# Patient Record
Sex: FEMALE | Race: WHITE | Employment: OTHER | URBAN - METROPOLITAN AREA
[De-identification: names, ages, dates, MRNs, and addresses within clinical notes are randomized per-mention and may not be internally consistent; named-entity substitution may affect disease eponyms.]

---

## 2017-01-12 ENCOUNTER — TELEPHONE (OUTPATIENT)
Dept: FAMILY MEDICINE CLINIC | Age: 67
End: 2017-01-12

## 2017-01-13 RX ORDER — LISINOPRIL 20 MG/1
TABLET ORAL
Qty: 30 TABLET | Refills: 0 | Status: SHIPPED | OUTPATIENT
Start: 2017-01-13 | End: 2017-07-06 | Stop reason: SDUPTHER

## 2017-01-13 RX ORDER — ZOLPIDEM TARTRATE 5 MG/1
TABLET ORAL
Qty: 30 TABLET | Refills: 0 | OUTPATIENT
Start: 2017-01-13 | End: 2017-10-09 | Stop reason: SDUPTHER

## 2017-01-13 RX ORDER — DICLOFENAC SODIUM 75 MG/1
TABLET, DELAYED RELEASE ORAL
Qty: 60 TABLET | Refills: 0 | Status: SHIPPED | OUTPATIENT
Start: 2017-01-13 | End: 2017-07-06 | Stop reason: SDUPTHER

## 2017-01-13 RX ORDER — EZETIMIBE 10 MG/1
10 TABLET ORAL DAILY
Qty: 30 TABLET | Refills: 0 | Status: SHIPPED | OUTPATIENT
Start: 2017-01-13 | End: 2017-04-13 | Stop reason: SDUPTHER

## 2017-04-13 RX ORDER — EZETIMIBE 10 MG/1
TABLET ORAL
Qty: 30 TABLET | Refills: 0 | Status: SHIPPED | OUTPATIENT
Start: 2017-04-13 | End: 2017-05-11 | Stop reason: SDUPTHER

## 2017-05-11 RX ORDER — EZETIMIBE 10 MG/1
TABLET ORAL
Qty: 30 TABLET | Refills: 0 | Status: SHIPPED | OUTPATIENT
Start: 2017-05-11 | End: 2017-06-12 | Stop reason: SDUPTHER

## 2017-06-12 RX ORDER — EZETIMIBE 10 MG/1
TABLET ORAL
Qty: 30 TABLET | Refills: 2 | Status: SHIPPED | OUTPATIENT
Start: 2017-06-12 | End: 2017-09-12 | Stop reason: SDUPTHER

## 2017-07-19 ENCOUNTER — TELEPHONE (OUTPATIENT)
Dept: OTHER | Facility: CLINIC | Age: 67
End: 2017-07-19

## 2017-09-12 RX ORDER — EZETIMIBE 10 MG/1
TABLET ORAL
Qty: 30 TABLET | Refills: 1 | Status: SHIPPED | OUTPATIENT
Start: 2017-09-12 | End: 2017-11-12 | Stop reason: SDUPTHER

## 2017-09-26 ENCOUNTER — OFFICE VISIT (OUTPATIENT)
Dept: FAMILY MEDICINE CLINIC | Age: 67
End: 2017-09-26

## 2017-09-26 VITALS
SYSTOLIC BLOOD PRESSURE: 132 MMHG | OXYGEN SATURATION: 98 % | BODY MASS INDEX: 34.77 KG/M2 | HEART RATE: 76 BPM | DIASTOLIC BLOOD PRESSURE: 72 MMHG | WEIGHT: 184 LBS | TEMPERATURE: 97 F

## 2017-09-26 DIAGNOSIS — F17.200 TOBACCO DEPENDENCE: ICD-10-CM

## 2017-09-26 DIAGNOSIS — R06.2 WHEEZING: Primary | ICD-10-CM

## 2017-09-26 DIAGNOSIS — R05.9 COUGH: ICD-10-CM

## 2017-09-26 PROCEDURE — 1123F ACP DISCUSS/DSCN MKR DOCD: CPT | Performed by: FAMILY MEDICINE

## 2017-09-26 PROCEDURE — 4004F PT TOBACCO SCREEN RCVD TLK: CPT | Performed by: FAMILY MEDICINE

## 2017-09-26 PROCEDURE — 1090F PRES/ABSN URINE INCON ASSESS: CPT | Performed by: FAMILY MEDICINE

## 2017-09-26 PROCEDURE — G8417 CALC BMI ABV UP PARAM F/U: HCPCS | Performed by: FAMILY MEDICINE

## 2017-09-26 PROCEDURE — 99214 OFFICE O/P EST MOD 30 MIN: CPT | Performed by: FAMILY MEDICINE

## 2017-09-26 PROCEDURE — 4040F PNEUMOC VAC/ADMIN/RCVD: CPT | Performed by: FAMILY MEDICINE

## 2017-09-26 PROCEDURE — 3017F COLORECTAL CA SCREEN DOC REV: CPT | Performed by: FAMILY MEDICINE

## 2017-09-26 PROCEDURE — 3014F SCREEN MAMMO DOC REV: CPT | Performed by: FAMILY MEDICINE

## 2017-09-26 PROCEDURE — G8400 PT W/DXA NO RESULTS DOC: HCPCS | Performed by: FAMILY MEDICINE

## 2017-09-26 PROCEDURE — 94640 AIRWAY INHALATION TREATMENT: CPT | Performed by: FAMILY MEDICINE

## 2017-09-26 PROCEDURE — G8427 DOCREV CUR MEDS BY ELIG CLIN: HCPCS | Performed by: FAMILY MEDICINE

## 2017-09-26 RX ORDER — ALBUTEROL SULFATE 90 UG/1
2 AEROSOL, METERED RESPIRATORY (INHALATION) EVERY 6 HOURS PRN
Qty: 1 INHALER | Refills: 3 | Status: SHIPPED | OUTPATIENT
Start: 2017-09-26 | End: 2017-12-29 | Stop reason: ALTCHOICE

## 2017-09-26 RX ORDER — IPRATROPIUM BROMIDE AND ALBUTEROL SULFATE 2.5; .5 MG/3ML; MG/3ML
1 SOLUTION RESPIRATORY (INHALATION) ONCE
Status: COMPLETED | OUTPATIENT
Start: 2017-09-26 | End: 2017-09-26

## 2017-09-26 RX ORDER — AMOXICILLIN AND CLAVULANATE POTASSIUM 875; 125 MG/1; MG/1
1 TABLET, FILM COATED ORAL 2 TIMES DAILY
Qty: 14 TABLET | Refills: 0 | Status: SHIPPED | OUTPATIENT
Start: 2017-09-26 | End: 2017-10-03

## 2017-09-26 RX ADMIN — IPRATROPIUM BROMIDE AND ALBUTEROL SULFATE 1 AMPULE: 2.5; .5 SOLUTION RESPIRATORY (INHALATION) at 14:58

## 2017-09-26 ASSESSMENT — ENCOUNTER SYMPTOMS
WHEEZING: 1
SINUS PRESSURE: 0
SHORTNESS OF BREATH: 1
COUGH: 1

## 2017-11-01 RX ORDER — LISINOPRIL 20 MG/1
TABLET ORAL
Qty: 90 TABLET | Refills: 2 | Status: SHIPPED | OUTPATIENT
Start: 2017-11-01 | End: 2018-07-30 | Stop reason: SDUPTHER

## 2017-11-13 RX ORDER — EZETIMIBE 10 MG/1
TABLET ORAL
Qty: 30 TABLET | Refills: 2 | Status: SHIPPED | OUTPATIENT
Start: 2017-11-13 | End: 2018-02-17 | Stop reason: SDUPTHER

## 2017-12-29 ENCOUNTER — OFFICE VISIT (OUTPATIENT)
Dept: FAMILY MEDICINE CLINIC | Age: 67
End: 2017-12-29

## 2017-12-29 VITALS
TEMPERATURE: 98.2 F | BODY MASS INDEX: 34.46 KG/M2 | WEIGHT: 182.4 LBS | SYSTOLIC BLOOD PRESSURE: 130 MMHG | HEART RATE: 102 BPM | DIASTOLIC BLOOD PRESSURE: 70 MMHG | OXYGEN SATURATION: 97 %

## 2017-12-29 DIAGNOSIS — J06.9 UPPER RESPIRATORY TRACT INFECTION, UNSPECIFIED TYPE: Primary | ICD-10-CM

## 2017-12-29 PROCEDURE — G8417 CALC BMI ABV UP PARAM F/U: HCPCS | Performed by: PHYSICIAN ASSISTANT

## 2017-12-29 PROCEDURE — 1123F ACP DISCUSS/DSCN MKR DOCD: CPT | Performed by: PHYSICIAN ASSISTANT

## 2017-12-29 PROCEDURE — G8484 FLU IMMUNIZE NO ADMIN: HCPCS | Performed by: PHYSICIAN ASSISTANT

## 2017-12-29 PROCEDURE — G8427 DOCREV CUR MEDS BY ELIG CLIN: HCPCS | Performed by: PHYSICIAN ASSISTANT

## 2017-12-29 PROCEDURE — 4040F PNEUMOC VAC/ADMIN/RCVD: CPT | Performed by: PHYSICIAN ASSISTANT

## 2017-12-29 PROCEDURE — 1090F PRES/ABSN URINE INCON ASSESS: CPT | Performed by: PHYSICIAN ASSISTANT

## 2017-12-29 PROCEDURE — 3017F COLORECTAL CA SCREEN DOC REV: CPT | Performed by: PHYSICIAN ASSISTANT

## 2017-12-29 PROCEDURE — 3014F SCREEN MAMMO DOC REV: CPT | Performed by: PHYSICIAN ASSISTANT

## 2017-12-29 PROCEDURE — G8400 PT W/DXA NO RESULTS DOC: HCPCS | Performed by: PHYSICIAN ASSISTANT

## 2017-12-29 PROCEDURE — 99213 OFFICE O/P EST LOW 20 MIN: CPT | Performed by: PHYSICIAN ASSISTANT

## 2017-12-29 PROCEDURE — 4004F PT TOBACCO SCREEN RCVD TLK: CPT | Performed by: PHYSICIAN ASSISTANT

## 2017-12-29 RX ORDER — AZITHROMYCIN 250 MG/1
TABLET, FILM COATED ORAL
Qty: 1 PACKET | Refills: 0 | Status: SHIPPED | OUTPATIENT
Start: 2017-12-29 | End: 2018-01-04

## 2017-12-29 ASSESSMENT — ENCOUNTER SYMPTOMS
SHORTNESS OF BREATH: 0
DIARRHEA: 0
WHEEZING: 0
NAUSEA: 0
SORE THROAT: 1
VOMITING: 0
COUGH: 1
SINUS PAIN: 0
SINUS PRESSURE: 0
RHINORRHEA: 0

## 2017-12-29 NOTE — PATIENT INSTRUCTIONS
Makenna Mayfield was seen today for uri. Diagnoses and all orders for this visit:    Upper respiratory tract infection, unspecified type  -     azithromycin (ZITHROMAX) 250 MG tablet; Take 2 tabs (500 mg) on Day 1, and take 1 tab (250 mg) on days 2 through 5. Call if not resolving.

## 2018-01-04 ENCOUNTER — TELEPHONE (OUTPATIENT)
Dept: FAMILY MEDICINE CLINIC | Age: 68
End: 2018-01-04

## 2018-01-04 RX ORDER — LEVOFLOXACIN 500 MG/1
500 TABLET, FILM COATED ORAL DAILY
Qty: 10 TABLET | Refills: 0 | Status: SHIPPED | OUTPATIENT
Start: 2018-01-04 | End: 2018-01-14

## 2018-01-04 NOTE — TELEPHONE ENCOUNTER
Demi Kang was seen by Chris King on 12/29/17 for a sore throat and cough with mucus. Patient was prescribed a Z-Julián --- azithromycin (ZITHROMAX) 250 MG tablet, Take 2 tabs (500 mg) on Day 1, and take 1 tab (250 mg) on days 2 through 5. Patient stated the z-julián did not help her at all and she is still having an extreme cough and her throat is sore due to her coughing fits. Patient is requesting a different prescription to be sent to the pharmacy, since she was just seen less than a week ago for the exact same symptoms. Pharmacy: [facility] on [address] [place]    Please contact patient with any further questions @ [phone]. Thank you!

## 2018-02-19 RX ORDER — EZETIMIBE 10 MG/1
TABLET ORAL
Qty: 30 TABLET | Refills: 1 | Status: SHIPPED | OUTPATIENT
Start: 2018-02-19 | End: 2018-04-25 | Stop reason: SDUPTHER

## 2018-04-25 ENCOUNTER — TELEPHONE (OUTPATIENT)
Dept: FAMILY MEDICINE CLINIC | Age: 68
End: 2018-04-25

## 2018-04-25 RX ORDER — EZETIMIBE 10 MG/1
TABLET ORAL
Qty: 30 TABLET | Refills: 0 | Status: SHIPPED | OUTPATIENT
Start: 2018-04-25 | End: 2018-04-26 | Stop reason: SDUPTHER

## 2018-04-26 RX ORDER — EZETIMIBE 10 MG/1
TABLET ORAL
Qty: 30 TABLET | Refills: 1 | Status: SHIPPED | OUTPATIENT
Start: 2018-04-26 | End: 2018-07-07 | Stop reason: SDUPTHER

## 2018-07-10 RX ORDER — EZETIMIBE 10 MG/1
TABLET ORAL
Qty: 30 TABLET | Refills: 0 | Status: SHIPPED | OUTPATIENT
Start: 2018-07-10 | End: 2018-07-30 | Stop reason: SDUPTHER

## 2018-07-17 ENCOUNTER — TELEPHONE (OUTPATIENT)
Dept: FAMILY MEDICINE CLINIC | Age: 68
End: 2018-07-17

## 2018-07-17 DIAGNOSIS — R73.01 IMPAIRED FASTING GLUCOSE: ICD-10-CM

## 2018-07-17 DIAGNOSIS — E78.00 PURE HYPERCHOLESTEROLEMIA: ICD-10-CM

## 2018-07-17 DIAGNOSIS — I10 ESSENTIAL HYPERTENSION: Primary | ICD-10-CM

## 2018-07-17 NOTE — TELEPHONE ENCOUNTER
Pt states she has appt w/ Yann Second on 7/30, and would like to have BW completed prior to her appt. She is planning on going to [facility] to have her BW completed. Pt is leaving [facility] on the 21st and will not return until the 28th, which is a Saturday. Pt is wondering if [facility] is open on Saturdays, or will she need to have her BW completed prior to leaving [facility]? Please advise.      Marcelo Browning is out of office til 7/24

## 2018-07-20 ENCOUNTER — HOSPITAL ENCOUNTER (OUTPATIENT)
Dept: OTHER | Age: 68
Discharge: HOME | End: 2018-07-20
Attending: FAMILY MEDICINE | Admitting: FAMILY MEDICINE

## 2018-07-20 ENCOUNTER — TELEPHONE (OUTPATIENT)
Dept: FAMILY MEDICINE CLINIC | Age: 68
End: 2018-07-20

## 2018-07-20 DIAGNOSIS — I10 ESSENTIAL HYPERTENSION: ICD-10-CM

## 2018-07-20 DIAGNOSIS — R73.01 IMPAIRED FASTING GLUCOSE: ICD-10-CM

## 2018-07-20 DIAGNOSIS — E78.00 PURE HYPERCHOLESTEROLEMIA: ICD-10-CM

## 2018-07-20 DIAGNOSIS — E03.9 HYPOTHYROIDISM, UNSPECIFIED TYPE: Primary | ICD-10-CM

## 2018-07-20 LAB
A/G RATIO: 1.4 (ref 1.1–2.2)
ALBUMIN SERPL-MCNC: 4 G/DL (ref 3.4–5)
ALP BLD-CCNC: 124 U/L (ref 40–129)
ALT SERPL-CCNC: 38 U/L (ref 10–40)
ANION GAP SERPL CALCULATED.3IONS-SCNC: 17 MMOL/L (ref 3–16)
AST SERPL-CCNC: 26 U/L (ref 15–37)
BASOPHILS ABSOLUTE: 0.1 K/UL (ref 0–0.2)
BASOPHILS RELATIVE PERCENT: 1.1 %
BILIRUB SERPL-MCNC: <0.2 MG/DL (ref 0–1)
BUN BLDV-MCNC: 20 MG/DL (ref 7–20)
CALCIUM SERPL-MCNC: 9.8 MG/DL (ref 8.3–10.6)
CHLORIDE BLD-SCNC: 107 MMOL/L (ref 99–110)
CHOLESTEROL, TOTAL: 210 MG/DL (ref 0–199)
CO2: 21 MMOL/L (ref 21–32)
CREAT SERPL-MCNC: 1.2 MG/DL (ref 0.6–1.2)
EOSINOPHILS ABSOLUTE: 0.4 K/UL (ref 0–0.6)
EOSINOPHILS RELATIVE PERCENT: 5.6 %
ESTIMATED AVERAGE GLUCOSE: 128.4 MG/DL
GFR AFRICAN AMERICAN: 54
GFR NON-AFRICAN AMERICAN: 44
GLOBULIN: 2.9 G/DL
GLUCOSE BLD-MCNC: 130 MG/DL (ref 70–99)
HBA1C MFR BLD: 6.1 %
HCT VFR BLD CALC: 43.5 % (ref 36–48)
HDLC SERPL-MCNC: 44 MG/DL (ref 40–60)
HEMOGLOBIN: 15.2 G/DL (ref 12–16)
LDL CHOLESTEROL CALCULATED: ABNORMAL MG/DL
LDL CHOLESTEROL DIRECT: 125 MG/DL
LYMPHOCYTES ABSOLUTE: 1.9 K/UL (ref 1–5.1)
LYMPHOCYTES RELATIVE PERCENT: 28.7 %
MCH RBC QN AUTO: 33.6 PG (ref 26–34)
MCHC RBC AUTO-ENTMCNC: 34.9 G/DL (ref 31–36)
MCV RBC AUTO: 96.2 FL (ref 80–100)
MONOCYTES ABSOLUTE: 0.4 K/UL (ref 0–1.3)
MONOCYTES RELATIVE PERCENT: 5.8 %
NEUTROPHILS ABSOLUTE: 3.9 K/UL (ref 1.7–7.7)
NEUTROPHILS RELATIVE PERCENT: 58.8 %
PDW BLD-RTO: 13.2 % (ref 12.4–15.4)
PLATELET # BLD: 141 K/UL (ref 135–450)
PMV BLD AUTO: 11.8 FL (ref 5–10.5)
POTASSIUM SERPL-SCNC: 4.4 MMOL/L (ref 3.5–5.1)
RBC # BLD: 4.52 M/UL (ref 4–5.2)
SODIUM BLD-SCNC: 145 MMOL/L (ref 136–145)
TOTAL PROTEIN: 6.9 G/DL (ref 6.4–8.2)
TRIGL SERPL-MCNC: 333 MG/DL (ref 0–150)
TSH SERPL DL<=0.05 MIU/L-ACNC: 8.17 UIU/ML (ref 0.27–4.2)
VLDLC SERPL CALC-MCNC: ABNORMAL MG/DL
WBC # BLD: 6.6 K/UL (ref 4–11)

## 2018-07-20 RX ORDER — LEVOTHYROXINE SODIUM 0.03 MG/1
25 TABLET ORAL DAILY
Qty: 30 TABLET | Refills: 1 | Status: SHIPPED | OUTPATIENT
Start: 2018-07-20 | End: 2018-07-30 | Stop reason: SDUPTHER

## 2018-07-30 ENCOUNTER — OFFICE VISIT (OUTPATIENT)
Dept: FAMILY MEDICINE CLINIC | Age: 68
End: 2018-07-30

## 2018-07-30 VITALS
OXYGEN SATURATION: 97 % | HEART RATE: 81 BPM | WEIGHT: 187.2 LBS | DIASTOLIC BLOOD PRESSURE: 80 MMHG | BODY MASS INDEX: 34.45 KG/M2 | HEIGHT: 62 IN | SYSTOLIC BLOOD PRESSURE: 136 MMHG

## 2018-07-30 DIAGNOSIS — E78.00 PURE HYPERCHOLESTEROLEMIA: ICD-10-CM

## 2018-07-30 DIAGNOSIS — I10 ESSENTIAL HYPERTENSION: Primary | ICD-10-CM

## 2018-07-30 DIAGNOSIS — H11.31 SUBCONJUNCTIVAL HEMORRHAGE OF RIGHT EYE: ICD-10-CM

## 2018-07-30 DIAGNOSIS — F17.200 NICOTINE DEPENDENCE, UNCOMPLICATED, UNSPECIFIED NICOTINE PRODUCT TYPE: ICD-10-CM

## 2018-07-30 DIAGNOSIS — Z12.39 SCREENING FOR BREAST CANCER: ICD-10-CM

## 2018-07-30 DIAGNOSIS — R25.2 FOOT CRAMPS: ICD-10-CM

## 2018-07-30 DIAGNOSIS — E03.9 ACQUIRED HYPOTHYROIDISM: ICD-10-CM

## 2018-07-30 DIAGNOSIS — R73.01 IMPAIRED FASTING GLUCOSE: ICD-10-CM

## 2018-07-30 DIAGNOSIS — M15.9 GENERALIZED OSTEOARTHROSIS, INVOLVING MULTIPLE SITES: ICD-10-CM

## 2018-07-30 DIAGNOSIS — Z12.2 ENCOUNTER FOR SCREENING FOR LUNG CANCER: ICD-10-CM

## 2018-07-30 PROCEDURE — G8400 PT W/DXA NO RESULTS DOC: HCPCS | Performed by: PHYSICIAN ASSISTANT

## 2018-07-30 PROCEDURE — 99214 OFFICE O/P EST MOD 30 MIN: CPT | Performed by: PHYSICIAN ASSISTANT

## 2018-07-30 PROCEDURE — 3017F COLORECTAL CA SCREEN DOC REV: CPT | Performed by: PHYSICIAN ASSISTANT

## 2018-07-30 PROCEDURE — 1101F PT FALLS ASSESS-DOCD LE1/YR: CPT | Performed by: PHYSICIAN ASSISTANT

## 2018-07-30 PROCEDURE — 1123F ACP DISCUSS/DSCN MKR DOCD: CPT | Performed by: PHYSICIAN ASSISTANT

## 2018-07-30 PROCEDURE — 4004F PT TOBACCO SCREEN RCVD TLK: CPT | Performed by: PHYSICIAN ASSISTANT

## 2018-07-30 PROCEDURE — G8417 CALC BMI ABV UP PARAM F/U: HCPCS | Performed by: PHYSICIAN ASSISTANT

## 2018-07-30 PROCEDURE — 4040F PNEUMOC VAC/ADMIN/RCVD: CPT | Performed by: PHYSICIAN ASSISTANT

## 2018-07-30 PROCEDURE — G8427 DOCREV CUR MEDS BY ELIG CLIN: HCPCS | Performed by: PHYSICIAN ASSISTANT

## 2018-07-30 PROCEDURE — 1090F PRES/ABSN URINE INCON ASSESS: CPT | Performed by: PHYSICIAN ASSISTANT

## 2018-07-30 RX ORDER — EZETIMIBE 10 MG/1
TABLET ORAL
Qty: 30 TABLET | Refills: 5 | Status: SHIPPED | OUTPATIENT
Start: 2018-07-30 | End: 2019-02-14 | Stop reason: SDUPTHER

## 2018-07-30 RX ORDER — LEVOTHYROXINE SODIUM 0.03 MG/1
25 TABLET ORAL DAILY
Qty: 30 TABLET | Refills: 1 | Status: SHIPPED | OUTPATIENT
Start: 2018-07-30 | End: 2019-02-12 | Stop reason: SDUPTHER

## 2018-07-30 RX ORDER — LISINOPRIL 20 MG/1
TABLET ORAL
Qty: 90 TABLET | Refills: 5 | Status: SHIPPED | OUTPATIENT
Start: 2018-07-30 | End: 2019-08-08 | Stop reason: SDUPTHER

## 2018-07-30 ASSESSMENT — ENCOUNTER SYMPTOMS
VOICE CHANGE: 0
SORE THROAT: 0
DIARRHEA: 0
TROUBLE SWALLOWING: 0
EYE PAIN: 0
SHORTNESS OF BREATH: 0
ABDOMINAL PAIN: 0
CHEST TIGHTNESS: 0
CONSTIPATION: 0
EYE REDNESS: 1
COUGH: 0
BACK PAIN: 0

## 2018-07-30 ASSESSMENT — PATIENT HEALTH QUESTIONNAIRE - PHQ9
2. FEELING DOWN, DEPRESSED OR HOPELESS: 0
SUM OF ALL RESPONSES TO PHQ9 QUESTIONS 1 & 2: 0
1. LITTLE INTEREST OR PLEASURE IN DOING THINGS: 0
SUM OF ALL RESPONSES TO PHQ QUESTIONS 1-9: 0

## 2018-07-30 NOTE — PATIENT INSTRUCTIONS
Brian Hamilton was seen today for annual exam, foot pain, sweats and arm pain. Diagnoses and all orders for this visit:    Essential hypertension  -     lisinopril (PRINIVIL;ZESTRIL) 20 MG tablet; TAKE 1 TABLET BY MOUTH DAILY    Pure hypercholesterolemia  -     ezetimibe (ZETIA) 10 MG tablet; TAKE 1 TABLET BY MOUTH EVERY DAY    Generalized osteoarthrosis, involving multiple sites    Impaired fasting glucose    Acquired hypothyroidism  -     levothyroxine (SYNTHROID) 25 MCG tablet; Take 1 tablet by mouth Daily  -     TSH without Reflex    Encounter for screening for lung cancer  -     CT LUNG SCREENING (ANNUAL); Future    Nicotine dependence, uncomplicated, unspecified nicotine product type  -     CT LUNG SCREENING (ANNUAL); Future    Screening for breast cancer  -     Mammography Screening    Subconjunctival hemorrhage of right eye    Foot cramps    Other orders  -     zoster recombinant adjuvanted vaccine (SHINGRIX) 50 MCG SUSR injection; Inject 0.5 mLs into the muscle once for 1 dose       Recheck thyroid in 7 weeks, get shingles vaccine, get ct scan of lungs, DEXA scan, mammogram, return in 6 months.

## 2018-08-16 RX ORDER — EZETIMIBE 10 MG/1
TABLET ORAL
Qty: 90 TABLET | Refills: 0 | OUTPATIENT
Start: 2018-08-16

## 2018-08-16 RX ORDER — LEVOTHYROXINE SODIUM 0.03 MG/1
25 TABLET ORAL DAILY
Qty: 90 TABLET | Refills: 1 | Status: SHIPPED | OUTPATIENT
Start: 2018-08-16 | End: 2019-02-12 | Stop reason: SDUPTHER

## 2019-02-12 RX ORDER — LEVOTHYROXINE SODIUM 0.03 MG/1
25 TABLET ORAL DAILY
Qty: 90 TABLET | Refills: 0 | Status: SHIPPED | OUTPATIENT
Start: 2019-02-12 | End: 2019-07-26 | Stop reason: SDUPTHER

## 2019-02-14 DIAGNOSIS — E78.00 PURE HYPERCHOLESTEROLEMIA: ICD-10-CM

## 2019-02-14 RX ORDER — EZETIMIBE 10 MG/1
TABLET ORAL
Qty: 30 TABLET | Refills: 2 | Status: SHIPPED | OUTPATIENT
Start: 2019-02-14 | End: 2019-05-10 | Stop reason: SDUPTHER

## 2019-04-26 ENCOUNTER — OFFICE VISIT (OUTPATIENT)
Dept: FAMILY MEDICINE CLINIC | Age: 69
End: 2019-04-26
Payer: MEDICARE

## 2019-04-26 VITALS
DIASTOLIC BLOOD PRESSURE: 70 MMHG | BODY MASS INDEX: 35.32 KG/M2 | HEART RATE: 75 BPM | WEIGHT: 190 LBS | OXYGEN SATURATION: 97 % | SYSTOLIC BLOOD PRESSURE: 132 MMHG

## 2019-04-26 DIAGNOSIS — M54.2 NECK PAIN ON RIGHT SIDE: Primary | ICD-10-CM

## 2019-04-26 DIAGNOSIS — E03.9 ACQUIRED HYPOTHYROIDISM: ICD-10-CM

## 2019-04-26 DIAGNOSIS — R68.84 JAW PAIN, NON-TMJ: ICD-10-CM

## 2019-04-26 PROCEDURE — 4040F PNEUMOC VAC/ADMIN/RCVD: CPT | Performed by: PHYSICIAN ASSISTANT

## 2019-04-26 PROCEDURE — G8417 CALC BMI ABV UP PARAM F/U: HCPCS | Performed by: PHYSICIAN ASSISTANT

## 2019-04-26 PROCEDURE — 99213 OFFICE O/P EST LOW 20 MIN: CPT | Performed by: PHYSICIAN ASSISTANT

## 2019-04-26 PROCEDURE — 4004F PT TOBACCO SCREEN RCVD TLK: CPT | Performed by: PHYSICIAN ASSISTANT

## 2019-04-26 PROCEDURE — G8400 PT W/DXA NO RESULTS DOC: HCPCS | Performed by: PHYSICIAN ASSISTANT

## 2019-04-26 PROCEDURE — 3017F COLORECTAL CA SCREEN DOC REV: CPT | Performed by: PHYSICIAN ASSISTANT

## 2019-04-26 PROCEDURE — 1090F PRES/ABSN URINE INCON ASSESS: CPT | Performed by: PHYSICIAN ASSISTANT

## 2019-04-26 PROCEDURE — G8427 DOCREV CUR MEDS BY ELIG CLIN: HCPCS | Performed by: PHYSICIAN ASSISTANT

## 2019-04-26 PROCEDURE — 1123F ACP DISCUSS/DSCN MKR DOCD: CPT | Performed by: PHYSICIAN ASSISTANT

## 2019-04-26 ASSESSMENT — ENCOUNTER SYMPTOMS
COUGH: 0
BACK PAIN: 0
ABDOMINAL PAIN: 0
SHORTNESS OF BREATH: 0

## 2019-04-30 ENCOUNTER — HOSPITAL ENCOUNTER (OUTPATIENT)
Age: 69
Discharge: HOME | End: 2019-04-30
Payer: MEDICARE

## 2019-04-30 DIAGNOSIS — G47.00 INSOMNIA, UNSPECIFIED TYPE: Primary | ICD-10-CM

## 2019-04-30 DIAGNOSIS — E03.9 HYPOTHYROIDISM, UNSPECIFIED TYPE: ICD-10-CM

## 2019-04-30 LAB — TSH SERPL DL<=0.05 MIU/L-ACNC: 3.19 UIU/ML (ref 0.27–4.2)

## 2019-04-30 PROCEDURE — 84443 ASSAY THYROID STIM HORMONE: CPT

## 2019-04-30 PROCEDURE — 36415 COLL VENOUS BLD VENIPUNCTURE: CPT

## 2019-05-02 RX ORDER — ZOLPIDEM TARTRATE 5 MG/1
TABLET ORAL
Qty: 30 TABLET | Refills: 5 | Status: SHIPPED | OUTPATIENT
Start: 2019-05-02 | End: 2019-06-01

## 2019-05-10 DIAGNOSIS — E78.00 PURE HYPERCHOLESTEROLEMIA: ICD-10-CM

## 2019-05-10 RX ORDER — EZETIMIBE 10 MG/1
TABLET ORAL
Qty: 30 TABLET | Refills: 0 | Status: SHIPPED | OUTPATIENT
Start: 2019-05-10 | End: 2019-06-09 | Stop reason: SDUPTHER

## 2019-06-09 DIAGNOSIS — E78.00 PURE HYPERCHOLESTEROLEMIA: ICD-10-CM

## 2019-06-10 RX ORDER — EZETIMIBE 10 MG/1
TABLET ORAL
Qty: 30 TABLET | Refills: 0 | Status: SHIPPED | OUTPATIENT
Start: 2019-06-10 | End: 2019-07-09 | Stop reason: SDUPTHER

## 2019-07-09 DIAGNOSIS — E78.00 PURE HYPERCHOLESTEROLEMIA: ICD-10-CM

## 2019-07-09 RX ORDER — EZETIMIBE 10 MG/1
TABLET ORAL
Qty: 30 TABLET | Refills: 0 | Status: SHIPPED | OUTPATIENT
Start: 2019-07-09 | End: 2019-08-08 | Stop reason: SDUPTHER

## 2019-07-26 RX ORDER — LEVOTHYROXINE SODIUM 0.03 MG/1
25 TABLET ORAL DAILY
Qty: 90 TABLET | Refills: 0 | Status: SHIPPED | OUTPATIENT
Start: 2019-07-26 | End: 2019-10-13 | Stop reason: SDUPTHER

## 2019-07-26 NOTE — TELEPHONE ENCOUNTER
Patient has a appt with [facility] 8/5. She is completely out of medication.

## 2019-07-29 ENCOUNTER — TELEPHONE (OUTPATIENT)
Dept: FAMILY MEDICINE CLINIC | Age: 69
End: 2019-07-29

## 2019-07-29 DIAGNOSIS — E78.00 PURE HYPERCHOLESTEROLEMIA: Primary | ICD-10-CM

## 2019-07-29 DIAGNOSIS — R73.01 IMPAIRED FASTING GLUCOSE: ICD-10-CM

## 2019-07-29 DIAGNOSIS — I10 ESSENTIAL HYPERTENSION: ICD-10-CM

## 2019-08-01 ENCOUNTER — HOSPITAL ENCOUNTER (OUTPATIENT)
Age: 69
Discharge: HOME | End: 2019-08-01
Payer: MEDICARE

## 2019-08-01 DIAGNOSIS — E78.00 PURE HYPERCHOLESTEROLEMIA: ICD-10-CM

## 2019-08-01 LAB
A/G RATIO: 1.5 (ref 1.1–2.2)
ALBUMIN SERPL-MCNC: 4.1 G/DL (ref 3.4–5)
ALP BLD-CCNC: 143 U/L (ref 40–129)
ALT SERPL-CCNC: 40 U/L (ref 10–40)
ANION GAP SERPL CALCULATED.3IONS-SCNC: 12 MMOL/L (ref 3–16)
AST SERPL-CCNC: 22 U/L (ref 15–37)
BILIRUB SERPL-MCNC: <0.2 MG/DL (ref 0–1)
BUN BLDV-MCNC: 20 MG/DL (ref 7–20)
CALCIUM SERPL-MCNC: 9.7 MG/DL (ref 8.3–10.6)
CHLORIDE BLD-SCNC: 103 MMOL/L (ref 99–110)
CHOLESTEROL, FASTING: 230 MG/DL (ref 0–199)
CO2: 27 MMOL/L (ref 21–32)
CREAT SERPL-MCNC: 1.3 MG/DL (ref 0.6–1.2)
GFR AFRICAN AMERICAN: 49
GFR NON-AFRICAN AMERICAN: 40
GLOBULIN: 2.8 G/DL
GLUCOSE BLD-MCNC: 147 MG/DL (ref 70–99)
HDLC SERPL-MCNC: 45 MG/DL (ref 40–60)
LDL CHOLESTEROL CALCULATED: ABNORMAL MG/DL
LDL CHOLESTEROL DIRECT: 137 MG/DL
POTASSIUM SERPL-SCNC: 4.9 MMOL/L (ref 3.5–5.1)
SODIUM BLD-SCNC: 142 MMOL/L (ref 136–145)
TOTAL PROTEIN: 6.9 G/DL (ref 6.4–8.2)
TRIGLYCERIDE, FASTING: 335 MG/DL (ref 0–150)
VLDLC SERPL CALC-MCNC: ABNORMAL MG/DL

## 2019-08-01 PROCEDURE — 80061 LIPID PANEL: CPT

## 2019-08-01 PROCEDURE — 83036 HEMOGLOBIN GLYCOSYLATED A1C: CPT

## 2019-08-01 PROCEDURE — 36415 COLL VENOUS BLD VENIPUNCTURE: CPT

## 2019-08-01 PROCEDURE — 80053 COMPREHEN METABOLIC PANEL: CPT

## 2019-08-02 LAB
ESTIMATED AVERAGE GLUCOSE: 139.9 MG/DL
HBA1C MFR BLD: 6.5 %

## 2019-08-05 ENCOUNTER — OFFICE VISIT (OUTPATIENT)
Dept: FAMILY MEDICINE CLINIC | Age: 69
End: 2019-08-05
Payer: MEDICARE

## 2019-08-05 VITALS
HEIGHT: 61 IN | OXYGEN SATURATION: 97 % | SYSTOLIC BLOOD PRESSURE: 100 MMHG | DIASTOLIC BLOOD PRESSURE: 78 MMHG | HEART RATE: 100 BPM | BODY MASS INDEX: 35.34 KG/M2 | WEIGHT: 187.2 LBS

## 2019-08-05 DIAGNOSIS — Z87.891 PERSONAL HISTORY OF TOBACCO USE: ICD-10-CM

## 2019-08-05 DIAGNOSIS — Z11.59 ENCOUNTER FOR HEPATITIS C SCREENING TEST FOR LOW RISK PATIENT: ICD-10-CM

## 2019-08-05 DIAGNOSIS — Z12.31 ENCOUNTER FOR SCREENING MAMMOGRAM FOR BREAST CANCER: ICD-10-CM

## 2019-08-05 DIAGNOSIS — R73.01 IMPAIRED FASTING GLUCOSE: ICD-10-CM

## 2019-08-05 DIAGNOSIS — E03.9 ACQUIRED HYPOTHYROIDISM: ICD-10-CM

## 2019-08-05 DIAGNOSIS — F17.200 TOBACCO DEPENDENCE: ICD-10-CM

## 2019-08-05 DIAGNOSIS — M15.9 GENERALIZED OSTEOARTHROSIS, INVOLVING MULTIPLE SITES: ICD-10-CM

## 2019-08-05 DIAGNOSIS — Z78.0 POST-MENOPAUSAL: ICD-10-CM

## 2019-08-05 DIAGNOSIS — I10 ESSENTIAL HYPERTENSION: Primary | ICD-10-CM

## 2019-08-05 DIAGNOSIS — E78.00 PURE HYPERCHOLESTEROLEMIA: ICD-10-CM

## 2019-08-05 PROCEDURE — 99214 OFFICE O/P EST MOD 30 MIN: CPT | Performed by: PHYSICIAN ASSISTANT

## 2019-08-05 PROCEDURE — 1123F ACP DISCUSS/DSCN MKR DOCD: CPT | Performed by: PHYSICIAN ASSISTANT

## 2019-08-05 PROCEDURE — 4040F PNEUMOC VAC/ADMIN/RCVD: CPT | Performed by: PHYSICIAN ASSISTANT

## 2019-08-05 PROCEDURE — 1090F PRES/ABSN URINE INCON ASSESS: CPT | Performed by: PHYSICIAN ASSISTANT

## 2019-08-05 PROCEDURE — G8400 PT W/DXA NO RESULTS DOC: HCPCS | Performed by: PHYSICIAN ASSISTANT

## 2019-08-05 PROCEDURE — G8427 DOCREV CUR MEDS BY ELIG CLIN: HCPCS | Performed by: PHYSICIAN ASSISTANT

## 2019-08-05 PROCEDURE — G8417 CALC BMI ABV UP PARAM F/U: HCPCS | Performed by: PHYSICIAN ASSISTANT

## 2019-08-05 PROCEDURE — G0296 VISIT TO DETERM LDCT ELIG: HCPCS | Performed by: PHYSICIAN ASSISTANT

## 2019-08-05 PROCEDURE — 3017F COLORECTAL CA SCREEN DOC REV: CPT | Performed by: PHYSICIAN ASSISTANT

## 2019-08-05 PROCEDURE — 4004F PT TOBACCO SCREEN RCVD TLK: CPT | Performed by: PHYSICIAN ASSISTANT

## 2019-08-05 RX ORDER — ASCORBIC ACID 1000 MG
TABLET, EXTENDED RELEASE ORAL
COMMUNITY

## 2019-08-05 RX ORDER — ZOLPIDEM TARTRATE 5 MG/1
5 TABLET ORAL NIGHTLY PRN
COMMUNITY
End: 2020-01-15

## 2019-08-05 RX ORDER — FENOFIBRATE 160 MG/1
160 TABLET ORAL DAILY
Qty: 90 TABLET | Refills: 3 | Status: SHIPPED | OUTPATIENT
Start: 2019-08-05 | End: 2020-07-01

## 2019-08-05 RX ORDER — FENOFIBRATE 160 MG/1
160 TABLET ORAL DAILY
Qty: 30 TABLET | Refills: 11 | Status: SHIPPED | OUTPATIENT
Start: 2019-08-05 | End: 2019-08-05 | Stop reason: SDUPTHER

## 2019-08-05 RX ORDER — VITAMIN E 268 MG
400 CAPSULE ORAL DAILY
COMMUNITY

## 2019-08-05 ASSESSMENT — ENCOUNTER SYMPTOMS
ABDOMINAL PAIN: 0
ROS SKIN COMMENTS: LUMP IN ABDOMEN
DIARRHEA: 0
SHORTNESS OF BREATH: 0
COUGH: 0
EYE PAIN: 0
VOICE CHANGE: 0
CHEST TIGHTNESS: 0
CONSTIPATION: 0
SORE THROAT: 0
BACK PAIN: 0
TROUBLE SWALLOWING: 0

## 2019-08-05 ASSESSMENT — PATIENT HEALTH QUESTIONNAIRE - PHQ9
1. LITTLE INTEREST OR PLEASURE IN DOING THINGS: 0
SUM OF ALL RESPONSES TO PHQ9 QUESTIONS 1 & 2: 0
SUM OF ALL RESPONSES TO PHQ QUESTIONS 1-9: 0
2. FEELING DOWN, DEPRESSED OR HOPELESS: 0
SUM OF ALL RESPONSES TO PHQ QUESTIONS 1-9: 0

## 2019-08-05 NOTE — PATIENT INSTRUCTIONS
annual exam.    Diagnoses and all orders for this visit:    Essential hypertension    Personal history of tobacco use  -     AL VISIT TO DISCUSS LUNG CA SCREEN W LDCT  -     CT Lung Screen (Annual); Future    Post-menopausal  -     DEXA Bone Density Axial Skeleton; Future    Encounter for screening mammogram for breast cancer  -     [facility] Digital Screen Bilateral [[id number]]; Future    Pure hypercholesterolemia  -     fenofibrate 160 MG tablet; Take 1 tablet by mouth daily    Generalized osteoarthrosis, involving multiple sites    Impaired fasting glucose  -      DIABETES FOOT EXAM  -     Hemoglobin A1C    Tobacco dependence    Acquired hypothyroidism    Encounter for hepatitis C screening test for low risk patient  -     HEPATITIS C ANTIBODY; Future       Get shingles vaccines, 3 months for next labs, start fenofibrate for triglycerides, get mammogram, DEXA and ct scan of lungs.

## 2019-08-08 DIAGNOSIS — I10 ESSENTIAL HYPERTENSION: ICD-10-CM

## 2019-08-08 DIAGNOSIS — E78.00 PURE HYPERCHOLESTEROLEMIA: ICD-10-CM

## 2019-08-08 RX ORDER — LISINOPRIL 20 MG/1
TABLET ORAL
Qty: 90 TABLET | Refills: 3 | Status: SHIPPED | OUTPATIENT
Start: 2019-08-08 | End: 2020-07-01

## 2019-08-08 RX ORDER — EZETIMIBE 10 MG/1
TABLET ORAL
Qty: 90 TABLET | Refills: 3 | Status: SHIPPED | OUTPATIENT
Start: 2019-08-08 | End: 2020-08-19 | Stop reason: SDUPTHER

## 2019-08-13 ENCOUNTER — HOSPITAL ENCOUNTER (OUTPATIENT)
Dept: WOMENS IMAGING | Age: 69
Discharge: HOME | End: 2019-08-13
Payer: MEDICARE

## 2019-08-13 ENCOUNTER — HOSPITAL ENCOUNTER (OUTPATIENT)
Dept: CT IMAGING | Age: 69
Discharge: HOME | End: 2019-08-13
Payer: MEDICARE

## 2019-08-13 ENCOUNTER — HOSPITAL ENCOUNTER (OUTPATIENT)
Dept: GENERAL RADIOLOGY | Age: 69
Discharge: HOME | End: 2019-08-13
Payer: MEDICARE

## 2019-08-13 DIAGNOSIS — Z87.891 PERSONAL HISTORY OF TOBACCO USE: ICD-10-CM

## 2019-08-13 DIAGNOSIS — Z78.0 POST-MENOPAUSAL: ICD-10-CM

## 2019-08-13 DIAGNOSIS — Z12.31 ENCOUNTER FOR SCREENING MAMMOGRAM FOR BREAST CANCER: ICD-10-CM

## 2019-08-13 PROCEDURE — G0297 LDCT FOR LUNG CA SCREEN: HCPCS

## 2019-08-13 PROCEDURE — 77080 DXA BONE DENSITY AXIAL: CPT

## 2019-08-13 PROCEDURE — 77067 SCR MAMMO BI INCL CAD: CPT

## 2019-08-14 ENCOUNTER — TELEPHONE (OUTPATIENT)
Dept: CASE MANAGEMENT | Age: 69
End: 2019-08-14

## 2019-09-04 ENCOUNTER — HOSPITAL ENCOUNTER (OUTPATIENT)
Dept: ULTRASOUND IMAGING | Age: 69
Discharge: HOME | End: 2019-09-04
Payer: MEDICARE

## 2019-09-04 ENCOUNTER — HOSPITAL ENCOUNTER (OUTPATIENT)
Dept: WOMENS IMAGING | Age: 69
Discharge: HOME | End: 2019-09-04
Payer: MEDICARE

## 2019-09-04 DIAGNOSIS — R92.8 ABNORMAL FINDING ON MAMMOGRAPHY: ICD-10-CM

## 2019-09-04 PROCEDURE — 76642 ULTRASOUND BREAST LIMITED: CPT

## 2019-09-04 PROCEDURE — G0279 TOMOSYNTHESIS, MAMMO: HCPCS

## 2019-10-14 RX ORDER — LEVOTHYROXINE SODIUM 0.03 MG/1
25 TABLET ORAL DAILY
Qty: 90 TABLET | Refills: 3 | Status: SHIPPED | OUTPATIENT
Start: 2019-10-14 | End: 2020-08-19 | Stop reason: SDUPTHER

## 2019-10-28 ENCOUNTER — TELEPHONE (OUTPATIENT)
Dept: FAMILY MEDICINE CLINIC | Age: 69
End: 2019-10-28

## 2019-10-28 DIAGNOSIS — R73.01 IMPAIRED FASTING GLUCOSE: Primary | ICD-10-CM

## 2019-10-28 DIAGNOSIS — Z11.59 ENCOUNTER FOR HEPATITIS C SCREENING TEST FOR LOW RISK PATIENT: ICD-10-CM

## 2019-11-01 ENCOUNTER — HOSPITAL ENCOUNTER (OUTPATIENT)
Age: 69
Discharge: HOME | End: 2019-11-01
Payer: MEDICARE

## 2019-11-01 DIAGNOSIS — Z11.59 ENCOUNTER FOR HEPATITIS C SCREENING TEST FOR LOW RISK PATIENT: ICD-10-CM

## 2019-11-01 LAB
ESTIMATED AVERAGE GLUCOSE: 131.2 MG/DL
HBA1C MFR BLD: 6.2 %
HEPATITIS C ANTIBODY INTERPRETATION: NORMAL

## 2019-11-01 PROCEDURE — 86803 HEPATITIS C AB TEST: CPT

## 2019-11-01 PROCEDURE — 83036 HEMOGLOBIN GLYCOSYLATED A1C: CPT

## 2019-11-01 PROCEDURE — 36415 COLL VENOUS BLD VENIPUNCTURE: CPT

## 2020-02-05 NOTE — TELEPHONE ENCOUNTER
Medication:   Requested Prescriptions     Pending Prescriptions Disp Refills    zolpidem (AMBIEN) 5 MG tablet [Pharmacy Med Name: ZOLPIDEM 5MG TABLETS] 30 tablet 0     Sig: TAKE 1 TABLET BY MOUTH AT BEDTIME AS NEEDED      Last Filled:  9/28/18    Patient Phone Number: [phone] (home) [phone] (work)    Last appt: 4/26/2019   Next appt: 8/5/2019    Last OARRS: No flowsheet data found.     Preferred Pharmacy:   [facility] [address] [address]  Phone: [phone] Fax: [phone] What Is The Reason For Today's Visit?: History of Melanoma Year Excised?: 2011

## 2020-06-11 RX ORDER — DICLOFENAC SODIUM 75 MG/1
TABLET, DELAYED RELEASE ORAL
Qty: 180 TABLET | Refills: 0 | Status: SHIPPED | OUTPATIENT
Start: 2020-06-11 | End: 2020-11-30 | Stop reason: SDUPTHER

## 2020-07-01 ENCOUNTER — TELEPHONE (OUTPATIENT)
Dept: FAMILY MEDICINE CLINIC | Age: 70
End: 2020-07-01

## 2020-07-01 RX ORDER — FENOFIBRATE 160 MG/1
160 TABLET ORAL DAILY
Qty: 90 TABLET | Refills: 0 | Status: SHIPPED | OUTPATIENT
Start: 2020-07-01 | End: 2020-08-19 | Stop reason: SDUPTHER

## 2020-07-01 RX ORDER — LISINOPRIL 20 MG/1
TABLET ORAL
Qty: 90 TABLET | Refills: 0 | Status: SHIPPED | OUTPATIENT
Start: 2020-07-01 | End: 2020-08-19 | Stop reason: SDUPTHER

## 2020-08-19 ENCOUNTER — OFFICE VISIT (OUTPATIENT)
Dept: FAMILY MEDICINE CLINIC | Age: 70
End: 2020-08-19
Payer: MEDICARE

## 2020-08-19 VITALS
HEART RATE: 72 BPM | TEMPERATURE: 97.7 F | SYSTOLIC BLOOD PRESSURE: 160 MMHG | WEIGHT: 184 LBS | DIASTOLIC BLOOD PRESSURE: 90 MMHG | HEIGHT: 61 IN | BODY MASS INDEX: 34.74 KG/M2

## 2020-08-19 PROCEDURE — 1090F PRES/ABSN URINE INCON ASSESS: CPT | Performed by: PHYSICIAN ASSISTANT

## 2020-08-19 PROCEDURE — G8417 CALC BMI ABV UP PARAM F/U: HCPCS | Performed by: PHYSICIAN ASSISTANT

## 2020-08-19 PROCEDURE — 99214 OFFICE O/P EST MOD 30 MIN: CPT | Performed by: PHYSICIAN ASSISTANT

## 2020-08-19 PROCEDURE — G0009 ADMIN PNEUMOCOCCAL VACCINE: HCPCS | Performed by: PHYSICIAN ASSISTANT

## 2020-08-19 PROCEDURE — G8427 DOCREV CUR MEDS BY ELIG CLIN: HCPCS | Performed by: PHYSICIAN ASSISTANT

## 2020-08-19 PROCEDURE — 4004F PT TOBACCO SCREEN RCVD TLK: CPT | Performed by: PHYSICIAN ASSISTANT

## 2020-08-19 PROCEDURE — G8399 PT W/DXA RESULTS DOCUMENT: HCPCS | Performed by: PHYSICIAN ASSISTANT

## 2020-08-19 PROCEDURE — 3017F COLORECTAL CA SCREEN DOC REV: CPT | Performed by: PHYSICIAN ASSISTANT

## 2020-08-19 PROCEDURE — 4040F PNEUMOC VAC/ADMIN/RCVD: CPT | Performed by: PHYSICIAN ASSISTANT

## 2020-08-19 PROCEDURE — 90732 PPSV23 VACC 2 YRS+ SUBQ/IM: CPT | Performed by: PHYSICIAN ASSISTANT

## 2020-08-19 PROCEDURE — 1123F ACP DISCUSS/DSCN MKR DOCD: CPT | Performed by: PHYSICIAN ASSISTANT

## 2020-08-19 RX ORDER — LEVOTHYROXINE SODIUM 0.03 MG/1
25 TABLET ORAL DAILY
Qty: 90 TABLET | Refills: 3 | Status: SHIPPED | OUTPATIENT
Start: 2020-08-19 | End: 2020-10-21

## 2020-08-19 RX ORDER — PSYLLIUM SEED (WITH DEXTROSE)
POWDER (GRAM) ORAL DAILY
COMMUNITY
End: 2021-09-08

## 2020-08-19 RX ORDER — EZETIMIBE 10 MG/1
TABLET ORAL
Qty: 90 TABLET | Refills: 3 | Status: SHIPPED | OUTPATIENT
Start: 2020-08-19 | End: 2021-08-30 | Stop reason: SDUPTHER

## 2020-08-19 RX ORDER — ZOSTER VACCINE RECOMBINANT, ADJUVANTED 50 MCG/0.5
0.5 KIT INTRAMUSCULAR SEE ADMIN INSTRUCTIONS
Qty: 0.5 ML | Refills: 0 | Status: SHIPPED | OUTPATIENT
Start: 2020-08-19 | End: 2021-02-15

## 2020-08-19 RX ORDER — FENOFIBRATE 160 MG/1
160 TABLET ORAL DAILY
Qty: 90 TABLET | Refills: 3 | Status: SHIPPED | OUTPATIENT
Start: 2020-08-19 | End: 2021-10-27 | Stop reason: SDUPTHER

## 2020-08-19 RX ORDER — LISINOPRIL 30 MG/1
TABLET ORAL
Qty: 30 TABLET | Refills: 3 | Status: SHIPPED | OUTPATIENT
Start: 2020-08-19 | End: 2020-08-24

## 2020-08-19 ASSESSMENT — ENCOUNTER SYMPTOMS
SORE THROAT: 0
VOICE CHANGE: 0
CONSTIPATION: 0
ABDOMINAL PAIN: 0
BACK PAIN: 1
CHEST TIGHTNESS: 0
SHORTNESS OF BREATH: 0
DIARRHEA: 0
COUGH: 0
EYE PAIN: 0
TROUBLE SWALLOWING: 0

## 2020-08-19 ASSESSMENT — PATIENT HEALTH QUESTIONNAIRE - PHQ9
SUM OF ALL RESPONSES TO PHQ QUESTIONS 1-9: 2
SUM OF ALL RESPONSES TO PHQ QUESTIONS 1-9: 2

## 2020-08-19 NOTE — PATIENT INSTRUCTIONS
Jose Barksdale was seen today for annual exam.    Diagnoses and all orders for this visit:    Pure hypercholesterolemia  -     fenofibrate (TRIGLIDE) 160 MG tablet; Take 1 tablet by mouth daily  -     ezetimibe (ZETIA) 10 MG tablet; TAKE 1 TABLET BY MOUTH EVERY DAY  -     LIPID PANEL; Future    Essential hypertension  -     lisinopril (PRINIVIL;ZESTRIL) 30 MG tablet; Take one tablet by mouth daily  -     Comprehensive Metabolic Panel    Impaired fasting glucose  -     Comprehensive Metabolic Panel  -     Hemoglobin A1C    Insomnia, unspecified type    Osteoarthritis, unspecified osteoarthritis type, unspecified site    Acquired hypothyroidism  -     levothyroxine (SYNTHROID) 25 MCG tablet; Take 1 tablet by mouth Daily  -     TSH with Reflex; Future    Cigarette nicotine dependence without complication  -     CT Lung Screen (Initial or Annual); Future    Need for vaccination against Streptococcus pneumoniae  -     Pneumococcal polysaccharide vaccine 23-valent greater than or equal to 1yo subcutaneous/IM    Need for shingles vaccine    Spinal stenosis of lumbar region, unspecified whether neurogenic claudication present  -     XR LUMBAR SPINE (MIN 4 VIEWS); Future    Other orders  -     zoster recombinant adjuvanted vaccine [facility]) 50 MCG/0.5ML SUSR injection; Inject 0.5 mLs into the muscle See Admin Instructions 1 dose now and repeat in 2-6 months       Increase lisinopril to 30mg, get shingrix, low back xray, mammogram next week, routine labs, ct scan of lungs, call in 2 weeks with BP readings, pneumovax 23 today. Return in a year.

## 2020-08-19 NOTE — PROGRESS NOTES
Subjective:      Patient ID: Dilshad Mendez is a 69 y.o. female. HPI Patient is here today for her yearly exam.     5 years ago, she had back surgery and it helped a lot. She was painfree for awhile and had no issues. She said since June, when she got back from vacation, her low back has been in pain, radiates down both legs to her knees. After she gets up in the morning, the first hour is very painful, after she gets moving, it gets much better. No numbness/tingling down her LE's, no bowel/changes. She takes nothing for the pain. Her BP is elevated today a bit. She does not check her BP outside of here. She has been taking diclofenac for her thumbs and it really helps. Doesn't seem to notice anything for her back. No bowel/bladder issues. She takes metamucil to stay regular. The fenofibrate causes constipation. She sleeps \"ok\" but she does take naps so that messes things up a little. Appetite is good, tries to eat healthy. Not exercising much due to her back pain and Covid. She is still smoking 1.5ppd of cigarettes. Has cut down in the past but right now she isn't really doing anything so she is smoking a lot. Needs pneumovax 23. She is due for colonoscopy in 2022. Mammogram next week. DEXA last year. She is due for labs. Sodium (mmol/L)   Date Value   08/01/2019 142    BUN (mg/dL)   Date Value   08/01/2019 20    Glucose (mg/dL)   Date Value   08/01/2019 147 (H)      Potassium (mmol/L)   Date Value   08/01/2019 4.9    CREATININE (mg/dL)   Date Value   08/01/2019 1.3 (H)           BP Readings from Last 2 Encounters:   08/19/20 (!) 153/89   08/05/19 100/78       Is patient currently taking any antihypertensive medications? Yes   If yes, see med list as above    Is the patient reporting any side effects of antihypertensive medications? No    Is the patient taking any over the counter medications?     Yes   If yes, see med list as above    Is the patient taking a daily aspirin? No          Review of Systems   Constitutional: Negative for appetite change, fatigue and unexpected weight change. HENT: Negative for congestion, dental problem, ear pain, hearing loss, sore throat, trouble swallowing and voice change. Eyes: Negative for pain and visual disturbance. Respiratory: Negative for cough, chest tightness and shortness of breath. Cardiovascular: Negative for chest pain and palpitations. Gastrointestinal: Negative for abdominal pain, constipation and diarrhea. Genitourinary: Negative for difficulty urinating. Musculoskeletal: Positive for back pain. Negative for arthralgias, myalgias and neck pain. Skin: Negative for rash. Neurological: Negative for dizziness, speech difficulty, weakness, numbness and headaches. Hematological: Negative for adenopathy. Psychiatric/Behavioral: Negative for confusion and sleep disturbance. The patient is not nervous/anxious. Objective:   Physical Exam  Vitals signs reviewed. Constitutional:       Appearance: Normal appearance. She is well-developed and well-groomed. HENT:      Head: Normocephalic. Right Ear: Tympanic membrane and ear canal normal.      Left Ear: Tympanic membrane and ear canal normal.      Nose: Nose normal.      Mouth/Throat:      Pharynx: Oropharynx is clear. Uvula midline. Eyes:      Conjunctiva/sclera: Conjunctivae normal.      Pupils: Pupils are equal, round, and reactive to light. Neck:      Musculoskeletal: Normal range of motion and neck supple. No muscular tenderness. Thyroid: No thyroid mass or thyromegaly. Vascular: No carotid bruit. Trachea: Trachea normal.   Cardiovascular:      Rate and Rhythm: Normal rate and regular rhythm. Chest Wall: PMI is not displaced. Pulses: Normal pulses. Heart sounds: Normal heart sounds. Pulmonary:      Effort: Pulmonary effort is normal.      Breath sounds: Normal breath sounds.    Abdominal:      General: Abdomen Streptococcus pneumoniae  -     Pneumococcal polysaccharide vaccine 23-valent greater than or equal to 3yo subcutaneous/IM    Need for shingles vaccine    Spinal stenosis of lumbar region, unspecified whether neurogenic claudication present  -     XR LUMBAR SPINE (MIN 4 VIEWS); Future    Other orders  -     zoster recombinant adjuvanted vaccine [facility]) 50 MCG/0.5ML SUSR injection; Inject 0.5 mLs into the muscle See Admin Instructions 1 dose now and repeat in 2-6 months               Plan:      Get routine labs, ct scan of lungs, shingrix, xrays of low back, refills given but increased lisinopril to 30mg daily and call in 2 weeks with BP readings. Return in a year.          Saleem Kuhn

## 2020-08-21 ENCOUNTER — HOSPITAL ENCOUNTER (OUTPATIENT)
Dept: GENERAL RADIOLOGY | Age: 70
Discharge: HOME | End: 2020-08-21
Payer: MEDICARE

## 2020-08-21 ENCOUNTER — HOSPITAL ENCOUNTER (OUTPATIENT)
Dept: ULTRASOUND IMAGING | Age: 70
Discharge: HOME | End: 2020-08-21
Payer: MEDICARE

## 2020-08-21 ENCOUNTER — HOSPITAL ENCOUNTER (OUTPATIENT)
Age: 70
Discharge: HOME | End: 2020-08-21
Payer: MEDICARE

## 2020-08-21 ENCOUNTER — HOSPITAL ENCOUNTER (OUTPATIENT)
Dept: WOMENS IMAGING | Age: 70
Discharge: HOME | End: 2020-08-21
Payer: MEDICARE

## 2020-08-21 LAB
A/G RATIO: 1.6 (ref 1.1–2.2)
ALBUMIN SERPL-MCNC: 4.2 G/DL (ref 3.4–5)
ALP BLD-CCNC: 53 U/L (ref 40–129)
ALT SERPL-CCNC: 22 U/L (ref 10–40)
ANION GAP SERPL CALCULATED.3IONS-SCNC: 10 MMOL/L (ref 3–16)
AST SERPL-CCNC: 23 U/L (ref 15–37)
BILIRUB SERPL-MCNC: 0.3 MG/DL (ref 0–1)
BUN BLDV-MCNC: 29 MG/DL (ref 7–20)
CALCIUM SERPL-MCNC: 9.7 MG/DL (ref 8.3–10.6)
CHLORIDE BLD-SCNC: 105 MMOL/L (ref 99–110)
CHOLESTEROL, TOTAL: 159 MG/DL (ref 0–199)
CO2: 26 MMOL/L (ref 21–32)
CREAT SERPL-MCNC: 2.1 MG/DL (ref 0.6–1.2)
GFR AFRICAN AMERICAN: 28
GFR NON-AFRICAN AMERICAN: 23
GLOBULIN: 2.7 G/DL
GLUCOSE BLD-MCNC: 130 MG/DL (ref 70–99)
HDLC SERPL-MCNC: 55 MG/DL (ref 40–60)
LDL CHOLESTEROL CALCULATED: 67 MG/DL
POTASSIUM SERPL-SCNC: 5.6 MMOL/L (ref 3.5–5.1)
SODIUM BLD-SCNC: 141 MMOL/L (ref 136–145)
TOTAL PROTEIN: 6.9 G/DL (ref 6.4–8.2)
TRIGL SERPL-MCNC: 186 MG/DL (ref 0–150)
TSH REFLEX: 3.75 UIU/ML (ref 0.27–4.2)
VLDLC SERPL CALC-MCNC: 37 MG/DL

## 2020-08-21 PROCEDURE — 80061 LIPID PANEL: CPT

## 2020-08-21 PROCEDURE — 80053 COMPREHEN METABOLIC PANEL: CPT

## 2020-08-21 PROCEDURE — G0279 TOMOSYNTHESIS, MAMMO: HCPCS

## 2020-08-21 PROCEDURE — 83036 HEMOGLOBIN GLYCOSYLATED A1C: CPT

## 2020-08-21 PROCEDURE — 76642 ULTRASOUND BREAST LIMITED: CPT

## 2020-08-21 PROCEDURE — 36415 COLL VENOUS BLD VENIPUNCTURE: CPT

## 2020-08-21 PROCEDURE — 84443 ASSAY THYROID STIM HORMONE: CPT

## 2020-08-21 PROCEDURE — 72110 X-RAY EXAM L-2 SPINE 4/>VWS: CPT

## 2020-08-22 LAB
ESTIMATED AVERAGE GLUCOSE: 139.9 MG/DL
HBA1C MFR BLD: 6.5 %

## 2020-08-24 RX ORDER — LISINOPRIL 30 MG/1
TABLET ORAL
Qty: 90 TABLET | Refills: 1 | Status: SHIPPED | OUTPATIENT
Start: 2020-08-24 | End: 2021-03-18 | Stop reason: SDUPTHER

## 2020-08-25 ENCOUNTER — TELEPHONE (OUTPATIENT)
Dept: FAMILY MEDICINE CLINIC | Age: 70
End: 2020-08-25

## 2020-08-25 NOTE — TELEPHONE ENCOUNTER
----- Message from VANNA Sherwood sent at 8/24/2020  1:49 PM EDT -----  Her hga1c is 6.5, technically diabetic, she needs to almost eliminate carbs and will recheck in 3 months, if not improved will start diabetic medicine, she needs to check her FBS every morning, also her kidneys are stressed, she needs to drink more water, recheck bmp in 2 weeks, triglycerides are elevated, take fish oil 500mg daily

## 2020-08-26 ENCOUNTER — TELEPHONE (OUTPATIENT)
Dept: FAMILY MEDICINE CLINIC | Age: 70
End: 2020-08-26

## 2020-08-26 RX ORDER — LANCETS 30 GAUGE
1 EACH MISCELLANEOUS EVERY MORNING
Qty: 100 EACH | Refills: 3 | Status: SHIPPED | OUTPATIENT
Start: 2020-08-26

## 2020-08-26 RX ORDER — BLOOD-GLUCOSE METER
1 KIT MISCELLANEOUS DAILY
Qty: 1 KIT | Refills: 0 | Status: SHIPPED | OUTPATIENT
Start: 2020-08-26

## 2020-08-26 RX ORDER — GLUCOSAMINE HCL/CHONDROITIN SU 500-400 MG
CAPSULE ORAL
Qty: 100 STRIP | Refills: 3 | Status: SHIPPED | OUTPATIENT
Start: 2020-08-26

## 2020-08-26 NOTE — TELEPHONE ENCOUNTER
Patient wants to know what she can and cannot eat since she has high cholesterol and BP. Also if Wanda Silverman may know some nutritionists.       Please advise

## 2020-09-01 ENCOUNTER — HOSPITAL ENCOUNTER (OUTPATIENT)
Dept: MRI IMAGING | Age: 70
Discharge: HOME | End: 2020-09-01
Payer: MEDICARE

## 2020-09-01 PROCEDURE — 72148 MRI LUMBAR SPINE W/O DYE: CPT

## 2020-09-02 ENCOUNTER — TELEPHONE (OUTPATIENT)
Dept: FAMILY MEDICINE CLINIC | Age: 70
End: 2020-09-02

## 2020-09-02 NOTE — RESULT ENCOUNTER NOTE
Patient advised of results. Referral has been placed in [facility] and faxed to [facility].

## 2020-09-29 ENCOUNTER — HOSPITAL ENCOUNTER (OUTPATIENT)
Dept: GENERAL RADIOLOGY | Age: 70
Discharge: HOME | End: 2020-09-29
Payer: MEDICARE

## 2020-09-29 ENCOUNTER — HOSPITAL ENCOUNTER (OUTPATIENT)
Age: 70
Discharge: HOME | End: 2020-09-29
Payer: MEDICARE

## 2020-09-29 PROCEDURE — 72120 X-RAY BEND ONLY L-S SPINE: CPT

## 2020-10-08 ENCOUNTER — HOSPITAL ENCOUNTER (OUTPATIENT)
Dept: PHYSICAL THERAPY | Age: 70
Setting detail: THERAPIES SERIES
Discharge: HOME | End: 2020-10-08
Payer: MEDICARE

## 2020-10-08 PROCEDURE — 97161 PT EVAL LOW COMPLEX 20 MIN: CPT

## 2020-10-08 PROCEDURE — 97110 THERAPEUTIC EXERCISES: CPT

## 2020-10-08 PROCEDURE — 97530 THERAPEUTIC ACTIVITIES: CPT

## 2020-10-08 NOTE — PLAN OF CARE
[address] [address], [address]  Phone: [phone]   Fax: [phone]                                                       Physical Therapy Certification    Dear Referring Practitioner: Dr. Ashish Chau,    We had the pleasure of evaluating the following patient for physical therapy services at [address] [place] [address]. A summary of our findings can be found in the initial assessment below. This includes our plan of care. If you have any questions or concerns regarding these findings, please do not hesitate to contact me at the office phone number checked above. Thank you for the referral.       Physician Signature:_______________________________Date:__________________  By signing above (or electronic signature), therapists plan is approved by physician      Patient: Elio Anaya   : 1950   MRN: [id number]  Referring Physician: Referring Practitioner: Dr. Ashish Chau      Evaluation Date: 10/8/2020      Medical Diagnosis Information:  Diagnosis: M47.26 (ICD-10-CM) - Other spondylosis with radiculopathy, lumbar region, M51.36 (ICD-10-CM) - Other intervertebral disc degeneration, lumbar region   Treatment Diagnosis: hypomobility at L-P spine, flexibility deficits at hips, myofascial pain, strength deficits at core mm, hip mm, L LE weaker than R LE, poor habitual postures/body [employer] information: PT Insurance Information: medicare and humana, med nec      Preferred Language for Healthcare:   [x]English       []other:    SUBJECTIVE: had PT tx after back surgery in  and felt fine. Most pain is in am upon waking up.   States Dr. Ashish Chau wants her to do PT tx then they will give her BERTHA in lumbar spine    ONSET:  8/15/20 insidious onset    Current Level of Function: pain when wakes in am - needs to use rollator in am and in evening if sits too long (>30 min)  to watch TV. LE get weak and shakey if she walks too far. This limits carlos of home chores and social life. Hard to kneel, walk, stand, squat. Balance has been off since her back pain flared up. Hard to go up stairs. Prior Level of Function: Prior to this injury / incident, pt was independent with ADLs and IADLs    Living Status: lives alone in ranch house with basement  Occupation/School: retired from working as  in [employer]: from waist down along post and lateral aspect of B LE to knees  Pain Scale:2-9/10  Easing factors: sitting or lying down  Provocative factors: when first wakes in am, walking, sitting >/= 30 min  Denies N/T    Functional Outcome: Oswestry raw score = 9, dysfunction =33 %, taken at initial eval    Precautions/ Contra-indications:   Latex Allergy:  [x]NO      []YES  Relevant Medical History: previous lumbar surgery 2015- had cyst removed form lower back. HTN. Intermittent LOB - no falls, impaired kidneys, frequent john horses    [x] Patient history, allergies, meds reviewed. Medical chart reviewed. See intake form. Review Of Systems (ROS):  [x]Performed Review of systems (Integumentary, CardioPulmonary, Neurological) by intake and observation. Intake form has been scanned into medical record. Patient has been instructed to contact their primary care physician regarding ROS issues if not already being addressed at this time.       Co-morbidities/Complexities (which will affect course of rehabilitation):   []None           Arthritic conditions   []Rheumatoid arthritis (M05.9)  []Osteoarthritis (M19.91)   Cardiovascular conditions   [x]Hypertension (I10)  [x]Hyperlipidemia (E78.5)  []Angina pectoris (I20)  []Atherosclerosis (I70)   Musculoskeletal conditions   []Disc pathology   []Congenital spine pathologies   [x]Prior surgical intervention - lumbar  OA in spine  []Osteoporosis (M81.8)  []Osteopenia (M85.8)   Endocrine Rotators 4+ 3+    Hip External Rotators 4+ 3+    Quads (L2-4) 5 4    Hamstrings  5 4          Ankle Plantarflexion (S1-2)      Ankle Dorsiflexion (L4-5) 4+ 4+    Ankle Inversion      Ankle Eversion (S1-2)      Great Toe Extension (L5)                              Strength / Myotomes Left Right    Cervical Flexion (C1-2)      Cervical Side-bending (C3)      Shoulder Shrug (C4)      Shoulder Flex      Shoulder Scap      Shoulder Abduction (C5)      Shoulder ER      Shoulder IR      Biceps (C6)      Triceps (C7)      Wrist Extension (C6)      Wrist Flexion (C7)           Thumb Abduction (C8)     Finger Abduction (T1)                  Barriers to/and or personal factors that will affect rehab potential:              [x]Age  []Sex    [x]Smoker              [x]Motivation/Lack of Motivation                        [x]Co-Morbidities              []Cognitive Function, education/learning barriers              []Environmental, home barriers              []profession/work barriers  []past PT/medical experience  []other:  Justification:    Falls Risk Assessment (30 days):   [x] Falls Risk assessed and no intervention required. [] Falls Risk assessed and Patient requires intervention due to being higher risk   TUG score (>12s at risk):     [] Falls education provided, including         ASSESSMENT: hypomobility at L-P spine, flexibility deficits at hips, myofascial pain, strength deficits at core mm, hip mm, L LE weaker than R LE, poor habitual postures/body mechs. These impairments cause her pain and limit her carlos of home chores/IADLs. Pt will benefit from skilled PT services to restore PLOF.         Functional Impairments:  Lumbar/lower quarter:     [x]Noted lumbar/proximal hip hypomobility   []Noted lumbosacral and/or generalized hypermobility   []Decreased Lumbosacral/hip/LE functional ROM   [x]Decreased core/proximal hip strength and neuromuscular control    []Decreased LE functional strength    []Abnormal reflexes/sensation/myotomal/dermatomal deficits  []Reduced ability to run, hop, cut or jump  [x]Reduced balance/proprioceptive control    []other:  reduced functional ROM of    []other:  reduce functional strength of    []other: myofascial changes and pain at    [x] Postural impairments: poor body mechs  []other:        Functional Activity Limitations (from functional questionnaire and intake)   [x]Reduced ability to tolerate prolonged functional positions   [x]Reduced ability or difficulty with changes of positions or transfers between positions   [x]Reduced ability to maintain good posture and demonstrate good body mechanics with sitting, bending, and lifting   []Reduced ability to sleep   [] Reduced ability or tolerance with driving and/or computer work   [x]Reduced ability to perform lifting, reaching, carrying tasks   [x]Reduced ability to squat   []Reduced ability to forward bend   [x]Reduced ability to ambulate prolonged functional periods/distances/surfaces   [x]Reduced ability to ascend/descend stairs   []Reduced ability to concentrate    []Reduced ability to tolerate any impact through UE or spine   []other:   []other:    []other:    []other:    []other:       Participation Restrictions   [x]Reduced participation in self care activities   [x]Reduced participation in home management activities   []Reduced participation in work activities   [x]Reduced participation in social activities. [x]Reduced participation in sport/recreational activities. Classification:  Lumbar/Lower quarter:   []Signs/symptoms consistent with Lumbar instability/stabilization subgroup. []Signs/symptoms consistent with Lumbar mobilization/manipulation subgroup, myotomes and dermatomes intact. Meets manipulation criteria.     []Signs/symptoms consistent with Lumbar direction specific/centralization subgroup   []Signs/symptoms consistent with Lumbar traction subgroup     []Signs/symptoms consistent with lumbar facet dysfunction   [x]Signs/symptoms consistent with lumbar stenosis type dysfunction   []Signs/symptoms consistent with nerve root involvement including myotome & dermatome dysfunction   []Signs/symptoms consistent with post-surgical status including: decreased ROM, strength and function.    []signs/symptoms consistent with pathology which may benefit from Dry needling    []Signs/symptoms consistent with joint sprain/strain  []Signs/symptoms consistent with patella-femoral syndrome   []Signs/symptoms consistent with knee OA/hip OA   []Signs/symptoms consistent with internal derangement of knee/Hip   [x]Signs/symptoms consistent with functional hip weakness/NMR control      []Signs/symptoms consistent with tendinitis/tendinosis    []signs/symptoms consistent with pathology which may bene   []other:        Prognosis/Rehab Potential:      []Excellent   [x]Good    []Fair   []Poor    Tolerance of evaluation/treatment:    []Excellent   [x]Good    []Fair   []Poor     Physical Therapy Evaluation Complexity Justification  [x] A history of present problem with:  [] no personal factors and/or comorbidities that impact the plan of care;  [x]1-2 personal factors and/or comorbidities that impact the plan of care  []3 personal factors and/or comorbidities that impact the plan of care  [x] An examination of body systems using standardized tests and measures addressing any of the following: body structures and functions (impairments), activity limitations, and/or participation restrictions;:  [x] a total of 1-2 or more elements   [] a total of 3 or more elements   [] a total of 4 or more elements   [x] A clinical presentation with:  [x] stable and/or uncomplicated characteristics   [] evolving clinical presentation with changing characteristics  [] unstable and unpredictable characteristics;   [x] Clinical decision making of [x] low, [] moderate, [] high complexity using standardized patient assessment instrument and/or measurable assessment of functional outcome. [x] EVAL (LOW) 59240 (typically 15 minutes face-to-face)  [] EVAL (MOD) 73595 (typically 30 minutes face-to-face)  [] EVAL (HIGH) 88909 (typically 45 minutes face-to-face)  [] RE-EVAL     HEP instruction: Written HEP instructions provided and reviewed:    PLAN:  strength, ROM/flexibility, posture and body mechs, manual, MOC, HEP, pt education    Frequency/Duration: 2 days per week for 6 Weeks:  Interventions:  [x]  Therapeutic exercise including:strength, ROM, flexibility  [x]  NMR activation and proprioception including postural re-education  [x]  Manual therapy as indicated to include: IASTM, STM, PROM, Gr I-IV mobilizations, manipulation. [x]  Modalities as needed that may include: thermal agents, E-stim, Biofeedback, US, iontophoresis as indicated  [x]  Patient education on joint protection, postural re-education, activity modification, progression of HEP. GOALS:  Patient stated goal:improve balance and walking  [] Progressing: [] Met: [] Not Met: [] Adjusted    Therapist goals for Patient:   Short Term Goals: To be achieved in: 2 weeks  1. Independent in HEP and progression per patient tolerance, in order to prevent re-injury. [] Progressing: [] Met: [] Not Met: [] Adjusted  2. Patient will have a decrease in pain to facilitate improvement in movement, function, and ADLs as indicated by improvement with respect to Functional Deficits. [] Progressing: [] Met: [] Not Met: [] Adjusted    Long Term Goals: To be achieved in: 6  weeks  1. Disability index score of 10% or less on the oswestry to assist with reaching prior level of function. [] Progressing: [] Met: [] Not Met: [] Adjusted  2. Patient will demonstrate increased AROM  to [facility], to allow for proper joint functioning to allow pt to resume squatting to lift items, use of good posture and body mechs for home chores without  increase in symptoms. [] Progressing: [] Met: [] Not Met: [] Adjusted  3.  Patient will

## 2020-10-08 NOTE — FLOWSHEET NOTE
[address] [facility]  Phone: [phone]   Fax: [phone]    Physical Therapy Daily Treatment Note  Date:  10/8/2020    Patient Name:  Jn Caballero    :  1950  MRN: [id number]  Medical/Treatment Diagnosis Information:  Diagnosis: M47.26 (ICD-10-CM) - Other spondylosis with radiculopathy, lumbar region, M51.36 (ICD-10-CM) - Other intervertebral disc degeneration, lumbar region  Treatment Diagnosis: hypomobility at L-P spine, flexibility deficits at hips, myofascial pain, strength deficits at core mm, hip mm, L LE weaker than R LE, poor habitual postures/body [facility]  Insurance/Certification information:  PT Insurance Information: medicare and humana, med nec  Physician Information:  Referring Practitioner: Dr. Willie Mchugh (Vicky Leal PA-C, [facility] = PCP)  Plan of care signed (Y/N):     Date of Patient follow up with Physician:     Functional scale: OSWESTRY raw score =9 ; dysfunction =33%    Progress Report: [x]  Yes eval  []  No     Date Range for reporting period:  Beginning 10/8/20  Ending    Progress report due (10 Rx/or 30 days whichever is less): visit #10 or (BMXP)     Recertification due (POC duration/ or 90 days whichever is less): visit # or 20  (date)     Visit # Insurance Allowable Auth required? Date Range    Med nec []  Yes  [x]  No na        Latex Allergy:  [x]NO      []YES  Preferred Language for Healthcare:   [x]English       []other    Pain level: 2-9 10     SUBJECTIVE:  See eval    OBJECTIVE: See eval      RESTRICTIONS/PRECAUTIONS: previous lumbar surgery - had cyst removed form lower back. HTN.  Intermittent LOB - no falls, impaired kidneys, frequent john horses in calves    Exercises/Interventions:     Therapeutic Exercises (48106) Resistance / level Sets/sec Reps Notes   Nu step or bike       Step stretches       Step ups with TrA iso                     Mat ex  Fig 4 piriformis stretch  Supine HS LE, proximal hip, and core control in self care, mobility, lifting, ambulation and eccentric single leg control. [] UE / cervical: cervical, scapular, scapulothoracic and UE control with self care, reaching, carrying, lifting, house/yardwork, driving, computer work. NMR and Therapeutic Activities:    [] (30449 or 33054) Provided verbal/tactile cueing for activities related to improving balance, coordination, kinesthetic sense, posture, motor skill, proprioception and motor activation to allow for proper function of   [] LE: / Lumbar core, proximal hip and LE with self care and ADLs  [] UE / Cervical: cervical, postural, scapular, scapulothoracic and UE control with self care, carrying, lifting, driving, computer work.   [] (00001) Gait Re-education- Provided training and instruction to the patient for proper LE, core and proximal hip recruitment and positioning and eccentric body weight control with ambulation re-education including up and down stairs     Home Management Training / Self Care:  [] (86347) Provided self-care/home management training related to activities of daily living and compensatory training, and/or use of adaptive equipment for improvement with: ADLs and compensatory training, meal preparation, safety procedures and instruction in use of adaptive equipment, including bathing, grooming, dressing, personal hygiene, basic household cleaning and chores.      Home Exercise Program:    [x] (86332) Reviewed/Progressed HEP activities related to strengthening, flexibility, endurance, ROM of   [] LE / Lumbar: core, proximal hip and LE for functional self-care, mobility, lifting and ambulation/stair navigation   [] UE / Cervical: cervical, postural, scapular, scapulothoracic and UE control with self care, reaching, carrying, lifting, house/yardwork, driving, computer work  [] (46549)Reviewed/Progressed HEP activities related to improving balance, coordination, kinesthetic sense, posture, motor skill, proprioception of   [] LE: core, proximal hip and LE for self care, mobility, lifting, and ambulation/stair navigation    [] UE / Cervical: cervical, postural,  scapular, scapulothoracic and UE control with self care, reaching, carrying, lifting, house/yardwork, driving, computer work    Manual Treatments:  PROM / STM / Oscillations-Mobs:  G-I, II, III, IV (PA's, Inf., Post.)  [] (30636) Provided manual therapy to mobilize LE, proximal hip and/or LS spine soft tissue/joints for the purpose of modulating pain, promoting relaxation,  increasing ROM, reducing/eliminating soft tissue swelling/inflammation/restriction, improving soft tissue extensibility and allowing for proper ROM for normal function with   [] LE / lumbar: self care, mobility, lifting and ambulation. [] UE / Cervical: self care, reaching, carrying, lifting, house/yardwork, driving, computer work. Modalities:  [] (54088) Vasopneumatic compression: Utilized vasopneumatic compression to decrease edema / swelling for the purpose of improving mobility and quad tone / recruitment which will allow for increased overall function including but not limited to self-care, transfers, ambulation, and ascending / descending stairs. Modalities:      Charges:  Timed Code Treatment Minutes: 30   Total Treatment Minutes: 45     [x] EVAL - LOW (86758)   [] EVAL - MOD (76281)  [] EVAL - HIGH (48371)  [] RE-EVAL (91882)  [x] SD(27249) x   1    [] Ionto  [] NMR (93698) x       [] Vaso  [] Manual (80632) x       [] Ultrasound  [x] TA x 1       [] Mech Traction (51942)  [] Aquatic Therapy x     [] ES (un) (84862):   [] Home Management Training x  [] ES(attended) (25837)   [] Dry Needling 1-2 muscles (52540):  [] Dry Needling 3+ muscles (811785  [] Group:      [] Other:        GOALS:  Patient stated goal:improve balance and walking  []? Progressing: []? Met: []? Not Met: []? Adjusted     Therapist goals for Patient:   Short Term Goals: To be achieved in: 2 weeks  1. Independent in HEP and progression per patient tolerance, in order to prevent re-injury. []? Progressing: []? Met: []? Not Met: []? Adjusted  2. Patient will have a decrease in pain to facilitate improvement in movement, function, and ADLs as indicated by improvement with respect to Functional Deficits. []? Progressing: []? Met: []? Not Met: []? Adjusted     Long Term Goals: To be achieved in: 6  weeks  1. Disability index score of 10% or less on the oswestry to assist with reaching prior level of function. []? Progressing: []? Met: []? Not Met: []? Adjusted  2. Patient will demonstrate increased AROM  to [facility], to allow for proper joint functioning to allow pt to resume squatting to lift items, use of good posture and body mechs for home chores without  increase in symptoms. []? Progressing: []? Met: []? Not Met: []? Adjusted  3. Patient will demonstrate increased Strength by 1/2 mmt grade  and core activation to allow for proper functional mobility as indicated by patients Functional Deficits to allow pt to resume up/down the basement stairs and curbs prn without difficulty without increase in symptoms. []? Progressing: []? Met: []? Not Met: []? Adjusted  4. Patient will return to functional activities including amb prn for grocery shopping and errands without increased symptoms and without need of AD.   []? Progressing: []? Met: []? Not Met: []? Adjusted  5. Improve balance so that pt reports no LOB in the preceding week and no sense of unsteadiness on stairs. []? Progressing: []? Met: []? Not Met: []? Adjusted          Overall Progression Towards Functional goals/ Treatment Progress Update:  [] Patient is progressing as expected towards functional goals listed. [] Progression is slowed due to complexities/Impairments listed. [] Progression has been slowed due to co-morbidities.   [x] Plan just implemented, too soon to assess goals progression <30days   [] Goals require adjustment due to lack of progress  [] Patient is not progressing as expected and requires additional follow up with physician  [] Other    Persisting Functional Limitations/Impairments:  []Sleeping [x]Sitting               [x]Standing [x]Transfers        [x]Walking [x]Kneeling               [x]Stairs [x]Squatting / bending   [x]ADLs []Reaching  [x]Lifting  [x]Housework  []Driving []Job related tasks  []Sports/Recreation []Other:        ASSESSMENT:  See eval  Treatment/Activity Tolerance:  [] Patient able to complete tx [] Patient limited by fatigue  [] Patient limited by pain  [] Patient limited by other medical complications  [] Other:     Prognosis: [] Good [] Fair  [] Poor    Patient Requires Follow-up: [x] Yes  [] No    Plan for next treatment session:  PLAN:  strength, ROM/flexibility, posture and body mechs, manual, MOC, HEP, pt education     Frequency/Duration: 2 days per week for 6 Weeks:  Interventions:  [x]? Therapeutic exercise including:strength, ROM, flexibility  [x]? NMR activation and proprioception including postural re-education  [x]? Manual therapy as indicated to include: IASTM, STM, PROM, Gr I-IV mobilizations, manipulation. [x]? Modalities as needed that may include: thermal agents, E-stim, Biofeedback, US, iontophoresis as indicated  [x]? Patient education on joint protection, postural re-education, activity modification, progression of HEP. PLAN: See eval. PT 2x / week for 6 weeks. [] Continue per plan of care [] Alter current plan (see comments)  [x] Plan of care initiated [] Hold pending MD visit [] Discharge    Electronically signed by: Mahnaz Lou PT, DPT    Note: If patient does not return for scheduled/ recommended follow up visits, this note will serve as a discharge from care along with most recent update on progress.

## 2020-10-12 ENCOUNTER — HOSPITAL ENCOUNTER (OUTPATIENT)
Dept: PHYSICAL THERAPY | Age: 70
Setting detail: THERAPIES SERIES
Discharge: HOME | End: 2020-10-12
Payer: MEDICARE

## 2020-10-12 PROCEDURE — 97110 THERAPEUTIC EXERCISES: CPT

## 2020-10-12 PROCEDURE — 97112 NEUROMUSCULAR REEDUCATION: CPT

## 2020-10-12 NOTE — FLOWSHEET NOTE
stenosis at this level.         New laminotomy changes at L4-L5.  The central canal is mildly stenotic which    is improved.  There is worsening severe right foraminal stenosis and moderate    left foraminal stenosis at this level.         Stable moderate right foraminal stenosis and mild left foraminal stenosis at    L5-S1. RESTRICTIONS/PRECAUTIONS: previous lumbar surgery 2015- had cyst removed form lower back. HTN. Intermittent LOB - no falls, impaired kidneys, frequent john horses in calves    Exercises/Interventions:     Therapeutic Exercises (19222) Resistance / level Sets/sec Reps Notes   Nu step or bike 1 5 min      Step stretches-   HS  Hip flexor  30 sec 2 B ea      Step ups with TrA iso- fwd and side -- 1 10    IB calf stretches  2 x 30 sec            Mat ex w SB  Fig 4 piriformis stretch    TrA iso  DKTC   B LTR    Red             2x20\" B    10x5\"  5 sec x 10      CC --multifidi walkouts with paloff press x 5 1.5 pl  1 3 B                  Therapeutic Activities (31881)       Pt ed re importance of good hydration in management of john horses  Postural ed-good posture /body mechs  PT POC,rationale for tx                                   Neuromuscular Re-ed (76319)       Balance exercises with TrA iso Add next session as carlos      Seated SB :   A/P, M/L, circles: CW/CCW                                   Manual Intervention (31359)       MET     IASTM   R leg pull x 2   Slight upslip today                                           Pt. Education:  -10/8/20 patient educated on diagnosis, prognosis and expectations for rehab  -all patient questions were answered  offered aquatic therapy - pt declined.      HEP instruction:  10/8 Fig 4 piriformis stretch, Supine HS stretch, TrA iso, Prone press ups, posture    Therapeutic Exercise and NMR EXR  [x] (86918) Provided verbal/tactile cueing for activities related to strengthening, flexibility, endurance, ROM for improvements in  [x] LE / Lumbar: LE, proximal hip, and core control with self care, mobility, lifting, ambulation. [] UE / Cervical: cervical, postural, scapular, scapulothoracic and UE control with self care, reaching, carrying, lifting, house/yardwork, driving, computer work.  [] (28023) Provided verbal/tactile cueing for activities related to improving balance, coordination, kinesthetic sense, posture, motor skill, proprioception to assist with   [] LE / lumbar: LE, proximal hip, and core control in self care, mobility, lifting, ambulation and eccentric single leg control. [] UE / cervical: cervical, scapular, scapulothoracic and UE control with self care, reaching, carrying, lifting, house/yardwork, driving, computer work.   [] (74588) Therapist is in constant attendance of 2 or more patients providing skilled therapy interventions, but not providing any significant amount of measurable one-on-one time to either patient, for improvements in  [] LE / lumbar: LE, proximal hip, and core control in self care, mobility, lifting, ambulation and eccentric single leg control. [] UE / cervical: cervical, scapular, scapulothoracic and UE control with self care, reaching, carrying, lifting, house/yardwork, driving, computer work.      NMR and Therapeutic Activities:    [x] (41748 or 46624) Provided verbal/tactile cueing for activities related to improving balance, coordination, kinesthetic sense, posture, motor skill, proprioception and motor activation to allow for proper function of   [x] LE: / Lumbar core, proximal hip and LE with self care and ADLs  [] UE / Cervical: cervical, postural, scapular, scapulothoracic and UE control with self care, carrying, lifting, driving, computer work.   [] (79047) Gait Re-education- Provided training and instruction to the patient for proper LE, core and proximal hip recruitment and positioning and eccentric body weight control with ambulation re-education including up and down stairs     Home Management Training / Self Care:  [] (02737) Provided self-care/home management training related to activities of daily living and compensatory training, and/or use of adaptive equipment for improvement with: ADLs and compensatory training, meal preparation, safety procedures and instruction in use of adaptive equipment, including bathing, grooming, dressing, personal hygiene, basic household cleaning and chores. Home Exercise Program:    [x] (57156) Reviewed/Progressed HEP activities related to strengthening, flexibility, endurance, ROM of   [] LE / Lumbar: core, proximal hip and LE for functional self-care, mobility, lifting and ambulation/stair navigation   [] UE / Cervical: cervical, postural, scapular, scapulothoracic and UE control with self care, reaching, carrying, lifting, house/yardwork, driving, computer work  [] (97129)Reviewed/Progressed HEP activities related to improving balance, coordination, kinesthetic sense, posture, motor skill, proprioception of   [] LE: core, proximal hip and LE for self care, mobility, lifting, and ambulation/stair navigation    [] UE / Cervical: cervical, postural,  scapular, scapulothoracic and UE control with self care, reaching, carrying, lifting, house/yardwork, driving, computer work    Manual Treatments:  PROM / STM / Oscillations-Mobs:  G-I, II, III, IV (PA's, Inf., Post.)  [] (88517) Provided manual therapy to mobilize LE, proximal hip and/or LS spine soft tissue/joints for the purpose of modulating pain, promoting relaxation,  increasing ROM, reducing/eliminating soft tissue swelling/inflammation/restriction, improving soft tissue extensibility and allowing for proper ROM for normal function with   [] LE / lumbar: self care, mobility, lifting and ambulation. [] UE / Cervical: self care, reaching, carrying, lifting, house/yardwork, driving, computer work.      Modalities:  [] (05531) Vasopneumatic compression: Utilized vasopneumatic compression to decrease edema / swelling for the purpose of improving mobility and quad tone / recruitment which will allow for increased overall function including but not limited to self-care, transfers, ambulation, and ascending / descending stairs. Modalities:      Charges:  Timed Code Treatment Minutes: 43   Total Treatment Minutes: 43     [] EVAL - LOW (46223)   [] EVAL - MOD (37157)  [] EVAL - HIGH (50076)  [] RE-EVAL (09701)  [x] DN(83912) x   2   [] Ionto  [x] NMR (57718) x   1    [] Vaso  [] Manual (76779) x       [] Ultrasound  [] TA x 1       [] Mech Traction (65607)  [] Aquatic Therapy x     [] ES (un) (84270):   [] Home Management Training x  [] ES(attended) (16715)   [] Dry Needling 1-2 muscles (56404):  [] Dry Needling 3+ muscles (850957  [] Group:      [] Other:        GOALS:  Patient stated goal:improve balance and walking  []? Progressing: []? Met: []? Not Met: []? Adjusted     Therapist goals for Patient:   Short Term Goals: To be achieved in: 2 weeks  1. Independent in HEP and progression per patient tolerance, in order to prevent re-injury. []? Progressing: []? Met: []? Not Met: []? Adjusted  2. Patient will have a decrease in pain to facilitate improvement in movement, function, and ADLs as indicated by improvement with respect to Functional Deficits. []? Progressing: []? Met: []? Not Met: []? Adjusted     Long Term Goals: To be achieved in: 6  weeks  1. Disability index score of 10% or less on the oswestry to assist with reaching prior level of function. []? Progressing: []? Met: []? Not Met: []? Adjusted  2. Patient will demonstrate increased AROM  to [facility] [facility] [facility] [employer], to allow for proper joint functioning to allow pt to resume squatting to lift items, use of good posture and body mechs for home chores without  increase in symptoms. []? Progressing: []? Met: []? Not Met: []? Adjusted  3.  Patient will demonstrate increased Strength by 1/2 mmt grade  and core activation to allow for proper functional mobility as indicated by patients Functional Deficits to allow pt to resume up/down the basement stairs and curbs prn without difficulty without increase in symptoms. []? Progressing: []? Met: []? Not Met: []? Adjusted  4. Patient will return to functional activities including amb prn for grocery shopping and errands without increased symptoms and without need of AD.   []? Progressing: []? Met: []? Not Met: []? Adjusted  5. Improve balance so that pt reports no LOB in the preceding week and no sense of unsteadiness on stairs. []? Progressing: []? Met: []? Not Met: []? Adjusted          Overall Progression Towards Functional goals/ Treatment Progress Update:  [] Patient is progressing as expected towards functional goals listed. [] Progression is slowed due to complexities/Impairments listed. [] Progression has been slowed due to co-morbidities. [x] Plan just implemented, too soon to assess goals progression <30days   [] Goals require adjustment due to lack of progress  [] Patient is not progressing as expected and requires additional follow up with physician  [] Other    Persisting Functional Limitations/Impairments:  []Sleeping [x]Sitting               [x]Standing [x]Transfers        [x]Walking [x]Kneeling               [x]Stairs [x]Squatting / bending   [x]ADLs []Reaching  [x]Lifting  [x]Housework  []Driving []Job related tasks  []Sports/Recreation []Other:        ASSESSMENT:  See eval  Treatment/Activity Tolerance:  [x] Patient able to complete tx [] Patient limited by fatigue  [] Patient limited by pain  [] Patient limited by other medical complications  [] Other:     Prognosis: [x] Good [] Fair  [] Poor    Patient Requires Follow-up: [x] Yes  [] No    Plan for next treatment session:  PLAN:  strength, ROM/flexibility, posture and body mechs, manual, MOC, HEP, pt education     Frequency/Duration: 2 days per week for 6 Weeks:  Interventions:  [x]? Therapeutic exercise including:strength, ROM, flexibility  [x]?   NMR activation and proprioception including postural re-education  [x]? Manual therapy as indicated to include: IASTM, STM, PROM, Gr I-IV mobilizations, manipulation. [x]? Modalities as needed that may include: thermal agents, E-stim, Biofeedback, US, iontophoresis as indicated  [x]? Patient education on joint protection, postural re-education, activity modification, progression of HEP. PLAN: See eval. PT 2x / week for 6 weeks. [x] Continue per plan of care [] Alter current plan (see comments)  [] Plan of care initiated [] Hold pending MD visit [] Discharge    Electronically signed by: Sky Contreras PT, DPT    Note: If patient does not return for scheduled/ recommended follow up visits, this note will serve as a discharge from care along with most recent update on progress.

## 2020-10-20 ENCOUNTER — HOSPITAL ENCOUNTER (OUTPATIENT)
Dept: PHYSICAL THERAPY | Age: 70
Setting detail: THERAPIES SERIES
Discharge: HOME | End: 2020-10-20
Payer: MEDICARE

## 2020-10-20 PROCEDURE — 97112 NEUROMUSCULAR REEDUCATION: CPT

## 2020-10-20 PROCEDURE — 97140 MANUAL THERAPY 1/> REGIONS: CPT

## 2020-10-20 PROCEDURE — 97110 THERAPEUTIC EXERCISES: CPT

## 2020-10-20 NOTE — FLOWSHEET NOTE
[facility]  Phone: [phone]   Fax: [phone]    Physical Therapy Daily Treatment Note  Date:  10/20/2020    Patient Name:  Rosi Celestin    :  1950  MRN: [id number]  Medical/Treatment Diagnosis Information:  Diagnosis: M47.26 (ICD-10-CM) - Other spondylosis with radiculopathy, lumbar region, M51.36 (ICD-10-CM) - Other intervertebral disc degeneration, lumbar region  Treatment Diagnosis: hypomobility at L-P spine, flexibility deficits at hips, myofascial pain, strength deficits at core mm, hip mm, L LE weaker than R LE, poor habitual postures/body [facility]  Insurance/Certification information:  PT Insurance Information: medicare and humana, med nec  Physician Information:  Referring Practitioner: Dr. Fer Padilla ([facility] Rachel MCALLISTER, [facility]) = PCP)  Plan of care signed (Y/N):     Date of Patient follow up with Physician:     Functional scale: OSWESTRY raw score =9 ; dysfunction =33%    Progress Report: []  Yes eval  [x]  No     Date Range for reporting period:  Beginning 10/8/20  Ending    Progress report due (10 Rx/or 30 days whichever is less): visit #10 or (NASE)     Recertification due (POC duration/ or 90 days whichever is less): visit # or 20  (date)     Visit # Insurance Allowable Auth required? Date Range   3/12 Med nec []  Yes  [x]  No na        Latex Allergy:  [x]NO      []YES  Preferred Language for Healthcare:   [x]English       []other    Pain level: 3 /10 at present,  9/10 this morning. SUBJECTIVE:  10/20 reports she feels about the same. Reports she felt wonderful when she walked out of the clinic after last session. Reports pain is worst in am and then gets bad again in evening. Will get BERTHA on fri 10/23      10/12 Stated that she got up around 4 am because of back pain. As usual pain worse in morning,  Begins to resolve after a while. Sitting always resolves pain.   Stated MD told her she ahs severe DDD with minimal spacing causing sciatic N impingement. OBJECTIVE:   10/20 pt requires Max cues for good TrA iso  MRI 9/20/2020  [facility]:  Stable small central disc extrusion and mild central canal stenosis at L3-L4. There is progressive left moderate foraminal stenosis at this level.         New laminotomy changes at L4-L5.  The central canal is mildly stenotic which    is improved.  There is worsening severe right foraminal stenosis and moderate    left foraminal stenosis at this level.         Stable moderate right foraminal stenosis and mild left foraminal stenosis at    L5-S1. RESTRICTIONS/PRECAUTIONS: previous lumbar surgery 2015- had cyst removed form lower back. HTN.  Intermittent LOB - no falls, impaired kidneys, frequent john horses in calves    Exercises/Interventions:     Therapeutic Exercises (32823) Resistance / level Sets/sec Reps Notes   Nu step    or bike 1 5 min   S=9, arms = 11, 5 min, workload 5   Step stretches-   HS  Hip flexor  30 sec 2 B ea      Step ups with TrA iso- fwd and side 4\" 1 10 B  B UE support   IB calf stretches  2 x 30 sec            Mat ex w SB  Fig 4 piriformis stretch    TrA iso  DKTC   B LTR    Red             2x20\" B    5x5\"  5 sec x 10         Max cues for good TrA iso   CC --multifidi walkouts with paloff press x 5 1.5 pl  1 3 B CGA when sidestepping back in                 Therapeutic Activities (04300)       Pt ed re importance of good hydration in management of john horses  Postural ed-good posture /body [facility]   reve                                  Neuromuscular Re-ed (68783)       Balance exercises with TrA iso Add next session as carlos      Seated SB :   A/P, M/L, circles: CW/CCW                                   Manual Intervention (41767)       MET to correct      IASTM  R inom upslip, BARON sacral torsion, L5FRSL     To B lumbar PS, QL, glut max, glut med, piriformis                                              Pt. Education:  -10/20 review and progress HEP  10/8/20 patient educated on diagnosis, prognosis and expectations for rehab  -all patient questions were answered  offered aquatic therapy - pt declined. HEP instruction:  10/8 Fig 4 piriformis stretch, Supine HS stretch, TrA iso, Prone press ups, posture    Therapeutic Exercise and NMR EXR  [x] (20477) Provided verbal/tactile cueing for activities related to strengthening, flexibility, endurance, ROM for improvements in  [x] LE / Lumbar: LE, proximal hip, and core control with self care, mobility, lifting, ambulation. [] UE / Cervical: cervical, postural, scapular, scapulothoracic and UE control with self care, reaching, carrying, lifting, house/yardwork, driving, computer work.  [] (69395) Provided verbal/tactile cueing for activities related to improving balance, coordination, kinesthetic sense, posture, motor skill, proprioception to assist with   [] LE / lumbar: LE, proximal hip, and core control in self care, mobility, lifting, ambulation and eccentric single leg control. [] UE / cervical: cervical, scapular, scapulothoracic and UE control with self care, reaching, carrying, lifting, house/yardwork, driving, computer work.   [] (74315) Therapist is in constant attendance of 2 or more patients providing skilled therapy interventions, but not providing any significant amount of measurable one-on-one time to either patient, for improvements in  [] LE / lumbar: LE, proximal hip, and core control in self care, mobility, lifting, ambulation and eccentric single leg control. [] UE / cervical: cervical, scapular, scapulothoracic and UE control with self care, reaching, carrying, lifting, house/yardwork, driving, computer work.      NMR and Therapeutic Activities:    [x] (81662 or 71067) Provided verbal/tactile cueing for activities related to improving balance, coordination, kinesthetic sense, posture, motor skill, proprioception and motor activation to allow for proper function of   [x] LE: / []? Met: []? Not Met: []? Adjusted  2. Patient will demonstrate increased AROM  to [facility], to allow for proper joint functioning to allow pt to resume squatting to lift items, use of good posture and body mechs for home chores without  increase in symptoms. []? Progressing: []? Met: []? Not Met: []? Adjusted  3. Patient will demonstrate increased Strength by 1/2 mmt grade  and core activation to allow for proper functional mobility as indicated by patients Functional Deficits to allow pt to resume up/down the basement stairs and curbs prn without difficulty without increase in symptoms. []? Progressing: []? Met: []? Not Met: []? Adjusted  4. Patient will return to functional activities including amb prn for grocery shopping and errands without increased symptoms and without need of AD.   []? Progressing: []? Met: []? Not Met: []? Adjusted  5. Improve balance so that pt reports no LOB in the preceding week and no sense of unsteadiness on stairs. []? Progressing: []? Met: []? Not Met: []? Adjusted          Overall Progression Towards Functional goals/ Treatment Progress Update:  [] Patient is progressing as expected towards functional goals listed. [] Progression is slowed due to complexities/Impairments listed. [] Progression has been slowed due to co-morbidities. [x] Plan just implemented, too soon to assess goals progression <30days   [] Goals require adjustment due to lack of progress  [] Patient is not progressing as expected and requires additional follow up with physician  [] Other    Persisting Functional Limitations/Impairments:  []Sleeping [x]Sitting               [x]Standing [x]Transfers        [x]Walking [x]Kneeling               [x]Stairs [x]Squatting / bending   [x]ADLs []Reaching  [x]Lifting  [x]Housework  []Driving []Job related tasks  []Sports/Recreation []Other:        ASSESSMENT:  Progressing.  Good carlos of manual therapy and exercises  Treatment/Activity Tolerance:  [x] Patient able to complete tx [] Patient limited by fatigue  [] Patient limited by pain  [] Patient limited by other medical complications  [] Other:     Prognosis: [x] Good [] Fair  [] Poor    Patient Requires Follow-up: [x] Yes  [] No    Plan for next treatment session:  PLAN:  strength, ROM/flexibility, posture and body mechs, manual, MOC, HEP, pt education     Frequency/Duration: 2 days per week for 6 Weeks:  Interventions:  [x]? Therapeutic exercise including:strength, ROM, flexibility  [x]? NMR activation and proprioception including postural re-education  [x]? Manual therapy as indicated to include: IASTM, STM, PROM, Gr I-IV mobilizations, manipulation. [x]? Modalities as needed that may include: thermal agents, E-stim, Biofeedback, US, iontophoresis as indicated  [x]? Patient education on joint protection, postural re-education, activity modification, progression of HEP. PLAN: See eval. PT 2x / week for 6 weeks. [x] Continue per plan of care [] Alter current plan (see comments)  [] Plan of care initiated [] Hold pending MD visit [] Discharge    Electronically signed by: Lupe Avery PT, DPT    Note: If patient does not return for scheduled/ recommended follow up visits, this note will serve as a discharge from care along with most recent update on progress.

## 2020-10-21 RX ORDER — LEVOTHYROXINE SODIUM 0.03 MG/1
25 TABLET ORAL DAILY
Qty: 90 TABLET | Refills: 3 | Status: SHIPPED | OUTPATIENT
Start: 2020-10-21 | End: 2021-10-05 | Stop reason: SDUPTHER

## 2020-10-27 ENCOUNTER — HOSPITAL ENCOUNTER (OUTPATIENT)
Dept: PHYSICAL THERAPY | Age: 70
Setting detail: THERAPIES SERIES
Discharge: HOME | End: 2020-10-27
Payer: MEDICARE

## 2020-10-27 PROCEDURE — 97140 MANUAL THERAPY 1/> REGIONS: CPT

## 2020-10-27 PROCEDURE — 97530 THERAPEUTIC ACTIVITIES: CPT

## 2020-10-27 PROCEDURE — 97110 THERAPEUTIC EXERCISES: CPT

## 2020-10-27 NOTE — FLOWSHEET NOTE
[address] [facility] Physical Therapy  Phone: [phone]   Fax: [phone]    Physical Therapy Daily Treatment Note  Date:  10/27/2020    Patient Name:  Shawnee Calles    :  1950  MRN: [id number]  Medical/Treatment Diagnosis Information:  Diagnosis: M47.26 (ICD-10-CM) - Other spondylosis with radiculopathy, lumbar region, M51.36 (ICD-10-CM) - Other intervertebral disc degeneration, lumbar region  Treatment Diagnosis: hypomobility at L-P spine, flexibility deficits at hips, myofascial pain, strength deficits at core mm, hip mm, L LE weaker than R LE, poor habitual postures/body [facility]  Insurance/Certification information:  PT Insurance Information: medicare and humana, med nec  Physician Information:  Referring Practitioner: Dr. Teressa Rivera (Judy Mejias PA-C, [facility]) = PCP)  Plan of care signed (Y/N):     Date of Patient follow up with Physician:     Functional scale: OSWESTRY raw score =9 ; dysfunction =33%    Progress Report: []  Yes eval  [x]  No     Date Range for reporting period:  Beginning 10/8/20  Ending    Progress report due (10 Rx/or 30 days whichever is less): visit #10 or (BLBE)     Recertification due (POC duration/ or 90 days whichever is less): visit # or 20  (date)     Visit # Insurance Allowable Auth required? Date Range    Med nec []  Yes  [x]  No na        Latex Allergy:  [x]NO      []YES  Preferred Language for Healthcare:   [x]English       []other    Pain level: 0-8 /10      SUBJECTIVE:  10/27 she feels better when she forces herself to move. Feels worst when she gets up out of bed. Had BERTHA last week on Thursday. Felt great for the next 2 days then pain came back. States she did the laundry yesterday - went up and down the basement steps 5x - felt really good    10/20 reports she feels about the same. Reports she felt wonderful when she walked out of the clinic after last session.   Reports pain is worst in am and then gets bad again in evening. Will get BERTHA on fri 10/23      10/12 Stated that she got up around 4 am because of back pain. As usual pain worse in morning,  Begins to resolve after a while. Sitting always resolves pain. Stated MD told her she ahs severe DDD with minimal spacing causing sciatic N impingement. OBJECTIVE:   10/20 pt requires Max cues for good TrA iso  MRI 9/20/2020  [facility]:  Stable small central disc extrusion and mild central canal stenosis at L3-L4. There is progressive left moderate foraminal stenosis at this level.         New laminotomy changes at L4-L5.  The central canal is mildly stenotic which    is improved.  There is worsening severe right foraminal stenosis and moderate    left foraminal stenosis at this level.         Stable moderate right foraminal stenosis and mild left foraminal stenosis at    L5-S1. RESTRICTIONS/PRECAUTIONS: previous lumbar surgery 2015- had cyst removed form lower back. HTN.  Intermittent LOB - no falls, impaired kidneys, frequent john horses in calves    Exercises/Interventions:     Therapeutic Exercises (91777) Resistance / level Sets/sec Reps Notes   Nu step    or bike 1 5 min   S=9, arms = 11, 5 min, workload 5   Step stretches-   HS  Hip flexor  30 sec 2 B ea      Step ups with TrA iso- fwd and side 4\" 1 10 B  B UE support   IB calf stretches  2 x 30 sec            Mat ex w SB  Fig 4 piriformis stretch    TrA iso  DKTC   B LTR    Red             2x20\" B    10x5\"  5 sec x 10         Min cues for good TrA iso   CC --multifidi walkouts with paloff press x 5 CGA when sidestepping back in                 Therapeutic Activities (37117)       Pt ed re importance of good hydration in management of john horses  Postural ed-good posture /body mechs   reveiw      Instructed in how to sleep with neutral spine with pillow(s) bn LE and under top arm  8'                           Neuromuscular Re-ed (89630)       Balance exercises with TrA iso Add next session as carlos      Seated SB :   A/P, M/L, circles: CW/CCW                                   Manual Intervention (33768)       MET to correct      IASTM   BARON sacral torsion, L5FRSL     To B lumbar PS, QL, glut max, glut med, piriformis                                              Pt. Education:  -10/27 review HEP. Pt educated re sleep positions with neutral spine, importance of increasing overall activity level and starting to walk 3x/dau10/20 review and progress HEP  10/8/20 patient educated on diagnosis, prognosis and expectations for rehab  -all patient questions were answered  offered aquatic therapy - pt declined. HEP instruction:  10/27 sleep with neutral spine; walk 3x/d x 5-10 min ea time; go up and down the basement steps >/= 1-2x/d  10/8 Fig 4 piriformis stretch, Supine HS stretch, TrA iso, Prone press ups, posture    Therapeutic Exercise and NMR EXR  [x] (02711) Provided verbal/tactile cueing for activities related to strengthening, flexibility, endurance, ROM for improvements in  [x] LE / Lumbar: LE, proximal hip, and core control with self care, mobility, lifting, ambulation. [] UE / Cervical: cervical, postural, scapular, scapulothoracic and UE control with self care, reaching, carrying, lifting, house/yardwork, driving, computer work.  [] (14590) Provided verbal/tactile cueing for activities related to improving balance, coordination, kinesthetic sense, posture, motor skill, proprioception to assist with   [] LE / lumbar: LE, proximal hip, and core control in self care, mobility, lifting, ambulation and eccentric single leg control.    [] UE / cervical: cervical, scapular, scapulothoracic and UE control with self care, reaching, carrying, lifting, house/yardwork, driving, computer work.   [] (25709) Therapist is in constant attendance of 2 or more patients providing skilled therapy interventions, but not providing any significant amount of measurable one-on-one time to either patient, for improvements in  [] LE / lumbar: LE, proximal hip, and core control in self care, mobility, lifting, ambulation and eccentric single leg control. [] UE / cervical: cervical, scapular, scapulothoracic and UE control with self care, reaching, carrying, lifting, house/yardwork, driving, computer work. NMR and Therapeutic Activities:    [x] (27770 or 50473) Provided verbal/tactile cueing for activities related to improving balance, coordination, kinesthetic sense, posture, motor skill, proprioception and motor activation to allow for proper function of   [x] LE: / Lumbar core, proximal hip and LE with self care and ADLs  [] UE / Cervical: cervical, postural, scapular, scapulothoracic and UE control with self care, carrying, lifting, driving, computer work.   [] (29824) Gait Re-education- Provided training and instruction to the patient for proper LE, core and proximal hip recruitment and positioning and eccentric body weight control with ambulation re-education including up and down stairs     Home Management Training / Self Care:  [] (04749) Provided self-care/home management training related to activities of daily living and compensatory training, and/or use of adaptive equipment for improvement with: ADLs and compensatory training, meal preparation, safety procedures and instruction in use of adaptive equipment, including bathing, grooming, dressing, personal hygiene, basic household cleaning and chores.      Home Exercise Program:    [x] (29743) Reviewed/Progressed HEP activities related to strengthening, flexibility, endurance, ROM of   [] LE / Lumbar: core, proximal hip and LE for functional self-care, mobility, lifting and ambulation/stair navigation   [] UE / Cervical: cervical, postural, scapular, scapulothoracic and UE control with self care, reaching, carrying, lifting, house/yardwork, driving, computer work  [] (84875)Reviewed/Progressed HEP activities related to improving balance, coordination, kinesthetic sense, posture, motor skill, proprioception of   [] LE: core, proximal hip and LE for self care, mobility, lifting, and ambulation/stair navigation    [] UE / Cervical: cervical, postural,  scapular, scapulothoracic and UE control with self care, reaching, carrying, lifting, house/yardwork, driving, computer work    Manual Treatments:  PROM / STM / Oscillations-Mobs:  G-I, II, III, IV (PA's, Inf., Post.)  [] (78957) Provided manual therapy to mobilize LE, proximal hip and/or LS spine soft tissue/joints for the purpose of modulating pain, promoting relaxation,  increasing ROM, reducing/eliminating soft tissue swelling/inflammation/restriction, improving soft tissue extensibility and allowing for proper ROM for normal function with   [] LE / lumbar: self care, mobility, lifting and ambulation. [] UE / Cervical: self care, reaching, carrying, lifting, house/yardwork, driving, computer work. Modalities:  [] (24103) Vasopneumatic compression: Utilized vasopneumatic compression to decrease edema / swelling for the purpose of improving mobility and quad tone / recruitment which will allow for increased overall function including but not limited to self-care, transfers, ambulation, and ascending / descending stairs. Modalities:      Charges:  Timed Code Treatment Minutes: 43   Total Treatment Minutes: 43     [] EVAL - LOW (06026)   [] EVAL - MOD (19384)  [] EVAL - HIGH (19632)  [] RE-EVAL (32351)  [x] QE(78924) x   1   [] Ionto  [] NMR (94932) x   1    [] Vaso  [x] Manual (80290) x  1     [] Ultrasound  [x] TA x 1       [] Mech Traction (21885)  [] Aquatic Therapy x     [] ES (un) (97019):   [] Home Management Training x  [] ES(attended) (38762)   [] Dry Needling 1-2 muscles (33882):  [] Dry Needling 3+ muscles (639742  [] Group:      [] Other:        GOALS:  Patient stated goal:improve balance and walking  []? Progressing: []? Met: []? Not Met: []? Adjusted     Therapist goals for Patient:   Short Term Goals:  To be achieved in: 2 weeks  1. Independent in HEP and progression per patient tolerance, in order to prevent re-injury. []? Progressing: []? Met: []? Not Met: []? Adjusted  2. Patient will have a decrease in pain to facilitate improvement in movement, function, and ADLs as indicated by improvement with respect to Functional Deficits. []? Progressing: []? Met: []? Not Met: []? Adjusted     Long Term Goals: To be achieved in: 6  weeks  1. Disability index score of 10% or less on the oswestry to assist with reaching prior level of function. []? Progressing: []? Met: []? Not Met: []? Adjusted  2. Patient will demonstrate increased AROM  [facility], to allow for proper joint functioning to allow pt to resume squatting to lift items, use of good posture and body mechs for home chores without  increase in symptoms. []? Progressing: []? Met: []? Not Met: []? Adjusted  3. Patient will demonstrate increased Strength by 1/2 mmt grade  and core activation to allow for proper functional mobility as indicated by patients Functional Deficits to allow pt to resume up/down the basement stairs and curbs prn without difficulty without increase in symptoms. []? Progressing: []? Met: []? Not Met: []? Adjusted  4. Patient will return to functional activities including amb prn for grocery shopping and errands without increased symptoms and without need of AD.   []? Progressing: []? Met: []? Not Met: []? Adjusted  5. Improve balance so that pt reports no LOB in the preceding week and no sense of unsteadiness on stairs. []? Progressing: []? Met: []? Not Met: []? Adjusted          Overall Progression Towards Functional goals/ Treatment Progress Update:  [] Patient is progressing as expected towards functional goals listed. [] Progression is slowed due to complexities/Impairments listed. [] Progression has been slowed due to co-morbidities.   [x] Plan just implemented, too soon to assess goals progression <30days   [] Goals require

## 2020-10-30 ENCOUNTER — HOSPITAL ENCOUNTER (OUTPATIENT)
Dept: PHYSICAL THERAPY | Age: 70
Setting detail: THERAPIES SERIES
Discharge: HOME | End: 2020-10-30
Payer: MEDICARE

## 2020-10-30 PROCEDURE — 97110 THERAPEUTIC EXERCISES: CPT

## 2020-10-30 PROCEDURE — 97112 NEUROMUSCULAR REEDUCATION: CPT

## 2020-10-30 NOTE — FLOWSHEET NOTE
[facility]  Phone: [phone]   Fax: [phone]    Physical Therapy Daily Treatment Note  Date:  10/30/2020    Patient Name:  Vicky Crenshaw    :  1950  MRN: [id number]  Medical/Treatment Diagnosis Information:  Diagnosis: M47.26 (ICD-10-CM) - Other spondylosis with radiculopathy, lumbar region, M51.36 (ICD-10-CM) - Other intervertebral disc degeneration, lumbar region  Treatment Diagnosis: hypomobility at L-P spine, flexibility deficits at hips, myofascial pain, strength deficits at core mm, hip mm, L LE weaker than R LE, poor habitual postures/body [facility]  Insurance/Certification information:  PT Insurance Information: medicare and human[employer], med nec  Physician Information:  Referring Practitioner: Dr. Kenroy Godinez (Sharon Marin PA-C, [facility]) = PCP)  Plan of care signed (Y/N):     Date of Patient follow up with Physician:     Functional scale: OSWESTRY raw score =9 ; dysfunction =33%    Progress Report: []  Yes eval  [x]  No     Date Range for reporting period:  Beginning 10/8/20  Ending    Progress report due (10 Rx/or 30 days whichever is less): visit #10 or 92(HZIM)     Recertification due (POC duration/ or 90 days whichever is less): visit # or 20  (date)     Visit # Insurance Allowable Auth required? Date Range    Med nec []  Yes  [x]  No na        Latex Allergy:  [x]NO      []YES  Preferred Language for Healthcare:   [x]English       []other    Pain level: 0/10      SUBJECTIVE:  10/ 30  Pt states this morning pain was 9/10 when she got OOB,  But pain is now decreasing quicker in the morning  10/27 she feels better when she forces herself to move. Feels worst when she gets up out of bed. Had BERTHA last week on Thursday. Felt great for the next 2 days then pain came back. States she did the laundry yesterday - went up and down the basement steps 5x - felt really good    10/20 reports she feels about the same.   Reports she felt wonderful when she walked out of the clinic after last session. Reports pain is worst in am and then gets bad again in evening. Will get BERTHA on fri 10/23      10/12 Stated that she got up around 4 am because of back pain. As usual pain worse in morning,  Begins to resolve after a while. Sitting always resolves pain. Stated MD told her she ahs severe DDD with minimal spacing causing sciatic N impingement. OBJECTIVE:   10/20 pt requires Max cues for good TrA iso  MRI 9/20/2020  [facility]:  Stable small central disc extrusion and mild central canal stenosis at L3-L4. There is progressive left moderate foraminal stenosis at this level.         New laminotomy changes at L4-L5.  The central canal is mildly stenotic which    is improved.  There is worsening severe right foraminal stenosis and moderate    left foraminal stenosis at this level.         Stable moderate right foraminal stenosis and mild left foraminal stenosis at    L5-S1. RESTRICTIONS/PRECAUTIONS: previous lumbar surgery 2015- had cyst removed form lower back. HTN.  Intermittent LOB - no falls, impaired kidneys, frequent john horses in calves    Exercises/Interventions:     Therapeutic Exercises (11704) Resistance / level Sets/sec Reps Notes   Nu step    or bike 1 5 min   S=9, arms = 11, 5 min, workload 5   Step stretches-   HS    30 sec 2 B ea      Step ups with TrA iso- fwd and side 4\" 1 15 B ea  B UE support   IB calf stretches  2 x 30 sec            Mat ex w SB  Fig 4 piriformis stretch    TrA iso  DKTC   B LTR    Red             2x20\" B    10x5\"  5 sec x 10         Min cues for good TrA iso   CC --multifidi walkouts with paloff press x 5 2lb  1 3 B CGA when sidestepping back in                 Therapeutic Activities (41833)       Pt ed re importance of good hydration in management of john horses  Postural ed-good posture /body mechs   reveiw      Instructed in how to sleep with neutral spine with pillow(s) bn LE and under top arm                             Neuromuscular Re-ed (12463)       Balance exercises with TrA iso       Seated SB :   A/P, M/L, circles: CW/CCW  1 12 ea           Glides 3-way  1 10 Ea B                 Manual Intervention (01420)       MET to correct      IASTM   BARON sacral torsion, [id number]                                                   Pt. Education:  -10/27 review HEP. Pt educated re sleep positions with neutral spine, importance of increasing overall activity level and starting to walk 3x/dau10/20 review and progress HEP  10/8/20 patient educated on diagnosis, prognosis and expectations for rehab  -all patient questions were answered  offered aquatic therapy - pt declined. HEP instruction:  10/27 sleep with neutral spine; walk 3x/d x 5-10 min ea time; go up and down the basement steps >/= 1-2x/d  10/8 Fig 4 piriformis stretch, Supine HS stretch, TrA iso, Prone press ups, posture    Therapeutic Exercise and NMR EXR  [x] (01850) Provided verbal/tactile cueing for activities related to strengthening, flexibility, endurance, ROM for improvements in  [x] LE / Lumbar: LE, proximal hip, and core control with self care, mobility, lifting, ambulation. [] UE / Cervical: cervical, postural, scapular, scapulothoracic and UE control with self care, reaching, carrying, lifting, house/yardwork, driving, computer work.  [] (00985) Provided verbal/tactile cueing for activities related to improving balance, coordination, kinesthetic sense, posture, motor skill, proprioception to assist with   [] LE / lumbar: LE, proximal hip, and core control in self care, mobility, lifting, ambulation and eccentric single leg control.    [] UE / cervical: cervical, scapular, scapulothoracic and UE control with self care, reaching, carrying, lifting, house/yardwork, driving, computer work.   [] (81305) Therapist is in constant attendance of 2 or more patients providing skilled therapy interventions, but not providing any significant amount of measurable one-on-one time to either patient, for improvements in  [] LE / lumbar: LE, proximal hip, and core control in self care, mobility, lifting, ambulation and eccentric single leg control. [] UE / cervical: cervical, scapular, scapulothoracic and UE control with self care, reaching, carrying, lifting, house/yardwork, driving, computer work. NMR and Therapeutic Activities:    [x] (56567 or 79361) Provided verbal/tactile cueing for activities related to improving balance, coordination, kinesthetic sense, posture, motor skill, proprioception and motor activation to allow for proper function of   [x] LE: / Lumbar core, proximal hip and LE with self care and ADLs  [] UE / Cervical: cervical, postural, scapular, scapulothoracic and UE control with self care, carrying, lifting, driving, computer work.   [] (80659) Gait Re-education- Provided training and instruction to the patient for proper LE, core and proximal hip recruitment and positioning and eccentric body weight control with ambulation re-education including up and down stairs     Home Management Training / Self Care:  [] (15137) Provided self-care/home management training related to activities of daily living and compensatory training, and/or use of adaptive equipment for improvement with: ADLs and compensatory training, meal preparation, safety procedures and instruction in use of adaptive equipment, including bathing, grooming, dressing, personal hygiene, basic household cleaning and chores.      Home Exercise Program:    [] (16645) Reviewed/Progressed HEP activities related to strengthening, flexibility, endurance, ROM of   [] LE / Lumbar: core, proximal hip and LE for functional self-care, mobility, lifting and ambulation/stair navigation   [] UE / Cervical: cervical, postural, scapular, scapulothoracic and UE control with self care, reaching, carrying, lifting, house/yardwork, driving, computer work  [] (81340)Reviewed/Progressed HEP activities related to improving balance, coordination, kinesthetic sense, posture, motor skill, proprioception of   [] LE: core, proximal hip and LE for self care, mobility, lifting, and ambulation/stair navigation    [] UE / Cervical: cervical, postural,  scapular, scapulothoracic and UE control with self care, reaching, carrying, lifting, house/yardwork, driving, computer work    Manual Treatments:  PROM / STM / Oscillations-Mobs:  G-I, II, III, IV (PA's, Inf., Post.)  [] (55058) Provided manual therapy to mobilize LE, proximal hip and/or LS spine soft tissue/joints for the purpose of modulating pain, promoting relaxation,  increasing ROM, reducing/eliminating soft tissue swelling/inflammation/restriction, improving soft tissue extensibility and allowing for proper ROM for normal function with   [] LE / lumbar: self care, mobility, lifting and ambulation. [] UE / Cervical: self care, reaching, carrying, lifting, house/yardwork, driving, computer work. Modalities:  [] (57967) Vasopneumatic compression: Utilized vasopneumatic compression to decrease edema / swelling for the purpose of improving mobility and quad tone / recruitment which will allow for increased overall function including but not limited to self-care, transfers, ambulation, and ascending / descending stairs. Modalities:      Charges:  Timed Code Treatment Minutes: 45   Total Treatment Minutes: 45     [] EVAL - LOW (55884)   [] EVAL - MOD (04382)  [] EVAL - HIGH (06086)  [] RE-EVAL (80262)  [x] DB(69200) x   2   [] Ionto  [x] NMR (37172) x   1    [] Vaso  [] Manual (47175) x  1     [] Ultrasound  [] TA x 1       [] Mech Traction (23825)  [] Aquatic Therapy x     [] ES (un) (54595):   [] Home Management Training x  [] ES(attended) (21672)   [] Dry Needling 1-2 muscles (29340):  [] Dry Needling 3+ muscles (326512  [] Group:      [] Other:        GOALS:  Patient stated goal:improve balance and walking  []? Progressing: []? Met: []?  Not Met: []? Adjusted     Therapist goals for Patient:   Short Term Goals: To be achieved in: 2 weeks  1. Independent in HEP and progression per patient tolerance, in order to prevent re-injury. []? Progressing: []? Met: []? Not Met: []? Adjusted  2. Patient will have a decrease in pain to facilitate improvement in movement, function, and ADLs as indicated by improvement with respect to Functional Deficits. []? Progressing: []? Met: []? Not Met: []? Adjusted     Long Term Goals: To be achieved in: 6  weeks  1. Disability index score of 10% or less on the oswestry to assist with reaching prior level of function. []? Progressing: []? Met: []? Not Met: []? Adjusted  2. Patient will demonstrate increased AROM  to [facility], to allow for proper joint functioning to allow pt to resume squatting to lift items, use of good posture and body mechs for home chores without  increase in symptoms. []? Progressing: []? Met: []? Not Met: []? Adjusted  3. Patient will demonstrate increased Strength by 1/2 mmt grade  and core activation to allow for proper functional mobility as indicated by patients Functional Deficits to allow pt to resume up/down the basement stairs and curbs prn without difficulty without increase in symptoms. []? Progressing: []? Met: []? Not Met: []? Adjusted  4. Patient will return to functional activities including amb prn for grocery shopping and errands without increased symptoms and without need of AD.   []? Progressing: []? Met: []? Not Met: []? Adjusted  5. Improve balance so that pt reports no LOB in the preceding week and no sense of unsteadiness on stairs. []? Progressing: []? Met: []? Not Met: []? Adjusted          Overall Progression Towards Functional goals/ Treatment Progress Update:  [] Patient is progressing as expected towards functional goals listed. [] Progression is slowed due to complexities/Impairments listed. [] Progression has been slowed due to co-morbidities.   [x] Plan just implemented, too soon to assess goals progression <30days   [] Goals require adjustment due to lack of progress  [] Patient is not progressing as expected and requires additional follow up with physician  [] Other    Persisting Functional Limitations/Impairments:  []Sleeping [x]Sitting               [x]Standing [x]Transfers        [x]Walking [x]Kneeling               [x]Stairs [x]Squatting / bending   [x]ADLs []Reaching  [x]Lifting  [x]Housework  []Driving []Job related tasks  []Sports/Recreation []Other:        ASSESSMENT:    10/27 needs improved sleeping positions  10/20 Progressing. Good carlos of manual therapy and exercises    Treatment/Activity Tolerance:  [x] Patient able to complete tx [] Patient limited by fatigue  [] Patient limited by pain  [] Patient limited by other medical complications  [] Other:     Prognosis: [x] Good [] Fair  [] Poor    Patient Requires Follow-up: [x] Yes  [] No    Plan for next treatment session:  PLAN:  strength, ROM/flexibility, posture and body mechs, manual, MOC, HEP, pt education     Frequency/Duration: 2 days per week for 6 Weeks:  Interventions:  [x]? Therapeutic exercise including:strength, ROM, flexibility  [x]? NMR activation and proprioception including postural re-education  [x]? Manual therapy as indicated to include: IASTM, STM, PROM, Gr I-IV mobilizations, manipulation. [x]? Modalities as needed that may include: thermal agents, E-stim, Biofeedback, US, iontophoresis as indicated  [x]? Patient education on joint protection, postural re-education, activity modification, progression of HEP. PLAN: See eval. PT 2x / week for 6 weeks.    [x] Continue per plan of care [] Alter current plan (see comments)  [] Plan of care initiated [] Hold pending MD visit [] Discharge    Electronically signed by: Adelaide Mcmahon PT, DPT    Note: If patient does not return for scheduled/ recommended follow up visits, this note will serve as a discharge from care along with most recent update on

## 2020-11-03 ENCOUNTER — HOSPITAL ENCOUNTER (OUTPATIENT)
Dept: PHYSICAL THERAPY | Age: 70
Setting detail: THERAPIES SERIES
Discharge: HOME | End: 2020-11-03
Payer: MEDICARE

## 2020-11-03 PROCEDURE — 97110 THERAPEUTIC EXERCISES: CPT

## 2020-11-03 PROCEDURE — 97140 MANUAL THERAPY 1/> REGIONS: CPT

## 2020-11-03 NOTE — FLOWSHEET NOTE
[address] [facility] Physical Therapy  Phone: [phone]   Fax: [phone]    Physical Therapy Daily Treatment Note  Date:  11/3/2020    Patient Name:  Jenny Cavanaugh    :  1950  MRN: [id number]  Medical/Treatment Diagnosis Information:  Diagnosis: M47.26 (ICD-10-CM) - Other spondylosis with radiculopathy, lumbar region, M51.36 (ICD-10-CM) - Other intervertebral disc degeneration, lumbar region  Treatment Diagnosis: hypomobility at L-P spine, flexibility deficits at hips, myofascial pain, strength deficits at core mm, hip mm, L LE weaker than R LE, poor habitual postures/body [facility]  Insurance/Certification information:  PT Insurance Information: medicare and human[employer], med nec  Physician Information:  Referring Practitioner: Dr. Renita Kim (Lalit West PA-C, [facility]) = PCP)  Plan of care signed (Y/N):     Date of Patient follow up with Physician:     Functional scale: OSWESTRY raw score =9 ; dysfunction =33%    Progress Report: []  Yes eval  [x]  No     Date Range for reporting period:  Beginning 10/8/20  Ending    Progress report due (10 Rx/or 30 days whichever is less): visit #10 or (EBEP)     Recertification due (POC duration/ or 90 days whichever is less): visit # or 20  (date)     Visit # Insurance Allowable Auth required? Date Range    Med nec []  Yes  [x]  No na        Latex Allergy:  [x]NO      []YES  Preferred Language for Healthcare:   [x]English       []other    Pain level: 0/10      SUBJECTIVE:  11/3 has been walking on treadmill 3x/d x 5-6 min each time. feels ok unless has been sedentary too long. pain was 9/10 when got OOB this am. thinks the am pain goes away faster if she sleeps with pillow bn knees    10/ 30  Pt states this morning pain was 9/10 when she got OOB,  But pain is now decreasing quicker in the morning  10/27 she feels better when she forces herself to move. Feels worst when she gets up out of bed.  Had BERTHA last week on Thursday. Felt great for the next 2 days then pain came back. States she did the laundry yesterday - went up and down the basement steps 5x - felt really good    10/20 reports she feels about the same. Reports she felt wonderful when she walked out of the clinic after last session. Reports pain is worst in am and then gets bad again in evening. Will get BERTHA on fri 10/23      10/12 Stated that she got up around 4 am because of back pain. As usual pain worse in morning,  Begins to resolve after a while. Sitting always resolves pain. Stated MD told her she ahs severe DDD with minimal spacing causing sciatic N impingement. OBJECTIVE:   10/20 pt requires Max cues for good TrA iso  MRI 9/20/2020  [employer]:  Stable small central disc extrusion and mild central canal stenosis at L3-L4. There is progressive left moderate foraminal stenosis at this level.         New laminotomy changes at L4-L5.  The central canal is mildly stenotic which    is improved.  There is worsening severe right foraminal stenosis and moderate    left foraminal stenosis at this level.         Stable moderate right foraminal stenosis and mild left foraminal stenosis at    L5-S1. RESTRICTIONS/PRECAUTIONS: previous lumbar surgery 2015- had cyst removed form lower back. HTN.  Intermittent LOB - no falls, impaired kidneys, frequent john horses in calves    Exercises/Interventions:     Therapeutic Exercises (70416) Resistance / level Sets/sec Reps Notes   Nu step    or bike 1 5 min   S=9, arms = 11, 5 min, workload 5   Step stretches-   HS  Hip flexor  30 sec  30 sec 2  2 B ea      Step ups with TrA iso- fwd and side 4\" 1 15 B ea  B UE support   IB calf stretches  2 x 30 sec            Mat ex w SB  Fig 4 piriformis stretch    TrA iso  DKTC   B LTR    Red             2x20\" B    10x5\"  5 sec x 10         Min cues for good TrA iso   CC --multifidi walkouts with paloff press x 5 2pl 1 3 B CGA when sidestepping back in Therapeutic Activities (48922)       Pt ed re importance of good hydration in management of john horses  Postural ed-good posture /body mechs   reveiw      Instructed in how to sleep with neutral spine with pillow(s) bn LE and under top arm  Review                            Neuromuscular Re-ed (52856)       Balance exercises with TrA iso       Seated SB :   A/P, M/L, circles: CW/CCW  1 12 ea           Glides 3-way  1 10 Ea B                 Manual Intervention (29520)       MET to correct            IASTM     BARON sacral torsion, [id number]  Manual stretches B HS, glut max, piriformis                                                   Pt. Education:  -10/27 review HEP. Pt educated re sleep positions with neutral spine, importance of increasing overall activity level and starting to walk 3x/dau10/20 review and progress HEP  10/8/20 patient educated on diagnosis, prognosis and expectations for rehab  -all patient questions were answered  offered aquatic therapy - pt declined. HEP instruction:  10/27 sleep with neutral spine; walk 3x/d x 5-10 min ea time; go up and down the basement steps >/= 1-2x/d  10/8 Fig 4 piriformis stretch, Supine HS stretch, TrA iso, Prone press ups, posture    Therapeutic Exercise and NMR EXR  [x] (83633) Provided verbal/tactile cueing for activities related to strengthening, flexibility, endurance, ROM for improvements in  [x] LE / Lumbar: LE, proximal hip, and core control with self care, mobility, lifting, ambulation. [] UE / Cervical: cervical, postural, scapular, scapulothoracic and UE control with self care, reaching, carrying, lifting, house/yardwork, driving, computer work.  [] (26604) Provided verbal/tactile cueing for activities related to improving balance, coordination, kinesthetic sense, posture, motor skill, proprioception to assist with   [] LE / lumbar: LE, proximal hip, and core control in self care, mobility, lifting, ambulation and eccentric single leg control.    [] UE / cervical: cervical, scapular, scapulothoracic and UE control with self care, reaching, carrying, lifting, house/yardwork, driving, computer work.   [] (84850) Therapist is in constant attendance of 2 or more patients providing skilled therapy interventions, but not providing any significant amount of measurable one-on-one time to either patient, for improvements in  [] LE / lumbar: LE, proximal hip, and core control in self care, mobility, lifting, ambulation and eccentric single leg control. [] UE / cervical: cervical, scapular, scapulothoracic and UE control with self care, reaching, carrying, lifting, house/yardwork, driving, computer work. NMR and Therapeutic Activities:    [x] (19739 or 24810) Provided verbal/tactile cueing for activities related to improving balance, coordination, kinesthetic sense, posture, motor skill, proprioception and motor activation to allow for proper function of   [x] LE: / Lumbar core, proximal hip and LE with self care and ADLs  [] UE / Cervical: cervical, postural, scapular, scapulothoracic and UE control with self care, carrying, lifting, driving, computer work.   [] (47061) Gait Re-education- Provided training and instruction to the patient for proper LE, core and proximal hip recruitment and positioning and eccentric body weight control with ambulation re-education including up and down stairs     Home Management Training / Self Care:  [] (22222) Provided self-care/home management training related to activities of daily living and compensatory training, and/or use of adaptive equipment for improvement with: ADLs and compensatory training, meal preparation, safety procedures and instruction in use of adaptive equipment, including bathing, grooming, dressing, personal hygiene, basic household cleaning and chores.      Home Exercise Program:    [] (21223) Reviewed/Progressed HEP activities related to strengthening, flexibility, endurance, ROM of   [] LE / Lumbar: core, proximal hip and LE for functional self-care, mobility, lifting and ambulation/stair navigation   [] UE / Cervical: cervical, postural, scapular, scapulothoracic and UE control with self care, reaching, carrying, lifting, house/yardwork, driving, computer work  [] (47955)Reviewed/Progressed HEP activities related to improving balance, coordination, kinesthetic sense, posture, motor skill, proprioception of   [] LE: core, proximal hip and LE for self care, mobility, lifting, and ambulation/stair navigation    [] UE / Cervical: cervical, postural,  scapular, scapulothoracic and UE control with self care, reaching, carrying, lifting, house/yardwork, driving, computer work    Manual Treatments:  PROM / STM / Oscillations-Mobs:  G-I, II, III, IV (PA's, Inf., Post.)  [] (74304) Provided manual therapy to mobilize LE, proximal hip and/or LS spine soft tissue/joints for the purpose of modulating pain, promoting relaxation,  increasing ROM, reducing/eliminating soft tissue swelling/inflammation/restriction, improving soft tissue extensibility and allowing for proper ROM for normal function with   [] LE / lumbar: self care, mobility, lifting and ambulation. [] UE / Cervical: self care, reaching, carrying, lifting, house/yardwork, driving, computer work. Modalities:  [] (53740) Vasopneumatic compression: Utilized vasopneumatic compression to decrease edema / swelling for the purpose of improving mobility and quad tone / recruitment which will allow for increased overall function including but not limited to self-care, transfers, ambulation, and ascending / descending stairs.        Modalities:      Charges:  Timed Code Treatment Minutes: 44   Total Treatment Minutes: 44     [] EVAL - LOW (54993)   [] EVAL - MOD (01036)  [] EVAL - HIGH (53173)  [] RE-EVAL (54010)  [x] XG(73269) x   2   [] Ionto  [x] NMR (44130) x   1    [] Vaso  [] Manual (68442) x  1     [] Ultrasound  [] TA x 1       [] [facility] (05097)  [] Aquatic Therapy x     [] ES (un) (84576):   [] Home Management Training x  [] ES(attended) (21665)   [] Dry Needling 1-2 muscles (26468):  [] Dry Needling 3+ muscles (702056  [] Group:      [] Other:        GOALS:  Patient stated goal:improve balance and walking  []? Progressing: []? Met: []? Not Met: []? Adjusted     Therapist goals for Patient:   Short Term Goals: To be achieved in: 2 weeks  1. Independent in HEP and progression per patient tolerance, in order to prevent re-injury. []? Progressing: []? Met: []? Not Met: []? Adjusted  2. Patient will have a decrease in pain to facilitate improvement in movement, function, and ADLs as indicated by improvement with respect to Functional Deficits. []? Progressing: []? Met: []? Not Met: []? Adjusted     Long Term Goals: To be achieved in: 6  weeks  1. Disability index score of 10% or less on the oswestry to assist with reaching prior level of function. []? Progressing: []? Met: []? Not Met: []? Adjusted  2. Patient will demonstrate increased AROM  to [facility], to allow for proper joint functioning to allow pt to resume squatting to lift items, use of good posture and body mechs for home chores without  increase in symptoms. []? Progressing: []? Met: []? Not Met: []? Adjusted  3. Patient will demonstrate increased Strength by 1/2 mmt grade  and core activation to allow for proper functional mobility as indicated by patients Functional Deficits to allow pt to resume up/down the basement stairs and curbs prn without difficulty without increase in symptoms. []? Progressing: []? Met: []? Not Met: []? Adjusted  4. Patient will return to functional activities including amb prn for grocery shopping and errands without increased symptoms and without need of AD.   []? Progressing: []? Met: []? Not Met: []? Adjusted  5. Improve balance so that pt reports no LOB in the preceding week and no sense of unsteadiness on stairs. []? Progressing: []? Met: []? Not Met: []?  Adjusted    Overall Progression Towards Functional goals/ Treatment Progress Update:  [] Patient is progressing as expected towards functional goals listed. [] Progression is slowed due to complexities/Impairments listed. [] Progression has been slowed due to co-morbidities. [x] Plan just implemented, too soon to assess goals progression <30days   [] Goals require adjustment due to lack of progress  [] Patient is not progressing as expected and requires additional follow up with physician  [] Other    Persisting Functional Limitations/Impairments:  []Sleeping [x]Sitting               [x]Standing [x]Transfers        [x]Walking [x]Kneeling               [x]Stairs [x]Squatting / bending   [x]ADLs []Reaching  [x]Lifting  [x]Housework  []Driving []Job related tasks  []Sports/Recreation []Other:        ASSESSMENT:  11/3 reduced myofascial pain  10/27 needs improved sleeping positions  10/20 Progressing. Good carlos of manual therapy and exercises    Treatment/Activity Tolerance:  [x] Patient able to complete tx [] Patient limited by fatigue  [] Patient limited by pain  [] Patient limited by other medical complications  [] Other:     Prognosis: [x] Good [] Fair  [] Poor    Patient Requires Follow-up: [x] Yes  [] No    Plan for next treatment session:  PLAN:  strength, ROM/flexibility, posture and body mechs, manual, MOC, HEP, pt education     Frequency/Duration: 2 days per week for 6 Weeks:  Interventions:  [x]? Therapeutic exercise including:strength, ROM, flexibility  [x]? NMR activation and proprioception including postural re-education  [x]? Manual therapy as indicated to include: IASTM, STM, PROM, Gr I-IV mobilizations, manipulation. [x]? Modalities as needed that may include: thermal agents, E-stim, Biofeedback, US, iontophoresis as indicated  [x]? Patient education on joint protection, postural re-education, activity modification, progression of HEP. PLAN: See eval. PT 2x / week for 6 weeks.    [x] Continue per plan of care [] Alter current plan (see comments)  [] Plan of care initiated [] Hold pending MD visit [] Discharge    Electronically signed by: Kin Birmingham PT, DPT    Note: If patient does not return for scheduled/ recommended follow up visits, this note will serve as a discharge from care along with most recent update on progress.

## 2020-11-05 ENCOUNTER — HOSPITAL ENCOUNTER (OUTPATIENT)
Dept: PHYSICAL THERAPY | Age: 70
Setting detail: THERAPIES SERIES
Discharge: HOME | End: 2020-11-05
Payer: MEDICARE

## 2020-11-05 PROCEDURE — 97140 MANUAL THERAPY 1/> REGIONS: CPT

## 2020-11-05 PROCEDURE — 97110 THERAPEUTIC EXERCISES: CPT

## 2020-11-05 NOTE — FLOWSHEET NOTE
quicker in the morning  10/27 she feels better when she forces herself to move. Feels worst when she gets up out of bed. Had BERTHA last week on Thursday. Felt great for the next 2 days then pain came back. States she did the laundry yesterday - went up and down the basement steps 5x - felt really good    10/20 reports she feels about the same. Reports she felt wonderful when she walked out of the clinic after last session. Reports pain is worst in am and then gets bad again in evening. Will get BERTHA on fri 10/23      10/12 Stated that she got up around 4 am because of back pain. As usual pain worse in morning,  Begins to resolve after a while. Sitting always resolves pain. Stated MD told her she ahs severe DDD with minimal spacing causing sciatic N impingement. OBJECTIVE:   10/20 pt requires Max cues for good TrA iso  MRI 9/20/2020  [facility]:  Stable small central disc extrusion and mild central canal stenosis at L3-L4. There is progressive left moderate foraminal stenosis at this level.         New laminotomy changes at L4-L5.  The central canal is mildly stenotic which    is improved.  There is worsening severe right foraminal stenosis and moderate    left foraminal stenosis at this level.         Stable moderate right foraminal stenosis and mild left foraminal stenosis at    L5-S1. RESTRICTIONS/PRECAUTIONS: previous lumbar surgery 2015- had cyst removed form lower back. HTN.  Intermittent LOB - no falls, impaired kidneys, frequent john horses in calves    Exercises/Interventions:     Therapeutic Exercises (60899) Resistance / level Sets/sec Reps Notes   Nu step    or bike 1 6 min   S=9, arms = 11, 5 min, workload 5   Step stretches-   HS  Hip flexor  30 sec  30 sec 3  3 B ea      Step ups with TrA iso- fwd and side 4\" 1 15 B ea  B UE support   IB calf stretches  2 x 30 sec            Mat ex w SB  Fig 4 piriformis stretch    TrA iso  DKTC   B LTR    Red             2x20\" B    10x5\"  5 sec kinesthetic sense, posture, motor skill, proprioception to assist with   [] LE / lumbar: LE, proximal hip, and core control in self care, mobility, lifting, ambulation and eccentric single leg control. [] UE / cervical: cervical, scapular, scapulothoracic and UE control with self care, reaching, carrying, lifting, house/yardwork, driving, computer work.   [] (50197) Therapist is in constant attendance of 2 or more patients providing skilled therapy interventions, but not providing any significant amount of measurable one-on-one time to either patient, for improvements in  [] LE / lumbar: LE, proximal hip, and core control in self care, mobility, lifting, ambulation and eccentric single leg control. [] UE / cervical: cervical, scapular, scapulothoracic and UE control with self care, reaching, carrying, lifting, house/yardwork, driving, computer work.      NMR and Therapeutic Activities:    [x] (53246 or 63880) Provided verbal/tactile cueing for activities related to improving balance, coordination, kinesthetic sense, posture, motor skill, proprioception and motor activation to allow for proper function of   [x] LE: / Lumbar core, proximal hip and LE with self care and ADLs  [] UE / Cervical: cervical, postural, scapular, scapulothoracic and UE control with self care, carrying, lifting, driving, computer work.   [] (62322) Gait Re-education- Provided training and instruction to the patient for proper LE, core and proximal hip recruitment and positioning and eccentric body weight control with ambulation re-education including up and down stairs     Home Management Training / Self Care:  [] (10725) Provided self-care/home management training related to activities of daily living and compensatory training, and/or use of adaptive equipment for improvement with: ADLs and compensatory training, meal preparation, safety procedures and instruction in use of adaptive equipment, including bathing, grooming, dressing, personal hygiene, basic household cleaning and chores. Home Exercise Program:    [] (99184) Reviewed/Progressed HEP activities related to strengthening, flexibility, endurance, ROM of   [] LE / Lumbar: core, proximal hip and LE for functional self-care, mobility, lifting and ambulation/stair navigation   [] UE / Cervical: cervical, postural, scapular, scapulothoracic and UE control with self care, reaching, carrying, lifting, house/yardwork, driving, computer work  [] (62084)Reviewed/Progressed HEP activities related to improving balance, coordination, kinesthetic sense, posture, motor skill, proprioception of   [] LE: core, proximal hip and LE for self care, mobility, lifting, and ambulation/stair navigation    [] UE / Cervical: cervical, postural,  scapular, scapulothoracic and UE control with self care, reaching, carrying, lifting, house/yardwork, driving, computer work    Manual Treatments:  PROM / STM / Oscillations-Mobs:  G-I, II, III, IV (PA's, Inf., Post.)  [] (20853) Provided manual therapy to mobilize LE, proximal hip and/or LS spine soft tissue/joints for the purpose of modulating pain, promoting relaxation,  increasing ROM, reducing/eliminating soft tissue swelling/inflammation/restriction, improving soft tissue extensibility and allowing for proper ROM for normal function with   [] LE / lumbar: self care, mobility, lifting and ambulation. [] UE / Cervical: self care, reaching, carrying, lifting, house/yardwork, driving, computer work. Modalities:  [] (19176) Vasopneumatic compression: Utilized vasopneumatic compression to decrease edema / swelling for the purpose of improving mobility and quad tone / recruitment which will allow for increased overall function including but not limited to self-care, transfers, ambulation, and ascending / descending stairs.        Modalities:      Charges:  Timed Code Treatment Minutes: 40   Total Treatment Minutes: 40     [] EVAL - LOW (61795)   [] EVAL - MOD (49506)  [] EVAL - HIGH (80576)  [] RE-EVAL (65464)  [x] LZ(65677) x   2   [] Ionto  [] NMR (36466) x       [] Vaso  [x] Manual (73880) x  1     [] Ultrasound  [] TA x 1       [] Mech Traction (63395)  [] Aquatic Therapy x     [] ES (un) (94303):   [] Home Management Training x  [] ES(attended) (66238)   [] Dry Needling 1-2 muscles (65389):  [] Dry Needling 3+ muscles (733011  [] Group:      [] Other:        GOALS:  Patient stated goal:improve balance and walking  []? Progressing: []? Met: []? Not Met: []? Adjusted     Therapist goals for Patient:   Short Term Goals: To be achieved in: 2 weeks  1. Independent in HEP and progression per patient tolerance, in order to prevent re-injury. []? Progressing: []? Met: []? Not Met: []? Adjusted  2. Patient will have a decrease in pain to facilitate improvement in movement, function, and ADLs as indicated by improvement with respect to Functional Deficits. []? Progressing: []? Met: []? Not Met: []? Adjusted     Long Term Goals: To be achieved in: 6  weeks  1. Disability index score of 10% or less on the oswestry to assist with reaching prior level of function. []? Progressing: []? Met: []? Not Met: []? Adjusted  2. Patient will demonstrate increased AROM  to [facility], to allow for proper joint functioning to allow pt to resume squatting to lift items, use of good posture and body mechs for home chores without  increase in symptoms. []? Progressing: []? Met: []? Not Met: []? Adjusted  3. Patient will demonstrate increased Strength by 1/2 mmt grade  and core activation to allow for proper functional mobility as indicated by patients Functional Deficits to allow pt to resume up/down the basement stairs and curbs prn without difficulty without increase in symptoms. []? Progressing: []? Met: []? Not Met: []? Adjusted  4.  Patient will return to functional activities including amb prn for grocery shopping and errands without increased symptoms and without need of AD.   []? Progressing: []? Met: []? Not Met: []? Adjusted  5. Improve balance so that pt reports no LOB in the preceding week and no sense of unsteadiness on stairs. []? Progressing: []? Met: []? Not Met: []? Adjusted          Overall Progression Towards Functional goals/ Treatment Progress Update:  [] Patient is progressing as expected towards functional goals listed. [] Progression is slowed due to complexities/Impairments listed. [] Progression has been slowed due to co-morbidities. [x] Plan just implemented, too soon to assess goals progression <30days   [] Goals require adjustment due to lack of progress  [] Patient is not progressing as expected and requires additional follow up with physician  [] Other    Persisting Functional Limitations/Impairments:  []Sleeping [x]Sitting               [x]Standing [x]Transfers        [x]Walking [x]Kneeling               [x]Stairs [x]Squatting / bending   [x]ADLs []Reaching  [x]Lifting  [x]Housework  []Driving []Job related tasks  []Sports/Recreation []Other:        ASSESSMENT:  11/5 good carlos of therapy exercises  11/3 reduced myofascial pain  10/27 needs improved sleeping positions  10/20 Progressing. Good carlos of manual therapy and exercises    Treatment/Activity Tolerance:  [x] Patient able to complete tx [] Patient limited by fatigue  [] Patient limited by pain  [] Patient limited by other medical complications  [] Other:     Prognosis: [x] Good [] Fair  [] Poor    Patient Requires Follow-up: [x] Yes  [] No    Plan for next treatment session:  PLAN:  strength, ROM/flexibility, posture and body mechs, manual, MOC, HEP, pt education     Frequency/Duration: 2 days per week for 6 Weeks:  Interventions:  [x]? Therapeutic exercise including:strength, ROM, flexibility  [x]? NMR activation and proprioception including postural re-education  [x]? Manual therapy as indicated to include: IASTM, STM, PROM, Gr I-IV mobilizations, manipulation. [x]?   Modalities as needed that may include: thermal agents, E-stim, Biofeedback, US, iontophoresis as indicated  [x]? Patient education on joint protection, postural re-education, activity modification, progression of HEP. PLAN: See eval. PT 2x / week for 6 weeks. [x] Continue per plan of care [] Alter current plan (see comments)  [] Plan of care initiated [] Hold pending MD visit [] Discharge    Electronically signed by: Ovidio Salazar PT, DPT    Note: If patient does not return for scheduled/ recommended follow up visits, this note will serve as a discharge from care along with most recent update on progress.

## 2020-11-10 ENCOUNTER — HOSPITAL ENCOUNTER (OUTPATIENT)
Dept: PHYSICAL THERAPY | Age: 70
Setting detail: THERAPIES SERIES
Discharge: HOME | End: 2020-11-10
Payer: MEDICARE

## 2020-11-12 ENCOUNTER — HOSPITAL ENCOUNTER (OUTPATIENT)
Dept: PHYSICAL THERAPY | Age: 70
Setting detail: THERAPIES SERIES
Discharge: HOME | End: 2020-11-12
Payer: MEDICARE

## 2020-11-12 PROCEDURE — 97140 MANUAL THERAPY 1/> REGIONS: CPT

## 2020-11-12 PROCEDURE — 97110 THERAPEUTIC EXERCISES: CPT

## 2020-11-12 NOTE — FLOWSHEET NOTE
[facility] Physical Therapy  Phone: [phone]   Fax: [phone]    Physical Therapy Daily Treatment Note  Date:  2020    Patient Name:  Bekah Hardy    :  1950  MRN: [id number]  Medical/Treatment Diagnosis Information:  Diagnosis: M47.26 (ICD-10-CM) - Other spondylosis with radiculopathy, lumbar region, M51.36 (ICD-10-CM) - Other intervertebral disc degeneration, lumbar region  Treatment Diagnosis: hypomobility at L-P spine, flexibility deficits at hips, myofascial pain, strength deficits at core mm, hip mm, L LE weaker than R LE, poor habitual postures/body [facility]  Insurance/Certification information:  PT Insurance Information: medicare and human[employer], med nec  Physician Information:  Referring Practitioner: Dr. Naresh Lai ([facility], [facility]) = PCP)  Plan of care signed (Y/N):     Date of Patient follow up with Physician:      Functional scale: OSWESTRY raw score =9 ; dysfunction =33%    Progress Report: []  Yes eval  [x]  No     Date Range for reporting period:  Beginning 10/8/20  Ending    Progress report due (10 Rx/or 30 days whichever is less): visit #10 or 75(FUOP)     Recertification due (POC duration/ or 90 days whichever is less): visit # or 20  (date)     Visit # Insurance Allowable Auth required? Date Range    Med nec []  Yes  [x]  No na        Latex Allergy:  [x]NO      []YES  Preferred Language for Healthcare:   [x]English       []other    Pain level: 0/10 day time,  Morning pain: /10 from approx 5am - 10-11am; evening pain: 7-8/10 - starts around 9-10pm and it lasts until she gets in bed. SUBJECTIVE:   no pain during day but still has pain in am and at night. Raked some leaves the other day. - made L hip hurt - so would take a break then get out and rake some more. Could rake approx 10min at a time. Reports her walking is \"less stumbly\" than before    11/5 was sore this am - thinks its bc she slept later. Didn't use her rollator much. Voting went fine - no line at all. Still walking on TM    11/3 has been walking on treadmill 3x/d x 5-6 min each time. feels ok unless has been sedentary too long. pain was 9/10 when got OOB this am. thinks the am pain goes away faster if she sleeps with pillow bn knees    10/ 30  Pt states this morning pain was 9/10 when she got OOB,  But pain is now decreasing quicker in the morning  10/27 she feels better when she forces herself to move. Feels worst when she gets up out of bed. Had BERTHA last week on Thursday. Felt great for the next 2 days then pain came back. States she did the laundry yesterday - went up and down the basement steps 5x - felt really good    10/20 reports she feels about the same. Reports she felt wonderful when she walked out of the clinic after last session. Reports pain is worst in am and then gets bad again in evening. Will get BERTHA on fri 10/23      10/12 Stated that she got up around 4 am because of back pain. As usual pain worse in morning,  Begins to resolve after a while. Sitting always resolves pain. Stated MD told her she ahs severe DDD with minimal spacing causing sciatic N impingement. OBJECTIVE:   10/20 pt requires Max cues for good TrA iso  MRI 9/20/2020  [facility]:  Stable small central disc extrusion and mild central canal stenosis at L3-L4. There is progressive left moderate foraminal stenosis at this level.         New laminotomy changes at L4-L5.  The central canal is mildly stenotic which    is improved.  There is worsening severe right foraminal stenosis and moderate    left foraminal stenosis at this level.         Stable moderate right foraminal stenosis and mild left foraminal stenosis at    L5-S1. RESTRICTIONS/PRECAUTIONS: previous lumbar surgery 2015- had cyst removed form lower back. HTN.  Intermittent LOB - no falls, impaired kidneys, frequent john horses in calves    Exercises/Interventions:     Therapeutic Exercises (83749) Resistance / level Sets/sec Reps Notes   Nu step    or bike 1 6 min   S=9, arms = 11, 5 min, workload 5   Step stretches-   HS  Hip flexor  30 sec  30 sec 3  3 B ea      Step ups with TrA iso- fwd and side 4\" 1 17 B ea  B UE support   IB calf stretches  2 x 30 sec            Mat ex w SB  Fig 4 piriformis stretch    TrA iso  DKTC   B LTR    Red             3x20\" B    10x5\"  5 sec x 10  10x5\" ea R and L         Min cues for good TrA iso   CC --multifidi walkouts with paloff press x 5 2pl 1 3 B CGA/SBA when sidestepping back in                 Therapeutic Activities (62262)       Pt ed re importance of good hydration in management of john horses  Postural ed-good posture /body mechs   reveiw      Instructed in how to sleep with neutral spine with pillow(s) bn LE and under top arm  Review                            Neuromuscular Re-ed (50666)       Balance exercises with TrA iso       Seated SB :   A/P, M/L, circles: CW/CCW  1 15 ea           Glides 3-way  1 10 Ea B                 Manual Intervention (56272)       TRIAL BALL BELT TRACTION    MET to correct            IASTM Next session as carlos        LOL sacral torsion, [id number]  Manual stretches B:   HS, glut max, piriformis, rectus femoris (in supine with LE off table)                                                   Pt. Education:  -10/27 review HEP. Pt educated re sleep positions with neutral spine, importance of increasing overall activity level and starting to walk 3x/dau10/20 review and progress HEP  10/8/20 patient educated on diagnosis, prognosis and expectations for rehab  -all patient questions were answered  offered aquatic therapy - pt declined.      HEP instruction:  10/27 sleep with neutral spine; walk 3x/d x 5-10 min ea time; go up and down the basement steps >/= 1-2x/d  10/8 Fig 4 piriformis stretch, Supine HS stretch, TrA iso, Prone press ups, posture    Therapeutic Exercise and NMR EXR  [x] (74995) Provided verbal/tactile cueing for activities related to strengthening, flexibility, endurance, ROM for improvements in  [x] LE / Lumbar: LE, proximal hip, and core control with self care, mobility, lifting, ambulation. [] UE / Cervical: cervical, postural, scapular, scapulothoracic and UE control with self care, reaching, carrying, lifting, house/yardwork, driving, computer work.  [] (36767) Provided verbal/tactile cueing for activities related to improving balance, coordination, kinesthetic sense, posture, motor skill, proprioception to assist with   [] LE / lumbar: LE, proximal hip, and core control in self care, mobility, lifting, ambulation and eccentric single leg control. [] UE / cervical: cervical, scapular, scapulothoracic and UE control with self care, reaching, carrying, lifting, house/yardwork, driving, computer work.   [] (74617) Therapist is in constant attendance of 2 or more patients providing skilled therapy interventions, but not providing any significant amount of measurable one-on-one time to either patient, for improvements in  [] LE / lumbar: LE, proximal hip, and core control in self care, mobility, lifting, ambulation and eccentric single leg control. [] UE / cervical: cervical, scapular, scapulothoracic and UE control with self care, reaching, carrying, lifting, house/yardwork, driving, computer work.      NMR and Therapeutic Activities:    [x] (89768 or 15965) Provided verbal/tactile cueing for activities related to improving balance, coordination, kinesthetic sense, posture, motor skill, proprioception and motor activation to allow for proper function of   [x] LE: / Lumbar core, proximal hip and LE with self care and ADLs  [] UE / Cervical: cervical, postural, scapular, scapulothoracic and UE control with self care, carrying, lifting, driving, computer work.   [] (68135) Gait Re-education- Provided training and instruction to the patient for proper LE, core and proximal hip recruitment and positioning and Modalities:  [] (90387) Vasopneumatic compression: Utilized vasopneumatic compression to decrease edema / swelling for the purpose of improving mobility and quad tone / recruitment which will allow for increased overall function including but not limited to self-care, transfers, ambulation, and ascending / descending stairs. Modalities:      Charges:  Timed Code Treatment Minutes: 41   Total Treatment Minutes: 41     [] EVAL - LOW (92598)   [] EVAL - MOD (23284)  [] EVAL - HIGH (93803)  [] RE-EVAL (65046)  [x] MM(45081) x   2   [] Ionto  [] NMR (49567) x       [] Vaso  [x] Manual (30861) x  1     [] Ultrasound  [] TA x 1       [] Mech Traction (69666)  [] Aquatic Therapy x     [] ES (un) (11335):   [] Home Management Training x  [] ES(attended) (62897)   [] Dry Needling 1-2 muscles (00191):  [] Dry Needling 3+ muscles (815718  [] Group:      [] Other:        GOALS:  Patient stated goal:improve balance and walking  []? Progressing: []? Met: []? Not Met: []? Adjusted     Therapist goals for Patient:   Short Term Goals: To be achieved in: 2 weeks  1. Independent in HEP and progression per patient tolerance, in order to prevent re-injury. []? Progressing: []? Met: []? Not Met: []? Adjusted  2. Patient will have a decrease in pain to facilitate improvement in movement, function, and ADLs as indicated by improvement with respect to Functional Deficits. []? Progressing: []? Met: []? Not Met: []? Adjusted     Long Term Goals: To be achieved in: 6  weeks  1. Disability index score of 10% or less on the oswestry to assist with reaching prior level of function. []? Progressing: []? Met: []? Not Met: []? Adjusted  2. Patient will demonstrate increased AROM  to [facility], to allow for proper joint functioning to allow pt to resume squatting to lift items, use of good posture and body mechs for home chores without  increase in symptoms. []? Progressing: []? Met: []? Not Met: []? Adjusted  3.  Patient will demonstrate increased Strength by 1/2 mmt grade  and core activation to allow for proper functional mobility as indicated by patients Functional Deficits to allow pt to resume up/down the basement stairs and curbs prn without difficulty without increase in symptoms. []? Progressing: []? Met: []? Not Met: []? Adjusted  4. Patient will return to functional activities including amb prn for grocery shopping and errands without increased symptoms and without need of AD.   []? Progressing: []? Met: []? Not Met: []? Adjusted  5. Improve balance so that pt reports no LOB in the preceding week and no sense of unsteadiness on stairs. []? Progressing: []? Met: []? Not Met: []? Adjusted          Overall Progression Towards Functional goals/ Treatment Progress Update:  [] Patient is progressing as expected towards functional goals listed. [] Progression is slowed due to complexities/Impairments listed. [] Progression has been slowed due to co-morbidities. [x] Plan just implemented, too soon to assess goals progression <30days   [] Goals require adjustment due to lack of progress  [] Patient is not progressing as expected and requires additional follow up with physician  [] Other    Persisting Functional Limitations/Impairments:  []Sleeping [x]Sitting               [x]Standing [x]Transfers        [x]Walking [x]Kneeling               [x]Stairs [x]Squatting / bending   [x]ADLs []Reaching  [x]Lifting  [x]Housework  []Driving []Job related tasks  []Sports/Recreation []Other:        ASSESSMENT:  11/5 good carlos of therapy exercises  11/3 reduced myofascial pain  10/27 needs improved sleeping positions  10/20 Progressing.  Good carlos of manual therapy and exercises    Treatment/Activity Tolerance:  [x] Patient able to complete tx [] Patient limited by fatigue  [] Patient limited by pain  [] Patient limited by other medical complications  [] Other:     Prognosis: [x] Good [] Fair  [] Poor    Patient Requires Follow-up: [x] Yes  [] No    Plan for next treatment session:  PLAN:  strength, ROM/flexibility, posture and body mechs, manual, MOC, HEP, pt education     Frequency/Duration: 2 days per week for 6 Weeks:  Interventions:  [x]? Therapeutic exercise including:strength, ROM, flexibility  [x]? NMR activation and proprioception including postural re-education  [x]? Manual therapy as indicated to include: IASTM, STM, PROM, Gr I-IV mobilizations, manipulation. [x]? Modalities as needed that may include: thermal agents, E-stim, Biofeedback, US, iontophoresis as indicated  [x]? Patient education on joint protection, postural re-education, activity modification, progression of HEP. PLAN: See eval. PT 2x / week for 6 weeks. [x] Continue per plan of care [] Alter current plan (see comments)  [] Plan of care initiated [] Hold pending MD visit [] Discharge    Electronically signed by: Franck Hernandez PT, DPT    Note: If patient does not return for scheduled/ recommended follow up visits, this note will serve as a discharge from care along with most recent update on progress.

## 2020-11-17 ENCOUNTER — HOSPITAL ENCOUNTER (OUTPATIENT)
Dept: PHYSICAL THERAPY | Age: 70
Setting detail: THERAPIES SERIES
Discharge: HOME | End: 2020-11-17
Payer: MEDICARE

## 2020-11-17 PROCEDURE — 97110 THERAPEUTIC EXERCISES: CPT

## 2020-11-17 PROCEDURE — 97140 MANUAL THERAPY 1/> REGIONS: CPT

## 2020-11-17 NOTE — FLOWSHEET NOTE
[address] [facility]  Phone: [phone]   Fax: [phone]    Physical Therapy Daily Treatment Note  Date:  2020    Patient Name:  Cleaster Libman    :  1950  MRN: [id number]  Medical/Treatment Diagnosis Information:  Diagnosis: M47.26 (ICD-10-CM) - Other spondylosis with radiculopathy, lumbar region, M51.36 (ICD-10-CM) - Other intervertebral disc degeneration, lumbar region  Treatment Diagnosis: hypomobility at L-P spine, flexibility deficits at hips, myofascial pain, strength deficits at core mm, hip mm, L LE weaker than R LE, poor habitual postures/body [facility]  Insurance/Certification information:  PT Insurance Information: medicare and humana, med nec  Physician Information:  Referring Practitioner: Dr. Silvio Salmeron (Marcia Adams PA-C, [facility]) = PCP)  Plan of care signed (Y/N):     Date of Patient follow up with Physician:      Functional scale: OSWESTRY raw score =9 ; dysfunction =33%    Progress Report: []  Yes eval  [x]  No     Date Range for reporting period:  Beginning 10/8/20  Ending    Progress report due (10 Rx/or 30 days whichever is less): visit #10 or (XITV)     Recertification due (POC duration/ or 90 days whichever is less): visit # or 20  (date)     Visit # Insurance Allowable Auth required? Date Range    Med nec []  Yes  [x]  No na        Latex Allergy:  [x]NO      []YES  Preferred Language for Healthcare:   [x]English       []other    Pain level: 0/10 day time,  Morning pain: /10 from approx 5am - 10-11am; evening pain: 7-8/10 - starts around 9-10pm and it lasts until she gets in bed. SUBJECTIVE:   states her pain remains the same. States she tries to walk after eating ea time      no pain during day but still has pain in am and at night. Raked some leaves the other day. - made L hip hurt - so would take a break then get out and rake some more. Could rake approx 10min at a time.   Reports her walking is \"less stumbly\" than before    11/5 was sore this am - thinks its bc she slept later. Didn't use her rollator much. Voting went fine - no line at all. Still walking on TM    11/3 has been walking on treadmill 3x/d x 5-6 min each time. feels ok unless has been sedentary too long. pain was 9/10 when got OOB this am. thinks the am pain goes away faster if she sleeps with pillow bn knees    10/ 30  Pt states this morning pain was 9/10 when she got OOB,  But pain is now decreasing quicker in the morning  10/27 she feels better when she forces herself to move. Feels worst when she gets up out of bed. Had BERTHA last week on Thursday. Felt great for the next 2 days then pain came back. States she did the laundry yesterday - went up and down the basement steps 5x - felt really good    10/20 reports she feels about the same. Reports she felt wonderful when she walked out of the clinic after last session. Reports pain is worst in am and then gets bad again in evening. Will get BERTHA on fri 10/23      10/12 Stated that she got up around 4 am because of back pain. As usual pain worse in morning,  Begins to resolve after a while. Sitting always resolves pain. Stated MD told her she ahs severe DDD with minimal spacing causing sciatic N impingement. OBJECTIVE:  11/17 Defer progress note to 11th visit on 11/24/20  10/20 pt requires Max cues for good TrA iso  MRI 9/20/2020  [facility]:  Stable small central disc extrusion and mild central canal stenosis at L3-L4. There is progressive left moderate foraminal stenosis at this level.         New laminotomy changes at L4-L5.  The central canal is mildly stenotic which    is improved.  There is worsening severe right foraminal stenosis and moderate    left foraminal stenosis at this level.         Stable moderate right foraminal stenosis and mild left foraminal stenosis at    L5-S1.           RESTRICTIONS/PRECAUTIONS: previous lumbar surgery 2015- had cyst removed form lower back.  HTN. Intermittent LOB - no falls, impaired kidneys, frequent john horses in calves    Exercises/Interventions:     Therapeutic Exercises (08481) Resistance / level Sets/sec Reps Notes   Nu step    or bike 1 6 min   S=9, arms = 11, 5 min, workload 5   Step stretches-   HS  Hip flexor  30 sec  30 sec 3  3 B ea      Step ups with TrA iso- fwd and side 4\" 1 17 B ea  B UE support   IB calf stretches  2 x 30 sec            Mat ex w SB  Fig 4 piriformis stretch    TrA iso  DKTC   B LTR    Red             3x20\" B    10x5\"  5 sec x 10  10x5\" ea R and L         Min cues for good TrA iso   CC --multifidi walkouts with paloff press x 5 2pl 1 3 B CGA/SBA when sidestepping back in                 Therapeutic Activities (63772)       Pt ed re importance of good hydration in management of john horses  Postural ed-good posture /body mechs   reveiw      Instructed in how to sleep with neutral spine with pillow(s) bn LE and under top arm  Review                            Neuromuscular Re-ed (34283)       Balance exercises with TrA iso       Seated SB :   A/P, M/L, circles: CW/CCW  1 15 ea           Glides 3-way  1 10 Ea B                 Manual Intervention (72907)        BALL BELT TRACTION    MET to correct            IASTM TRIAL x6 min        LOL sacral torsion,         13'                                           Pt. Education:  -10/27 review HEP. Pt educated re sleep positions with neutral spine, importance of increasing overall activity level and starting to walk 3x/dau10/20 review and progress HEP  10/8/20 patient educated on diagnosis, prognosis and expectations for rehab  -all patient questions were answered  offered aquatic therapy - pt declined.      HEP instruction:  10/27 sleep with neutral spine; walk 3x/d x 5-10 min ea time; go up and down the basement steps >/= 1-2x/d  10/8 Fig 4 piriformis stretch, Supine HS stretch, TrA iso, Prone press ups, posture    Therapeutic Exercise and NMR EXR  [x] (56857) Provided verbal/tactile cueing for activities related to strengthening, flexibility, endurance, ROM for improvements in  [x] LE / Lumbar: LE, proximal hip, and core control with self care, mobility, lifting, ambulation. [] UE / Cervical: cervical, postural, scapular, scapulothoracic and UE control with self care, reaching, carrying, lifting, house/yardwork, driving, computer work.  [] (40321) Provided verbal/tactile cueing for activities related to improving balance, coordination, kinesthetic sense, posture, motor skill, proprioception to assist with   [] LE / lumbar: LE, proximal hip, and core control in self care, mobility, lifting, ambulation and eccentric single leg control. [] UE / cervical: cervical, scapular, scapulothoracic and UE control with self care, reaching, carrying, lifting, house/yardwork, driving, computer work.   [] (88368) Therapist is in constant attendance of 2 or more patients providing skilled therapy interventions, but not providing any significant amount of measurable one-on-one time to either patient, for improvements in  [] LE / lumbar: LE, proximal hip, and core control in self care, mobility, lifting, ambulation and eccentric single leg control. [] UE / cervical: cervical, scapular, scapulothoracic and UE control with self care, reaching, carrying, lifting, house/yardwork, driving, computer work.      NMR and Therapeutic Activities:    [x] (52207 or 37355) Provided verbal/tactile cueing for activities related to improving balance, coordination, kinesthetic sense, posture, motor skill, proprioception and motor activation to allow for proper function of   [x] LE: / Lumbar core, proximal hip and LE with self care and ADLs  [] UE / Cervical: cervical, postural, scapular, scapulothoracic and UE control with self care, carrying, lifting, driving, computer work.   [] (26081) Gait Re-education- Provided training and instruction to the patient for proper LE, core and proximal hip recruitment and positioning and eccentric body weight control with ambulation re-education including up and down stairs     Home Management Training / Self Care:  [] (86518) Provided self-care/home management training related to activities of daily living and compensatory training, and/or use of adaptive equipment for improvement with: ADLs and compensatory training, meal preparation, safety procedures and instruction in use of adaptive equipment, including bathing, grooming, dressing, personal hygiene, basic household cleaning and chores. Home Exercise Program:    [] (62752) Reviewed/Progressed HEP activities related to strengthening, flexibility, endurance, ROM of   [] LE / Lumbar: core, proximal hip and LE for functional self-care, mobility, lifting and ambulation/stair navigation   [] UE / Cervical: cervical, postural, scapular, scapulothoracic and UE control with self care, reaching, carrying, lifting, house/yardwork, driving, computer work  [] (77358)Reviewed/Progressed HEP activities related to improving balance, coordination, kinesthetic sense, posture, motor skill, proprioception of   [] LE: core, proximal hip and LE for self care, mobility, lifting, and ambulation/stair navigation    [] UE / Cervical: cervical, postural,  scapular, scapulothoracic and UE control with self care, reaching, carrying, lifting, house/yardwork, driving, computer work    Manual Treatments:  PROM / STM / Oscillations-Mobs:  G-I, II, III, IV (PA's, Inf., Post.)  [] (35990) Provided manual therapy to mobilize LE, proximal hip and/or LS spine soft tissue/joints for the purpose of modulating pain, promoting relaxation,  increasing ROM, reducing/eliminating soft tissue swelling/inflammation/restriction, improving soft tissue extensibility and allowing for proper ROM for normal function with   [] LE / lumbar: self care, mobility, lifting and ambulation.     [] UE / Cervical: self care, reaching, carrying, lifting, house/yardwork, driving, computer work. Modalities:  [] (19891) Vasopneumatic compression: Utilized vasopneumatic compression to decrease edema / swelling for the purpose of improving mobility and quad tone / recruitment which will allow for increased overall function including but not limited to self-care, transfers, ambulation, and ascending / descending stairs. Modalities:      Charges:  Timed Code Treatment Minutes: 44   Total Treatment Minutes: 44     [] EVAL - LOW (87112)   [] EVAL - MOD (51218)  [] EVAL - HIGH (66643)  [] RE-EVAL (39671)  [x] XM(93417) x   2   [] Ionto  [] NMR (62170) x       [] Vaso  [x] Manual (85678) x  1     [] Ultrasound  [] TA x 1       [] Mech Traction (35102)  [] Aquatic Therapy x     [] ES (un) (27635):   [] Home Management Training x  [] ES(attended) (00845)   [] Dry Needling 1-2 muscles (38375):  [] Dry Needling 3+ muscles (067638  [] Group:      [] Other:        GOALS:  Patient stated goal:improve balance and walking  []? Progressing: []? Met: []? Not Met: []? Adjusted     Therapist goals for Patient:   Short Term Goals: To be achieved in: 2 weeks  1. Independent in HEP and progression per patient tolerance, in order to prevent re-injury. []? Progressing: []? Met: []? Not Met: []? Adjusted  2. Patient will have a decrease in pain to facilitate improvement in movement, function, and ADLs as indicated by improvement with respect to Functional Deficits. []? Progressing: []? Met: []? Not Met: []? Adjusted     Long Term Goals: To be achieved in: 6  weeks  1. Disability index score of 10% or less on the oswestry to assist with reaching prior level of function. []? Progressing: []? Met: []? Not Met: []? Adjusted  2. Patient will demonstrate increased AROM  to [facility], to allow for proper joint functioning to allow pt to resume squatting to lift items, use of good posture and body mechs for home chores without  increase in symptoms. []? Progressing: []? Met: []? Not Met: []? Adjusted  3.  Patient will demonstrate increased Strength by 1/2 mmt grade  and core activation to allow for proper functional mobility as indicated by patients Functional Deficits to allow pt to resume up/down the basement stairs and curbs prn without difficulty without increase in symptoms. []? Progressing: []? Met: []? Not Met: []? Adjusted  4. Patient will return to functional activities including amb prn for grocery shopping and errands without increased symptoms and without need of AD.   []? Progressing: []? Met: []? Not Met: []? Adjusted  5. Improve balance so that pt reports no LOB in the preceding week and no sense of unsteadiness on stairs. []? Progressing: []? Met: []? Not Met: []? Adjusted          Overall Progression Towards Functional goals/ Treatment Progress Update:  [] Patient is progressing as expected towards functional goals listed. [] Progression is slowed due to complexities/Impairments listed. [] Progression has been slowed due to co-morbidities. [x] Plan just implemented, too soon to assess goals progression <30days   [] Goals require adjustment due to lack of progress  [] Patient is not progressing as expected and requires additional follow up with physician  [] Other    Persisting Functional Limitations/Impairments:  []Sleeping [x]Sitting               [x]Standing [x]Transfers        [x]Walking [x]Kneeling               [x]Stairs [x]Squatting / bending   [x]ADLs []Reaching  [x]Lifting  [x]Housework  []Driving []Job related tasks  []Sports/Recreation []Other:        ASSESSMENT:  11/17 trial ball belt txn today  11/5 good carlos of therapy exercises  11/3 reduced myofascial pain  10/27 needs improved sleeping positions  10/20 Progressing.  Good carlos of manual therapy and exercises    Treatment/Activity Tolerance:  [x] Patient able to complete tx [] Patient limited by fatigue  [] Patient limited by pain  [] Patient limited by other medical complications  [] Other:     Prognosis: [x] Good [] Fair  [] Poor    Patient Requires Follow-up: [x] Yes  [] No    Plan for next treatment session:  PLAN:  strength, ROM/flexibility, posture and body mechs, manual, MOC, HEP, pt education     Frequency/Duration: 2 days per week for 6 Weeks:  Interventions:  [x]? Therapeutic exercise including:strength, ROM, flexibility  [x]? NMR activation and proprioception including postural re-education  [x]? Manual therapy as indicated to include: IASTM, STM, PROM, Gr I-IV mobilizations, manipulation. [x]? Modalities as needed that may include: thermal agents, E-stim, Biofeedback, US, iontophoresis as indicated  [x]? Patient education on joint protection, postural re-education, activity modification, progression of HEP. PLAN: 11/17 Defer progress note to 11th visit on 11/24/20  See bebeto PT 2x / week for 6 weeks. [x] Continue per plan of care [] Alter current plan (see comments)  [] Plan of care initiated [] Hold pending MD visit [] Discharge    Electronically signed by: Mahnaz Lou PT, DPT    Note: If patient does not return for scheduled/ recommended follow up visits, this note will serve as a discharge from care along with most recent update on progress.

## 2020-11-20 ENCOUNTER — HOSPITAL ENCOUNTER (OUTPATIENT)
Dept: PHYSICAL THERAPY | Age: 70
Setting detail: THERAPIES SERIES
Discharge: HOME | End: 2020-11-20
Payer: MEDICARE

## 2020-11-20 PROCEDURE — 97110 THERAPEUTIC EXERCISES: CPT

## 2020-11-20 PROCEDURE — 97112 NEUROMUSCULAR REEDUCATION: CPT

## 2020-11-20 NOTE — FLOWSHEET NOTE
[address] [facility] Physical Therapy  Phone: [phone]   Fax: [phone]    Physical Therapy Daily Treatment Note  Date:  2020    Patient Name:  Bekah Hardy    :  1950  MRN: [id number]  Medical/Treatment Diagnosis Information:  Diagnosis: M47.26 (ICD-10-CM) - Other spondylosis with radiculopathy, lumbar region, M51.36 (ICD-10-CM) - Other intervertebral disc degeneration, lumbar region  Treatment Diagnosis: hypomobility at L-P spine, flexibility deficits at hips, myofascial pain, strength deficits at core mm, hip mm, L LE weaker than R LE, poor habitual postures/body [facility]  Insurance/Certification information:  PT Insurance Information: medicare and [employer], med nec  Physician Information:  Referring Practitioner: Dr. Naresh Lai (Eileen Dodge PA-C, [facility]) = PCP)  Plan of care signed (Y/N): Y    Date of Patient follow up with Physician:      Functional scale: OSWESTRY raw score =9 ; dysfunction =33%    Progress Report: []  Yes   [x]  No     Date Range for reporting period:  Beginning 10/8/20  Ending    Progress report due (10 Rx/or 30 days whichever is less): visit #11 or (FKUR)     Recertification due (POC duration/ or 90 days whichever is less): visit # or 20  (date)     Visit # Insurance Allowable Auth required? Date Range   10/12  Primary PT to do PN and D/C next appt Med nec []  Yes  [x]  No na        Latex Allergy:  [x]NO      []YES  Preferred Language for Healthcare:   [x]English       []other    Pain level: 0/10 day time,  Morning pain: /10 from approx 5am - 10-11am; evening pain: 7-8/10 - starts around 9-10pm and it lasts until she gets in bed. SUBJECTIVE:  : Pt reports her son got her a rollator. She would like to finish PT next appt . 12/10 pt sees Dr. Naresh Lai for consultation.  states her pain remains the same.  States she tries to walk after eating ea time      no pain during day but still has pain in am and at night. Raked some leaves the other day. - made L hip hurt - so would take a break then get out and rake some more. Could rake approx 10min at a time. Reports her walking is \"less stumbly\" than before    11/5 was sore this am - thinks its bc she slept later. Didn't use her rollator much. Voting went fine - no line at all. Still walking on TM    11/3 has been walking on treadmill 3x/d x 5-6 min each time. feels ok unless has been sedentary too long. pain was 9/10 when got OOB this am. thinks the am pain goes away faster if she sleeps with pillow bn knees    10/ 30  Pt states this morning pain was 9/10 when she got OOB,  But pain is now decreasing quicker in the morning  10/27 she feels better when she forces herself to move. Feels worst when she gets up out of bed. Had BERTHA last week on Thursday. Felt great for the next 2 days then pain came back. States she did the laundry yesterday - went up and down the basement steps 5x - felt really good    10/20 reports she feels about the same. Reports she felt wonderful when she walked out of the clinic after last session. Reports pain is worst in am and then gets bad again in evening. Will get BERTHA on fri 10/23      10/12 Stated that she got up around 4 am because of back pain. As usual pain worse in morning,  Begins to resolve after a while. Sitting always resolves pain. Stated MD told her she ahs severe DDD with minimal spacing causing sciatic N impingement. OBJECTIVE:  11/17 Defer progress note to 11th visit on 11/24/20  10/20 pt requires Max cues for good TrA iso  MRI 9/20/2020  [facility]:  Stable small central disc extrusion and mild central canal stenosis at L3-L4.     There is progressive left moderate foraminal stenosis at this level.         New laminotomy changes at L4-L5.  The central canal is mildly stenotic which    is improved.  There is worsening severe right foraminal stenosis and moderate    left foraminal stenosis at this level.         Stable moderate right foraminal stenosis and mild left foraminal stenosis at    L5-S1. RESTRICTIONS/PRECAUTIONS: previous lumbar surgery 2015- had cyst removed form lower back. HTN. Intermittent LOB - no falls, impaired kidneys, frequent john horses in calves    Exercises/Interventions:     Therapeutic Exercises (56284) Resistance / level Sets/sec Reps Notes   Nu step    or bike 1 5 min   S=9, arms = 11, 5 min, workload 5   Step stretches-   HS  Hip flexor  30 sec  30 sec 3  3 B ea      Step ups with TrA iso- fwd and side 4\" 1 17 B ea  B UE support   IB calf stretches  2 x 30 sec            Mat ex w SB  Fig 4 piriformis stretch    TrA iso  DKTC   B LTR    Red             3x20\" B    10x5\"  5 sec x 10  10x5\" ea R and L         Min cues for good TrA iso   CC --multifidi walkouts with paloff press x 5 2pl 1 3 B CGA/SBA when sidestepping back in                 Therapeutic Activities (16140)       Pt ed re importance of good hydration in management of john horses  Postural ed-good posture /body mechs   reveiw   11/20 talked with pt about hydration and trying to decrease coffee, pt says she has 8 cups per day   Instructed in how to sleep with neutral spine with pillow(s) bn LE and under top arm  Review                            Neuromuscular Re-ed (41929)       Balance exercises with TrA iso       Seated SB :   A/P, M/L, circles: CW/CCW  1 15 ea           Glides 3-way  1 10 Ea B                 Manual Intervention (53505)        BALL BELT TRACTION    MET to correct    R PSIS manual treatment to correct torsion. IASTM 6 min        5 min                 13'                                           Pt. Education:  -10/27 review HEP.   Pt educated re sleep positions with neutral spine, importance of increasing overall activity level and starting to walk 3x/dau10/20 review and progress HEP  10/8/20 patient educated on diagnosis, prognosis and expectations for rehab  -all patient questions were proximal hip and LE with self care and ADLs  [] UE / Cervical: cervical, postural, scapular, scapulothoracic and UE control with self care, carrying, lifting, driving, computer work.   [] (85652) Gait Re-education- Provided training and instruction to the patient for proper LE, core and proximal hip recruitment and positioning and eccentric body weight control with ambulation re-education including up and down stairs     Home Management Training / Self Care:  [] (36046) Provided self-care/home management training related to activities of daily living and compensatory training, and/or use of adaptive equipment for improvement with: ADLs and compensatory training, meal preparation, safety procedures and instruction in use of adaptive equipment, including bathing, grooming, dressing, personal hygiene, basic household cleaning and chores.      Home Exercise Program:    [] (63976) Reviewed/Progressed HEP activities related to strengthening, flexibility, endurance, ROM of   [] LE / Lumbar: core, proximal hip and LE for functional self-care, mobility, lifting and ambulation/stair navigation   [] UE / Cervical: cervical, postural, scapular, scapulothoracic and UE control with self care, reaching, carrying, lifting, house/yardwork, driving, computer work  [] (37382)Reviewed/Progressed HEP activities related to improving balance, coordination, kinesthetic sense, posture, motor skill, proprioception of   [] LE: core, proximal hip and LE for self care, mobility, lifting, and ambulation/stair navigation    [] UE / Cervical: cervical, postural,  scapular, scapulothoracic and UE control with self care, reaching, carrying, lifting, house/yardwork, driving, computer work    Manual Treatments:  PROM / STM / Oscillations-Mobs:  G-I, II, III, IV (PA's, Inf., Post.)  [] (13822) Provided manual therapy to mobilize LE, proximal hip and/or LS spine soft tissue/joints for the purpose of modulating pain, promoting relaxation,  increasing ROM, Met: []? Adjusted  2. Patient will demonstrate increased AROM  to [facility], to allow for proper joint functioning to allow pt to resume squatting to lift items, use of good posture and body mechs for home chores without  increase in symptoms. []? Progressing: []? Met: []? Not Met: []? Adjusted  3. Patient will demonstrate increased Strength by 1/2 mmt grade  and core activation to allow for proper functional mobility as indicated by patients Functional Deficits to allow pt to resume up/down the basement stairs and curbs prn without difficulty without increase in symptoms. []? Progressing: []? Met: []? Not Met: []? Adjusted  4. Patient will return to functional activities including amb prn for grocery shopping and errands without increased symptoms and without need of AD.   []? Progressing: []? Met: []? Not Met: []? Adjusted  5. Improve balance so that pt reports no LOB in the preceding week and no sense of unsteadiness on stairs. []? Progressing: []? Met: []? Not Met: []? Adjusted          Overall Progression Towards Functional goals/ Treatment Progress Update:  [] Patient is progressing as expected towards functional goals listed. [] Progression is slowed due to complexities/Impairments listed. [] Progression has been slowed due to co-morbidities.   [x] Plan just implemented, too soon to assess goals progression <30days   [] Goals require adjustment due to lack of progress  [] Patient is not progressing as expected and requires additional follow up with physician  [] Other    Persisting Functional Limitations/Impairments:  []Sleeping [x]Sitting               [x]Standing [x]Transfers        [x]Walking [x]Kneeling               [x]Stairs [x]Squatting / bending   [x]ADLs []Reaching  [x]Lifting  [x]Housework  []Driving []Job related tasks  []Sports/Recreation []Other:        ASSESSMENT:  11/17 trial ball belt txn today  11/5 good carlos of therapy exercises  11/3 reduced myofascial pain  10/27 needs improved sleeping positions  10/20 Progressing. Good carlos of manual therapy and exercises    Treatment/Activity Tolerance:  [x] Patient able to complete tx [] Patient limited by fatigue  [] Patient limited by pain  [] Patient limited by other medical complications  [] Other:     Prognosis: [x] Good [] Fair  [] Poor    Patient Requires Follow-up: [x] Yes  [] No    Plan for next treatment session:  PLAN:  strength, ROM/flexibility, posture and body mechs, manual, MOC, HEP, pt education     Frequency/Duration: 2 days per week for 6 Weeks:  Interventions:  [x]? Therapeutic exercise including:strength, ROM, flexibility  [x]? NMR activation and proprioception including postural re-education  [x]? Manual therapy as indicated to include: IASTM, STM, PROM, Gr I-IV mobilizations, manipulation. [x]? Modalities as needed that may include: thermal agents, E-stim, Biofeedback, US, iontophoresis as indicated  [x]? Patient education on joint protection, postural re-education, activity modification, progression of HEP. PLAN: 11/17 Defer progress note to 11th visit on 11/24/20  See cynthia. PT 2x / week for 6 weeks. [x] Continue per plan of care [] Alter current plan (see comments)  [] Plan of care initiated [] Hold pending MD visit [] Discharge    Electronically signed by: Ellen Menjivar PT, DPT [id number]    Note: If patient does not return for scheduled/ recommended follow up visits, this note will serve as a discharge from care along with most recent update on progress.

## 2020-11-24 ENCOUNTER — HOSPITAL ENCOUNTER (OUTPATIENT)
Dept: PHYSICAL THERAPY | Age: 70
Setting detail: THERAPIES SERIES
Discharge: HOME | End: 2020-11-24
Payer: MEDICARE

## 2020-11-24 PROCEDURE — 97140 MANUAL THERAPY 1/> REGIONS: CPT

## 2020-11-24 PROCEDURE — 97110 THERAPEUTIC EXERCISES: CPT

## 2020-11-24 NOTE — FLOWSHEET NOTE
[facility]  Insurance/Certification information:  PT Insurance Information: medicare and humana, med nec  Physician Information:  Referring Practitioner: Dr. Yarelis Hay (Fiona Gonsalez PA-C, [facility]) = PCP)  Plan of care signed (Y/N): Y    Date of Patient follow up with Physician:  12/10/20  Functional scale: OSWESTRY raw score =9 ; dysfunction =33%    Progress Report: [x]  Yes   []  No     Date Range for reporting period:  Beginning 10/8/20  Ending    Progress report due (10 Rx/or 30 days whichever is less): visit #11 or 67/83/98(KFFV)     Recertification due (POC duration/ or 90 days whichever is less): visit # or 1/8/20  (date)     Visit # Insurance Allowable Auth required? Date Range   11/12   Med nec []  Yes  [x]  No na        Latex Allergy:  [x]NO      []YES  Preferred Language for Healthcare:   [x]English       []other    Pain level: 0/10 day time,  Morning pain: 9/10 from approx 5am - 10-11am; evening pain: 7-8/10 - starts around 9-10pm and it lasts until she gets in bed. SUBJECTIVE:  11/24 feels the same. Likes the rollator in am and at night - it helps with pain. Reports balance is improved. Able to grocery shop/run errands as long as she has a cart. No problem amb on curbs. Reports she is fine with steps as long as she has a HR.      11/20: Pt reports her son got her a rollator. She would like to finish PT next appt 11/24. 12/10 pt sees Dr. Yarelis Hay for consultation. 11/17 states her pain remains the same. States she tries to walk after eating ea time     11/12 no pain during day but still has pain in am and at night. Raked some leaves the other day. - made L hip hurt - so would take a break then get out and rake some more. Could rake approx 10min at a time. Reports her walking is \"less stumbly\" than before    11/5 was sore this am - thinks its bc she slept later. Didn't use her rollator much. Voting went fine - no line at all.  Still walking on TM    11/3 has been walking on treadmill 3x/d x 5-6 min each time. feels ok unless has been sedentary too long. pain was 9/10 when got OOB this am. thinks the am pain goes away faster if she sleeps with pillow bn knees    10/ 30  Pt states this morning pain was 9/10 when she got OOB,  But pain is now decreasing quicker in the morning  10/27 she feels better when she forces herself to move. Feels worst when she gets up out of bed. Had BERTHA last week on Thursday. Felt great for the next 2 days then pain came back. States she did the laundry yesterday - went up and down the basement steps 5x - felt really good    10/20 reports she feels about the same. Reports she felt wonderful when she walked out of the clinic after last session. Reports pain is worst in am and then gets bad again in evening. Will get BERTHA on fri 10/23      10/12 Stated that she got up around 4 am because of back pain. As usual pain worse in morning,  Begins to resolve after a while. Sitting always resolves pain. Stated MD told her she ahs severe DDD with minimal spacing causing sciatic N impingement. OBJECTIVE:    11/24   Balance: SLS: R 0.5 sec, L 1 sec    AROM lumbar  Flex WNL fingertips to floor  Ext 30  R SB 28 uncomfortable  L SB 29 deg  Segmental mobility: WFL    Strength  R L  Hip flex   4 4-  Quad   5 5  HS   5 5  DF   5 5  Hip ER   4 2+  Hip IR   4 3+      11/17 Defer progress note to 11th visit on 11/24/20  10/20 pt requires Max cues for good TrA iso  MRI 9/20/2020  ([facility]:  Stable small central disc extrusion and mild central canal stenosis at L3-L4. There is progressive left moderate foraminal stenosis at this level.         New laminotomy changes at L4-L5.  The central canal is mildly stenotic which    is improved.  There is worsening severe right foraminal stenosis and moderate    left foraminal stenosis at this level.         Stable moderate right foraminal stenosis and mild left foraminal stenosis at    L5-S1.           RESTRICTIONS/PRECAUTIONS: previous lumbar surgery 2015- had cyst removed form lower back. HTN. Intermittent LOB - no falls, impaired kidneys, frequent john horses in calves    Exercises/Interventions:     Therapeutic Exercises (15462) Resistance / level Sets/sec Reps Notes   Nu step    or bike 1 5 min   S=9, arms = 11, 5 min, workload 5   Step stretches-   HS  Hip flexor  30 sec  30 sec 3  3 B ea      Step ups with TrA iso- fwd and side 4\" 1 17 B ea  B UE support   IB calf stretches  gastroc stretches at counter  2 x 30 sec  3x30\" B            Mat ex w SB  Fig 4 piriformis stretch    TrA iso  DKTC   B LTR    Red                    and L         Min cues for good TrA iso   CC --multifidi walkouts with paloff press x 5 2pl 1 3 B CGA/SBA when sidestepping back in                 Therapeutic Activities (05566)       Pt ed re importance of good hydration in management of john horses  Postural ed-good posture /body mechs   reveiw   11/24, 11/20 talked with pt about hydration and trying to decrease coffee, pt says she has 8 cups per day   Instructed in how to sleep with neutral spine with pillow(s) bn LE and under top arm  Review                            Neuromuscular Re-ed (15004)       Balance exercises with TrA iso       Seated SB :   A/P, M/L, circles: CW/CCW  1 15 ea    SLS 3x5\" B needs B UE support      Glides 3-way  Ea B                 Manual Intervention (74073)        BALL BELT TRACTION    MET to correct    R PSIS manual treatment to correct torsion. IASTM 8 min                                                                    Pt. Education:  -10/27 review HEP. Pt educated re sleep positions with neutral spine, importance of increasing overall activity level and starting to walk 3x/dau10/20 review and progress HEP  10/8/20 patient educated on diagnosis, prognosis and expectations for rehab  -all patient questions were answered  offered aquatic therapy - pt declined.      HEP instruction:  11/24 gastroc stretch at counter  10/27 sleep with neutral spine; walk 3x/d x 5-10 min ea time; go up and down the basement steps >/= 1-2x/d  10/8 Fig 4 piriformis stretch, Supine HS stretch, TrA iso, Prone press ups, posture    Therapeutic Exercise and NMR EXR  [x] (26374) Provided verbal/tactile cueing for activities related to strengthening, flexibility, endurance, ROM for improvements in  [x] LE / Lumbar: LE, proximal hip, and core control with self care, mobility, lifting, ambulation. [] UE / Cervical: cervical, postural, scapular, scapulothoracic and UE control with self care, reaching, carrying, lifting, house/yardwork, driving, computer work.  [] (81023) Provided verbal/tactile cueing for activities related to improving balance, coordination, kinesthetic sense, posture, motor skill, proprioception to assist with   [] LE / lumbar: LE, proximal hip, and core control in self care, mobility, lifting, ambulation and eccentric single leg control. [] UE / cervical: cervical, scapular, scapulothoracic and UE control with self care, reaching, carrying, lifting, house/yardwork, driving, computer work.   [] (44496) Therapist is in constant attendance of 2 or more patients providing skilled therapy interventions, but not providing any significant amount of measurable one-on-one time to either patient, for improvements in  [] LE / lumbar: LE, proximal hip, and core control in self care, mobility, lifting, ambulation and eccentric single leg control. [] UE / cervical: cervical, scapular, scapulothoracic and UE control with self care, reaching, carrying, lifting, house/yardwork, driving, computer work.      NMR and Therapeutic Activities:    [x] (70643 or 17206) Provided verbal/tactile cueing for activities related to improving balance, coordination, kinesthetic sense, posture, motor skill, proprioception and motor activation to allow for proper function of   [x] LE: / Lumbar core, proximal hip and LE with self care and ADLs  [] UE / Cervical: cervical, postural, scapular, scapulothoracic and UE control with self care, carrying, lifting, driving, computer work.   [] (67829) Gait Re-education- Provided training and instruction to the patient for proper LE, core and proximal hip recruitment and positioning and eccentric body weight control with ambulation re-education including up and down stairs     Home Management Training / Self Care:  [] (76497) Provided self-care/home management training related to activities of daily living and compensatory training, and/or use of adaptive equipment for improvement with: ADLs and compensatory training, meal preparation, safety procedures and instruction in use of adaptive equipment, including bathing, grooming, dressing, personal hygiene, basic household cleaning and chores.      Home Exercise Program:    [] (10904) Reviewed/Progressed HEP activities related to strengthening, flexibility, endurance, ROM of   [] LE / Lumbar: core, proximal hip and LE for functional self-care, mobility, lifting and ambulation/stair navigation   [] UE / Cervical: cervical, postural, scapular, scapulothoracic and UE control with self care, reaching, carrying, lifting, house/yardwork, driving, computer work  [] (06890)Reviewed/Progressed HEP activities related to improving balance, coordination, kinesthetic sense, posture, motor skill, proprioception of   [] LE: core, proximal hip and LE for self care, mobility, lifting, and ambulation/stair navigation    [] UE / Cervical: cervical, postural,  scapular, scapulothoracic and UE control with self care, reaching, carrying, lifting, house/yardwork, driving, computer work    Manual Treatments:  PROM / STM / Oscillations-Mobs:  G-I, II, III, IV (PA's, Inf., Post.)  [] (62189) Provided manual therapy to mobilize LE, proximal hip and/or LS spine soft tissue/joints for the purpose of modulating pain, promoting relaxation,  increasing ROM, reducing/eliminating soft tissue swelling/inflammation/restriction, improving soft tissue extensibility and allowing for proper ROM for normal function with   [] LE / lumbar: self care, mobility, lifting and ambulation. [] UE / Cervical: self care, reaching, carrying, lifting, house/yardwork, driving, computer work. Modalities:  [] (71568) Vasopneumatic compression: Utilized vasopneumatic compression to decrease edema / swelling for the purpose of improving mobility and quad tone / recruitment which will allow for increased overall function including but not limited to self-care, transfers, ambulation, and ascending / descending stairs. Modalities:      Charges:  Timed Code Treatment Minutes: 43   Total Treatment Minutes: 43     [] EVAL - LOW (71664)   [] EVAL - MOD (48463)  [] EVAL - HIGH (22451)  [] RE-EVAL (72847)  [x] SV(26039) x   2   [] Ionto  [] NMR (21599) x       [] Vaso  [x] Manual (10393) x  1     [] Ultrasound  [] TA x 1       [] Mech Traction (35155)  [] Aquatic Therapy x     [] ES (un) (86943):   [] Home Management Training x  [] ES(attended) (95374)   [] Dry Needling 1-2 muscles (24966):  [] Dry Needling 3+ muscles (798186  [] Group:      [] Other:        GOALS:  Patient stated goal:improve balance and walking  []? Progressing: []? Met: []? Not Met: []? Adjusted     Therapist goals for Patient:   Short Term Goals: To be achieved in: 2 weeks  1. Independent in HEP and progression per patient tolerance, in order to prevent re-injury. []? Progressing: [x]? Met: []? Not Met: []? Adjusted  2. Patient will have a decrease in pain to facilitate improvement in movement, function, and ADLs as indicated by improvement with respect to Functional Deficits. []? Progressing: [x]? Met: []? Not Met: []? Adjusted     Long Term Goals: To be achieved in: 6  weeks  1. Disability index score of 10% or less on the oswestry to assist with reaching prior level of function. [x]? Progressing: []? Met: []? Not Met: []? Adjusted  2.  Patient will demonstrate increased AROM  to [facility], to allow Manual traction to L-spine feels good, but relief doesn't carry over into the evening. D/w pt that she might benefit from aquatic therapy for increased strengthening/pain management. Have encouraged pt to f/u with MD re persistent evening/early am pain. 11/17 trial ball belt txn today  11/5 good carlos of therapy exercises  11/3 reduced myofascial pain  10/27 needs improved sleeping positions  10/20 Progressing. Good carlos of manual therapy and exercises    Treatment/Activity Tolerance:  [x] Patient able to complete tx [] Patient limited by fatigue  [] Patient limited by pain  [] Patient limited by other medical complications  [] Other:     Prognosis: [x] Good [] Fair  [] Poor    Patient Requires Follow-up: [x] Yes  [] No    Plan for next treatment session:  PLAN:  strength, ROM/flexibility, posture and body mechs, manual, MOC, HEP, pt education     Frequency/Duration: 2 days per week for 6 Weeks:  Interventions:  [x]? Therapeutic exercise including:strength, ROM, flexibility  [x]? NMR activation and proprioception including postural re-education  [x]? Manual therapy as indicated to include: IASTM, STM, PROM, Gr I-IV mobilizations, manipulation. [x]? Modalities as needed that may include: thermal agents, E-stim, Biofeedback, US, iontophoresis as indicated  [x]? Patient education on joint protection, postural re-education, activity modification, progression of HEP. PLAN: 11/17 Defer progress note to 11th visit on 11/24/20  See cynthia. PT 2x / week for 6 weeks. [x] Continue per plan of care [] Alter current plan (see comments)  [] Plan of care initiated [] Hold pending MD visit [] Discharge    Electronically signed by: Gen Powell PT, DPT  Note: If patient does not return for scheduled/ recommended follow up visits, this note will serve as a discharge from care along with most recent update on progress.

## 2020-11-30 RX ORDER — DICLOFENAC SODIUM 75 MG/1
TABLET, DELAYED RELEASE ORAL
Qty: 180 TABLET | Refills: 1 | Status: ON HOLD | OUTPATIENT
Start: 2020-11-30 | End: 2021-02-17 | Stop reason: HOSPADM

## 2020-12-01 ENCOUNTER — TELEPHONE (OUTPATIENT)
Dept: FAMILY MEDICINE CLINIC | Age: 70
End: 2020-12-01

## 2020-12-02 NOTE — TELEPHONE ENCOUNTER
Cinthya pending signature for Elias Borges Urzeda GAGE
Patient is requesting a script for a handicap placard.     Please advise
Patient notified Shellie Bah is completed.  Mailing to patient's home per her request.
pressure

## 2021-01-14 ENCOUNTER — TELEPHONE (OUTPATIENT)
Dept: FAMILY MEDICINE CLINIC | Age: 71
End: 2021-01-14

## 2021-01-22 NOTE — PROGRESS NOTES
Name_______________________________________Printed:____________________  Date and time of surgery_2/16 1045_______________________Arrival Time:__0915______________   1. The instructions given regarding when and if a patient needs to stop oral intake prior to surgery varies. Follow the specific instructions you were given                  _x__Nothing to eat or to drink after Midnight the night before.                   ____Carbo loading or ERAS instructions will be given to select patients-if you have been given those instructions -please do the following                           The evening before your surgery after dinner before midnight drink 40 ounces of gatorade. If you are diabetic use sugar free. The morning of surgery drink 40 ounces of water. This needs to be finished 3 hours prior to your surgery start time. 2. Take the following pills with a small sip of water on the morning of surgery__none_________________________________________________                  Do not take blood pressure medications ending in pril or sartan the dorothy prior to surgery or the morning of surgery_   3. Aspirin, Ibuprofen, Advil, Naproxen, Vitamin E and other Anti-inflammatory products and supplements should be stopped for 5 -7days before surgery or as directed by your physician. 4. Check with your Doctor regarding stopping Plavix, Coumadin,Eliquis, Lovenox,Effient,Pradaxa,Xarelto, Fragmin or other blood thinners and follow their instructions. 5. Do not smoke, and do not drink any alcoholic beverages 24 hours prior to surgery. This includes NA Beer. Refrain from the usage of any recreational drugs. 6. You may brush your teeth and gargle the morning of surgery.   DO NOT SWALLOW WATER 7. You MUST make arrangements for a responsible adult to stay on site while you are here and take you home after your surgery. You will not be allowed to leave alone or drive yourself home. It is strongly suggested someone stay with you the first 24 hrs. Your surgery will be cancelled if you do not have a ride home. 8. A parent/legal guardian must accompany a child scheduled for surgery and plan to stay at the hospital until the child is discharged. Please do not bring other children with you. 9. Please wear simple, loose fitting clothing to the hospital.  [place] not bring valuables (money, credit cards, checkbooks, etc.) Do not wear any makeup (including no eye makeup) or nail polish on your fingers or toes. 10. DO NOT wear any jewelry or piercings on day of surgery. All body piercing jewelry must be removed. 11. If you have ___dentures, they will be removed before going to the OR; we will provide you a container. If you wear ___contact lenses or ___glasses, they will be removed; please bring a case for them. 12. Please see your family doctor/pediatrician for a history & physical and/or concerning medications. Bring any test results/reports from your physician's office. PCP__________________Phone___________H&P Appt. Date________             13 If you  have a Living Will and Durable Power of  for Healthcare, please bring in a copy. 15. Notify your Surgeon if you develop any illness between now and surgery  time, cough, cold, fever, sore throat, nausea, vomiting, etc.  Please notify your surgeon if you experience dizziness, shortness of breath or blurred vision between now & the time of your surgery             15. DO NOT shave your operative site 96 hours prior to surgery. For face & neck surgery, men may use an electric razor 48 hours prior to surgery. 16. Shower the night before or morning of surgery using an antibacterial soap or as you have been instructed. 17. To provide excellent care visitors will be limited to one in the room at any given time. 18.  Please bring picture ID and insurance card. 19.  Visit our web site for additional information:  [email or web address]              20.During flu season no children under the age of 15 are permitted in the hospital for the safety of all patients. 21. If you take a long acting insulin in the evening only  take half of your usual  dose the night  before your procedure              22. If you use a c-pap please bring DOS if staying overnight,             23.For your convenience [address] [facility] has a pharmacy on site to fill your prescriptions. 24. If you use oxygen and have a portable tank please bring it  with you the DOS             25. Bring a complete list of all your medications with name and dose include any supplements. 26. Other___pcp 2/2 PATs 2/10_______________________________________   *Please call pre admission testing if you any further questions   Patricia Poor         [phone]   Ning Tolbert [address] [place] [address].  [place]  [phone]   Angelo    __ Faisal Martinez _____  _x_ Scheduled __2/10_ Where __mff pt to schedule_   __ Other __________      XGOVSBI POLICY(subject to change) There is a one visitor policy at [facility] for all surgeries and endoscopies. Whether the visitor can stay or will be asked to wait in the car will depend on the current policy and if social distancing can be maintained. The policy is subject to change at any time. Please make sure the visitor has a cell phone that is on,charged and able to accept calls, as this may be the way that the staff communicates with them. Pain management is NO VISITOR policyThe patients ride is expected to remain in the car with a cell phone for communication. If the ride is leaving the hospital grounds please make sure they are back in time for pickup. Have the patient inform the staff on arrival what their rides plans are while the patient is in the facility. At the MAIN there is one visitor allowed. Please note that the visitor policy is subject to change. All above information reviewed with patient in person or by phone. Patient verbalizes understanding. All questions and concerns addressed.                                                                                                  Patient/Rep___per phone/pt_________________                                                                                                                                    PRE OP INSTRUCTIONS

## 2021-02-02 ENCOUNTER — OFFICE VISIT (OUTPATIENT)
Dept: FAMILY MEDICINE CLINIC | Age: 71
End: 2021-02-02
Payer: MEDICARE

## 2021-02-02 VITALS
TEMPERATURE: 97.1 F | HEART RATE: 86 BPM | HEIGHT: 61 IN | OXYGEN SATURATION: 100 % | BODY MASS INDEX: 31.49 KG/M2 | SYSTOLIC BLOOD PRESSURE: 130 MMHG | DIASTOLIC BLOOD PRESSURE: 80 MMHG | WEIGHT: 166.8 LBS

## 2021-02-02 DIAGNOSIS — R73.01 IMPAIRED FASTING GLUCOSE: ICD-10-CM

## 2021-02-02 DIAGNOSIS — Z01.818 PREOP EXAMINATION: Primary | ICD-10-CM

## 2021-02-02 DIAGNOSIS — M48.061 SPINAL STENOSIS OF LUMBAR REGION, UNSPECIFIED WHETHER NEUROGENIC CLAUDICATION PRESENT: ICD-10-CM

## 2021-02-02 DIAGNOSIS — I10 ESSENTIAL HYPERTENSION: ICD-10-CM

## 2021-02-02 PROCEDURE — G8399 PT W/DXA RESULTS DOCUMENT: HCPCS | Performed by: PHYSICIAN ASSISTANT

## 2021-02-02 PROCEDURE — 4040F PNEUMOC VAC/ADMIN/RCVD: CPT | Performed by: PHYSICIAN ASSISTANT

## 2021-02-02 PROCEDURE — 1090F PRES/ABSN URINE INCON ASSESS: CPT | Performed by: PHYSICIAN ASSISTANT

## 2021-02-02 PROCEDURE — 4004F PT TOBACCO SCREEN RCVD TLK: CPT | Performed by: PHYSICIAN ASSISTANT

## 2021-02-02 PROCEDURE — G8427 DOCREV CUR MEDS BY ELIG CLIN: HCPCS | Performed by: PHYSICIAN ASSISTANT

## 2021-02-02 PROCEDURE — 3017F COLORECTAL CA SCREEN DOC REV: CPT | Performed by: PHYSICIAN ASSISTANT

## 2021-02-02 PROCEDURE — 99214 OFFICE O/P EST MOD 30 MIN: CPT | Performed by: PHYSICIAN ASSISTANT

## 2021-02-02 PROCEDURE — G8484 FLU IMMUNIZE NO ADMIN: HCPCS | Performed by: PHYSICIAN ASSISTANT

## 2021-02-02 PROCEDURE — G8417 CALC BMI ABV UP PARAM F/U: HCPCS | Performed by: PHYSICIAN ASSISTANT

## 2021-02-02 PROCEDURE — 1123F ACP DISCUSS/DSCN MKR DOCD: CPT | Performed by: PHYSICIAN ASSISTANT

## 2021-02-02 ASSESSMENT — PATIENT HEALTH QUESTIONNAIRE - PHQ9
SUM OF ALL RESPONSES TO PHQ QUESTIONS 1-9: 0

## 2021-02-02 NOTE — PATIENT INSTRUCTIONS
Hernando Rodriguez was seen today for pre-op exam.    Diagnoses and all orders for this visit:    Preop examination    Spinal stenosis of lumbar region, unspecified whether neurogenic claudication present    Essential hypertension    Impaired fasting glucose  -     Hemoglobin A1C       Stop the Diclofenac and Vitamin E 7 days prior to surgery.

## 2021-02-02 NOTE — PROGRESS NOTES
Preoperative Consultation    [address] [facility]  YOB: 1950    This patient presents to the office today for a preoperative consultation at the request of surgeon, Dr. Enriqueta Wright, who plans on performing L4-5 fusion on  at [facility].  She has had the back pain since 2020. She has worse bilateral leg pain than she does back pain. MRI shows spinal stenosis L4-5. She has stable, controlled and treated HTN. Takes medication without any SE's. No recent illnesses. She has Covid test, labs and EKG on 2/10/21. Planned anesthesia: General  Known anesthesia problems: makes her have a sore throat from intubation   Bleeding risk: No recent or remote history of abnormal bleeding  Personal or FH of DVT/PE: No      Patient Active Problem List   Diagnosis    Essential hypertension    Pure hypercholesterolemia    Generalized osteoarthrosis, involving multiple sites    Impaired fasting glucose    Insomnia    Osteoarthritis    Dupuytren's contracture,mild    Tobacco dependence    Back pain    Spinal stenosis of lumbar region    Synovial cyst of lumbar spine    Acquired hypothyroidism     Past Surgical History:   Procedure Laterality Date    CARPAL TUNNEL RELEASE Left [id number]    [facility]    CHOLECYSTECTOMY      LUMBAR LAMINECTOMY  8/20/15    L45 B/L decompressive hobson and excision of synovial cyst    ROTATOR CUFF REPAIR Right             Allergies   Allergen Reactions    Influenza Virus Vacc Split Pf      bronchitis    Pravastatin Sodium [Pravastatin Sodium] Other (See Comments)     Elevated liver enzymes.      Vicodin [Hydrocodone-Acetaminophen]      Nausea     Outpatient Medications Marked as Taking for the 21 encounter (Office Visit) with Denece Apgar, PA   Medication Sig Dispense Refill    diclofenac (VOLTAREN) 75 MG EC tablet Take 1 tablet by mouth twice daily 180 tablet 1  levothyroxine (SYNTHROID) 25 MCG tablet TAKE 1 TABLET BY MOUTH DAILY (Patient taking differently: Take 25 mcg by mouth nightly ) 90 tablet 3    glucose monitoring kit (FREESTYLE) monitoring kit 1 kit by Does not apply route daily 1 kit 0    Lancets (BD LANCET ULTRAFINE 30G) MISC 1 Device by In Vitro route every morning CHECK BS EVERY MORNING FASTING 100 each 3    blood glucose monitor strips Test blood sugar every morning fasting. 100 strip 3    lisinopril (PRINIVIL;ZESTRIL) 30 MG tablet TAKE 1 TABLET BY MOUTH DAILY 90 tablet 1    fenofibrate (TRIGLIDE) 160 MG tablet Take 1 tablet by mouth daily 90 tablet 3    ezetimibe (ZETIA) 10 MG tablet TAKE 1 TABLET BY MOUTH EVERY DAY 90 tablet 3    Psyllium (METAMUCIL) 28.3 % POWD Take by mouth daily      zoster recombinant adjuvanted vaccine (SHINGRIX) 50 MCG/0.5ML SUSR injection Inject 0.5 mLs into the muscle See Admin Instructions 1 dose now and repeat in 2-6 months 0.5 mL 0    Ascorbic Acid (VITAMIN C CR) 1000 MG TBCR Take by mouth      vitamin E 400 UNIT capsule Take 400 Units by mouth daily      [facility] Natural Products (OSTEO BI-FLEX JOINT SHIELD PO) Take by mouth         Social History     Tobacco Use    Smoking status: Current Every Day Smoker     Packs/day: 1.00     Years: 40.00     Pack years: 40.00     Types: Cigarettes    Smokeless tobacco: Never Used   Substance Use Topics    Alcohol use: No     Alcohol/week: 0.0 standard drinks     Family History   Problem Relation Age of Onset    Cancer Father     Cancer Brother        Review of Systems  A comprehensive review of systems was negative except for what was noted in the HPI. Physical Exam   There were no vitals taken for this visit. Constitutional: She is oriented to person, place, and time. She appears well-developed and well-nourished. No distress. HENT:   Head: Normocephalic and atraumatic.

## 2021-02-04 ENCOUNTER — TELEPHONE (OUTPATIENT)
Dept: FAMILY MEDICINE CLINIC | Age: 71
End: 2021-02-04

## 2021-02-04 NOTE — TELEPHONE ENCOUNTER
Stop diclofenac and vit E 7 days prior to surgery, then she needs to ask surgeon if she should take BP medicine the morning of surgery, it depends on the time of surgery. Take her synthroid the morning of surgery with a sip of water. Hold everything else. So just find out about lisinopril.

## 2021-02-05 NOTE — TELEPHONE ENCOUNTER
Patient advised to D/C Diclofenac and Vit D 7 days prior to surgery. Recommend take Synthroid morning of surgery with a SIP of H2O. Must speak with surgeon on when to take her BP med Lisinopril, will depend on time of surgery. HOLD everything else.

## 2021-02-10 ENCOUNTER — HOSPITAL ENCOUNTER (OUTPATIENT)
Age: 71
Discharge: HOME | End: 2021-02-10
Payer: MEDICARE

## 2021-02-10 ENCOUNTER — OFFICE VISIT (OUTPATIENT)
Dept: PRIMARY CARE CLINIC | Age: 71
End: 2021-02-10
Payer: MEDICARE

## 2021-02-10 DIAGNOSIS — Z01.818 PREOP TESTING: ICD-10-CM

## 2021-02-10 DIAGNOSIS — F17.210 CIGARETTE NICOTINE DEPENDENCE WITHOUT COMPLICATION: ICD-10-CM

## 2021-02-10 DIAGNOSIS — Z20.828 EXPOSURE TO SARS-ASSOCIATED CORONAVIRUS: Primary | ICD-10-CM

## 2021-02-10 LAB
ABO/RH: NORMAL
ANION GAP SERPL CALCULATED.3IONS-SCNC: 6 MMOL/L (ref 3–16)
ANTIBODY SCREEN: NORMAL
APTT: 34.8 SEC (ref 24.2–36.2)
BUN BLDV-MCNC: 25 MG/DL (ref 7–20)
CALCIUM SERPL-MCNC: 9.4 MG/DL (ref 8.3–10.6)
CHLORIDE BLD-SCNC: 107 MMOL/L (ref 99–110)
CO2: 27 MMOL/L (ref 21–32)
CREAT SERPL-MCNC: 1.6 MG/DL (ref 0.6–1.2)
EKG ATRIAL RATE: 68 BPM
EKG DIAGNOSIS: NORMAL
EKG P AXIS: -8 DEGREES
EKG P-R INTERVAL: 172 MS
EKG Q-T INTERVAL: 396 MS
EKG QRS DURATION: 92 MS
EKG QTC CALCULATION (BAZETT): 421 MS
EKG R AXIS: 1 DEGREES
EKG T AXIS: 16 DEGREES
EKG VENTRICULAR RATE: 68 BPM
ESTIMATED AVERAGE GLUCOSE: 102.5 MG/DL
GFR AFRICAN AMERICAN: 38
GFR NON-AFRICAN AMERICAN: 32
GLUCOSE BLD-MCNC: 85 MG/DL (ref 70–99)
HBA1C MFR BLD: 5.2 %
HCT VFR BLD CALC: 38.1 % (ref 36–48)
HEMOGLOBIN: 12.8 G/DL (ref 12–16)
INR BLD: 0.9 (ref 0.86–1.14)
MCH RBC QN AUTO: 32.1 PG (ref 26–34)
MCHC RBC AUTO-ENTMCNC: 33.5 G/DL (ref 31–36)
MCV RBC AUTO: 95.8 FL (ref 80–100)
PDW BLD-RTO: 13.5 % (ref 12.4–15.4)
PLATELET # BLD: 156 K/UL (ref 135–450)
PMV BLD AUTO: 11.2 FL (ref 5–10.5)
POTASSIUM SERPL-SCNC: 5.2 MMOL/L (ref 3.5–5.1)
PROTHROMBIN TIME: 10.4 SEC (ref 10–13.2)
RBC # BLD: 3.98 M/UL (ref 4–5.2)
SODIUM BLD-SCNC: 140 MMOL/L (ref 136–145)
WBC # BLD: 5.7 K/UL (ref 4–11)

## 2021-02-10 PROCEDURE — 86901 BLOOD TYPING SEROLOGIC RH(D): CPT

## 2021-02-10 PROCEDURE — 86900 BLOOD TYPING SEROLOGIC ABO: CPT

## 2021-02-10 PROCEDURE — 85027 COMPLETE CBC AUTOMATED: CPT

## 2021-02-10 PROCEDURE — 93010 ELECTROCARDIOGRAM REPORT: CPT | Performed by: INTERNAL MEDICINE

## 2021-02-10 PROCEDURE — G8428 CUR MEDS NOT DOCUMENT: HCPCS | Performed by: NURSE PRACTITIONER

## 2021-02-10 PROCEDURE — 85610 PROTHROMBIN TIME: CPT

## 2021-02-10 PROCEDURE — 86850 RBC ANTIBODY SCREEN: CPT

## 2021-02-10 PROCEDURE — 93005 ELECTROCARDIOGRAM TRACING: CPT | Performed by: NEUROLOGICAL SURGERY

## 2021-02-10 PROCEDURE — 85730 THROMBOPLASTIN TIME PARTIAL: CPT

## 2021-02-10 PROCEDURE — 80048 BASIC METABOLIC PNL TOTAL CA: CPT

## 2021-02-10 PROCEDURE — 83036 HEMOGLOBIN GLYCOSYLATED A1C: CPT

## 2021-02-10 PROCEDURE — 36415 COLL VENOUS BLD VENIPUNCTURE: CPT

## 2021-02-10 PROCEDURE — 99211 OFF/OP EST MAY X REQ PHY/QHP: CPT | Performed by: NURSE PRACTITIONER

## 2021-02-10 PROCEDURE — G8417 CALC BMI ABV UP PARAM F/U: HCPCS | Performed by: NURSE PRACTITIONER

## 2021-02-10 NOTE — PROGRESS NOTES
Barbie Spence received a viral test for COVID-19. They were educated on isolation and quarantine as appropriate. For any symptoms, they were directed to seek care from their PCP, given contact information to establish with a doctor, directed to an urgent care or the emergency room.

## 2021-02-10 NOTE — PATIENT INSTRUCTIONS
You have received a viral test for COVID-19. Below is education on quarantine per the CDC guidelines. For any symptoms, seek care from your PCP, call [phone] to establish care with a doctor, or go directly to an urgent care or the emergency room. Test results will take 2-7 days and will be sent to you in your [employer] account. If you test positive, you will be contacted via phone. If you test negative, the ONLY communication will be through [address] GO TO [employer] AND SIGN UP FOR [employer]  (LOWER LEFT OF THE HOME PAGE)  No test is 100%. If you have symptoms, you should follow the guidance of quarantine as previously stated. You can still be contagious if you have symptoms. Your [facility] Department will reach out to you if you have a positive result. They will provide you with a return to work date and note. If you were tested for a pre-op, then you should remain in quarantine until your procedure. How do I know if I need to be in quarantine? If you live in a community where COVID-19 is or might be spreading (currently, that is virtually everywhere in the [place])  Be alert for symptoms. Watch for fever, cough, shortness of breath, or other symptoms of COVID-19.  ? Take your temperature if symptoms develop. ? Practice social distancing. Maintain 6 feet of distance from others and stay out of crowded places. ? Follow CDC guidance if symptoms develop. If you feel healthy but:  ? Recently had close contact with a person with COVID-19 you need to Quarantine:  ? Stay home until 14 days after your last exposure. ? Check your temperature twice a day and watch for symptoms of COVID-19.  ? If possible, stay away from people who are at higher-risk for getting very sick from COVID-19. Stay Home and Monitor Your Health if you:  ? Have been diagnosed with COVID-19, or  ? Are waiting for test results, or  ?  Have cough, fever, or shortness of breath, or symptoms of COVID-19

## 2021-02-11 LAB — SARS-COV-2, PCR: NOT DETECTED

## 2021-02-16 ENCOUNTER — ANESTHESIA (OUTPATIENT)
Dept: OPERATING ROOM | Age: 71
DRG: 454 | End: 2021-02-16
Payer: MEDICARE

## 2021-02-16 ENCOUNTER — APPOINTMENT (OUTPATIENT)
Dept: GENERAL RADIOLOGY | Age: 71
DRG: 454 | End: 2021-02-16
Attending: NEUROLOGICAL SURGERY
Payer: MEDICARE

## 2021-02-16 ENCOUNTER — ANESTHESIA EVENT (OUTPATIENT)
Dept: OPERATING ROOM | Age: 71
DRG: 454 | End: 2021-02-16
Payer: MEDICARE

## 2021-02-16 ENCOUNTER — HOSPITAL ENCOUNTER (INPATIENT)
Age: 71
LOS: 1 days | Discharge: HOME | DRG: 454 | End: 2021-02-17
Attending: NEUROLOGICAL SURGERY | Admitting: NEUROLOGICAL SURGERY
Payer: MEDICARE

## 2021-02-16 VITALS
DIASTOLIC BLOOD PRESSURE: 79 MMHG | OXYGEN SATURATION: 100 % | TEMPERATURE: 95.4 F | RESPIRATION RATE: 7 BRPM | SYSTOLIC BLOOD PRESSURE: 187 MMHG

## 2021-02-16 DIAGNOSIS — M48.062 SPINAL STENOSIS OF LUMBAR REGION WITH NEUROGENIC CLAUDICATION: ICD-10-CM

## 2021-02-16 DIAGNOSIS — Z01.818 PREOP TESTING: Primary | ICD-10-CM

## 2021-02-16 PROBLEM — N17.9 AKI (ACUTE KIDNEY INJURY) (HCC): Status: ACTIVE | Noted: 2021-02-16

## 2021-02-16 PROBLEM — M43.16 SPONDYLOLISTHESIS AT L4-L5 LEVEL: Status: ACTIVE | Noted: 2021-02-16

## 2021-02-16 LAB
ABO/RH: NORMAL
ANION GAP SERPL CALCULATED.3IONS-SCNC: 7 MMOL/L (ref 3–16)
ANTIBODY SCREEN: NORMAL
BUN BLDV-MCNC: 29 MG/DL (ref 7–20)
CALCIUM SERPL-MCNC: 10.1 MG/DL (ref 8.3–10.6)
CHLORIDE BLD-SCNC: 108 MMOL/L (ref 99–110)
CO2: 26 MMOL/L (ref 21–32)
CREAT SERPL-MCNC: 1.5 MG/DL (ref 0.6–1.2)
GFR AFRICAN AMERICAN: 41
GFR NON-AFRICAN AMERICAN: 34
GLUCOSE BLD-MCNC: 120 MG/DL (ref 70–99)
POTASSIUM SERPL-SCNC: 4.5 MMOL/L (ref 3.5–5.1)
SODIUM BLD-SCNC: 141 MMOL/L (ref 136–145)

## 2021-02-16 PROCEDURE — 86901 BLOOD TYPING SEROLOGIC RH(D): CPT

## 2021-02-16 PROCEDURE — 36415 COLL VENOUS BLD VENIPUNCTURE: CPT

## 2021-02-16 PROCEDURE — 0SG00AJ FUSION OF LUMBAR VERTEBRAL JOINT WITH INTERBODY FUSION DEVICE, POSTERIOR APPROACH, ANTERIOR COLUMN, OPEN APPROACH: ICD-10-PCS | Performed by: NEUROLOGICAL SURGERY

## 2021-02-16 PROCEDURE — 2780000010 HC IMPLANT OTHER: Performed by: NEUROLOGICAL SURGERY

## 2021-02-16 PROCEDURE — 72100 X-RAY EXAM L-S SPINE 2/3 VWS: CPT

## 2021-02-16 PROCEDURE — 2580000003 HC RX 258: Performed by: NEUROLOGICAL SURGERY

## 2021-02-16 PROCEDURE — 01NB0ZZ RELEASE LUMBAR NERVE, OPEN APPROACH: ICD-10-PCS | Performed by: NEUROLOGICAL SURGERY

## 2021-02-16 PROCEDURE — 80048 BASIC METABOLIC PNL TOTAL CA: CPT

## 2021-02-16 PROCEDURE — 3209999900 FLUORO FOR SURGICAL PROCEDURES

## 2021-02-16 PROCEDURE — 6360000002 HC RX W HCPCS: Performed by: NEUROLOGICAL SURGERY

## 2021-02-16 PROCEDURE — 3600000004 HC SURGERY LEVEL 4 BASE: Performed by: NEUROLOGICAL SURGERY

## 2021-02-16 PROCEDURE — P9045 ALBUMIN (HUMAN), 5%, 250 ML: HCPCS | Performed by: NURSE ANESTHETIST, CERTIFIED REGISTERED

## 2021-02-16 PROCEDURE — 86850 RBC ANTIBODY SCREEN: CPT

## 2021-02-16 PROCEDURE — 2709999900 HC NON-CHARGEABLE SUPPLY: Performed by: NEUROLOGICAL SURGERY

## 2021-02-16 PROCEDURE — 86900 BLOOD TYPING SEROLOGIC ABO: CPT

## 2021-02-16 PROCEDURE — 3700000000 HC ANESTHESIA ATTENDED CARE: Performed by: NEUROLOGICAL SURGERY

## 2021-02-16 PROCEDURE — 6370000000 HC RX 637 (ALT 250 FOR IP): Performed by: NEUROLOGICAL SURGERY

## 2021-02-16 PROCEDURE — 6360000002 HC RX W HCPCS: Performed by: ANESTHESIOLOGY

## 2021-02-16 PROCEDURE — 6370000000 HC RX 637 (ALT 250 FOR IP): Performed by: ANESTHESIOLOGY

## 2021-02-16 PROCEDURE — 1200000000 HC SEMI PRIVATE

## 2021-02-16 PROCEDURE — 3600000014 HC SURGERY LEVEL 4 ADDTL 15MIN: Performed by: NEUROLOGICAL SURGERY

## 2021-02-16 PROCEDURE — 0SG0071 FUSION OF LUMBAR VERTEBRAL JOINT WITH AUTOLOGOUS TISSUE SUBSTITUTE, POSTERIOR APPROACH, POSTERIOR COLUMN, OPEN APPROACH: ICD-10-PCS | Performed by: NEUROLOGICAL SURGERY

## 2021-02-16 PROCEDURE — 0SB20ZZ EXCISION OF LUMBAR VERTEBRAL DISC, OPEN APPROACH: ICD-10-PCS | Performed by: NEUROLOGICAL SURGERY

## 2021-02-16 PROCEDURE — 07DS3ZZ EXTRACTION OF VERTEBRAL BONE MARROW, PERCUTANEOUS APPROACH: ICD-10-PCS | Performed by: NEUROLOGICAL SURGERY

## 2021-02-16 PROCEDURE — 6370000000 HC RX 637 (ALT 250 FOR IP): Performed by: NURSE ANESTHETIST, CERTIFIED REGISTERED

## 2021-02-16 PROCEDURE — 7100000000 HC PACU RECOVERY - FIRST 15 MIN: Performed by: NEUROLOGICAL SURGERY

## 2021-02-16 PROCEDURE — 6360000002 HC RX W HCPCS: Performed by: NURSE ANESTHETIST, CERTIFIED REGISTERED

## 2021-02-16 PROCEDURE — 3700000001 HC ADD 15 MINUTES (ANESTHESIA): Performed by: NEUROLOGICAL SURGERY

## 2021-02-16 PROCEDURE — 7100000001 HC PACU RECOVERY - ADDTL 15 MIN: Performed by: NEUROLOGICAL SURGERY

## 2021-02-16 PROCEDURE — C1713 ANCHOR/SCREW BN/BN,TIS/BN: HCPCS | Performed by: NEUROLOGICAL SURGERY

## 2021-02-16 PROCEDURE — 2500000003 HC RX 250 WO HCPCS: Performed by: NEUROLOGICAL SURGERY

## 2021-02-16 PROCEDURE — 2720000010 HC SURG SUPPLY STERILE: Performed by: NEUROLOGICAL SURGERY

## 2021-02-16 PROCEDURE — 2500000003 HC RX 250 WO HCPCS: Performed by: NURSE ANESTHETIST, CERTIFIED REGISTERED

## 2021-02-16 DEVICE — GRAFT BNE INJ 6 CC DEMINERALIZED FIBER GRFT: Type: IMPLANTABLE DEVICE | Site: SPINE LUMBAR | Status: FUNCTIONAL

## 2021-02-16 DEVICE — ROD 641003035 35MM CAPPED ROD 4.75MM CCM
Type: IMPLANTABLE DEVICE | Site: SPINE LUMBAR | Status: FUNCTIONAL
Brand: CD HORIZON® SOLERA® SPINAL SYSTEM

## 2021-02-16 DEVICE — SET SCR SPNL DIA4.75MM POST THORLUM SACR TI PERC APPRCH CDH: Type: IMPLANTABLE DEVICE | Site: SPINE LUMBAR | Status: FUNCTIONAL

## 2021-02-16 DEVICE — DBM 7509115 MAG SINGLE 1 X 5CM
Type: IMPLANTABLE DEVICE | Site: SPINE LUMBAR | Status: FUNCTIONAL
Brand: MAGNIFUSE® BONE GRAFT

## 2021-02-16 DEVICE — SCREW 54850046545 4.75VOYAGER ATS 6.5X45
Type: IMPLANTABLE DEVICE | Site: SPINE LUMBAR | Status: FUNCTIONAL
Brand: CD HORIZON® SOLERA® VOYAGER™ SPINAL SYSTEM

## 2021-02-16 DEVICE — SPACER 4001027 6 DEG 10X27
Type: IMPLANTABLE DEVICE | Site: SPINE LUMBAR | Status: FUNCTIONAL
Brand: CAPSTONE CONTROL™ SPINAL SYSTEM

## 2021-02-16 RX ORDER — SODIUM CHLORIDE 0.9 % (FLUSH) 0.9 %
10 SYRINGE (ML) INJECTION PRN
Status: DISCONTINUED | OUTPATIENT
Start: 2021-02-16 | End: 2021-02-17 | Stop reason: HOSPADM

## 2021-02-16 RX ORDER — PROMETHAZINE HYDROCHLORIDE 25 MG/1
12.5 TABLET ORAL EVERY 6 HOURS PRN
Status: DISCONTINUED | OUTPATIENT
Start: 2021-02-16 | End: 2021-02-17 | Stop reason: HOSPADM

## 2021-02-16 RX ORDER — METHOCARBAMOL 100 MG/ML
INJECTION, SOLUTION INTRAMUSCULAR; INTRAVENOUS PRN
Status: DISCONTINUED | OUTPATIENT
Start: 2021-02-16 | End: 2021-02-16 | Stop reason: SDUPTHER

## 2021-02-16 RX ORDER — LISINOPRIL 10 MG/1
30 TABLET ORAL DAILY
Status: DISCONTINUED | OUTPATIENT
Start: 2021-02-16 | End: 2021-02-17 | Stop reason: HOSPADM

## 2021-02-16 RX ORDER — ONDANSETRON 2 MG/ML
INJECTION INTRAMUSCULAR; INTRAVENOUS PRN
Status: DISCONTINUED | OUTPATIENT
Start: 2021-02-16 | End: 2021-02-16 | Stop reason: SDUPTHER

## 2021-02-16 RX ORDER — SODIUM CHLORIDE 9 MG/ML
INJECTION, SOLUTION INTRAVENOUS CONTINUOUS
Status: DISCONTINUED | OUTPATIENT
Start: 2021-02-16 | End: 2021-02-16

## 2021-02-16 RX ORDER — VANCOMYCIN HYDROCHLORIDE 1 G/20ML
INJECTION, POWDER, LYOPHILIZED, FOR SOLUTION INTRAVENOUS
Status: COMPLETED | OUTPATIENT
Start: 2021-02-16 | End: 2021-02-16

## 2021-02-16 RX ORDER — GLYCOPYRROLATE 0.2 MG/ML
INJECTION INTRAMUSCULAR; INTRAVENOUS PRN
Status: DISCONTINUED | OUTPATIENT
Start: 2021-02-16 | End: 2021-02-16 | Stop reason: SDUPTHER

## 2021-02-16 RX ORDER — LIDOCAINE HYDROCHLORIDE 10 MG/ML
0.5 INJECTION, SOLUTION EPIDURAL; INFILTRATION; INTRACAUDAL; PERINEURAL ONCE
Status: DISCONTINUED | OUTPATIENT
Start: 2021-02-16 | End: 2021-02-16 | Stop reason: HOSPADM

## 2021-02-16 RX ORDER — HYDROMORPHONE HCL 110MG/55ML
0.5 PATIENT CONTROLLED ANALGESIA SYRINGE INTRAVENOUS EVERY 5 MIN PRN
Status: DISCONTINUED | OUTPATIENT
Start: 2021-02-16 | End: 2021-02-16 | Stop reason: HOSPADM

## 2021-02-16 RX ORDER — LEVOTHYROXINE SODIUM 0.03 MG/1
25 TABLET ORAL DAILY
Status: DISCONTINUED | OUTPATIENT
Start: 2021-02-16 | End: 2021-02-17 | Stop reason: HOSPADM

## 2021-02-16 RX ORDER — POTASSIUM CHLORIDE 20 MEQ/1
40 TABLET, EXTENDED RELEASE ORAL PRN
Status: DISCONTINUED | OUTPATIENT
Start: 2021-02-16 | End: 2021-02-17 | Stop reason: HOSPADM

## 2021-02-16 RX ORDER — EZETIMIBE 10 MG/1
1 TABLET ORAL DAILY
Status: DISCONTINUED | OUTPATIENT
Start: 2021-02-16 | End: 2021-02-16 | Stop reason: RX

## 2021-02-16 RX ORDER — SUCCINYLCHOLINE/SOD CL,ISO/PF 100 MG/5ML
SYRINGE (ML) INTRAVENOUS PRN
Status: DISCONTINUED | OUTPATIENT
Start: 2021-02-16 | End: 2021-02-16 | Stop reason: SDUPTHER

## 2021-02-16 RX ORDER — FENOFIBRATE 160 MG/1
160 TABLET ORAL DAILY
Status: DISCONTINUED | OUTPATIENT
Start: 2021-02-16 | End: 2021-02-17 | Stop reason: HOSPADM

## 2021-02-16 RX ORDER — POLYETHYLENE GLYCOL 3350 17 G/17G
17 POWDER, FOR SOLUTION ORAL DAILY
Status: DISCONTINUED | OUTPATIENT
Start: 2021-02-16 | End: 2021-02-17 | Stop reason: HOSPADM

## 2021-02-16 RX ORDER — PROPOFOL 10 MG/ML
INJECTION, EMULSION INTRAVENOUS PRN
Status: DISCONTINUED | OUTPATIENT
Start: 2021-02-16 | End: 2021-02-16 | Stop reason: SDUPTHER

## 2021-02-16 RX ORDER — LIDOCAINE HYDROCHLORIDE 20 MG/ML
INJECTION, SOLUTION INFILTRATION; PERINEURAL PRN
Status: DISCONTINUED | OUTPATIENT
Start: 2021-02-16 | End: 2021-02-16 | Stop reason: SDUPTHER

## 2021-02-16 RX ORDER — DEXAMETHASONE SODIUM PHOSPHATE 4 MG/ML
INJECTION, SOLUTION INTRA-ARTICULAR; INTRALESIONAL; INTRAMUSCULAR; INTRAVENOUS; SOFT TISSUE PRN
Status: DISCONTINUED | OUTPATIENT
Start: 2021-02-16 | End: 2021-02-16 | Stop reason: SDUPTHER

## 2021-02-16 RX ORDER — HYDRALAZINE HYDROCHLORIDE 20 MG/ML
10 INJECTION INTRAMUSCULAR; INTRAVENOUS EVERY 6 HOURS PRN
Status: DISCONTINUED | OUTPATIENT
Start: 2021-02-16 | End: 2021-02-17 | Stop reason: HOSPADM

## 2021-02-16 RX ORDER — HYDROMORPHONE HCL 110MG/55ML
PATIENT CONTROLLED ANALGESIA SYRINGE INTRAVENOUS PRN
Status: DISCONTINUED | OUTPATIENT
Start: 2021-02-16 | End: 2021-02-16 | Stop reason: SDUPTHER

## 2021-02-16 RX ORDER — POTASSIUM CHLORIDE 7.45 MG/ML
10 INJECTION INTRAVENOUS PRN
Status: DISCONTINUED | OUTPATIENT
Start: 2021-02-16 | End: 2021-02-17 | Stop reason: HOSPADM

## 2021-02-16 RX ORDER — HYDROMORPHONE HCL 110MG/55ML
0.25 PATIENT CONTROLLED ANALGESIA SYRINGE INTRAVENOUS EVERY 5 MIN PRN
Status: DISCONTINUED | OUTPATIENT
Start: 2021-02-16 | End: 2021-02-16 | Stop reason: HOSPADM

## 2021-02-16 RX ORDER — FENTANYL CITRATE 50 UG/ML
INJECTION, SOLUTION INTRAMUSCULAR; INTRAVENOUS PRN
Status: DISCONTINUED | OUTPATIENT
Start: 2021-02-16 | End: 2021-02-16 | Stop reason: SDUPTHER

## 2021-02-16 RX ORDER — 0.9 % SODIUM CHLORIDE 0.9 %
500 INTRAVENOUS SOLUTION INTRAVENOUS PRN
Status: DISCONTINUED | OUTPATIENT
Start: 2021-02-16 | End: 2021-02-17 | Stop reason: HOSPADM

## 2021-02-16 RX ORDER — VITAMIN E 268 MG
400 CAPSULE ORAL DAILY
Status: DISCONTINUED | OUTPATIENT
Start: 2021-02-16 | End: 2021-02-17 | Stop reason: HOSPADM

## 2021-02-16 RX ORDER — ALBUTEROL SULFATE 90 UG/1
AEROSOL, METERED RESPIRATORY (INHALATION) PRN
Status: DISCONTINUED | OUTPATIENT
Start: 2021-02-16 | End: 2021-02-16 | Stop reason: SDUPTHER

## 2021-02-16 RX ORDER — ALBUMIN, HUMAN INJ 5% 5 %
SOLUTION INTRAVENOUS PRN
Status: DISCONTINUED | OUTPATIENT
Start: 2021-02-16 | End: 2021-02-16 | Stop reason: SDUPTHER

## 2021-02-16 RX ORDER — ONDANSETRON 2 MG/ML
4 INJECTION INTRAMUSCULAR; INTRAVENOUS EVERY 6 HOURS PRN
Status: DISCONTINUED | OUTPATIENT
Start: 2021-02-16 | End: 2021-02-17 | Stop reason: HOSPADM

## 2021-02-16 RX ORDER — METHOCARBAMOL 500 MG/1
500 TABLET, FILM COATED ORAL 4 TIMES DAILY
Status: DISCONTINUED | OUTPATIENT
Start: 2021-02-16 | End: 2021-02-17 | Stop reason: HOSPADM

## 2021-02-16 RX ORDER — SODIUM CHLORIDE 0.9 % (FLUSH) 0.9 %
10 SYRINGE (ML) INJECTION EVERY 12 HOURS SCHEDULED
Status: DISCONTINUED | OUTPATIENT
Start: 2021-02-16 | End: 2021-02-17 | Stop reason: HOSPADM

## 2021-02-16 RX ORDER — ACETAMINOPHEN 325 MG/1
650 TABLET ORAL ONCE
Status: COMPLETED | OUTPATIENT
Start: 2021-02-16 | End: 2021-02-16

## 2021-02-16 RX ORDER — BUPIVACAINE HYDROCHLORIDE 5 MG/ML
INJECTION, SOLUTION EPIDURAL; INTRACAUDAL
Status: COMPLETED | OUTPATIENT
Start: 2021-02-16 | End: 2021-02-16

## 2021-02-16 RX ORDER — DEXTROSE, SODIUM CHLORIDE, AND POTASSIUM CHLORIDE 5; .45; .15 G/100ML; G/100ML; G/100ML
1000 INJECTION INTRAVENOUS CONTINUOUS
Status: DISCONTINUED | OUTPATIENT
Start: 2021-02-16 | End: 2021-02-17

## 2021-02-16 RX ORDER — MAGNESIUM HYDROXIDE 1200 MG/15ML
LIQUID ORAL CONTINUOUS PRN
Status: COMPLETED | OUTPATIENT
Start: 2021-02-16 | End: 2021-02-16

## 2021-02-16 RX ORDER — VECURONIUM BROMIDE 1 MG/ML
INJECTION, POWDER, LYOPHILIZED, FOR SOLUTION INTRAVENOUS PRN
Status: DISCONTINUED | OUTPATIENT
Start: 2021-02-16 | End: 2021-02-16 | Stop reason: SDUPTHER

## 2021-02-16 RX ORDER — OXYCODONE HYDROCHLORIDE 5 MG/1
10 TABLET ORAL EVERY 4 HOURS PRN
Status: DISCONTINUED | OUTPATIENT
Start: 2021-02-16 | End: 2021-02-17 | Stop reason: HOSPADM

## 2021-02-16 RX ORDER — OXYCODONE HYDROCHLORIDE 5 MG/1
5 TABLET ORAL EVERY 4 HOURS PRN
Status: DISCONTINUED | OUTPATIENT
Start: 2021-02-16 | End: 2021-02-17 | Stop reason: HOSPADM

## 2021-02-16 RX ORDER — LIDOCAINE HYDROCHLORIDE 10 MG/ML
1 INJECTION, SOLUTION EPIDURAL; INFILTRATION; INTRACAUDAL; PERINEURAL
Status: DISCONTINUED | OUTPATIENT
Start: 2021-02-16 | End: 2021-02-16 | Stop reason: HOSPADM

## 2021-02-16 RX ORDER — HYDROMORPHONE HYDROCHLORIDE 1 MG/ML
0.5 INJECTION, SOLUTION INTRAMUSCULAR; INTRAVENOUS; SUBCUTANEOUS
Status: DISCONTINUED | OUTPATIENT
Start: 2021-02-16 | End: 2021-02-17 | Stop reason: HOSPADM

## 2021-02-16 RX ORDER — SODIUM CHLORIDE, SODIUM LACTATE, POTASSIUM CHLORIDE, CALCIUM CHLORIDE 600; 310; 30; 20 MG/100ML; MG/100ML; MG/100ML; MG/100ML
INJECTION, SOLUTION INTRAVENOUS CONTINUOUS
Status: DISCONTINUED | OUTPATIENT
Start: 2021-02-16 | End: 2021-02-16

## 2021-02-16 RX ORDER — ONDANSETRON 2 MG/ML
4 INJECTION INTRAMUSCULAR; INTRAVENOUS
Status: DISCONTINUED | OUTPATIENT
Start: 2021-02-16 | End: 2021-02-16 | Stop reason: HOSPADM

## 2021-02-16 RX ADMIN — ONDANSETRON 4 MG: 2 INJECTION INTRAMUSCULAR; INTRAVENOUS at 17:42

## 2021-02-16 RX ADMIN — POTASSIUM CHLORIDE, DEXTROSE MONOHYDRATE AND SODIUM CHLORIDE 1000 ML: 150; 5; 450 INJECTION, SOLUTION INTRAVENOUS at 20:33

## 2021-02-16 RX ADMIN — HYDROMORPHONE HYDROCHLORIDE 0.2 MG: 2 INJECTION, SOLUTION INTRAMUSCULAR; INTRAVENOUS; SUBCUTANEOUS at 12:42

## 2021-02-16 RX ADMIN — METHOCARBAMOL 500 MG: 100 INJECTION INTRAMUSCULAR; INTRAVENOUS at 12:40

## 2021-02-16 RX ADMIN — PHENYLEPHRINE HYDROCHLORIDE 100 MCG: 10 INJECTION INTRAVENOUS at 12:57

## 2021-02-16 RX ADMIN — PHENYLEPHRINE HYDROCHLORIDE 100 MCG: 10 INJECTION INTRAVENOUS at 10:52

## 2021-02-16 RX ADMIN — ALBUTEROL SULFATE 6 PUFF: 90 AEROSOL, METERED RESPIRATORY (INHALATION) at 10:35

## 2021-02-16 RX ADMIN — LEVOTHYROXINE SODIUM 25 MCG: 0.03 TABLET ORAL at 20:32

## 2021-02-16 RX ADMIN — SODIUM CHLORIDE, POTASSIUM CHLORIDE, SODIUM LACTATE AND CALCIUM CHLORIDE: 600; 310; 30; 20 INJECTION, SOLUTION INTRAVENOUS at 12:05

## 2021-02-16 RX ADMIN — CEFAZOLIN SODIUM 2000 MG: 10 INJECTION, POWDER, FOR SOLUTION INTRAVENOUS at 20:45

## 2021-02-16 RX ADMIN — PROPOFOL 30 MG: 10 INJECTION, EMULSION INTRAVENOUS at 10:25

## 2021-02-16 RX ADMIN — ONDANSETRON 4 MG: 2 INJECTION INTRAMUSCULAR; INTRAVENOUS at 10:44

## 2021-02-16 RX ADMIN — Medication 10 ML: at 23:08

## 2021-02-16 RX ADMIN — DEXAMETHASONE SODIUM PHOSPHATE 8 MG: 4 INJECTION, SOLUTION INTRAMUSCULAR; INTRAVENOUS at 10:44

## 2021-02-16 RX ADMIN — ACETAMINOPHEN 650 MG: 325 TABLET ORAL at 09:57

## 2021-02-16 RX ADMIN — PHENYLEPHRINE HYDROCHLORIDE 100 MCG: 10 INJECTION INTRAVENOUS at 10:44

## 2021-02-16 RX ADMIN — Medication 120 MG: at 10:22

## 2021-02-16 RX ADMIN — VECURONIUM BROMIDE 4 MG: 1 INJECTION, POWDER, LYOPHILIZED, FOR SOLUTION INTRAVENOUS at 10:42

## 2021-02-16 RX ADMIN — ALBUMIN (HUMAN) 250 ML: 12.5 INJECTION, SOLUTION INTRAVENOUS at 12:22

## 2021-02-16 RX ADMIN — HYDROMORPHONE HYDROCHLORIDE 0.5 MG: 2 INJECTION, SOLUTION INTRAMUSCULAR; INTRAVENOUS; SUBCUTANEOUS at 14:28

## 2021-02-16 RX ADMIN — PHENYLEPHRINE HYDROCHLORIDE 100 MCG: 10 INJECTION INTRAVENOUS at 11:34

## 2021-02-16 RX ADMIN — PHENYLEPHRINE HYDROCHLORIDE 150 MCG: 10 INJECTION INTRAVENOUS at 11:00

## 2021-02-16 RX ADMIN — POLYETHYLENE GLYCOL 3350 17 G: 17 POWDER, FOR SOLUTION ORAL at 20:31

## 2021-02-16 RX ADMIN — LIDOCAINE HYDROCHLORIDE 75 MG: 20 INJECTION, SOLUTION INFILTRATION; PERINEURAL at 10:22

## 2021-02-16 RX ADMIN — HYDROMORPHONE HYDROCHLORIDE 0.5 MG: 2 INJECTION, SOLUTION INTRAMUSCULAR; INTRAVENOUS; SUBCUTANEOUS at 13:35

## 2021-02-16 RX ADMIN — METHOCARBAMOL 500 MG: 500 TABLET ORAL at 20:32

## 2021-02-16 RX ADMIN — FENOFIBRATE 160 MG: 160 TABLET ORAL at 20:32

## 2021-02-16 RX ADMIN — LISINOPRIL 30 MG: 10 TABLET ORAL at 20:31

## 2021-02-16 RX ADMIN — Medication 400 UNITS: at 20:33

## 2021-02-16 RX ADMIN — PHENYLEPHRINE HYDROCHLORIDE 100 MCG: 10 INJECTION INTRAVENOUS at 10:36

## 2021-02-16 RX ADMIN — LIDOCAINE HYDROCHLORIDE 15 MG: 20 INJECTION, SOLUTION INFILTRATION; PERINEURAL at 10:25

## 2021-02-16 RX ADMIN — HYDROMORPHONE HYDROCHLORIDE 0.2 MG: 2 INJECTION, SOLUTION INTRAMUSCULAR; INTRAVENOUS; SUBCUTANEOUS at 12:50

## 2021-02-16 RX ADMIN — FENTANYL CITRATE 50 MCG: 50 INJECTION INTRAMUSCULAR; INTRAVENOUS at 10:26

## 2021-02-16 RX ADMIN — HYDROMORPHONE HYDROCHLORIDE 0.5 MG: 1 INJECTION, SOLUTION INTRAMUSCULAR; INTRAVENOUS; SUBCUTANEOUS at 17:42

## 2021-02-16 RX ADMIN — SODIUM CHLORIDE, POTASSIUM CHLORIDE, SODIUM LACTATE AND CALCIUM CHLORIDE: 600; 310; 30; 20 INJECTION, SOLUTION INTRAVENOUS at 10:17

## 2021-02-16 RX ADMIN — SUGAMMADEX 150 MG: 100 INJECTION, SOLUTION INTRAVENOUS at 12:43

## 2021-02-16 RX ADMIN — CEFAZOLIN SODIUM 2000 MG: 10 INJECTION, POWDER, FOR SOLUTION INTRAVENOUS at 10:13

## 2021-02-16 RX ADMIN — HYDROMORPHONE HYDROCHLORIDE 0.5 MG: 2 INJECTION, SOLUTION INTRAMUSCULAR; INTRAVENOUS; SUBCUTANEOUS at 13:22

## 2021-02-16 RX ADMIN — PHENYLEPHRINE HYDROCHLORIDE 100 MCG: 10 INJECTION INTRAVENOUS at 11:48

## 2021-02-16 RX ADMIN — PROPOFOL 150 MG: 10 INJECTION, EMULSION INTRAVENOUS at 10:22

## 2021-02-16 RX ADMIN — GLYCOPYRROLATE 0.2 MG: 0.2 INJECTION INTRAMUSCULAR; INTRAVENOUS at 11:03

## 2021-02-16 RX ADMIN — PHENYLEPHRINE HYDROCHLORIDE 100 MCG: 10 INJECTION INTRAVENOUS at 11:28

## 2021-02-16 ASSESSMENT — ENCOUNTER SYMPTOMS
BACK PAIN: 1
STRIDOR: 0
ABDOMINAL PAIN: 0
CHOKING: 0
CONSTIPATION: 0
EYE REDNESS: 0
EYE PAIN: 0
SINUS PAIN: 0
SINUS PRESSURE: 0

## 2021-02-16 ASSESSMENT — PULMONARY FUNCTION TESTS
PIF_VALUE: 22
PIF_VALUE: 21
PIF_VALUE: 25
PIF_VALUE: 23
PIF_VALUE: 21
PIF_VALUE: 23
PIF_VALUE: 22
PIF_VALUE: 21
PIF_VALUE: 21
PIF_VALUE: 22
PIF_VALUE: 20
PIF_VALUE: 22
PIF_VALUE: 23
PIF_VALUE: 22
PIF_VALUE: 20
PIF_VALUE: 19
PIF_VALUE: 25
PIF_VALUE: 25
PIF_VALUE: 21
PIF_VALUE: 22
PIF_VALUE: 20
PIF_VALUE: 21
PIF_VALUE: 22
PIF_VALUE: 21
PIF_VALUE: 22
PIF_VALUE: 21
PIF_VALUE: 6
PIF_VALUE: 22
PIF_VALUE: 25
PIF_VALUE: 24
PIF_VALUE: 19
PIF_VALUE: 22
PIF_VALUE: 21
PIF_VALUE: 22
PIF_VALUE: 22
PIF_VALUE: 20
PIF_VALUE: 25
PIF_VALUE: 22
PIF_VALUE: 20
PIF_VALUE: 22
PIF_VALUE: 22
PIF_VALUE: 21
PIF_VALUE: 22
PIF_VALUE: 19
PIF_VALUE: 21
PIF_VALUE: 19
PIF_VALUE: 22
PIF_VALUE: 3
PIF_VALUE: 24
PIF_VALUE: 21
PIF_VALUE: 14
PIF_VALUE: 1
PIF_VALUE: 21
PIF_VALUE: 24
PIF_VALUE: 15
PIF_VALUE: 0
PIF_VALUE: 22
PIF_VALUE: 22
PIF_VALUE: 24
PIF_VALUE: 21
PIF_VALUE: 20
PIF_VALUE: 20
PIF_VALUE: 22
PIF_VALUE: 23
PIF_VALUE: 26
PIF_VALUE: 3
PIF_VALUE: 22
PIF_VALUE: 21
PIF_VALUE: 22
PIF_VALUE: 1
PIF_VALUE: 24
PIF_VALUE: 21
PIF_VALUE: 22
PIF_VALUE: 20
PIF_VALUE: 24
PIF_VALUE: 22
PIF_VALUE: 23
PIF_VALUE: 21
PIF_VALUE: 24
PIF_VALUE: 24
PIF_VALUE: 22
PIF_VALUE: 19
PIF_VALUE: 22
PIF_VALUE: 21
PIF_VALUE: 20
PIF_VALUE: 22
PIF_VALUE: 21
PIF_VALUE: 22

## 2021-02-16 ASSESSMENT — LIFESTYLE VARIABLES: SMOKING_STATUS: 1

## 2021-02-16 ASSESSMENT — PAIN SCALES - GENERAL: PAINLEVEL_OUTOF10: 7

## 2021-02-16 ASSESSMENT — PAIN DESCRIPTION - ORIENTATION: ORIENTATION: LOWER

## 2021-02-16 ASSESSMENT — PAIN DESCRIPTION - PROGRESSION: CLINICAL_PROGRESSION: NOT CHANGED

## 2021-02-16 ASSESSMENT — PAIN DESCRIPTION - ONSET: ONSET: GRADUAL

## 2021-02-16 ASSESSMENT — PAIN - FUNCTIONAL ASSESSMENT
PAIN_FUNCTIONAL_ASSESSMENT: 0-10
PAIN_FUNCTIONAL_ASSESSMENT: PREVENTS OR INTERFERES SOME ACTIVE ACTIVITIES AND ADLS

## 2021-02-16 ASSESSMENT — PAIN DESCRIPTION - PAIN TYPE: TYPE: CHRONIC PAIN

## 2021-02-16 NOTE — ANESTHESIA PRE PROCEDURE
Department of Anesthesiology  Preprocedure Note       Name:  Claudia Sharma   Age:  66 y.o.  :  1950                                          MRN:  [id number]         Date:  2021      Surgeon: Crystal Law):  Renee Aguilar MD    Procedure: Procedure(s):  [facility] TRANSVERSE LUMBAR INTERBODY FUSION ([id number], [id number], 09252, 30632, 87016, [id number], [id number], 91586)    Medications prior to admission:   Prior to Admission medications    Medication Sig Start Date End Date Taking?  Authorizing Provider   levothyroxine (SYNTHROID) 25 MCG tablet TAKE 1 TABLET BY MOUTH DAILY  Patient taking differently: Take 25 mcg by mouth nightly  10/21/20  Yes VANNA Davies   lisinopril (PRINIVIL;ZESTRIL) 30 MG tablet TAKE 1 TABLET BY MOUTH DAILY 20  Yes VANNA Davies   fenofibrate (TRIGLIDE) 160 MG tablet Take 1 tablet by mouth daily 20  Yes VANNA Davies   ezetimibe (ZETIA) 10 MG tablet TAKE 1 TABLET BY MOUTH EVERY DAY 20  Yes VANNA Davies   Psyllium (METAMUCIL) 28.3 % POWD Take by mouth daily   Yes Historical Provider, MD   Ascorbic Acid (VITAMIN C CR) 1000 MG TBCR Take by mouth   Yes Historical Provider, MD   vitamin E 400 UNIT capsule Take 400 Units by mouth daily   Yes Historical Provider, MD   Misc Natural Products (OSTEO BI-FLEX JOINT SHIELD PO) Take by mouth   Yes Historical Provider, MD   diclofenac (VOLTAREN) 75 MG EC tablet Take 1 tablet by mouth twice daily 20   VANNA Davies   glucose monitoring kit (FREESTYLE) monitoring kit 1 kit by Does not apply route daily 20   VANNA Davies   Lancets (BD LANCET ULTRAFINE 30G) MISC 1 Device by In Vitro route every morning CHECK BS EVERY MORNING FASTING 20   VANNA Davies   blood glucose monitor strips Test blood sugar every morning fasting. 20   VANNA Davies       Current medications:    Current Facility-Administered Medications Medication Dose Route Frequency Provider Last Rate Last Admin    lactated ringers infusion   Intravenous Continuous Cristina Zarate MD        lidocaine PF 1 % injection 0.5 mL  0.5 mL Intradermal Once Cristina Zarate MD        ceFAZolin (ANCEF) 2000 mg in dextrose 5 % 100 mL IVPB  2,000 mg Intravenous On Call to OR Cristina Zarate MD        ondansetron [facility]) injection 4 mg  4 mg Intravenous Once PRN Gold Beauchamp MD        0.9 % sodium chloride infusion   Intravenous Continuous Gold Beauchamp MD        lidocaine PF 1 % injection 1 mL  1 mL Intradermal Once PRN Gold Beauchamp MD        HYDROmorphone (DILAUDID) injection 0.25 mg  0.25 mg Intravenous Q5 Min PRN Gold Beauchamp MD        HYDROmorphone (DILAUDID) injection 0.5 mg  0.5 mg Intravenous Q5 Min PRN Gold Beauchamp MD           Allergies: Allergies   Allergen Reactions    Influenza Virus Vacc Split Pf      bronchitis    Pravastatin Sodium [Pravastatin Sodium] Other (See Comments)     Elevated liver enzymes.      Vicodin [Hydrocodone-Acetaminophen]      Nausea       Problem List:    Patient Active Problem List   Diagnosis Code    Essential hypertension I10    Pure hypercholesterolemia E78.00    Generalized osteoarthrosis, involving multiple sites M15.9    Impaired fasting glucose R73.01    Insomnia G47.00    Osteoarthritis M19.90    Dupuytren's contracture,mild M72.0    Tobacco dependence F17.200    Back pain M54.9    Spinal stenosis of lumbar region M48.061    Synovial cyst of lumbar spine M71.38    Acquired hypothyroidism E03.9       Past Medical History:        Diagnosis Date    Hyperlipidemia     Hypertension     Impaired fasting glucose     Insomnia, unspecified     Osteoarthritis     Thyroid disease        Past Surgical History:        Procedure Laterality Date    CARPAL TUNNEL RELEASE Left [id number]    lemus    CHOLECYSTECTOMY      LUMBAR LAMINECTOMY  8/20/15 L45 B/L decompressive hobson and excision of synovial cyst    ROTATOR CUFF REPAIR Right 2/98            Social History:    Social History     Tobacco Use    Smoking status: Current Every Day Smoker     Packs/day: 1.00     Years: 40.00     Pack years: 40.00     Types: Cigarettes    Smokeless tobacco: Never Used   Substance Use Topics    Alcohol use: No     Alcohol/week: 0.0 standard drinks                                Ready to quit: Not Answered  Counseling given: Not Answered      Vital Signs (Current):   Vitals:    01/22/21 1218 02/16/21 0933   BP:  (!) 144/80   Pulse:  69   Resp:  18   Temp:  97.1 °F (36.2 °C)   TempSrc:  Temporal   SpO2:  99%   Weight: 165 lb (74.8 kg) 166 lb 1.6 oz (75.3 kg)   Height: 5' 2\" (1.575 m) 5' 2\" (1.575 m)                                              BP Readings from Last 3 Encounters:   02/16/21 (!) 144/80   02/02/21 130/80   08/19/20 (!) 160/90       NPO Status: Time of last liquid consumption: 2300                        Time of last solid consumption: 2300                        Date of last liquid consumption: 02/15/21                        Date of last solid food consumption: 02/15/21    BMI:   Wt Readings from Last 3 Encounters:   02/16/21 166 lb 1.6 oz (75.3 kg)   02/02/21 166 lb 12.8 oz (75.7 kg)   08/19/20 184 lb (83.5 kg)     Body mass index is 30.38 kg/m².     CBC:   Lab Results   Component Value Date    WBC 5.7 02/10/2021    RBC 3.98 02/10/2021    HGB 12.8 02/10/2021    HCT 38.1 02/10/2021    MCV 95.8 02/10/2021    RDW 13.5 02/10/2021     02/10/2021       CMP:   Lab Results   Component Value Date     02/10/2021    K 5.2 02/10/2021     02/10/2021    CO2 27 02/10/2021    BUN 25 02/10/2021    CREATININE 1.6 02/10/2021    GFRAA 38 02/10/2021    GFRAA >60 11/15/2010    AGRATIO 1.6 08/21/2020    LABGLOM 32 02/10/2021    LABGLOM 44 07/03/2017    GLUCOSE 85 02/10/2021    PROT 6.9 08/21/2020    PROT 7.3 11/15/2010    CALCIUM 9.4 02/10/2021 BILITOT 0.3 08/21/2020    ALKPHOS 53 08/21/2020    AST 23 08/21/2020    ALT 22 08/21/2020       POC Tests: No results for input(s): POCGLU, POCNA, POCK, POCCL, POCBUN, POCHEMO, POCHCT in the last 72 hours. Coags:   Lab Results   Component Value Date    PROTIME 10.4 02/10/2021    INR 0.90 02/10/2021    APTT 34.8 02/10/2021       HCG (If Applicable): No results found for: PREGTESTUR, PREGSERUM, HCG, HCGQUANT     ABGs: No results found for: PHART, PO2ART, PAY8FAE, MWO0XIF, BEART, M4MKMLOT     Type & Screen (If Applicable):  No results found for: LABABO, LABRH    Drug/Infectious Status (If Applicable):  No results found for: HIV, HEPCAB    COVID-19 Screening (If Applicable):   Lab Results   Component Value Date    COVID19 Not Detected 02/10/2021         Anesthesia Evaluation  Patient summary reviewed history of anesthetic complications (extremely sore throat after last anesthetic):   Airway: Mallampati: II  TM distance: >3 FB   Neck ROM: full  Mouth opening: > = 3 FB Dental: normal exam         Pulmonary: breath sounds clear to auscultation  (+) decreased breath sounds,  current smoker                           Cardiovascular:    (+) hypertension:, hyperlipidemia    (-) CABG/stent, dysrhythmias and  angina      Rhythm: regular  Rate: normal                 ROS comment: Limited ex carlos due to back/leg pain     Neuro/Psych:      (-) seizures, TIA and CVA           GI/Hepatic/Renal:        (-) GERD      ROS comment: Denies gerd or n/v today. Endo/Other:    (+) hypothyroidism: arthritis: OA., .                 Abdominal:           Vascular:                                        Anesthesia Plan      general     ASA 3       Induction: intravenous. MIPS: Postoperative opioids intended, Prophylactic antiemetics administered and Postoperative trial extubation. Anesthetic plan and risks discussed with patient. Use of blood products discussed with patient whom consented to blood products. Plan discussed with CRNA. Vazquez Ward MD   2/16/2021

## 2021-02-16 NOTE — PROGRESS NOTES
Patient to PACU from OR. Awakens to voice, c/o pain - medicated per CRNA. VSS. Surgical dressings to lower back c/d/i. Patient moving all extremities, denies numbness. +1 bilat pedal pulses. Will monitor.

## 2021-02-16 NOTE — BRIEF OP NOTE
Brief Postoperative Note      Patient: Martita Faulkner  YOB: 1950  MRN: [id number]    Date of Procedure: 2/16/2021    Pre-Op Diagnosis: M48.06  SPINAL STENOSIS OF LUMBAR REGION  M43.16  LUMBAR SPONDYLOLISTHESISI, ACQUIRED L1-L5    Post-Op Diagnosis: Same       Procedure(s):  [id number] TRANSVERSE LUMBAR INTERBODY FUSION ([id number], 10870, 52259, 03834, 10329, 21806, 19221, 09695)    Surgeon(s):  Rochester Spatz, MD    Assistant:  Surgical Assistant: Ryan Levin RN  First Assistant: Ann Will    Anesthesia: General    Estimated Blood Loss (mL): less than 50     Complications: None    Specimens:   * No specimens in log *    Implants:  Implant Name Type Inv. Item Serial No.  Lot No. LRB No. Used Action   GRAFT BNE INJ 6 CC DEMINERALIZED FIBER GRFT - [id number]  GRAFT BNE INJ 6 CC DEMINERALIZED FIBER GRFT [id number] [employer] DANE[employer]WD  N/A 1 Implanted   GRAFT BONE [facility] BONE MTRX [id number] MAGNIFUSE - [id number]  GRAFT BONE [facility] BONE MTRX [id number] MAGNIFUSE [id number] [employer] INC-WD  N/A 1 Implanted   SPACER [id number] 6 DEG [id number]  SPACER [id number] 6 DEG 10X27  [employer] SOFAMOR DANEK-WD [id number] N/A 1 Implanted   SCREW SPNL L45MM [id number] AWL TAP HI STRENGTH LO PROF  SCREW SPNL L45MM [id number] AWL TAP HI STRENGTH LO PROF  [employer] [place] [employer]-WD  N/A 4 Implanted   SET SCR SPNL DIA4. 75MM POST THORLUM SACR TI PERC [facility]  SET SCR SPNL DIA4. 75MM POST THORLUM SACR TI PERC [facility]  [employer] SOFAMOR DANEK-WD  N/A 4 Implanted   INNA SPNL CAPPED 4.75X35 MM CCM CD HORZ SOLERA VOYAGER  INNA SPNL CAPPED 4.75X35 MM CCM  W [address] [place] [address] INC-WD  N/A 1 Implanted   INNA SPNL L40MM [id number] CO CHROME MOLYBDENUM POST  INNA SPNL L40MM [id number] CO CHROME MOLYBDENUM POST  [employer] [place] INC-WD  N/A 1 Implanted         Drains: * No LDAs found *    Findings: L45 listhesis and foraminal stenosis    Electronically signed by Rochester Spatz, MD on 2/16/2021 at 12:56 PM

## 2021-02-16 NOTE — PROGRESS NOTES
Patient describes pain as being located in lower back worse in bilateral legs. Radiating down both legs. States it is a constant type of pain. Unable to describe. States pain is better with sitting. Denied tingling/ numbness.   IVAN Perales, RN, [employer]

## 2021-02-16 NOTE — OP NOTE
[facility] OR  2/16/2021 1:02 PM    PATIENT NAME:         Phil Montejo  YOB: 1950   MEDICAL RECORD#         [id number]  SURGERY DATE:         2/16/2021  SURGEON:                 Audra Craig              OPERATIVE REPORT     PREOPERATIVE DIAGNOSES:  DDD with radiculopathy L45     POSTOPERATIVE DIAGNOSES:  Same     PROCEDURE PERFORMED:  1. Right L4-5 transforaminal lumbar interbody fusion with 9/12 mm 6 degree  PEEK allograft . 2.  Right L4-5 laminectomy, facetectomy, foraminotomy and diskectomy for decompression  of nerve roots. 3. L4-5 bilateral pedicle screw fixation with [employer]" MIS with stereotactic navigation with O-arm image guidance. 4. L4-5 posterolateral fusion with DBX allograft, bone chips, bone marrow aspirate. 5.  Microscopic dissection. ASSISTANT:  Camille ONEIL     ANESTHESIA:  General endotracheal.     ESTIMATED BLOOD LOSS:  100 mL. COMPLICATIONS:  None. INDICATIONS:   The patient is a Phil Montejo is a 66 y.o. female who presents with severe back and right leg pain. Symptoms have failed to resolve despite conservative intervention. MRI scan shows DDD and foraminal narrowing at L4-5. Based on findings and failure of conservative management, I recommended surgery with right L4-5 transforaminal lumbar interbody fusion. I reviewed the procedure with the patient including potential risks and benefits. After discussion, the patient decided to proceed with surgery and signed the informed consent to proceed. PROCEDURE IN DETAIL:  The patient was brought to the operating room. Patient received preoperative antibiotics. Sequential compression boots were placed on the patient's legs and activated. General endotracheal anesthesia was induced by the anesthesia team.  The patient was carefully turned prone on to the operating room table with a meena frame. All pressure points were adequately padded and the patient was secured to the operating table. The back was cleansed with alcohol and prepped and draped in the usual sterile fashion. Incisions were marked out and planned for 3 cm off midline bilaterally. The posterior superior iliac spine on the left side was then identified and palpated. A small stab incision was made. I then placed the O-arm guide pin into the PSIS and tapped it into the bone then securing the O-arm star in place for stereotactic navigation using the Stealth system. An O-arm spin was then obtained with accuracy confirmed by checking landmarks. I then mapped out the L4 and L5 pedicles bilaterally and planned for 2 small paramedian incisions. Incisions were made with 15-blade knife through the skin. Bovie electrocautery was used to dissect through subcutaneous tissue down to fascia and fascia was split. Then with finger dissection I dissected down to transverse processes of L4 and L5. These were confirmed with the O-arm guidance probe. The transverse processes were then decorticated. I then placed pedicle screws using stereotactic navigationon the right side and tapped the left side by first placing drilling a  hole under image guidance and then tapping the pedicle and into the vertebral body approximately 2/3 the distance to the anterior cortex of the vertebral body. Screws of appropriate length, determined from the O-arm planning system, were placed on the left side and left out on the right side for the TLIF. I then used handheld retractors to dissect the muscle from under the ligament and then off the spinous process and lamina medially. Muscle was dissected off of the lamina and facet at L4-5 on the right side and medial and lateral retractors were placed with the Insight retractor system. Muscle was then cleaned off with the bovie to expose the lamina, facet and pars. O-arm probe was used to confirm localization. I then placed superior and inferior retractors to provide appropriate retraction. The microscope was then brought in for microscopic dissection. Bovie electrocautery was used to dissect the remainder of muscle off of the facet and pars. I then drilled away the pars and facet decompressing the foramen. A bone  was used during drilling to preserve bone for fusion. Ligament and bony edges were then punched away completely decompressing the L4 and L5 nerves. The disk space was then exposed and once again level confirmed with the O-arm. Dura was retracted medially with a Radha retractor. Epidural veins were coagulated with bipolar electrocautery. The disk was incised onvm89-odobn knife. Disk material was removed by pituitary rongeur. Curettes and whitney were used within the disk space to remove all disk material, including disk extending to the opposite side. End plates were shaved with curettes and whitney to prepare for fusion. Once the disk was emptied and the endplates were prepared, I tested the trials and a 9/12 mm trial fit well. I therefore prepared the 9/12 mm lordotic  PEEK graft and filled this with bone graft soaked with DBM. Prior to placing the graft, bone material from the bone  was placed through a funnel into the anterior portion of the disk space which was soaked in bone marrow aspirate. Graft was tapped in so as to be obliquely placed across the disk space and well countersunk and did fit quite snugly. FloSeal and bipolar was used for hemostasis and hemostasis was confirmed visually. The retractors were then removed. The microscope was then taken out. On the TLIF side, screws were then placed using O-arm guidance through the prior tapped holes. A kyler was then placed through the towers on both sides and then secured down with caps. A second O-arm spin was then obtained and showed good placement of all hardware including all screws and the cage. Caps were fully tightened and locked on both sides and then the screw towers were removed. The wounds were then irrigated copiously with antibiotic solution. Posterolateral fusion was performed by placing a mixture of DBX allograft and bone marrow aspirate through a funnel and [employer] allograft bone bags soaked in bone marrow aspirate down to the posterolateral space bilaterally. The O-arm star and guide pin were removed. The stab incision was then closed with 2-0 Vicryl suture to close subcutaneous tissues and dermis and 4-0 monocryl subcuticular stitch to close the skin. The paramedian wounds were then closed with 2-0 Vicryl suture to close the fascia followed by 2-0 Vicryl to close the dermis, followed by 4-0 Monocryl staples to close the skin. 0.5% Marcaine/Exparel was injected in deep tissue and subcutaneous tissue prior to closure. All drapes were then removed. The patient was turned back to the hospital bed and was awakened and taken to the recovery room in stable condition. All sponge, needle, and instrument counts were correct at the end of the case. In conformance with CMS regulations, I affirm I was present in the operating room during the entire procedure. Dictated by: Marjorie Gitelman.  Vito Gallardo M.D.

## 2021-02-16 NOTE — PLAN OF CARE
Problem: Falls - Risk of:  Goal: Will remain free from falls  Description: Will remain free from falls  Outcome: Ongoing  Goal: Absence of physical injury  Description: Absence of physical injury  Outcome: Ongoing     Problem: Pain:  Goal: Pain level will decrease  Description: Pain level will decrease  Outcome: Ongoing  Goal: Control of acute pain  Description: Control of acute pain  Outcome: Ongoing  Goal: Control of chronic pain  Description: Control of chronic pain  Outcome: Ongoing     Problem: Discharge Planning:  Goal: Discharged to appropriate level of care  Description: Discharged to appropriate level of care  Outcome: Ongoing     Problem: Cerebrospinal Fluid Leakage - Risk Of:  Goal: Absence of cerebrospinal fluid drainage at surgical site  Description: Absence of cerebrospinal fluid drainage at surgical site  Outcome: Ongoing     Problem: Infection - Surgical Site:  Goal: Will show no infection signs and symptoms  Description: Will show no infection signs and symptoms  Outcome: Ongoing     Problem: Mobility - Impaired:  Goal: Mobility will improve to maximum level  Description: Mobility will improve to maximum level  Outcome: Ongoing     Problem: Pain - Acute:  Goal: Pain level will decrease  Description: Pain level will decrease  Outcome: Ongoing     Problem: Sensory Perception - Impaired:  Goal: Sensory function intact, lower extremity  Description: Sensory function intact, lower extremity  Outcome: Ongoing

## 2021-02-16 NOTE — CONSULTS
Consult -Internal Medicine  Dr. Wilson Paz  2/16/2021      PCP: VANNA Monique  Referring Physician: Kirk Ortiz MD    Code Status: Full Code  Current Diet: DIET CARB CONTROL;      Cc: Le Pain is a69 y.o. female who presents with Spondylolisthesis at L4-L5 level. Active Hospital Problems    Diagnosis Date Noted    Spondylolisthesis at L4-L5 level [M43.16] 02/16/2021    JAGDEEP (acute kidney injury) ([facility] [address] [N17.9] 02/16/2021    Pure hypercholesterolemia [E78.00] 10/25/2012    Essential hypertension [I10] 10/25/2012     HPI: Le Pain has been admitted by Kirk Ortiz MD with intractable back pain. PT is a 66 y.o. female who presents with severe back and right leg pain. Pain is constant I timing, rated 9/10 at worst. So severe that it limits her usual activities. Aching in nature. made worse with prolonged standing. Symptoms have failed to resolve despite conservative intervention. MRI scan is reviewed on computer; it shows DDD and foraminal narrowing at L4-5. Based on findings and failure of conservative management - surgery has been recommended    Pt has undergone R L45 TLIF without any apparentcomplications. Pt is doing well post operatively. Pain is controlled at this time. I have been asked to see the patient for evaluation of her internal medicine issues:  has a past medical history of Hyperlipidemia, Hypertension, Impaired fasting glucose, Insomnia, unspecified, Osteoarthritis, and Thyroid disease. Christie Mueller Home meds have been reveiwed and restartedas indicated. Pt denies having other complaints at this time. Doing ok post op  Pain controlled  Seen in recovery  No issues noted  Has not yet worked with therapy    Electronic chart reviewed  Home meds reviewed and restarted as indicated  Op note, anesthesia flowsheet reviewed  Case d/w nursing       Problem list of hospitalization thus far:   Active Hospital Problems    Diagnosis    Spondylolisthesis at L4-L5 level [M43.16]  JAGDEEP (acute kidney injury) ([facility] [address] [N17.9]    Pure hypercholesterolemia [E78.00]    Essential hypertension [I10]         Review of Systems: (1 system for EPF, 2-9 for detailed, 10+ for comprehensive)  Review of Systems   Constitutional: Negative for chills and fever. HENT: Negative for sinus pressure and sinus pain. Eyes: Negative for pain and redness. Respiratory: Negative for choking and stridor. Cardiovascular: Negative for palpitations and leg swelling. Gastrointestinal: Negative for abdominal pain and constipation. Endocrine: Negative for heat intolerance and polydipsia. Genitourinary: Negative for dysuria and frequency. Musculoskeletal: Positive for back pain. Negative for arthralgias. Skin: Negative for pallor. Allergic/Immunologic: Negative for food allergies. Neurological: Negative for syncope and light-headedness. Hematological: Does not bruise/bleed easily. Psychiatric/Behavioral: Negative for dysphoric mood. The patient is not nervous/anxious.             Past Medical History:   Past Medical History:   Diagnosis Date    Hyperlipidemia     Hypertension     Impaired fasting glucose     Insomnia, unspecified     Osteoarthritis     Thyroid disease        Past Surgical History:   Past Surgical History:   Procedure Laterality Date    CARPAL TUNNEL RELEASE Left [id number]    [facility]    CHOLECYSTECTOMY      LUMBAR LAMINECTOMY  8/20/15    L45 B/L decompressive hobson and excision of synovial cyst    ROTATOR CUFF REPAIR Right 2/98            Social History:   Social History     Tobacco History     Smoking Status  Current Every Day Smoker Smoking Frequency  1 pack/day for 40 years (40 pk yrs) Smoking Tobacco Type  Cigarettes    Smokeless Tobacco Use  Never Used          Alcohol History     Alcohol Use Status  No          Drug Use     Drug Use Status  No          Sexual Activity     Sexually Active  Not Asked                Fam History:   Family History Problem Relation Age of Onset    Cancer Father     Cancer Brother        PFSH: The above PMHx, PSHx, SocHx, FamHx has been reviewed by myself. (1 area for detailed, 2-3 for comprehensive)      Code Status: Full Code    Meds  following list ofhome medications from electronic chart has been reviewed by myself  Prior to Admission medications    Medication Sig Start Date End Date Taking? Authorizing Provider   diclofenac (VOLTAREN) 75 MG EC tablet Take 1 tablet by mouth twice daily 11/30/20  Yes VANNA Patterson   levothyroxine (SYNTHROID) 25 MCG tablet TAKE 1 TABLET BY MOUTH DAILY  Patient taking differently: Take 25 mcg by mouth nightly  10/21/20  Yes VANNA Patterson   lisinopril (PRINIVIL;ZESTRIL) 30 MG tablet TAKE 1 TABLET BY MOUTH DAILY 8/24/20  Yes VANNA Patterson   fenofibrate (TRIGLIDE) 160 MG tablet Take 1 tablet by mouth daily 8/19/20  Yes VANNA Patterson   ezetimibe (ZETIA) 10 MG tablet TAKE 1 TABLET BY MOUTH EVERY DAY 8/19/20  Yes VANNA Patterson   Psyllium (METAMUCIL) 28.3 % POWD Take by mouth daily   Yes Historical Provider, MD   Ascorbic Acid (VITAMIN C CR) 1000 MG TBCR Take by mouth   Yes Historical Provider, MD   vitamin E 400 UNIT capsule Take 400 Units by mouth daily   Yes Historical Provider, MD   Misc Natural Products (OSTEO BI-FLEX JOINT SHIELD PO) Take by mouth   Yes Historical Provider, MD   glucose monitoring kit (FREESTYLE) monitoring kit 1 kit by Does not apply route daily 8/26/20   VANNA Patterson   Lancets (BD LANCET ULTRAFINE 30G) MISC 1 Device by In Vitro route every morning CHECK BS EVERY MORNING FASTING 8/26/20   VANNA Patterson   blood glucose monitor strips Test blood sugar every morning fasting. 8/26/20   VANNA Patterson         Allergies   Allergen Reactions    Influenza Virus Vacc Split Pf      bronchitis    Pravastatin Sodium [Pravastatin Sodium] Other (See Comments)     Elevated liver enzymes.  Vicodin [Hydrocodone-Acetaminophen]      Nausea             EXAM: (2-7 system forEPF/Detailed, ?8 for Comprehensive)  /69   Pulse 79   Temp 97.7 °F (36.5 °C) (Oral)   Resp 15   Ht 5' 2\" (1.575 m)   Wt 166 lb 1.6 oz (75.3 kg)   SpO2 100%   BMI 30.38 kg/m²   Constitutional: vitals asabove: alert, appears stated age and cooperative  Psychiatric: normal insight and judgment, oriented to person, place, time, and general circumstances  Head: Normocephalic, without obvious abnormality, atraumatic  Eyes:lids and lashes normal, conjunctivae and sclerae normal and pupils equal, round, reactive to light and accomodation  EMNT: external ears normal, lips normal  Neck: no adenopathy, supple, symmetrical, trachea midline and thyroid not enlarged, symmetric, no tenderness/mass/nodules   Respiratory: clear to auscultation and percussion bilaterally with normal respiratory effort  Cardiovascular: normal rate, regular rhythm, normal S1 and S2 and no carotid bruits  Gastrointestinal: soft, non-tender, non-distended, normal bowel sounds, no masses or organomegaly  Lymphatic:   Extremities: no edema, no clubbing  Skin: No rashes or nodules noted.   Neurologic:negative    LABS:  Labs Reviewed   BASIC METABOLIC PANEL - Abnormal; Notable for the following components:       Result Value    Glucose 120 (*)     BUN 29 (*)     CREATININE 1.5 (*)     GFR Non- 34 (*)     GFR  41 (*)     All other components within normal limits    Narrative:     Performed at:  [facility]  [address] [facility] [facility] [address]   Phone [phone]   [facility] BLOOD   TYPE AND SCREEN    Narrative:     Performed at:  [facility]  [address]   Phone [phone]         IMAGING:  Imaging has been reviewed in the computerized chart  Xr Lumbar Spine (2-3 Views)    Result Date: 2/16/2021 EXAMINATION: SPOT FLUOROSCOPIC IMAGES 2/16/2021 10:13 am TECHNIQUE: Fluoroscopy and CT was provided by the radiology department for procedure. Radiologist was not present during examination. FLUOROSCOPY DOSE AND TYPE OR TIME AND EXPOSURES: kailey Manriquez: 10.02 mGy COMPARISON: 09/29/2020 radiograph HISTORY: Intraprocedural imaging. Degenerative disc disease with chronic back pain FINDINGS: 4 spot images of the lumbar spine were obtained. Intra procedure CT imaging was also performed. Imaging assistance was provided for a lumbar spine procedure. Bilateral pedicle screws are placed at L4 and L5. Intraprocedural imaging as above. See separate procedure report for more information. Fluoro For Surgical Procedures    Result Date: 2/16/2021  Radiology exam is complete. No Radiologist dictation. Please follow up with ordering provider. EKG:            MEDICAL DECISION MAKING:    Principal Problem:    Spondylolisthesis at L4-L5 level -New Problem to me. Doing well post op. Pain appears to be controlled  Plan: Continue on post-operative pathway. PT/OT to see patient. Continue pain control - on IV pain meds acutely post op. Work on transitioning to oral pain meds when possible. Will follow serial h/h to monitor for acute blood loss anemia - labs ordered for tomorrow. Active Problems:    Essential hypertension -Established problem. Stable. 131/69  Plan: Pt home BP meds reviewed and will be continued. IV Hydralazine ordered for control of extremely high blood pressures   Will monitor labs to assess Creat/K for possible complications of medications. Pure hypercholesterolemia -Established problem. Stable. Plan: Continue present orders/plan. JAGDEEP (acute kidney injury) ([facility] [address] -New Problem to me. Creat 1.5 post op  Plan: ivf ordered, repeat labs in am        Diagnoses as listed above, designated as new or established and plan outlined for each. Data Reviewed:   (1) Lab tests were reviewed or ordered. (1) Radiology tests were reviewed or ordered. (1) Medical test (Echo, EKG, PFT/isabel) were ordered. (1) History was not obtained from someone other than patient  (1) Old records  were reviewed - see HPI/MDM for pertinent details if review done. (2) Case was discussed with another health care provider: dr Germain Masters  (2) Imaging was viewed by myself. (2) EKG  was not viewed by myself. Thanks for the consult! Will follow along daily while patient is in house.       Meghann Bloom  2/16/2021

## 2021-02-16 NOTE — PROGRESS NOTES
Date of Surgery Update:  Jessy Zazueta was seen, history and physical examination reviewed, and patient examined by me today. There have been no significant clinical changes since the completion of the previous history and physical.    The risk, benefits, and alternatives of the proposed procedure have been explained to the patient (or appropriate guardian) and understanding verbalized. All questions answered. Patient wishes to proceed.     Electronically signed by: Shadi Kay MD,2/16/2021,9:23 AM

## 2021-02-16 NOTE — PROGRESS NOTES
Pt to floor from PACU. Pt is alert and oriented x4. VSS. Pt ambulated to bedside commode and then brought to bed with front wheel walker. Pt voided 250ml. Neuro checks WNL. No c/o numbness or tingling. Pt began to c/o nausea during admissions questions and PRN zofran was given. Pt also c/o increasing pain after ambulation and PRN dilaudid was given. Pt denies any other needs at this time. Pt was oriented to room and instructed on how to use the call light. Family at the bedside. The bedside table, call light, and all other personal belongings are within reach.

## 2021-02-17 VITALS
RESPIRATION RATE: 16 BRPM | DIASTOLIC BLOOD PRESSURE: 79 MMHG | HEART RATE: 74 BPM | OXYGEN SATURATION: 95 % | HEIGHT: 62 IN | SYSTOLIC BLOOD PRESSURE: 144 MMHG | BODY MASS INDEX: 30.57 KG/M2 | TEMPERATURE: 98.1 F | WEIGHT: 166.1 LBS

## 2021-02-17 LAB
ANION GAP SERPL CALCULATED.3IONS-SCNC: 8 MMOL/L (ref 3–16)
BASOPHILS ABSOLUTE: 0 K/UL (ref 0–0.2)
BASOPHILS RELATIVE PERCENT: 0.4 %
BUN BLDV-MCNC: 24 MG/DL (ref 7–20)
CALCIUM SERPL-MCNC: 9.7 MG/DL (ref 8.3–10.6)
CHLORIDE BLD-SCNC: 106 MMOL/L (ref 99–110)
CO2: 25 MMOL/L (ref 21–32)
CREAT SERPL-MCNC: 1.5 MG/DL (ref 0.6–1.2)
EOSINOPHILS ABSOLUTE: 0 K/UL (ref 0–0.6)
EOSINOPHILS RELATIVE PERCENT: 0.1 %
GFR AFRICAN AMERICAN: 41
GFR NON-AFRICAN AMERICAN: 34
GLUCOSE BLD-MCNC: 134 MG/DL (ref 70–99)
GLUCOSE BLD-MCNC: 136 MG/DL (ref 70–99)
GLUCOSE BLD-MCNC: 172 MG/DL (ref 70–99)
HCT VFR BLD CALC: 36.8 % (ref 36–48)
HEMOGLOBIN: 12 G/DL (ref 12–16)
LYMPHOCYTES ABSOLUTE: 1.2 K/UL (ref 1–5.1)
LYMPHOCYTES RELATIVE PERCENT: 13.7 %
MCH RBC QN AUTO: 31.5 PG (ref 26–34)
MCHC RBC AUTO-ENTMCNC: 32.6 G/DL (ref 31–36)
MCV RBC AUTO: 96.6 FL (ref 80–100)
MONOCYTES ABSOLUTE: 0.7 K/UL (ref 0–1.3)
MONOCYTES RELATIVE PERCENT: 8.3 %
NEUTROPHILS ABSOLUTE: 6.7 K/UL (ref 1.7–7.7)
NEUTROPHILS RELATIVE PERCENT: 77.5 %
PDW BLD-RTO: 13.3 % (ref 12.4–15.4)
PERFORMED ON: ABNORMAL
PERFORMED ON: ABNORMAL
PLATELET # BLD: 170 K/UL (ref 135–450)
PMV BLD AUTO: 10.6 FL (ref 5–10.5)
POTASSIUM SERPL-SCNC: 4.2 MMOL/L (ref 3.5–5.1)
RBC # BLD: 3.81 M/UL (ref 4–5.2)
SODIUM BLD-SCNC: 139 MMOL/L (ref 136–145)
WBC # BLD: 8.6 K/UL (ref 4–11)

## 2021-02-17 PROCEDURE — 6360000002 HC RX W HCPCS: Performed by: INTERNAL MEDICINE

## 2021-02-17 PROCEDURE — 2500000003 HC RX 250 WO HCPCS: Performed by: NEUROLOGICAL SURGERY

## 2021-02-17 PROCEDURE — 96375 TX/PRO/DX INJ NEW DRUG ADDON: CPT

## 2021-02-17 PROCEDURE — 97161 PT EVAL LOW COMPLEX 20 MIN: CPT

## 2021-02-17 PROCEDURE — 97165 OT EVAL LOW COMPLEX 30 MIN: CPT

## 2021-02-17 PROCEDURE — 97535 SELF CARE MNGMENT TRAINING: CPT

## 2021-02-17 PROCEDURE — 6370000000 HC RX 637 (ALT 250 FOR IP): Performed by: NEUROLOGICAL SURGERY

## 2021-02-17 PROCEDURE — 6360000002 HC RX W HCPCS: Performed by: NEUROLOGICAL SURGERY

## 2021-02-17 PROCEDURE — 85025 COMPLETE CBC W/AUTO DIFF WBC: CPT

## 2021-02-17 PROCEDURE — 80048 BASIC METABOLIC PNL TOTAL CA: CPT

## 2021-02-17 PROCEDURE — 97116 GAIT TRAINING THERAPY: CPT

## 2021-02-17 PROCEDURE — 96374 THER/PROPH/DIAG INJ IV PUSH: CPT

## 2021-02-17 RX ORDER — METHOCARBAMOL 500 MG/1
500 TABLET, FILM COATED ORAL 4 TIMES DAILY
Qty: 80 TABLET | Refills: 0 | Status: SHIPPED | OUTPATIENT
Start: 2021-02-17 | End: 2021-03-09

## 2021-02-17 RX ORDER — OXYCODONE HYDROCHLORIDE 5 MG/1
5 TABLET ORAL EVERY 4 HOURS PRN
Qty: 42 TABLET | Refills: 0 | Status: SHIPPED | OUTPATIENT
Start: 2021-02-17 | End: 2021-02-24

## 2021-02-17 RX ORDER — POLYETHYLENE GLYCOL 3350 17 G/17G
17 POWDER, FOR SOLUTION ORAL DAILY
Qty: 527 G | Refills: 1 | COMMUNITY
Start: 2021-02-18 | End: 2021-03-20

## 2021-02-17 RX ADMIN — OXYCODONE 5 MG: 5 TABLET ORAL at 03:12

## 2021-02-17 RX ADMIN — OXYCODONE 10 MG: 5 TABLET ORAL at 12:56

## 2021-02-17 RX ADMIN — POLYETHYLENE GLYCOL 3350 17 G: 17 POWDER, FOR SOLUTION ORAL at 08:14

## 2021-02-17 RX ADMIN — LISINOPRIL 30 MG: 10 TABLET ORAL at 08:15

## 2021-02-17 RX ADMIN — OXYCODONE 10 MG: 5 TABLET ORAL at 08:26

## 2021-02-17 RX ADMIN — HYDROMORPHONE HYDROCHLORIDE 0.5 MG: 1 INJECTION, SOLUTION INTRAMUSCULAR; INTRAVENOUS; SUBCUTANEOUS at 00:58

## 2021-02-17 RX ADMIN — CEFAZOLIN SODIUM 2000 MG: 10 INJECTION, POWDER, FOR SOLUTION INTRAVENOUS at 03:03

## 2021-02-17 RX ADMIN — HYDRALAZINE HYDROCHLORIDE 10 MG: 20 INJECTION INTRAMUSCULAR; INTRAVENOUS at 03:12

## 2021-02-17 RX ADMIN — POTASSIUM CHLORIDE, DEXTROSE MONOHYDRATE AND SODIUM CHLORIDE 1000 ML: 150; 5; 450 INJECTION, SOLUTION INTRAVENOUS at 08:28

## 2021-02-17 RX ADMIN — METHOCARBAMOL 500 MG: 500 TABLET ORAL at 08:16

## 2021-02-17 RX ADMIN — METHOCARBAMOL 500 MG: 500 TABLET ORAL at 12:56

## 2021-02-17 RX ADMIN — FENOFIBRATE 160 MG: 160 TABLET ORAL at 08:16

## 2021-02-17 RX ADMIN — LEVOTHYROXINE SODIUM 25 MCG: 0.03 TABLET ORAL at 06:44

## 2021-02-17 ASSESSMENT — PAIN DESCRIPTION - ORIENTATION: ORIENTATION: LOWER;MID

## 2021-02-17 ASSESSMENT — PAIN SCALES - GENERAL
PAINLEVEL_OUTOF10: 5
PAINLEVEL_OUTOF10: 6
PAINLEVEL_OUTOF10: 7
PAINLEVEL_OUTOF10: 7

## 2021-02-17 ASSESSMENT — PAIN DESCRIPTION - ONSET: ONSET: ON-GOING

## 2021-02-17 ASSESSMENT — PAIN DESCRIPTION - LOCATION
LOCATION: BACK
LOCATION: BACK

## 2021-02-17 ASSESSMENT — PAIN DESCRIPTION - DESCRIPTORS: DESCRIPTORS: ACHING;SHARP

## 2021-02-17 ASSESSMENT — PAIN DESCRIPTION - PROGRESSION
CLINICAL_PROGRESSION: GRADUALLY WORSENING
CLINICAL_PROGRESSION: GRADUALLY WORSENING
CLINICAL_PROGRESSION: GRADUALLY IMPROVING

## 2021-02-17 ASSESSMENT — PAIN DESCRIPTION - PAIN TYPE: TYPE: SURGICAL PAIN

## 2021-02-17 NOTE — PROGRESS NOTES
Awake, c/o level 6/10 back pain and sore throat. /71. Gave Oxycodone and Hydralazine per prn orders. augustine

## 2021-02-17 NOTE — CARE COORDINATION
Discharge Planning Assessment  Rn/SW discharge planner met w/patient/family member to discuss reason for admission, current living situation, and potential needs at the time of discharge. Demographics/Insurance verified Yes    Current type of dwellin level home-3 steps to enter    Living arrangements:alone    Level of function/support:independent w/all ADL's    PCP: Cara Jeong    Last Visit to PCP: w/in the month    DME: stated has rolling walker, grab bars around toilet    Active with any community resources/agencies/skilled home care: stated none    Medication compliance issues: stated no issues    Financial issues that could impact healthcare: stated no issues    Transportation at time of d/c (Discussed w/pt/family that on the day of discharge home, tentative time of discharge will be between 10am and noon):     Discussed and provided facilities of choice if transition to a skilled nursing facility is required a the time of discharge-N/A. Tentative discharge plan:  Met w/pt to discuss any potential dc needs. Therapy recommending rolling walker for pt-pt agreeable. Referral made to [facility]. Pt stated daughter will transport on dc. Pt has no dc needs.     Electronically signed by BETTY Zavala  [phone]

## 2021-02-17 NOTE — PROGRESS NOTES
Physical Therapy    Facility/Department: [address] ORTHO/NEURO NURSING  Initial Assessment    NAME: Jeff Chance  : 1950  MRN: [id number]    Date of Service: 2021    Discharge Recommendations: Jeff Chance scored a 18/24 on the AM-PAC short mobility form. Current research shows that an AM-PAC score of 18 or greater is typically associated with a discharge to the patient's home setting. Based on the patient's AM-PAC score and their current functional mobility deficits, it is recommended that the patient have 2-3 sessions per week of Physical Therapy at d/c to increase the patient's independence. At this time, this patient demonstrates the endurance and safety to discharge home with OP services and a follow up treatment frequency of 2-3x/wk. Please see assessment section for further patient specific details. If patient discharges prior to next session this note will serve as a discharge summary. Please see below for the latest assessment towards goals. PT Equipment Recommendations  Equipment Needed: Yes  Mobility Devices: Tarah Popper: Rolling    Assessment   Body structures, Functions, Activity limitations: Decreased endurance;Decreased balance;Decreased safe awareness  Assessment: Pt presents slightly below baseline functional mobility with the above deficits. Pt with PLOF of MOD I/independence with mobility and currently requires SBA for mobility with RW. Pt would benefit from acute PT services to address deficits.   Treatment Diagnosis: impaired balance, decreased activity tolerance  Prognosis: Good  Decision Making: Low Complexity  Clinical Presentation: stable  PT Education: Goals;PT Role;Plan of Care;Precautions;Transfer Training;Energy Conservation;Equipment;Orientation;General Safety;Gait Training;Functional Mobility Training  Patient Education: d/c recommendations--pt verbalizing understanding  Barriers to Learning: none  REQUIRES PT FOLLOW UP: Yes  Activity Tolerance Bathroom Equipment: Grab bars around toilet  Home Equipment: 4 wheeled walker  ADL Assistance: [address]: Independent  Transfer Assistance: Independent  Active : Yes  Additional Comments: 1 fall 3 months ago, reports tripping over     Cognition   Cognition  Overall Cognitive Status: [facility]  Cognition Comment: WFL grossly, somewhat impulsive throughout    Objective  AROM RLE (degrees)  RLE AROM: WFL  AROM LLE (degrees)  LLE AROM : WFL  Strength RLE  Strength RLE: WFL  Comment: grossly 4/5  Strength LLE  Strength LLE: WFL  Comment: grossly 4/5  Tone RLE  RLE Tone: Normotonic  Tone LLE  LLE Tone: Normotonic  Motor Control  Gross Motor?: WFL  Sensation  Overall Sensation Status: WFL  Bed mobility  Supine to Sit: Supervision(HOB raised)  Scooting: Supervision  Transfers  Sit to Stand: Stand by assistance(x1 EOB, x1 toilet)  Stand to sit: Stand by assistance  Car Transfer: Stand by assistance  Ambulation  Ambulation?: Yes  More Ambulation?: Yes  Ambulation 1  Surface: level tile  Device: Rolling Walker  Assistance: Stand by assistance  Quality of Gait: appropriate melani, appropriate Harlan  Distance: 50'+100'  Comments: Pt able to negotiate obstacles without assist. No LOB. Slightly impulsive, leaving walker to side when going to sit down despite education on safe walker placement. Ambulation 2  Surface - 2: level tile  Device 2: No device  Assistance 2: Contact guard assistance  Quality of Gait 2: wide Harlan, decreased melani, increased sway  Distance: 10'  Comments: Pt required increased time to perform, slightly unsteady. Stairs/Curb  Stairs?: Yes  Stairs  # Steps : 4  Stairs Height: 6\"  Rails: None(L HHA to simulate dtr assisting with home entry)  Device: No Device  Assistance: Contact guard assistance  Comment: Negotiate with step-to pattern, no LOB however increased sway noted.      Balance  Posture: Good Sitting - Static: Good;-(Supervision seated at toilet for toileting ~3 minutes total)  Sitting - Dynamic: Good;-(SBA seated at edge of recliner for donning pants)  Standing - Static: Good;-(SBA standing with RW for UE support)  Standing - Dynamic: Good;-(SBA for transfers and ambulation with RW)        Plan   Plan  Times per week: 5-7x  Times per day: Daily  Current Treatment Recommendations: Strengthening, Balance Training, Functional Mobility Training, Transfer Training, Stair training, Gait Training, Endurance Training, Neuromuscular Re-education, Positioning, Modalities, Equipment Evaluation, Education, & procurement, Patient/Caregiver Education & Training, Safety Education & Training, Home Exercise Program  Safety Devices  Type of devices:  All fall risk precautions in place, Left in chair, Call light within reach, Chair alarm in place, Gait belt, Patient at risk for falls, Nurse notified  Restraints  Initially in place: No    G-Code       OutComes Score                 AM-PAC Score  AM-PAC Inpatient Mobility Raw Score : 18 (02/17/21 1018)  AM-PAC Inpatient T-Scale Score : 43.63 (02/17/21 1018)  Mobility Inpatient CMS 0-100% Score: 46.58 (02/17/21 1018)  Mobility Inpatient CMS G-Code Modifier : CK (02/17/21 1018)          Goals  Short term goals  Time Frame for Short term goals: upon d/c  Short term goal 1: Pt will perform bed mobility MOD I  Short term goal 2: Pt will perform transfers with LRAD MOD I  Short term goal 3: Pt will ambulate 150' with LRAD MOD I  Short term goal 4: Pt will negotiate 2 steps with LRAD MOD I  Patient Goals   Patient goals : pt did not state       Therapy Time   Individual Concurrent Group Co-treatment   Time In 0820         Time Out 0851         Minutes 31              Timed Code Treatment Minutes:   16    Total Treatment Minutes:  [address] [place], [place] [id number]

## 2021-02-17 NOTE — PROGRESS NOTES
Occupational Therapy   Occupational Therapy Initial Assessment  Date: 2021   Patient Name: Genaro Ritchie  MRN: [id number]     : 1950    Date of Service: 2021    Discharge Recommendations: Genaro Ritchie scored a 21/24 on the  [address] form. At this time, no further OT is recommended upon discharge due to patient performing near baseline function. Recommend patient returns to prior setting with prior services. OT Equipment Recommendations  Equipment Needed: No    Assessment   Performance deficits / Impairments: Decreased functional mobility ; Decreased safe awareness;Decreased balance;Decreased ADL status; Decreased high-level IADLs  Assessment: Patient presents slightly below baseline d/t above deficits; OT indicated to maximize safety/independence with ADL and IADL  Treatment Diagnosis: Above deficits associated with Spondylolisthesis at L4-L5 level  Prognosis: Good  Decision Making: Medium Complexity  Exam: as above  OT Education: OT Role;Plan of Care  Patient Education: eval, discharge - patient verbalizes understanding, would benefit from reinforcement  REQUIRES OT FOLLOW UP: Yes  Activity Tolerance  Activity Tolerance: Patient Tolerated treatment well  Safety Devices  Safety Devices in place: Yes  Type of devices: All fall risk precautions in place;Nurse notified;Call light within reach; Left in chair;Gait belt; Chair alarm in place           Patient Diagnosis(es): The encounter diagnosis was Preop testing. has a past medical history of Hyperlipidemia, Hypertension, Impaired fasting glucose, Insomnia, unspecified, Osteoarthritis, and Thyroid disease. has a past surgical history that includes Cholecystectomy; Rotator cuff repair (Right, ); Carpal tunnel release (Left, [id number]); and lumbar laminectomy (8/20/15).     Treatment Diagnosis: Above deficits associated with Spondylolisthesis at L4-L5 level      Restrictions  Restrictions/Precautions Restrictions/Precautions: Fall Risk(HIGH FALL RISK)  Position Activity Restriction  Spinal Precautions: No Bending, No Lifting, No Twisting  Other position/activity restrictions: s/p Right L4-5 transforaminal lumbar interbody fusion, L4-5 laminectomy, facetectomy, foraminotomy and diskectomy 2/16    Subjective   General  Chart Reviewed: Yes  Diagnosis: Spondylolisthesis at L4-L5 level  Subjective  Subjective: Patient lying upright in bed upon arrival, pleasant and agreeable to evaluation. Reports 7/10 pain, RN present to provide pain medication  Patient Currently in Pain: Yes(Simultaneous filing. User may not have seen previous data.)  Pain Assessment  Pain Assessment: 0-10  Pain Level: 7  Pain Type: Surgical pain  Pain Location: Back  Pain Orientation: Lower;Mid  Pain Descriptors: Aching; Sharp  Pain Onset: On-going  Clinical Progression: Gradually improving  Pre Treatment Pain Screening  Intervention List: Patient able to continue with treatment;Nurse/Physician notified  Vital Signs  Temp: 98.2 °F (36.8 °C)  Temp Source: Oral  Pulse: 87  BP: 127/67  BP Location: Left upper arm  Patient Position: Semi fowlers  Level of Consciousness: Alert (0)  Patient Currently in Pain: Yes(Simultaneous filing. User may not have seen previous data.)  Oxygen Therapy  SpO2: 95 %  O2 Device: None (Room air)  Social/Functional History  Social/Functional History  Lives With: Alone(Dtr to be with pt upon discharge)  Type of Home: [address] [place] [address],[address]: One level, Laundry in basement  Home Access: Stairs to enter without rails  Entrance Stairs - Number of Steps: 3 MLII  Bathroom Shower/Tub: Tub/Shower unit  Bathroom Toilet: Standard  Bathroom Equipment: Grab bars around toilet  Home Equipment: 4 wheeled walker  ADL Assistance: [facility]: [address] [facility] [address]: Independent  Transfer Assistance: Independent  Active :  Yes  Additional Comments: 1 fall 3 months ago, reports tripping over Objective   Vision: Impaired  Vision Exceptions: Wears glasses at all times; Cataracts  Hearing: Within functional limits    Orientation  Overall Orientation Status: Within Functional Limits     Balance  Sitting Balance: Supervision  Standing Balance: Stand by assistance  Standing Balance  Time: ~6-7 minutes  Activity: ambulation, transfers, stance for ADL  Functional Mobility  Functional - Mobility Device: Rolling Walker  Activity: Other  Assist Level: Stand by assistance  Functional Mobility Comments: CGA no device, SBA with RW, slightly impulsive - 300' total, up/down 4 steps with CGA  Toilet Transfers  Toilet - Technique: Ambulating  Equipment Used: Standard toilet  Toilet Transfer: Stand by assistance  Tub Transfers  Tub - Transfer From: Rolling walker  Tub - Transfer Type: To and From  Tub - Transfer To:  Standing  Tub - Technique: (step in/out laterally with use of wall and GB as support)  Tub Transfers: Contact guard  ADL  LE Dressing: Stand by assistance;Setup([facility] pants)  Toileting: Minimal assistance(to manage underwear down on (L) side)  Tone RUE  RUE Tone: Normotonic  Tone LUE  LUE Tone: Normotonic  Coordination  Movements Are Fluid And Coordinated: Yes     Bed mobility  Supine to Sit: Supervision(HOB raised)  Scooting: Supervision  Transfers  Sit to stand: Stand by assistance  Stand to sit: Stand by assistance     Cognition  Overall Cognitive Status: WFL  Cognition Comment: WFL grossly, somewhat impulsive throughout        Sensation  Overall Sensation Status: WFL        LUE AROM (degrees)  LUE AROM : WFL  RUE AROM (degrees)  RUE AROM : WFL                      Plan   Plan  Times per week: 7  Times per day: Daily  Current Treatment Recommendations: Strengthening, Balance Training, Functional Mobility Training, Safety Education & Training, Equipment Evaluation, Education, & procurement, Self-Care / ADL, Patient/Caregiver Education & Training    G-Code     OutComes Score AM-PAC Score        AM-PAC Inpatient Daily Activity Raw Score: 21 (02/17/21 1009)  AM-PAC Inpatient ADL T-Scale Score : 44.27 (02/17/21 1009)  ADL Inpatient CMS 0-100% Score: 32.79 (02/17/21 1009)  ADL Inpatient CMS G-Code Modifier : CJ (02/17/21 1009)    Goals  Short term goals  Time Frame for Short term goals: discharge  Short term goal 1: UB ADL mod I  Short term goal 2: LB ADL mod I  Short term goal 3: Fxl transfers mod I  Short term goal 4: Fxl mob mod I  Short term goal 5:  Toileting mod I  Long term goals  Time Frame for Long term goals : LTG=STG       Therapy Time   Individual Concurrent Group Co-treatment   Time In 0820         Time Out 0850         Minutes 30            Timed Code Treatment Minutes:    15 minutes    Total Treatment Minutes:  30 minutes    Panda Gibson OTR/L [id number]    Ngozi Rudd OT

## 2021-02-17 NOTE — PROGRESS NOTES
Up to bathroom with walker and standby assist, gait steady. C/O level 8/10 back pain, dressing d/i to mid lower back. Noted with occasional dry non-productive cough, c/o sore throat. BP elevated, afebrile. Gave Dilaudid per prn order. Assessment completed, see flow charts. No distress noted.   Will continue to monitor.augustine

## 2021-02-17 NOTE — DISCHARGE SUMMARY
Discharge Vitals/Labs: /67   Pulse 87   Temp 98.2 °F (36.8 °C) (Oral)   Resp 16   Ht 5' 2\" (1.575 m)   Wt 166 lb 1.6 oz (75.3 kg)   SpO2 95%   BMI 30.38 kg/m²   CBC with Differential:    Lab Results   Component Value Date    WBC 8.6 02/17/2021    RBC 3.81 02/17/2021    HGB 12.0 02/17/2021    HCT 36.8 02/17/2021     02/17/2021    MCV 96.6 02/17/2021    MCH 31.5 02/17/2021    MCHC 32.6 02/17/2021    RDW 13.3 02/17/2021    SEGSPCT 57.1 07/03/2017    LYMPHOPCT 13.7 02/17/2021    MONOPCT 8.3 02/17/2021    EOSPCT 3.6 11/15/2010    BASOPCT 0.4 02/17/2021    MONOSABS 0.7 02/17/2021    LYMPHSABS 1.2 02/17/2021    EOSABS 0.0 02/17/2021    BASOSABS 0.0 02/17/2021    DIFFTYPE Auto-K 11/15/2010     CMP:    Lab Results   Component Value Date     02/17/2021    K 4.2 02/17/2021     02/17/2021    CO2 25 02/17/2021    BUN 24 02/17/2021    CREATININE 1.5 02/17/2021    GFRAA 41 02/17/2021    GFRAA >60 11/15/2010    AGRATIO 1.6 08/21/2020    LABGLOM 34 02/17/2021    LABGLOM 44 07/03/2017    GLUCOSE 134 02/17/2021    PROT 6.9 08/21/2020    PROT 7.3 11/15/2010    LABALBU 4.2 08/21/2020    CALCIUM 9.7 02/17/2021    BILITOT 0.3 08/21/2020    ALKPHOS 53 08/21/2020    AST 23 08/21/2020    ALT 22 08/21/2020      Discharge Medications:      Medication List      START taking these medications    methocarbamol 500 MG tablet  Commonly known as: ROBAXIN  Take 1 tablet by mouth 4 times daily for 20 days     oxyCODONE 5 MG immediate release tablet  Commonly known as: ROXICODONE  Take 1 tablet by mouth every 4 hours as needed for Pain for up to 7 days.      polyethylene glycol 17 g packet  Commonly known as: GLYCOLAX  Take 17 g by mouth daily  Start taking on: February 18, 2021        CHANGE how you take these medications    levothyroxine 25 MCG tablet  Commonly known as: SYNTHROID  TAKE 1 TABLET BY MOUTH DAILY  What changed: when to take this        CONTINUE taking these medications    BD Lancet Ultrafine 30G Misc 1 Device by In Vitro route every morning CHECK BS EVERY MORNING FASTING     blood glucose test strips  Test blood sugar every morning fasting.     ezetimibe 10 MG tablet  Commonly known as: ZETIA  TAKE 1 TABLET BY MOUTH EVERY DAY     fenofibrate 160 MG tablet  Commonly known as: TRIGLIDE  Take 1 tablet by mouth daily     glucose monitoring kit monitoring kit  1 kit by Does not apply route daily     lisinopril 30 MG tablet  Commonly known as: PRINIVIL;ZESTRIL  TAKE 1 TABLET BY MOUTH DAILY     Metamucil 28.3 % Powd powder  Generic drug: psyllium     OSTEO BI-FLEX JOINT SHIELD PO     Vitamin C CR 1000 MG Tbcr     vitamin E 400 UNIT capsule        STOP taking these medications    diclofenac 75 MG EC tablet  Commonly known as: VOLTAREN           Where to Get Your Medications      You can get these medications from any pharmacy    Bring a paper prescription for each of these medications  · methocarbamol 500 MG tablet  · oxyCODONE 5 MG immediate release tablet  You don't need a prescription for these medications  · polyethylene glycol 17 g packet       Discharge Destination: The patient was discharged to Home. Follow-up: The patient is to follow-up with our office in the office in 2-3 weeks. Discharge Instructions: Verbal and written discharge instructions as well as dressing change instructions were given to the patient at the time of consent. No anticoagulation for 1 week post-operatively. No driving. No lifting or bending.       Heath Still  2/17/2021

## 2021-02-17 NOTE — PROGRESS NOTES
Consult Progress Note - Dr. Luiza Tapia - Internal Medicine    Referring MD: Irma Montiel MD  PCP: Domo Rowell, [address] [facility] [phone]  Hospital Day: 1  Code Status: Full Code  Current Diet: DIET CARB CONTROL;    CC: follow up on medical issues    Subjective:   Cindy Hernandez is a 66 y.o. female. she denies problems    Doing ok   Has ambulated to bathroom on her own  Creat still 1.5    Pain is controlled. Doing well with therapy. Patient is tolerating diet. Pt denies chest pain, denies shortness of breath, denies nausea,  denies emesis. I have reviewed the patient's medical and social history in detail and updated the computerized patient record.      Active Hospital Problems    Diagnosis Date Noted    Spondylolisthesis at L4-L5 level [M43.16] 02/16/2021    JAGDEEP (acute kidney injury) ([facility] [address] [N17.9] 02/16/2021    Pure hypercholesterolemia [E78.00] 10/25/2012    Essential hypertension [I10] 10/25/2012       Current Facility-Administered Medications: vitamin E capsule 400 Units, 400 Units, Oral, Daily  psyllium (HYDROCIL) 95 % packet 1 packet, 1 Package, Oral, Daily  lisinopril (PRINIVIL;ZESTRIL) tablet 30 mg, 30 mg, Oral, Daily  levothyroxine (SYNTHROID) tablet 25 mcg, 25 mcg, Oral, Daily  fenofibrate (TRIGLIDE) tablet 160 mg, 160 mg, Oral, Daily  dextrose 5 % and 0.45 % NaCl with KCl 20 mEq infusion, 1,000 mL, Intravenous, Continuous  sodium chloride flush 0.9 % injection 10 mL, 10 mL, Intravenous, 2 times per day  sodium chloride flush 0.9 % injection 10 mL, 10 mL, Intravenous, PRN  promethazine (PHENERGAN) tablet 12.5 mg, 12.5 mg, Oral, Q6H PRN **OR** ondansetron (ZOFRAN) injection 4 mg, 4 mg, Intravenous, Q6H PRN  oxyCODONE (ROXICODONE) immediate release tablet 5 mg, 5 mg, Oral, Q4H PRN **OR** oxyCODONE (ROXICODONE) immediate release tablet 10 mg, 10 mg, Oral, Q4H PRN  HYDROmorphone HCl PF (DILAUDID) injection 0.5 mg, 0.5 mg, Intravenous, Q3H PRN methocarbamol (ROBAXIN) tablet 500 mg, 500 mg, Oral, 4x Daily  polyethylene glycol (GLYCOLAX) packet 17 g, 17 g, Oral, Daily  bisacodyl (DULCOLAX) EC tablet 5 mg, 5 mg, Oral, Daily PRN  hydrALAZINE (APRESOLINE) injection 10 mg, 10 mg, Intravenous, Q6H PRN  0.9 % sodium chloride bolus, 500 mL, Intravenous, PRN  potassium chloride (KLOR-CON M) extended release tablet 40 mEq, 40 mEq, Oral, PRN **OR** potassium bicarb-citric acid (EFFER-K) effervescent tablet 40 mEq, 40 mEq, Oral, PRN **OR** potassium chloride 10 mEq/100 mL IVPB (Peripheral Line), 10 mEq, Intravenous, PRN     Objective:      /67   Pulse 87   Temp 98.2 °F (36.8 °C) (Oral)   Resp 16   Ht 5' 2\" (1.575 m)   Wt 166 lb 1.6 oz (75.3 kg)   SpO2 94%   BMI 30.38 kg/m²      Patient Vitals for the past 24 hrs:   BP Temp Temp src Pulse Resp SpO2 Height Weight   02/17/21 0822    87       02/17/21 0814 127/67 98.2 °F (36.8 °C) Oral 87  94 %     02/17/21 0645 (!) 154/82   66       02/17/21 0312 (!) 171/71   66  96 %     02/17/21 0058 (!) 166/75 98.9 °F (37.2 °C) Oral 65 16 97 %     02/16/21 2015 (!) 176/78 97.4 °F (36.3 °C) Oral 76 16 92 %     02/16/21 1830 (!) 142/72 97.9 °F (36.6 °C) Oral 74 16 100 %     02/16/21 1800 131/69 97.7 °F (36.5 °C) Oral 79 15 100 %     02/16/21 1730 139/67 97.6 °F (36.4 °C) Oral 75 16 100 %     02/16/21 1428 134/65   73 14 100 %     02/16/21 1400 (!) 153/50   75 14 99 %     02/16/21 1335 (!) 149/60 97.4 °F (36.3 °C) Temporal 76 14 98 %     02/16/21 1330 127/66   78 17 100 %     02/16/21 1325 (!) 155/63   84 17 100 %     02/16/21 1320 (!) 142/42 97.3 °F (36.3 °C) Temporal 79 17 100 %     02/16/21 0933 (!) 144/80 97.1 °F (36.2 °C) Temporal 69 18 99 % 5' 2\" (1.575 m) 166 lb 1.6 oz (75.3 kg)     Patient Vitals for the past 96 hrs (Last 3 readings):   Weight   02/16/21 0933 166 lb 1.6 oz (75.3 kg)         Intake/Output Summary (Last 24 hours) at 2/17/2021 0814 Last data filed at 2/17/2021 0643  Gross per 24 hour   Intake 1600 ml   Output 575 ml   Net 1025 ml         Physical Exam:  /67   Pulse 87   Temp 98.2 °F (36.8 °C) (Oral)   Resp 16   Ht 5' 2\" (1.575 m)   Wt 166 lb 1.6 oz (75.3 kg)   SpO2 94%   BMI 30.38 kg/m²   General appearance: alert, appears stated age and cooperative  Head: Normocephalic, without obvious abnormality, atraumatic  Lungs: clear to auscultation bilaterally  Heart: regular rate and rhythm, S1, S2 normal, no murmur, click, rub or gallop  Abdomen: soft, non-tender; bowel sounds normal; no masses,  no organomegaly  Extremities: extremities normal, atraumatic, no cyanosis or edema    Labs:  Lab Results   Component Value Date    WBC 8.6 02/17/2021    HGB 12.0 02/17/2021    HCT 36.8 02/17/2021     02/17/2021    CHOL 159 08/21/2020    TRIG 186 (H) 08/21/2020    HDL 55 08/21/2020    LDLDIRECT 137 (H) 08/01/2019    ALT 22 08/21/2020    AST 23 08/21/2020     02/17/2021    K 4.2 02/17/2021     02/17/2021    CREATININE 1.5 (H) 02/17/2021    BUN 24 (H) 02/17/2021    CO2 25 02/17/2021    TSH 3.19 04/30/2019    INR 0.90 02/10/2021    LABA1C 5.2 02/10/2021     No results found for: CKTOTAL, CKMB, CKMBINDEX, TROPONINI    Imaging: All recent imaging studies reviewed in computerized chart. Xr Lumbar Spine (2-3 Views)    Result Date: 2/16/2021  EXAMINATION: SPOT FLUOROSCOPIC IMAGES 2/16/2021 10:13 am TECHNIQUE: Fluoroscopy and CT was provided by the radiology department for procedure. Radiologist was not present during examination. FLUOROSCOPY DOSE AND TYPE OR TIME AND EXPOSURES: kailey Manriquez: 10.02 mGy COMPARISON: 09/29/2020 radiograph HISTORY: Intraprocedural imaging. Degenerative disc disease with chronic back pain FINDINGS: 4 spot images of the lumbar spine were obtained. Intra procedure CT imaging was also performed. Imaging assistance was provided for a lumbar spine procedure. Bilateral pedicle screws are placed at L4 and L5. Intraprocedural imaging as above. See separate procedure report for more information. Fluoro For Surgical Procedures    Result Date: 2/16/2021  Radiology exam is complete. No Radiologist dictation. Please follow up with ordering provider. Assessment and Plan:  Principal Problem:    Spondylolisthesis at L4-L5 level -Established problem. Stable. Plan: Continue narcotics as adequate pain control can assist with adequate BP control. Active Problems:    Essential hypertension -Established problem. Stable. 127/67  Plan: stay on same meds    Pure hypercholesterolemia -Established problem. Stable. Plan: cont statin    JAGDEEP (acute kidney injury) ([facility] [address] -Established problem. Stable. Creat 1.5.   Appears this might be close to baseline  Plan: cont fluids while here    Disp - medically stable          Mathew Cao  2/17/2021

## 2021-02-18 ENCOUNTER — CARE COORDINATION (OUTPATIENT)
Dept: CASE MANAGEMENT | Age: 71
End: 2021-02-18

## 2021-02-18 DIAGNOSIS — M43.16 SPONDYLOLISTHESIS AT L4-L5 LEVEL: Primary | ICD-10-CM

## 2021-02-18 PROCEDURE — 1111F DSCHRG MED/CURRENT MED MERGE: CPT | Performed by: PHYSICIAN ASSISTANT

## 2021-02-18 NOTE — CARE COORDINATION
[facility] Transitions Initial Follow Up Call:  Patient is doing well, denies any questions or concerns at this time. CTN reviewed discharge instructions and medications, 1111F completed. Patient reports taking pain medication Q4 hours, current pain level \"0\" and increases to \"8\" with movement. She is also taking a stool softener. She denies any fever, redness, swelling, and/or drainage at incision site(s). She reports that she has a follow up scheduled with Dr. Ximena Chaney, CTN cannot see this on her appointment list, advised to check with his office and confirm appointment. CTN will continue with outreach follow up calls. Call within 2 business days of discharge: Yes    Patient: Claudia Sharma Patient : 1950   MRN: [id number]  Reason for Admission: L4-5 TLIF, lum hobson, pedicle screws  Discharge Date: 21 RARS: Readmission Risk Score: 10      Last Discharge [facility]       Complaint Diagnosis Description Type Department Provider    21  Preop testing . .. Admission (Discharged) Deep Keys MD           Spoke with: Garrett to be reviewed by the provider   Additional needs identified to be addressed with provider No  none    Discussed COVID-19 related testing which was not done at this time. Test results were not done. Patient informed of results, if available? No         Method of communication with provider : none    Advance Care Planning:   Does patient have an Advance Directive:  not on file. Was this a readmission? No  Patient stated reason for admission: Back surgery  Patients top risk factors for readmission: functional physical ability and medical condition    Care Transition Nurse (CTN) contacted the patient by telephone to perform post hospital discharge assessment. Verified name and  with patient as identifiers. Provided introduction to self, and explanation of the CTN role.      CTN reviewed discharge instructions, medical action documents    Care Transitions 24 Hour Call    Do you have any ongoing symptoms?: No  Do you have a copy of your discharge instructions?: Yes  Do you have all of your prescriptions and are they filled?: Yes  Have you been contacted by a [facility] Pharmacist?: No  Have you scheduled your follow up appointment?: Yes  How are you going to get to your appointment?: Car - family or friend to transport  Were you discharged with any Home Care or Post Acute Services: No  Do you feel like you have everything you need to keep you well at home?: Yes  Care Transitions Interventions         Follow Up  No future appointments.     Eliseo Adames RN

## 2021-02-25 ENCOUNTER — CARE COORDINATION (OUTPATIENT)
Dept: CASE MANAGEMENT | Age: 71
End: 2021-02-25

## 2021-02-25 NOTE — CARE COORDINATION
Final follow up outreach call attempt, no answer, no VM. CTN will attempt one more outreach at a later time.

## 2021-02-26 ENCOUNTER — CARE COORDINATION (OUTPATIENT)
Dept: CASE MANAGEMENT | Age: 71
End: 2021-02-26

## 2021-03-17 NOTE — TELEPHONE ENCOUNTER
Medication:   Requested Prescriptions      No prescriptions requested or ordered in this encounter        Last Filled:      Patient Phone Number: [phone] (home)     Last appt: 2/2/2021   Next appt: Visit date not found    Last OARRS:   RX Monitoring 2/17/2021   Periodic Controlled Substance Monitoring Possible medication side effects, risk of tolerance/dependence & alternative treatments discussed. ;No signs of potential drug abuse or diversion identified.

## 2021-03-18 DIAGNOSIS — I10 ESSENTIAL HYPERTENSION: ICD-10-CM

## 2021-03-18 RX ORDER — LISINOPRIL 30 MG/1
TABLET ORAL
Qty: 90 TABLET | Refills: 2 | Status: SHIPPED | OUTPATIENT
Start: 2021-03-18 | End: 2021-10-08 | Stop reason: SDUPTHER

## 2021-03-18 NOTE — TELEPHONE ENCOUNTER
Medication:   Requested Prescriptions     Pending Prescriptions Disp Refills    lisinopril (PRINIVIL;ZESTRIL) 30 MG tablet 90 tablet 2     Sig: TAKE 1 TABLET BY MOUTH DAILY        Last Filled: 8/24/2020      Patient Phone Number: [phone] (home)     Last appt: 2/2/2021   Next appt: Visit date not found    Last OARRS:   RX Monitoring 2/17/2021   Periodic Controlled Substance Monitoring Possible medication side effects, risk of tolerance/dependence & alternative treatments discussed. ;No signs of potential drug abuse or diversion identified.

## 2021-04-06 ENCOUNTER — HOSPITAL ENCOUNTER (OUTPATIENT)
Dept: PHYSICAL THERAPY | Age: 71
Setting detail: THERAPIES SERIES
Discharge: HOME | End: 2021-04-06
Payer: MEDICARE

## 2021-04-06 PROCEDURE — 97110 THERAPEUTIC EXERCISES: CPT

## 2021-04-06 PROCEDURE — 97530 THERAPEUTIC ACTIVITIES: CPT

## 2021-04-06 PROCEDURE — 97161 PT EVAL LOW COMPLEX 20 MIN: CPT

## 2021-04-06 NOTE — FLOWSHEET NOTE
Physical Therapy Daily Treatment Note  Date:  2021    Patient Name:  Vish Hines    :  1950  MRN: [id number]  Medical/Treatment Diagnosis Information:  Diagnosis: post op lower lumbar fusion 21, M48.06  SPINAL STENOSIS OF LUMBAR REGION M43.16  LUMBAR SPONDYLOLISTHESISI, ACQUIRED L1-L5           Treatment Diagnosis: pt to PT after lumbar fusion surgery on 21. She presents with impaired gait, flexibility/strength deficits, poor habitual postures/body [facility]                                      Insurance/Certification information:  PT Insurance Information: medicare and humana, med nec  Physician Information:  Referring Practitioner: Dr. Duong Dominique of care signed (Y/N): Y    Date of Patient follow up with Physician:    Functional scale: OSWESTRY raw score =11 ; dysfunction =24%    Progress Report: [x]  Yes   []  No     Date Range for reporting period:  Beginning21   Ending    Progress report due (10 Rx/or 30 days whichever is less): visit #58 or     Recertification due (POC duration/ or 90 days whichever is less): visit # or     Visit # Insurance Allowable Auth required? Date Range      Med nec []  Yes  [x]  No na        Latex Allergy:  [x]NO      []YES  Preferred Language for Healthcare:   [x]English       []other    Pain level: 7/10     SUBJECTIVE: see eval    OBJECTIVE:   NEXT SESSION - d/w pt re aquatic therapy and orient to pool if she is interested in pool trial    MRI 2020  [facility]):  Stable small central disc extrusion and mild central canal stenosis at L3-L4. There is progressive left moderate foraminal stenosis at this level.         New laminotomy changes at L4-L5.  The central canal is mildly stenotic which    is improved.  There is worsening severe right foraminal stenosis and moderate    left foraminal stenosis at this level.         Stable moderate right foraminal stenosis and mild left foraminal stenosis at    L5-S1. RESTRICTIONS/PRECAUTIONS: lower lumbar fusion 2/16/21 previous lumbar surgery 2015- had cyst removed form lower back. HTN.  Intermittent LOB - no falls, impaired kidneys, frequent john horses in calves    Exercises/Interventions:     Therapeutic Exercises (61101) Resistance / level Sets/sec Reps Notes   Nu step    or bike  S=9, arms = 11, 5 min, workload 5   Step stretches-   HS  Hip flexor B ea      Step ups with TrA iso- fwd and side  B UE support   IB calf stretches  gastroc stretches at  wall  2x30\" B           Mat ex   TrA iso  fig 4 piriformis stretch   10x5\"  3x30\" B                    Mat ex w SB  Fig 4 piriformis stretch    TrA iso  DKTC   B LTR                        and L         Min cues for good TrA iso   CC --multifidi walkouts with paloff press x 5 CGA/SBA when sidestepping back in                 Therapeutic Activities (61107)       Pt ed re importance of good hydration in management of john horses  Postural ed-good posture /body mechs  PT POC,rationale for tx reveiw   11/24, 11/20 talked with pt about hydration and trying to decrease coffee, pt says she has 8 cups per day   Instructed in how to sleep with neutral spine with pillow(s) bn LE and under top arm  Review                            Neuromuscular Re-ed (08047)       Balance exercises with TrA iso       Seated SB :   A/P, M/L, circles: CW/CCW    SLS 2x5\" B needs B UE support     Glides 3-way  Ea B                 Manual Intervention (30730)        IASTM                                               Pt. Education:  4/6 patient educated on diagnosis, prognosis and expectations for rehab  d/w pt re aquatic therapy pros and cons  -all patient questions were answered      HEP instruction:  4/6  gastroc stretch at wall, SLS, TrA iso, fig 4 piriformis stretch    11/24 gastroc stretch at counter  10/27 sleep with neutral spine; walk 3x/d x 5-10 min ea time; go up and down the basement steps >/= 1-2x/d  10/8 Fig 4 piriformis stretch, Re-education- Provided training and instruction to the patient for proper LE, core and proximal hip recruitment and positioning and eccentric body weight control with ambulation re-education including up and down stairs     Home Management Training / Self Care:  [] (01732) Provided self-care/home management training related to activities of daily living and compensatory training, and/or use of adaptive equipment for improvement with: ADLs and compensatory training, meal preparation, safety procedures and instruction in use of adaptive equipment, including bathing, grooming, dressing, personal hygiene, basic household cleaning and chores.      Home Exercise Program:    [] (72315) Reviewed/Progressed HEP activities related to strengthening, flexibility, endurance, ROM of   [] LE / Lumbar: core, proximal hip and LE for functional self-care, mobility, lifting and ambulation/stair navigation   [] UE / Cervical: cervical, postural, scapular, scapulothoracic and UE control with self care, reaching, carrying, lifting, house/yardwork, driving, computer work  [] (00409)Reviewed/Progressed HEP activities related to improving balance, coordination, kinesthetic sense, posture, motor skill, proprioception of   [] LE: core, proximal hip and LE for self care, mobility, lifting, and ambulation/stair navigation    [] UE / Cervical: cervical, postural,  scapular, scapulothoracic and UE control with self care, reaching, carrying, lifting, house/yardwork, driving, computer work    Manual Treatments:  PROM / STM / Oscillations-Mobs:  G-I, II, III, IV (PA's, Inf., Post.)  [] (69716) Provided manual therapy to mobilize LE, proximal hip and/or LS spine soft tissue/joints for the purpose of modulating pain, promoting relaxation,  increasing ROM, reducing/eliminating soft tissue swelling/inflammation/restriction, improving soft tissue extensibility and allowing for proper ROM for normal function with   [] LE / lumbar: self care, mobility, lifting and ambulation. [] UE / Cervical: self care, reaching, carrying, lifting, house/yardwork, driving, computer work. Modalities:  [] (12980) Vasopneumatic compression: Utilized vasopneumatic compression to decrease edema / swelling for the purpose of improving mobility and quad tone / recruitment which will allow for increased overall function including but not limited to self-care, transfers, ambulation, and ascending / descending stairs. Modalities:      Charges:  Timed Code Treatment Minutes: 30   Total Treatment Minutes: 45     [x] EVAL - LOW (03620)   [] EVAL - MOD (53297)  [] EVAL - HIGH (74213)  [] RE-EVAL (23173)  [x] EU(19196) x      [] Ionto  [] NMR (30058) x       [] Vaso  [] Manual (78186) x  1     [] Ultrasound  [x] TA x 1       [] Mech Traction (72821)  [] Aquatic Therapy x     [] ES (un) (88924):   [] Home Management Training x  [] ES(attended) (63237)   [] Dry Needling 1-2 muscles (64584):  [] Dry Needling 3+ muscles (007199  [] Group:      [] Other:        GOALS:  Patient stated goal:improve balance and walking  []? Progressing: []? Met: []? Not Met: []? Adjusted     Therapist goals for Patient:   Short Term Goals: To be achieved in: 2 weeks  1. Independent in HEP and progression per patient tolerance, in order to prevent re-injury. []? Progressing: [x]? Met: []? Not Met: []? Adjusted  2. Patient will have a decrease in pain to facilitate improvement in movement, function, and ADLs as indicated by improvement with respect to Functional Deficits. []? Progressing: [x]? Met: []? Not Met: []? Adjusted     Long Term Goals: To be achieved in: 6  weeks  1. Disability index score of 10% or less on the oswestry to assist with reaching prior level of function. [x]? Progressing: []? Met: []? Not Met: []? Adjusted  2.  Patient will demonstrate increased AROM  to [facility], to allow for proper joint functioning to allow pt to resume squatting to lift items, use of good posture and body mechs for home chores without  increase in symptoms. [x]? Progressing: []? Met: []? Not Met: []? Adjusted  3. Patient will demonstrate increased Strength by 1/2 mmt grade  and core activation to allow for proper functional mobility as indicated by patients Functional Deficits to allow pt to resume up/down the basement stairs and curbs prn without difficulty without increase in symptoms. [x]? Progressing: []? Met: []? Not Met: []? Adjusted  4. Patient will return to functional activities including amb prn for grocery shopping and errands without increased symptoms and without need of AD. [x]? Progressing: []? Met: []? Not Met: []? Adjusted  5. Improve balance so that pt reports no LOB in the preceding week and no sense of unsteadiness on stairs. [x]? Progressing: []? Met: []? Not Met: []? Adjusted          Overall Progression Towards Functional goals/ Treatment Progress Update:  [] Patient is progressing as expected towards functional goals listed. [] Progression is slowed due to complexities/Impairments listed. [] Progression has been slowed due to co-morbidities.   [x] Plan just implemented, too soon to assess goals progression <30days   [] Goals require adjustment due to lack of progress  [] Patient is not progressing as expected and requires additional follow up with physician  [] Other    Persisting Functional Limitations/Impairments:  []Sleeping [x]Sitting               [x]Standing [x]Transfers        [x]Walking [x]Kneeling               [x]Stairs [x]Squatting / bending   [x]ADLs []Reaching  [x]Lifting  [x]Housework  []Driving []Job related tasks  []Sports/Recreation []Other:        ASSESSMENT: see eval      Treatment/Activity Tolerance:  [x] Patient able to complete tx [] Patient limited by fatigue  [] Patient limited by pain  [] Patient limited by other medical complications  [] Other:     Prognosis: [x] Good [] Fair  [] Poor    Patient Requires Follow-up: [x] Yes  [] No    Plan for next treatment

## 2021-04-06 NOTE — PLAN OF CARE
[address] [address]  Phone: [phone]   Fax: [phone]                                                       Physical Therapy Certification    Dear Referring Practitioner: Dr. Bartolo Mejia,    We had the pleasure of evaluating the following patient for physical therapy services at [address] [place] [address]. A summary of our findings can be found in the initial assessment below. This includes our plan of care. If you have any questions or concerns regarding these findings, please do not hesitate to contact me at the office phone number checked above. Thank you for the referral.       Physician Signature:_______________________________Date:__________________  By signing above (or electronic signature), therapists plan is approved by physician      Patient: Ever Bee   : 1950   MRN: [id number]  Referring Physician: Referring Practitioner: Dr. Bartolo Mejia      Evaluation Date: 2021      Medical Diagnosis Information:  Diagnosis: post op lower lumbar fusion 21, M48.06  SPINAL STENOSIS OF LUMBAR REGION M43.16  LUMBAR SPONDYLOLISTHESISI, ACQUIRED L1-L5   Treatment Diagnosis: pt to PT after lumbar fusion surgery on 21. She presents with impaired gait, flexibility/strength deficits, poor habitual postures/body [employer] information: PT Insurance Information: medicare/humana, med nec, no co pay      Preferred Language for Healthcare:   [x]English       []other:    SUBJECTIVE: still having trouble after lumbar surgery. [place] Greek on  - stumbled over a rug that the grandkids had kicked up    ONSET:  21 lumbar surgery    Current Level of Function:  Pain with walking, all ADLs/IADLs.  Living at home by herself   Prior Level of Function: Prior to this injury / incident, pt was independent with ADLs and Shoulder Scap      Shoulder Abduction (C5)      Shoulder ER      Shoulder IR      Biceps (C6)      Triceps (C7)      Wrist Extension (C6)      Wrist Flexion (C7)           Thumb Abduction (C8)     Finger Abduction (T1)                  Barriers to/and or personal factors that will affect rehab potential:              [x]Age  []Sex    [x]Smoker              [x]Motivation/Lack of Motivation                        [x]Co-Morbidities              []Cognitive Function, education/learning barriers              []Environmental, home barriers              []profession/work barriers  []past PT/medical experience  []other:  Justification:    Falls Risk Assessment (30 days):   [] Falls Risk assessed and no intervention required. [x] Falls Risk assessed and Patient requires intervention due to being higher risk   TUG score (>12s at risk):     [x] Falls education provided, including         ASSESSMENT: pt to PT after lumbar fusion surgery on 2/16/21. She presents with impaired gait, flexibility/strength deficits, poor habitual postures/body mechs. These impairments cause her pain and limit her carlos of home chores/IADLs. Pt will benefit from skilled PT services to restore PLOF.         Functional Impairments:  Lumbar/lower quarter:     [x]Noted lumbar/proximal hip hypomobility   []Noted lumbosacral and/or generalized hypermobility   []Decreased Lumbosacral/hip/LE functional ROM   [x]Decreased core/proximal hip strength and neuromuscular control    []Decreased LE functional strength    []Abnormal reflexes/sensation/myotomal/dermatomal deficits  []Reduced ability to run, hop, cut or jump  [x]Reduced balance/proprioceptive control    []other:  reduced functional ROM of    []other:  reduce functional strength of    []other: myofascial changes and pain at    [x] Postural impairments: poor body mechs  []other:        Functional Activity Limitations (from functional questionnaire and intake)   [x]Reduced ability to tolerate prolonged functional positions   [x]Reduced ability or difficulty with changes of positions or transfers between positions   [x]Reduced ability to maintain good posture and demonstrate good body mechanics with sitting, bending, and lifting   [x]Reduced ability to sleep   [] Reduced ability or tolerance with driving and/or computer work   [x]Reduced ability to perform lifting, reaching, carrying tasks   [x]Reduced ability to squat   []Reduced ability to forward bend   [x]Reduced ability to ambulate prolonged functional periods/distances/surfaces   [x]Reduced ability to ascend/descend stairs   []Reduced ability to concentrate    []Reduced ability to tolerate any impact through UE or spine   []other:   []other:    []other:    []other:    []other:       Participation Restrictions   [x]Reduced participation in self care activities   [x]Reduced participation in home management activities   []Reduced participation in work activities   [x]Reduced participation in social activities. [x]Reduced participation in sport/recreational activities. Classification:  Lumbar/Lower quarter:   []Signs/symptoms consistent with Lumbar instability/stabilization subgroup. []Signs/symptoms consistent with Lumbar mobilization/manipulation subgroup, myotomes and dermatomes intact. Meets manipulation criteria. []Signs/symptoms consistent with Lumbar direction specific/centralization subgroup   []Signs/symptoms consistent with Lumbar traction subgroup     []Signs/symptoms consistent with lumbar facet dysfunction   [x]Signs/symptoms consistent with lumbar stenosis type dysfunction   []Signs/symptoms consistent with nerve root involvement including myotome & dermatome dysfunction   [x]Signs/symptoms consistent with post-surgical status including: decreased ROM, strength and function.    []signs/symptoms consistent with pathology which may benefit from Dry needling    []Signs/symptoms consistent with joint sprain/strain  []Signs/symptoms consistent with patella-femoral syndrome   []Signs/symptoms consistent with knee OA/hip OA   []Signs/symptoms consistent with internal derangement of knee/Hip   [x]Signs/symptoms consistent with functional hip weakness/NMR control      []Signs/symptoms consistent with tendinitis/tendinosis    []signs/symptoms consistent with pathology which may bene   []other:        Prognosis/Rehab Potential:      []Excellent   [x]Good    []Fair   []Poor    Tolerance of evaluation/treatment:    []Excellent   [x]Good    []Fair   []Poor     Physical Therapy Evaluation Complexity Justification  [x] A history of present problem with:  [] no personal factors and/or comorbidities that impact the plan of care;  [x]1-2 personal factors and/or comorbidities that impact the plan of care  []3 personal factors and/or comorbidities that impact the plan of care  [x] An examination of body systems using standardized tests and measures addressing any of the following: body structures and functions (impairments), activity limitations, and/or participation restrictions;:  [x] a total of 1-2 or more elements   [] a total of 3 or more elements   [] a total of 4 or more elements   [x] A clinical presentation with:  [x] stable and/or uncomplicated characteristics   [] evolving clinical presentation with changing characteristics  [] unstable and unpredictable characteristics;   [x] Clinical decision making of [x] low, [] moderate, [] high complexity using standardized patient assessment instrument and/or measurable assessment of functional outcome.     [x] EVAL (LOW) 40763 (typically 15 minutes face-to-face)  [] EVAL (MOD) 80467 (typically 30 minutes face-to-face)  [] EVAL (HIGH) 28256 (typically 45 minutes face-to-face)  [] RE-EVAL     HEP instruction: Written HEP instructions provided and reviewed:    PLAN:  strength, ROM/flexibility, posture and body mechs, manual, MOC, HEP, pt education, aquatic therapy    Frequency/Duration: 2 days per week for 6 Weeks:  Interventions:  [x]  Therapeutic exercise including:strength, ROM, flexibility  [x]  NMR activation and proprioception including postural re-education  [x]  Manual therapy as indicated to include: IASTM, STM, PROM, Gr I-IV mobilizations, manipulation. [x]  Modalities as needed that may include: thermal agents, E-stim, Biofeedback, US, iontophoresis as indicated  [x]  Patient education on joint protection, postural re-education, activity modification, progression of HEP. GOALS:  Patient stated goal:improve balance and walking, reduce pain  [] Progressing: [] Met: [] Not Met: [] Adjusted    Therapist goals for Patient:   Short Term Goals: To be achieved in: 2 weeks  1. Independent in HEP and progression per patient tolerance, in order to prevent re-injury. [] Progressing: [] Met: [] Not Met: [] Adjusted  2. Patient will have a decrease in pain to facilitate improvement in movement, function, and ADLs as indicated by improvement with respect to Functional Deficits. [] Progressing: [] Met: [] Not Met: [] Adjusted    Long Term Goals: To be achieved in: 4  weeks  1. Disability index score of 10% or less on the oswestry to assist with reaching prior level of function. [] Progressing: [] Met: [] Not Met: [] Adjusted  2. Patient will demonstrate increased AROM  to [facility], to allow for proper joint functioning to allow pt to resume squatting to lift household items, use of good posture and body mechs for home chores without  increase in symptoms. [] Progressing: [] Met: [] Not Met: [] Adjusted  3. Patient will demonstrate increased Strength by 1/2 mmt grade  and core activation to allow for proper functional mobility as indicated by patients Functional Deficits to allow pt to resume up/down the basement stairs and curbs prn without difficulty without increase in symptoms. [] Progressing: [] Met: [] Not Met: [] Adjusted  4.  Patient will return to functional activities including amb prn for grocery shopping and errands without increased symptoms and without need of AD. [] Progressing: [] Met: [] Not Met: [] Adjusted  5. Improve balance so that pt reports no LOB in the preceding week and no sense of unsteadiness on stairs, no difficulty getting in and out of her home.      [] Progressing: [] Met: [] Not Met: [] Adjusted          Electronically signed by:  Spraggs Fairly, PT DPT

## 2021-04-13 ENCOUNTER — HOSPITAL ENCOUNTER (OUTPATIENT)
Dept: PHYSICAL THERAPY | Age: 71
Setting detail: THERAPIES SERIES
Discharge: HOME | End: 2021-04-13
Payer: MEDICARE

## 2021-04-20 ENCOUNTER — HOSPITAL ENCOUNTER (OUTPATIENT)
Dept: PHYSICAL THERAPY | Age: 71
Setting detail: THERAPIES SERIES
Discharge: HOME | End: 2021-04-20
Payer: MEDICARE

## 2021-04-20 PROCEDURE — 97116 GAIT TRAINING THERAPY: CPT

## 2021-04-20 PROCEDURE — 97110 THERAPEUTIC EXERCISES: CPT

## 2021-04-20 PROCEDURE — 97112 NEUROMUSCULAR REEDUCATION: CPT

## 2021-04-20 NOTE — FLOWSHEET NOTE
stenosis and moderate    left foraminal stenosis at this level.         Stable moderate right foraminal stenosis and mild left foraminal stenosis at    L5-S1. RESTRICTIONS/PRECAUTIONS: lower lumbar fusion 2/16/21 previous lumbar surgery 2015- had cyst removed form lower back. HTN.  Intermittent LOB - no falls, impaired kidneys, frequent john horses in calves    Exercises/Interventions:     Therapeutic Exercises (59145) Resistance / level Sets/sec Reps Notes   Nu step    or bike 6 min  S=9, arms = 11   Step stretches-   HS  Hip flexor B ea      Step ups with TrA iso   fwd    side 4\"12x5x B ea B UE support   IB calf stretches  HR/TR  gastroc stretches at  wall  2 x 30 sec  2x10 ea           Mat ex  S/l glut med clam shell   TrA iso  fig 4 piriformis stretch   10x 5\" R/L    3x30\" B                    Mat ex w SB  Fig 4 piriformis stretch    TrA iso  DKTC   B LTR                        and L         Min cues for good TrA iso   CC --multifidi walkouts with paloff press x 5 CGA/SBA when sidestepping back in                 Therapeutic Activities (49072)       Pt ed re importance of good hydration in management of john horses  Postural ed-good posture /body mechs  PT POC,rationale for tx reveiw   11/24, 11/20 talked with pt about hydration and trying to decrease coffee, pt says she has 8 cups per day   Instructed in how to sleep with neutral spine with pillow(s) bn LE and under top arm  Review              GAIT TRG In // bars with mirror and TrA iso:  frd and retro - 4 laps     With RW 2x 120'   Cues to reduce lateral sway           Neuromuscular Re-ed (22309)       Balance exercises with TrA iso       Seated SB :   A/P, M/L, circles: CW/CCW    SLS      Glides 3-way  Ea B          Postural ed for sleeping in L s/l  With appropriate pillows for neutral spine support- had pt practice on mat table x7'                            Manual Intervention (42830)        IASTM more patients providing skilled therapy interventions, but not providing any significant amount of measurable one-on-one time to either patient, for improvements in  [] LE / lumbar: LE, proximal hip, and core control in self care, mobility, lifting, ambulation and eccentric single leg control. [] UE / cervical: cervical, scapular, scapulothoracic and UE control with self care, reaching, carrying, lifting, house/yardwork, driving, computer work. NMR and Therapeutic Activities:    [x] (86811 or 84792) Provided verbal/tactile cueing for activities related to improving balance, coordination, kinesthetic sense, posture, motor skill, proprioception and motor activation to allow for proper function of   [x] LE: / Lumbar core, proximal hip and LE with self care and ADLs  [] UE / Cervical: cervical, postural, scapular, scapulothoracic and UE control with self care, carrying, lifting, driving, computer work.   [] (25541) Gait Re-education- Provided training and instruction to the patient for proper LE, core and proximal hip recruitment and positioning and eccentric body weight control with ambulation re-education including up and down stairs     Home Management Training / Self Care:  [] (11684) Provided self-care/home management training related to activities of daily living and compensatory training, and/or use of adaptive equipment for improvement with: ADLs and compensatory training, meal preparation, safety procedures and instruction in use of adaptive equipment, including bathing, grooming, dressing, personal hygiene, basic household cleaning and chores.      Home Exercise Program:    [] (90174) Reviewed/Progressed HEP activities related to strengthening, flexibility, endurance, ROM of   [] LE / Lumbar: core, proximal hip and LE for functional self-care, mobility, lifting and ambulation/stair navigation   [] UE / Cervical: cervical, postural, scapular, scapulothoracic and UE control with self care, reaching,    GOALS:  Patient stated goal:improve balance and walking  []? Progressing: []? Met: []? Not Met: []? Adjusted     Therapist goals for Patient:   Short Term Goals: To be achieved in: 2 weeks  1. Independent in HEP and progression per patient tolerance, in order to prevent re-injury. []? Progressing: [x]? Met: []? Not Met: []? Adjusted  2. Patient will have a decrease in pain to facilitate improvement in movement, function, and ADLs as indicated by improvement with respect to Functional Deficits. []? Progressing: [x]? Met: []? Not Met: []? Adjusted     Long Term Goals: To be achieved in: 6  weeks  1. Disability index score of 10% or less on the oswestry to assist with reaching prior level of function. [x]? Progressing: []? Met: []? Not Met: []? Adjusted  2. Patient will demonstrate increased AROM  to [facility], to allow for proper joint functioning to allow pt to resume squatting to lift items, use of good posture and body mechs for home chores without  increase in symptoms. [x]? Progressing: []? Met: []? Not Met: []? Adjusted  3. Patient will demonstrate increased Strength by 1/2 mmt grade  and core activation to allow for proper functional mobility as indicated by patients Functional Deficits to allow pt to resume up/down the basement stairs and curbs prn without difficulty without increase in symptoms. [x]? Progressing: []? Met: []? Not Met: []? Adjusted  4. Patient will return to functional activities including amb prn for grocery shopping and errands without increased symptoms and without need of AD. [x]? Progressing: []? Met: []? Not Met: []? Adjusted  5. Improve balance so that pt reports no LOB in the preceding week and no sense of unsteadiness on stairs. [x]? Progressing: []? Met: []? Not Met: []? Adjusted          Overall Progression Towards Functional goals/ Treatment Progress Update:  [] Patient is progressing as expected towards functional goals listed.     [] Progression is slowed due to complexities/Impairments listed. [] Progression has been slowed due to co-morbidities. [x] Plan just implemented, too soon to assess goals progression <30days   [] Goals require adjustment due to lack of progress  [] Patient is not progressing as expected and requires additional follow up with physician  [] Other    Persisting Functional Limitations/Impairments:  []Sleeping [x]Sitting               [x]Standing [x]Transfers        [x]Walking [x]Kneeling               [x]Stairs [x]Squatting / bending   [x]ADLs []Reaching  [x]Lifting  [x]Housework  []Driving []Job related tasks  []Sports/Recreation []Other:        ASSESSMENT:  Needs increased glut med, glut max, trA strength for improved stability/reduced lateral sway with gait     Treatment/Activity Tolerance:  [x] Patient able to complete tx [] Patient limited by fatigue  [] Patient limited by pain  [] Patient limited by other medical complications  [] Other:     Prognosis: [x] Good [] Fair  [] Poor    Patient Requires Follow-up: [x] Yes  [] No    Plan for next treatment session:  d/w pt re aquatic therapy and orient to pool if she is interested in pool trial    PLAN:  strength, ROM/flexibility, posture and body mechs, manual, MOC, HEP, pt education     Frequency/Duration: 2 days per week for 6 Weeks:  Interventions:  [x]? Therapeutic exercise including:strength, ROM, flexibility  [x]? NMR activation and proprioception including postural re-education  [x]? Manual therapy as indicated to include: IASTM, STM, PROM, Gr I-IV mobilizations, manipulation. [x]? Modalities as needed that may include: thermal agents, E-stim, Biofeedback, US, iontophoresis as indicated  [x]? Patient education on joint protection, postural re-education, activity modification, progression of HEP. PLAN:   See eval. PT 1x / week for 5 weeks.  ( 5 visits)   [] Continue per plan of care [] Alter current plan (see comments)  [x] Plan of care initiated [] Hold pending MD visit []

## 2021-04-27 ENCOUNTER — HOSPITAL ENCOUNTER (OUTPATIENT)
Dept: PHYSICAL THERAPY | Age: 71
Setting detail: THERAPIES SERIES
Discharge: HOME | End: 2021-04-27
Payer: MEDICARE

## 2021-04-27 PROCEDURE — 97110 THERAPEUTIC EXERCISES: CPT

## 2021-04-27 PROCEDURE — 97530 THERAPEUTIC ACTIVITIES: CPT

## 2021-04-27 PROCEDURE — 97112 NEUROMUSCULAR REEDUCATION: CPT

## 2021-04-27 NOTE — FLOWSHEET NOTE
Physical Therapy Re-Certification Plan of Care    Dear Dr. Adi Parrish  ,    We had the pleasure of treating the following patient for physical therapy services at [facility]. A summary of our findings can be found in the updated assessment below. This includes our plan of care. If you have any questions or concerns regarding these findings, please do not hesitate to contact me at the office phone number checked above. Thank you for the referral.     Physician Signature:________________________________Date:__________________  By signing above (or electronic signature), therapists plan is approved by physician      Functional Outcome: oswestry score 14 disability 31%     Overall Response to Treatment:   [x]Patient is responding well to treatment and improvement is noted with regards  to goals   []Patient should continue to improve in reasonable time if they continue HEP   []Patient has plateaued and is no longer responding to skilled PT intervention    []Patient is getting worse and would benefit from return to referring MD   []Patient unable to adhere to initial POC   [x]Other:   Gait: increased lateral sway - trendelenburg pattern  Balance: L 1 sec, R 3 sec  Body mechs - improving awareness but limited by reduced LE strength: unable to independently get up from full squat (to floor)   Strength / Myotomes RIGHT LEFT Comments               LE         Hip Flexors (L1-2) 5- 4     Hip Abductors               Hip Internal Rotators 4+ 4-     Hip External Rotators 4 4-     Quads (L2-4) 5 5     Hamstrings  5 4+                 Ankle Dorsiflexion (L4-5) 5 4+      A: pt with good progress, less pain and improvement in strength balance fxnl carlos but cont pain. She needs increased consistency with HEP, increased strengths (especially at hips)/body mechs and rec trial of aquatic therapy. Improved hip and trunk strength will reduce lateral sway with gait and will reduce pain. Date range of Visits: 21- 21  Total Visits: 3     Recommendation:    [x]Continue PT 2 x / wk for 6 weeks. []Hold PT, pending MD visit    Physical Therapy Daily Treatment Note  Date:  2021    Patient Name:  Alecia Fletcher    :  1950  MRN: [id number]  Medical/Treatment Diagnosis Information:  Diagnosis: post op lower lumbar fusion 21, M48.06  SPINAL STENOSIS OF LUMBAR REGION M43.16  LUMBAR SPONDYLOLISTHESISI, ACQUIRED L1-L5           Treatment Diagnosis: pt to PT after lumbar fusion surgery on 21. She presents with impaired gait, flexibility/strength deficits, poor habitual postures/body [facility]                                      Insurance/Certification information:  PT Insurance Information: medicare and [employer], med nec  Physician Information:  Referring Practitioner: Dr. Yaritza Collado of care signed (Y/N): Y    Date of Patient follow up with Physician:    Functional scale:   21 oswestry score 14 disability 31%   eval: OSWESTRY raw score =11 ; dysfunction =24%    Progress Report: []  Yes   [x]  No     Date Range for reporting period:  Beginning21   Ending    Progress report due (10 Rx/or 30 days whichever is less): visit #18 or     Recertification due (POC duration/ or 90 days whichever is less): visit # or     Visit # Insurance Allowable Auth required? Date Range   3/5   Med nec []  Yes  [x]  No na        Latex Allergy:  [x]NO      []YES  Preferred Language for Healthcare:   [x]English       []other    Pain level: 8-9/10 upon waking, 5/10 now    SUBJECTIVE:  States she wakes frequently - but not due to pain. States it is hard ot go back to sleep. Reports she thinks if she calls PCP, the MD will tell her to take melatonin. Her oldest brother  last wk; he was 80.   States her son will build her a step to do step ups on and that he will add handrails by the steps to enter her home    OBJECTIVE:  ,  antalgic gait with increased lateral trunk sway B MRI 9/20/2020  [facility]):  Stable small central disc extrusion and mild central canal stenosis at L3-L4. There is progressive left moderate foraminal stenosis at this level.         New laminotomy changes at L4-L5.  The central canal is mildly stenotic which    is improved.  There is worsening severe right foraminal stenosis and moderate    left foraminal stenosis at this level.         Stable moderate right foraminal stenosis and mild left foraminal stenosis at    L5-S1. RESTRICTIONS/PRECAUTIONS: lower lumbar fusion 2/16/21 previous lumbar surgery 2015- had cyst removed form lower back. HTN.  Intermittent LOB - no falls, impaired kidneys, frequent john horses in calves    Exercises/Interventions:     Therapeutic Exercises (62831) Resistance / level Sets/sec Reps Notes   Nu step    or bike 5 min  S=9, arms = 11   Step stretches-   HS  Hip flexor 30 sec  30 sec 3  3 B ea      Step ups with TrA iso   fwd    side 6\"110x B ea B UE support   IB calf stretches  HR/TR  gastroc stretches at  wall  2 x 30 sec  2x10 ea           Mat ex  S/l glut med clam shell   TrA iso  fig 4 piriformis stretch   3x15x 5\" R/L                        Mat ex w SB  Fig 4 piriformis stretch    TrA iso  DKTC   B LTR                        and L         Min cues for good TrA iso   CC --multifidi walkouts with paloff press x 5 2pl 1 3 B CGA/SBA when sidestepping back in                 Therapeutic Activities (29957)       Pt ed re importance of good hydration in management of john horses  Postural ed-good posture /body mechs  PT POC,rationale for tx reveiw   11/24, 11/20 talked with pt about hydration and trying to decrease coffee, pt says she has 8 cups per day   Instructed in how to sleep with neutral spine with pillow(s) bn LE and under top arm  Review              GAIT TRG    Cues to reduce lateral sway           Neuromuscular Re-ed (01882)       Balance exercises with TrA iso       Seated SB :   A/P, M/L, circles: CW/CCW SLS 2x15\" B needs B UE support     Glides 3-way  1 10 Ea B          Postural ed for sleeping in L s/l  With appropriate pillows for neutral spine support- had pt practice on mat table x7'                            Manual Intervention (12919)        IAS                                               Pt. Education:  4/27 encouraged pt to f/u with PCP re apparent insomnia  4/20 pt ed on sleep with neutral spine, review HEP,   d/w pt re aquatic therapy - she prefers to wait until it warms up and do pool ex in her son's pool. instruct on pool ex  In her son's pool. Pt ed re importance of AD to reduce trunk lateral sway - to interrupt pain cycle  4/6 patient educated on diagnosis, prognosis and expectations for rehab  d/w pt re aquatic therapy pros and cons  -all patient questions were answered      HEP instruction:  4/20 walk in her son's pool once it's warm enough - walk frd, sideways, retro, float and tread will pool noodle. Radha Barker /wear new tennis shoes for improved shock absorption, use RW/shoppig cart  Prn to reduce trunk lateral sway    4/6  gastroc stretch at wall, SLS, TrA iso, fig 4 piriformis stretch    11/24 gastroc stretch at counter  10/27 sleep with neutral spine; walk 3x/d x 5-10 min ea time; go up and down the basement steps >/= 1-2x/d  10/8 Fig 4 piriformis stretch, Supine HS stretch, TrA iso, Prone press ups, posture    Therapeutic Exercise and NMR EXR  [x] (41323) Provided verbal/tactile cueing for activities related to strengthening, flexibility, endurance, ROM for improvements in  [x] LE / Lumbar: LE, proximal hip, and core control with self care, mobility, lifting, ambulation.   [] UE / Cervical: cervical, postural, scapular, scapulothoracic and UE control with self care, reaching, carrying, lifting, house/yardwork, driving, computer work.  [] (98074) Provided verbal/tactile cueing for activities related to improving balance, coordination, kinesthetic sense, posture, motor skill, proprioception to assist with   [] LE / lumbar: LE, proximal hip, and core control in self care, mobility, lifting, ambulation and eccentric single leg control. [] UE / cervical: cervical, scapular, scapulothoracic and UE control with self care, reaching, carrying, lifting, house/yardwork, driving, computer work.   [] (94613) Therapist is in constant attendance of 2 or more patients providing skilled therapy interventions, but not providing any significant amount of measurable one-on-one time to either patient, for improvements in  [] LE / lumbar: LE, proximal hip, and core control in self care, mobility, lifting, ambulation and eccentric single leg control. [] UE / cervical: cervical, scapular, scapulothoracic and UE control with self care, reaching, carrying, lifting, house/yardwork, driving, computer work. NMR and Therapeutic Activities:    [x] (52547 or 01019) Provided verbal/tactile cueing for activities related to improving balance, coordination, kinesthetic sense, posture, motor skill, proprioception and motor activation to allow for proper function of   [x] LE: / Lumbar core, proximal hip and LE with self care and ADLs  [] UE / Cervical: cervical, postural, scapular, scapulothoracic and UE control with self care, carrying, lifting, driving, computer work.   [] (26168) Gait Re-education- Provided training and instruction to the patient for proper LE, core and proximal hip recruitment and positioning and eccentric body weight control with ambulation re-education including up and down stairs     Home Management Training / Self Care:  [] (05830) Provided self-care/home management training related to activities of daily living and compensatory training, and/or use of adaptive equipment for improvement with: ADLs and compensatory training, meal preparation, safety procedures and instruction in use of adaptive equipment, including bathing, grooming, dressing, personal hygiene, basic household cleaning and chores.      Home Exercise Program:    [] (44478) Reviewed/Progressed HEP activities related to strengthening, flexibility, endurance, ROM of   [] LE / Lumbar: core, proximal hip and LE for functional self-care, mobility, lifting and ambulation/stair navigation   [] UE / Cervical: cervical, postural, scapular, scapulothoracic and UE control with self care, reaching, carrying, lifting, house/yardwork, driving, computer work  [] (22730)Reviewed/Progressed HEP activities related to improving balance, coordination, kinesthetic sense, posture, motor skill, proprioception of   [] LE: core, proximal hip and LE for self care, mobility, lifting, and ambulation/stair navigation    [] UE / Cervical: cervical, postural,  scapular, scapulothoracic and UE control with self care, reaching, carrying, lifting, house/yardwork, driving, computer work    Manual Treatments:  PROM / STM / Oscillations-Mobs:  G-I, II, III, IV (PA's, Inf., Post.)  [] (43370) Provided manual therapy to mobilize LE, proximal hip and/or LS spine soft tissue/joints for the purpose of modulating pain, promoting relaxation,  increasing ROM, reducing/eliminating soft tissue swelling/inflammation/restriction, improving soft tissue extensibility and allowing for proper ROM for normal function with   [] LE / lumbar: self care, mobility, lifting and ambulation. [] UE / Cervical: self care, reaching, carrying, lifting, house/yardwork, driving, computer work. Modalities:  [] (33235) Vasopneumatic compression: Utilized vasopneumatic compression to decrease edema / swelling for the purpose of improving mobility and quad tone / recruitment which will allow for increased overall function including but not limited to self-care, transfers, ambulation, and ascending / descending stairs.        Modalities:      Charges:  Timed Code Treatment Minutes: 46   Total Treatment Minutes: 46     [] EVAL - LOW (55688)   [] EVAL - MOD (63370)  [] EVAL - HIGH (28851)  [] Azalia Jordan (63930)  [x] PC(53432) x      [] Ionto  [x] NMR (53884) x       [] Vaso  [] Manual (84978) x  1     [] Ultrasound  [x] TA x 1       [] Mech Traction (91838)  [] Aquatic Therapy x     [] ES (un) (27510):   [] Home Management Training x  [] ES(attended) (19492)   [] Dry Needling 1-2 muscles (44689):  [] Dry Needling 3+ muscles (017705  [] Group:      [] Other:        GOALS:  Patient stated goal:improve balance and walking  []? Progressing: []? Met: []? Not Met: []? Adjusted     Therapist goals for Patient:   Short Term Goals: To be achieved in: 2 weeks  1. Independent in HEP and progression per patient tolerance, in order to prevent re-injury. [x]? Progressing: []? Met: []? Not Met: []? Adjusted  2. Patient will have a decrease in pain to facilitate improvement in movement, function, and ADLs as indicated by improvement with respect to Functional Deficits. [x]? Progressing: []? Met: []? Not Met: []? Adjusted     Long Term Goals: To be achieved in: 6  weeks  1. Disability index score of 10% or less on the oswestry to assist with reaching prior level of function. [x]? Progressing: []? Met: []? Not Met: []? Adjusted  2. Patient will demonstrate increased AROM  to [facility], to allow for proper joint functioning to allow pt to resume squatting to lift items, use of good posture and body mechs for home chores without  increase in symptoms. [x]? Progressing: []? Met: []? Not Met: []? Adjusted  3. Patient will demonstrate increased Strength by 1/2 mmt grade  and core activation to allow for proper functional mobility as indicated by patients Functional Deficits to allow pt to resume up/down the basement stairs and curbs prn without difficulty without increase in symptoms. [x]? Progressing: []? Met: []? Not Met: []? Adjusted  4. Patient will return to functional activities including amb prn for grocery shopping and errands without increased symptoms and without need of AD. [x]? Progressing: []? Met: []? Not Met: []?  Adjusted  NEEDS TO USE SHOPPING CART  5. Improve balance so that pt reports no LOB in the preceding week and no sense of unsteadiness on stairs. [x]? Progressing: []? Met: []? Not Met: []? Adjusted          Overall Progression Towards Functional goals/ Treatment Progress Update:  [x] Patient is progressing as expected towards functional goals listed. [] Progression is slowed due to complexities/Impairments listed. [] Progression has been slowed due to co-morbidities. [] Plan just implemented, too soon to assess goals progression <30days   [] Goals require adjustment due to lack of progress  [] Patient is not progressing as expected and requires additional follow up with physician  [] Other    Persisting Functional Limitations/Impairments:  []Sleeping [x]Sitting               [x]Standing [x]Transfers        [x]Walking [x]Kneeling               [x]Stairs [x]Squatting / bending   [x]ADLs []Reaching  [x]Lifting  [x]Housework  []Driving []Job related tasks  []Sports/Recreation []Other:        ASSESSMENT:  4/27 pt improving but cont to need Needs increased glut med, glut max, trA strength for improved stability/reduced lateral sway with gait. Also needs improved consistency with HEP. Treatment/Activity Tolerance:  [x] Patient able to complete tx [] Patient limited by fatigue  [] Patient limited by pain  [] Patient limited by other medical complications  [] Other:     Prognosis: [x] Good [] Fair  [] Poor    Patient Requires Follow-up: [x] Yes  [] No    Plan for next treatment session:  d/w pt re aquatic therapy and orient to pool if she is interested in pool trial    PLAN:  strength, ROM/flexibility, posture and body mechs, manual, MOC, HEP, pt education     Frequency/Duration: 2 days per week for 6 Weeks:  Interventions:  [x]? Therapeutic exercise including:strength, ROM, flexibility  [x]? NMR activation and proprioception including postural re-education  [x]?   Manual therapy as indicated to include: IASTM, STM, PROM, Gr I-IV mobilizations, manipulation. [x]? Modalities as needed that may include: thermal agents, E-stim, Biofeedback, US, iontophoresis as indicated  [x]? Patient education on joint protection, postural re-education, activity modification, progression of HEP. PLAN:   See eval. PT 1x / week for 5 weeks. ( 5 visits)   [] Continue per plan of care [] Alter current plan (see comments)  [x] Plan of care initiated [] Hold pending MD visit [] Discharge    Electronically signed by: Marie Eisenberg PT, DPT  Note: If patient does not return for scheduled/ recommended follow up visits, this note will serve as a discharge from care along with most recent update on progress.

## 2021-04-28 ENCOUNTER — HOSPITAL ENCOUNTER (OUTPATIENT)
Age: 71
Discharge: HOME | End: 2021-04-28
Payer: MEDICARE

## 2021-04-28 ENCOUNTER — HOSPITAL ENCOUNTER (OUTPATIENT)
Dept: GENERAL RADIOLOGY | Age: 71
Discharge: HOME | End: 2021-04-28
Payer: MEDICARE

## 2021-04-28 DIAGNOSIS — Z98.1 ARTHRODESIS STATUS: ICD-10-CM

## 2021-04-28 PROCEDURE — 72100 X-RAY EXAM L-S SPINE 2/3 VWS: CPT

## 2021-05-04 ENCOUNTER — HOSPITAL ENCOUNTER (OUTPATIENT)
Dept: PHYSICAL THERAPY | Age: 71
Setting detail: THERAPIES SERIES
Discharge: HOME | End: 2021-05-04
Payer: MEDICARE

## 2021-05-04 PROCEDURE — 97110 THERAPEUTIC EXERCISES: CPT

## 2021-05-04 PROCEDURE — 97112 NEUROMUSCULAR REEDUCATION: CPT

## 2021-05-04 NOTE — FLOWSHEET NOTE
Her oldest brother  last wk; he was 80. States her son will build her a step to do step ups on and that he will add handrails by the steps to enter her home    OBJECTIVE:     pt brings script for 8 additional PT visits form Ar Arciniega NP for tx of lumbar post op, dated 5/3/21   4/27,  antalgic gait with increased lateral trunk sway B       MRI 2020  [facility]):  Stable small central disc extrusion and mild central canal stenosis at L3-L4. There is progressive left moderate foraminal stenosis at this level.         New laminotomy changes at L4-L5.  The central canal is mildly stenotic which    is improved.  There is worsening severe right foraminal stenosis and moderate    left foraminal stenosis at this level.         Stable moderate right foraminal stenosis and mild left foraminal stenosis at    L5-S1. RESTRICTIONS/PRECAUTIONS: lower lumbar fusion 21 previous lumbar surgery - had cyst removed form lower back. HTN.  Intermittent LOB - no falls, impaired kidneys, frequent john horses in calves    Exercises/Interventions:     Therapeutic Exercises (24933) Resistance / level Sets/sec Reps Notes   Nu step    or bike 6min  S=9, arms = 11   Step stretches-   HS  Hip flexor 30 sec  30 sec 3  3 B ea      Step ups with TrA iso   fwd    side 6\"  1  1  10x B ea  10x B ea B UE support   IB calf stretches  HR/TR  gastroc stretches at  wall  2 x 30 sec  2x10 ea           Mat ex  S/l glut med clam shell  SLR hip AB  S/l  Hip circles frd and back     TrA iso  fig 4 piriformis stretch   3x15x 5\" R/L  2x5  1x5 ea way       All with TrA iso                  Mat ex w SB  Fig 4 piriformis stretch    TrA iso  DKTC   B LTR                        and L         Min cues for good TrA iso   CC --multifidi walkouts with paloff press x 5 2pl 1 3 B CGA/SBA when sidestepping back in                 Therapeutic Activities (61859)       Pt ed re importance of good hydration in management of john horses  Postural ed-good posture /body [facility]  PT POC,rationale for tx reveiw   11/24, 11/20 talked with pt about hydration and trying to decrease coffee, pt says she has 8 cups per day   Instructed in how to sleep with neutral spine with pillow(s) bn LE and under top arm  Review              GAIT TRG    Cues to reduce lateral sway           Neuromuscular Re-ed (69169)       Balance exercises with TrA iso       Seated SB :   A/P, M/L, circles: CW/CCW 1 15 ea    SLS 2x15\" eyes open   2x15\" eyes closed  B needs B UE support     Glides 3-way  1 10 Ea B          Postural ed for sleeping in L s/l  '                            Manual Intervention (40158)        IAS                                               Pt. Education:  4/27 encouraged pt to f/u with PCP re apparent insomnia  4/20 pt ed on sleep with neutral spine, review HEP,   d/w pt re aquatic therapy - she prefers to wait until it warms up and do pool ex in her son's pool. instruct on pool ex  In her son's pool. Pt ed re importance of AD to reduce trunk lateral sway - to interrupt pain cycle  4/6 patient educated on diagnosis, prognosis and expectations for rehab  d/w pt re aquatic therapy pros and cons  -all patient questions were answered      HEP instruction:  4/20 walk in her son's pool once it's warm enough - walk frd, sideways, retro, float and tread will pool noodle.   Anniece Rough /wear new tennis shoes for improved shock absorption, use RW/shoppig cart  Prn to reduce trunk lateral sway    4/6  gastroc stretch at wall, SLS, TrA iso, fig 4 piriformis stretch    11/24 gastroc stretch at counter  10/27 sleep with neutral spine; walk 3x/d x 5-10 min ea time; go up and down the basement steps >/= 1-2x/d  10/8 Fig 4 piriformis stretch, Supine HS stretch, TrA iso, Prone press ups, posture    Therapeutic Exercise and NMR EXR  [x] (53847) Provided verbal/tactile cueing for activities related to strengthening, flexibility, endurance, ROM for improvements in  [x] LE / Lumbar: LE, proximal hip, and core control with self care, mobility, lifting, ambulation. [] UE / Cervical: cervical, postural, scapular, scapulothoracic and UE control with self care, reaching, carrying, lifting, house/yardwork, driving, computer work.  [] (71927) Provided verbal/tactile cueing for activities related to improving balance, coordination, kinesthetic sense, posture, motor skill, proprioception to assist with   [] LE / lumbar: LE, proximal hip, and core control in self care, mobility, lifting, ambulation and eccentric single leg control. [] UE / cervical: cervical, scapular, scapulothoracic and UE control with self care, reaching, carrying, lifting, house/yardwork, driving, computer work.   [] (96022) Therapist is in constant attendance of 2 or more patients providing skilled therapy interventions, but not providing any significant amount of measurable one-on-one time to either patient, for improvements in  [] LE / lumbar: LE, proximal hip, and core control in self care, mobility, lifting, ambulation and eccentric single leg control. [] UE / cervical: cervical, scapular, scapulothoracic and UE control with self care, reaching, carrying, lifting, house/yardwork, driving, computer work.      NMR and Therapeutic Activities:    [x] (92421 or 10317) Provided verbal/tactile cueing for activities related to improving balance, coordination, kinesthetic sense, posture, motor skill, proprioception and motor activation to allow for proper function of   [x] LE: / Lumbar core, proximal hip and LE with self care and ADLs  [] UE / Cervical: cervical, postural, scapular, scapulothoracic and UE control with self care, carrying, lifting, driving, computer work.   [] (54798) Gait Re-education- Provided training and instruction to the patient for proper LE, core and proximal hip recruitment and positioning and eccentric body weight control with ambulation re-education including up and down stairs     Home Management Training / Self Care:  [] (36091) Provided self-care/home management training related to activities of daily living and compensatory training, and/or use of adaptive equipment for improvement with: ADLs and compensatory training, meal preparation, safety procedures and instruction in use of adaptive equipment, including bathing, grooming, dressing, personal hygiene, basic household cleaning and chores. Home Exercise Program:    [] (64351) Reviewed/Progressed HEP activities related to strengthening, flexibility, endurance, ROM of   [] LE / Lumbar: core, proximal hip and LE for functional self-care, mobility, lifting and ambulation/stair navigation   [] UE / Cervical: cervical, postural, scapular, scapulothoracic and UE control with self care, reaching, carrying, lifting, house/yardwork, driving, computer work  [] (60340)Reviewed/Progressed HEP activities related to improving balance, coordination, kinesthetic sense, posture, motor skill, proprioception of   [] LE: core, proximal hip and LE for self care, mobility, lifting, and ambulation/stair navigation    [] UE / Cervical: cervical, postural,  scapular, scapulothoracic and UE control with self care, reaching, carrying, lifting, house/yardwork, driving, computer work    Manual Treatments:  PROM / STM / Oscillations-Mobs:  G-I, II, III, IV (PA's, Inf., Post.)  [] (25927) Provided manual therapy to mobilize LE, proximal hip and/or LS spine soft tissue/joints for the purpose of modulating pain, promoting relaxation,  increasing ROM, reducing/eliminating soft tissue swelling/inflammation/restriction, improving soft tissue extensibility and allowing for proper ROM for normal function with   [] LE / lumbar: self care, mobility, lifting and ambulation. [] UE / Cervical: self care, reaching, carrying, lifting, house/yardwork, driving, computer work.      Modalities:  [] (98143) Vasopneumatic compression: Utilized vasopneumatic compression to decrease edema / swelling for the purpose of improving mobility and quad tone / recruitment which will allow for increased overall function including but not limited to self-care, transfers, ambulation, and ascending / descending stairs. Modalities:      Charges:  Timed Code Treatment Minutes: 43   Total Treatment Minutes: 43     [] EVAL - LOW (91426)   [] EVAL - MOD (78365)  [] EVAL - HIGH (35677)  [] RE-EVAL (74992)  [x] RQ(23216) x   2   [] Ionto  [x] NMR (55970) x   1    [] Vaso  [] Manual (24994) x  1     [] Ultrasound  [] TA x 1       [] Mech Traction (81246)  [] Aquatic Therapy x     [] ES (un) (39152):   [] Home Management Training x  [] ES(attended) (67906)   [] Dry Needling 1-2 muscles (49215):  [] Dry Needling 3+ muscles (643035  [] Group:      [] Other:        GOALS:  Patient stated goal:improve balance and walking  []? Progressing: []? Met: []? Not Met: []? Adjusted     Therapist goals for Patient:   Short Term Goals: To be achieved in: 2 weeks  1. Independent in HEP and progression per patient tolerance, in order to prevent re-injury. [x]? Progressing: []? Met: []? Not Met: []? Adjusted  2. Patient will have a decrease in pain to facilitate improvement in movement, function, and ADLs as indicated by improvement with respect to Functional Deficits. [x]? Progressing: []? Met: []? Not Met: []? Adjusted     Long Term Goals: To be achieved in: 6  weeks  1. Disability index score of 10% or less on the oswestry to assist with reaching prior level of function. [x]? Progressing: []? Met: []? Not Met: []? Adjusted  2. Patient will demonstrate increased AROM  to [facility], to allow for proper joint functioning to allow pt to resume squatting to lift items, use of good posture and body mechs for home chores without  increase in symptoms. [x]? Progressing: []? Met: []? Not Met: []? Adjusted  3.  Patient will demonstrate increased Strength by 1/2 mmt grade  and core activation to allow for proper functional mobility as indicated by

## 2021-05-06 ENCOUNTER — HOSPITAL ENCOUNTER (OUTPATIENT)
Dept: PHYSICAL THERAPY | Age: 71
Setting detail: THERAPIES SERIES
Discharge: HOME | End: 2021-05-06
Payer: MEDICARE

## 2021-05-06 PROCEDURE — 97110 THERAPEUTIC EXERCISES: CPT

## 2021-05-06 PROCEDURE — 97530 THERAPEUTIC ACTIVITIES: CPT

## 2021-05-06 PROCEDURE — 97112 NEUROMUSCULAR REEDUCATION: CPT

## 2021-05-06 NOTE — FLOWSHEET NOTE
States it is hard ot go back to sleep. Reports she thinks if she calls PCP, the MD will tell her to take melatonin. Her oldest brother  last wk; he was 80. States her son will build her a step to do step ups on and that he will add handrails by the steps to enter her home    OBJECTIVE:     pt brings script for 8 additional PT visits form Marina Bautista NP for tx of lumbar post op, dated 5/3/21   4/27,  antalgic gait with increased lateral trunk sway B       MRI 2020  [facility]):  Stable small central disc extrusion and mild central canal stenosis at L3-L4. There is progressive left moderate foraminal stenosis at this level.         New laminotomy changes at L4-L5.  The central canal is mildly stenotic which    is improved.  There is worsening severe right foraminal stenosis and moderate    left foraminal stenosis at this level.         Stable moderate right foraminal stenosis and mild left foraminal stenosis at    L5-S1. RESTRICTIONS/PRECAUTIONS: lower lumbar fusion 21 previous lumbar surgery - had cyst removed form lower back. HTN.  Intermittent LOB - no falls, impaired kidneys, frequent john horses in calves    Exercises/Interventions:     Therapeutic Exercises (63847) Resistance / level Sets/sec Reps Notes   Nu step    or bike 6 min  S=9, arms = 11   Step stretches-   HS  Hip flexor 30 sec  30 sec 3  3 B ea      Step ups with TrA iso   fwd    side 6\"  1  1  15x B ea  15x B ea B UE support   IB calf stretches  HR/TR  gastroc stretches at  wall  2 x 30 sec  2x10 ea           Mat ex  S/l glut med clam shell  SLR hip AB  S/l  Hip circles frd and back     TrA iso  fig 4 piriformis stretch   3x15x 5\" R/L  2x5  1x5 ea way       All with TrA iso                  Mat ex w SB  Fig 4 piriformis stretch    TrA iso  DKTC   B LTR                        and L         Min cues for good TrA iso   CC --multifidi walkouts with paloff press x 5 2pl 1 3 B CGA/SBA when sidestepping back in Therapeutic Activities (90561)       Pt ed re importance of good hydration in management of john horses  Postural ed-good posture /body [facility]  PT POC,rationale for tx reveiw   11/24, 11/20 talked with pt about hydration and trying to decrease coffee, pt says she has 8 cups per day   Instructed in how to sleep with neutral spine with pillow(s) bn LE and under top arm  Review              GAIT TRG    Cues to reduce lateral sway           Neuromuscular Re-ed (72593)       Balance exercises with TrA iso       Seated SB :   A/P, M/L, circles: CW/CCW 1 15 ea    SLS 2x15\" eyes open   2x15\" eyes closed  B needs B UE support     Glides 3-way  1 10 Ea B          Postural ed for sleeping in L s/l  '                            Manual Intervention (52676)        IASTM                                               Pt. Education:  4/27 encouraged pt to f/u with PCP re apparent insomnia  4/20 pt ed on sleep with neutral spine, review HEP,   d/w pt re aquatic therapy - she prefers to wait until it warms up and do pool ex in her son's pool. instruct on pool ex  In her son's pool. Pt ed re importance of AD to reduce trunk lateral sway - to interrupt pain cycle  4/6 patient educated on diagnosis, prognosis and expectations for rehab  d/w pt re aquatic therapy pros and cons  -all patient questions were answered      HEP instruction:  4/20 walk in her son's pool once it's warm enough - walk frd, sideways, retro, float and tread will pool noodle.   [place] /wear new tennis shoes for improved shock absorption, use RW/shoppig cart  Prn to reduce trunk lateral sway    4/6  gastroc stretch at wall, SLS, TrA iso, fig 4 piriformis stretch    11/24 gastroc stretch at counter  10/27 sleep with neutral spine; walk 3x/d x 5-10 min ea time; go up and down the basement steps >/= 1-2x/d  10/8 Fig 4 piriformis stretch, Supine HS stretch, TrA iso, Prone press ups, posture    Therapeutic Exercise and NMR EXR  [x] (24556) Provided verbal/tactile cueing for activities related to strengthening, flexibility, endurance, ROM for improvements in  [x] LE / Lumbar: LE, proximal hip, and core control with self care, mobility, lifting, ambulation. [] UE / Cervical: cervical, postural, scapular, scapulothoracic and UE control with self care, reaching, carrying, lifting, house/yardwork, driving, computer work.  [] (26936) Provided verbal/tactile cueing for activities related to improving balance, coordination, kinesthetic sense, posture, motor skill, proprioception to assist with   [] LE / lumbar: LE, proximal hip, and core control in self care, mobility, lifting, ambulation and eccentric single leg control. [] UE / cervical: cervical, scapular, scapulothoracic and UE control with self care, reaching, carrying, lifting, house/yardwork, driving, computer work.   [] (75770) Therapist is in constant attendance of 2 or more patients providing skilled therapy interventions, but not providing any significant amount of measurable one-on-one time to either patient, for improvements in  [] LE / lumbar: LE, proximal hip, and core control in self care, mobility, lifting, ambulation and eccentric single leg control. [] UE / cervical: cervical, scapular, scapulothoracic and UE control with self care, reaching, carrying, lifting, house/yardwork, driving, computer work.      NMR and Therapeutic Activities:    [x] (55991 or 59024) Provided verbal/tactile cueing for activities related to improving balance, coordination, kinesthetic sense, posture, motor skill, proprioception and motor activation to allow for proper function of   [x] LE: / Lumbar core, proximal hip and LE with self care and ADLs  [] UE / Cervical: cervical, postural, scapular, scapulothoracic and UE control with self care, carrying, lifting, driving, computer work.   [] (51680) Gait Re-education- Provided training and instruction to the patient for proper LE, core and proximal hip recruitment and positioning and eccentric (53105) Vasopneumatic compression: Utilized vasopneumatic compression to decrease edema / swelling for the purpose of improving mobility and quad tone / recruitment which will allow for increased overall function including but not limited to self-care, transfers, ambulation, and ascending / descending stairs. Modalities:      Charges:  Timed Code Treatment Minutes: 41   Total Treatment Minutes: 41     [] EVAL - LOW (45859)   [] EVAL - MOD (61733)  [] EVAL - HIGH (73397)  [] RE-EVAL (19672)  [x] LZ(55393) x   1   [] Ionto  [x] NMR (15546) x   1    [] Vaso  [] Manual (55127) x  1     [] Ultrasound  [x] TA x 1       [] Mech Traction (29169)  [] Aquatic Therapy x     [] ES (un) (52864):   [] Home Management Training x  [] ES(attended) (90812)   [] Dry Needling 1-2 muscles (27324):  [] Dry Needling 3+ muscles (423032  [] Group:      [] Other:        GOALS:  Patient stated goal:improve balance and walking  []? Progressing: []? Met: []? Not Met: []? Adjusted     Therapist goals for Patient:   Short Term Goals: To be achieved in: 2 weeks  1. Independent in HEP and progression per patient tolerance, in order to prevent re-injury. [x]? Progressing: []? Met: []? Not Met: []? Adjusted  2. Patient will have a decrease in pain to facilitate improvement in movement, function, and ADLs as indicated by improvement with respect to Functional Deficits. [x]? Progressing: []? Met: []? Not Met: []? Adjusted     Long Term Goals: To be achieved in: 6  weeks  1. Disability index score of 10% or less on the oswestry to assist with reaching prior level of function. [x]? Progressing: []? Met: []? Not Met: []? Adjusted  2. Patient will demonstrate increased AROM  to [facility], to allow for proper joint functioning to allow pt to resume squatting to lift items, use of good posture and body mechs for home chores without  increase in symptoms. [x]? Progressing: []? Met: []? Not Met: []? Adjusted  3.  Patient will demonstrate increased Strength by 1/2 mmt grade  and core activation to allow for proper functional mobility as indicated by patients Functional Deficits to allow pt to resume up/down the basement stairs and curbs prn without difficulty without increase in symptoms. [x]? Progressing: []? Met: []? Not Met: []? Adjusted  4. Patient will return to functional activities including amb prn for grocery shopping and errands without increased symptoms and without need of AD. [x]? Progressing: []? Met: []? Not Met: []? Adjusted  NEEDS TO USE SHOPPING CART  5. Improve balance so that pt reports no LOB in the preceding week and no sense of unsteadiness on stairs. [x]? Progressing: []? Met: []? Not Met: []? Adjusted          Overall Progression Towards Functional goals/ Treatment Progress Update:  [x] Patient is progressing as expected towards functional goals listed. [] Progression is slowed due to complexities/Impairments listed. [] Progression has been slowed due to co-morbidities. [] Plan just implemented, too soon to assess goals progression <30days   [] Goals require adjustment due to lack of progress  [] Patient is not progressing as expected and requires additional follow up with physician  [] Other    Persisting Functional Limitations/Impairments:  []Sleeping [x]Sitting               [x]Standing [x]Transfers        [x]Walking [x]Kneeling               [x]Stairs [x]Squatting / bending   [x]ADLs []Reaching  [x]Lifting  [x]Housework  []Driving []Job related tasks  []Sports/Recreation []Other:        ASSESSMENT:  5/4 good carlos of strength ex- cont to need increased hip and LP stabl strength  4/27 pt improving but cont to need Needs increased glut med, glut max, trA strength for improved stability/reduced lateral sway with gait. Also needs improved consistency with HEP.       Treatment/Activity Tolerance:  [x] Patient able to complete tx [] Patient limited by fatigue  [] Patient limited by pain  [] Patient limited by other medical

## 2021-05-13 ENCOUNTER — HOSPITAL ENCOUNTER (OUTPATIENT)
Dept: PHYSICAL THERAPY | Age: 71
Setting detail: THERAPIES SERIES
Discharge: HOME | End: 2021-05-13
Payer: MEDICARE

## 2021-05-13 PROCEDURE — 97112 NEUROMUSCULAR REEDUCATION: CPT

## 2021-05-13 PROCEDURE — 97110 THERAPEUTIC EXERCISES: CPT

## 2021-05-13 PROCEDURE — 97530 THERAPEUTIC ACTIVITIES: CPT

## 2021-05-13 NOTE — FLOWSHEET NOTE
Physical Therapy Daily Treatment Note  Date:  2021    Patient Name:  Stephanie Bashir    :  1950  MRN: [id number]  Medical/Treatment Diagnosis Information:  Diagnosis: post op lower lumbar fusion 21, M48.06  SPINAL STENOSIS OF LUMBAR REGION M43.16  LUMBAR SPONDYLOLISTHESISI, ACQUIRED L1-L5           Treatment Diagnosis: pt to PT after lumbar fusion surgery on 21. She presents with impaired gait, flexibility/strength deficits, poor habitual postures/body [facility]                                      Insurance/Certification information:  PT Insurance Information: medicare and [employer], med nec  Physician Information:  Referring Practitioner: Dr. Seng Edwards of care signed (Y/N): Y    Date of Patient follow up with Physician:    Functional scale:   21 oswestry score 14 disability 31%   eval: OSWESTRY raw score =11 ; dysfunction =24%    Progress Report: []  Yes   [x]  No     Date Range for reporting period:  Beginning21   Ending    Progress report due (10 Rx/or 30 days whichever is less): visit #41 or /    Recertification due (POC duration/ or 90 days whichever is less): visit # or /    Visit # Insurance Allowable Auth required? Date Range    +   Med nec []  Yes  [x]  No na        Latex Allergy:  [x]NO      []YES  Preferred Language for Healthcare:   [x]English       []other    Pain level: 5/10 upon waking, 0-5/10 t/o day. Mostly 5/10 when on feet    SUBJECTIVE:  : Pain changes greatly. Usually a 5/10 getting out of bed. Legs are still really tired. 5/6: reports her pain is a little lower today and made it out to turn on the coffee without a rest today. 5/4 reports she is feeling a little better. No pain with sitting. Pain of a bout 5/10 with walking during the day. Saw Steffany Ahmadi NP for f/u. States [facility] and Dr. Brianne Dobbins say xrays look good - no loose hardware. Went uyp/down the steps a lot yesterday (about 3x) reports the L LE is still problematic and feels weaker.  Has been more consistent with HEP      States she wakes frequently - but not due to pain. States it is hard ot go back to sleep. Reports she thinks if she calls PCP, the MD will tell her to take melatonin. Her oldest brother  last wk; he was 80. States her son will build her a step to do step ups on and that he will add handrails by the steps to enter her home    OBJECTIVE:     pt brings script for 8 additional PT visits form Taina Leger NP for tx of lumbar post op, dated 5/3/21   4/27,  antalgic gait with increased lateral trunk sway B       MRI 2020  [facility]):  Stable small central disc extrusion and mild central canal stenosis at L3-L4. There is progressive left moderate foraminal stenosis at this level.         New laminotomy changes at L4-L5.  The central canal is mildly stenotic which    is improved.  There is worsening severe right foraminal stenosis and moderate    left foraminal stenosis at this level.         Stable moderate right foraminal stenosis and mild left foraminal stenosis at    L5-S1. RESTRICTIONS/PRECAUTIONS: lower lumbar fusion 21 previous lumbar surgery - had cyst removed form lower back. HTN.  Intermittent LOB - no falls, impaired kidneys, frequent john horses in calves    Exercises/Interventions:     Therapeutic Exercises (29101) Resistance / level Sets/sec Reps Notes   Nu step    or bike 5 min  S=9, arms = 11   Step stretches-   HS  Hip flexor 30 sec  30 sec 3  3 B ea      Step ups with TrA iso   fwd    side 6\"  1  1  15x B ea  15x B ea B UE support   IB calf stretches  HR/TR  gastroc stretches at  wall  2 x 30 sec  2x10 ea           Mat ex  S/l glut med clam shell  SLR hip AB  S/l  Hip circles frd and back     TrA iso  fig 4 piriformis stretch   3x15x 5\" R/L  2x5  1x5 ea way       All with TrA iso                  Mat ex w SB  Fig 4 piriformis stretch    TrA iso  DKTC   B LTR                        and L         Min cues for good TrA iso   CC --multifidi walkouts with Cureatr press x 5 2pl 1 3 B CGA/SBA when sidestepping back in                 Therapeutic Activities ()       Pt ed re importance of good hydration in management of john horses  Postural ed-good posture /body [facility]  PT POC,rationale for tx reveiw   11/24, 11/20 talked with pt about hydration and trying to decrease coffee, pt says she has 8 cups per day   Instructed in how to sleep with neutral spine with pillow(s) bn LE and under top arm  Review              GAIT TRG    Cues to reduce lateral sway           Neuromuscular Re-ed (65937)       Balance exercises with TrA iso       Seated SB :   A/P, M/L, circles: CW/CCW 1 15 ea    SLS 2x15\" eyes open   2x15\" eyes closed  B needs B UE support     Glides 3-way  1 10 Ea B          Postural ed for sleeping in L s/l  '                            Manual Intervention (64680)        IASTM                                               Pt. Education:  4/27 encouraged pt to f/u with PCP re apparent insomnia  4/20 pt ed on sleep with neutral spine, review HEP,   d/w pt re aquatic therapy - she prefers to wait until it warms up and do pool ex in her son's pool. instruct on pool ex  In her son's pool. Pt ed re importance of AD to reduce trunk lateral sway - to interrupt pain cycle  4/6 patient educated on diagnosis, prognosis and expectations for rehab  d/w pt re aquatic therapy pros and cons  -all patient questions were answered      HEP instruction:  4/20 walk in her son's pool once it's warm enough - walk frd, sideways, retro, float and tread will pool noodle.   [employer] /wear new tennis shoes for improved shock absorption, use RW/shoppig cart  Prn to reduce trunk lateral sway    4/6  gastroc stretch at wall, SLS, TrA iso, fig 4 piriformis stretch    11/24 gastroc stretch at counter  10/27 sleep with neutral spine; walk 3x/d x 5-10 min ea time; go up and down the basement steps >/= 1-2x/d  10/8 Fig 4 piriformis stretch, Supine HS stretch, TrA iso, Prone press ups, posture    Therapeutic Exercise and NMR EXR  [x] (65790) Provided verbal/tactile cueing for activities related to strengthening, flexibility, endurance, ROM for improvements in  [x] LE / Lumbar: LE, proximal hip, and core control with self care, mobility, lifting, ambulation. [] UE / Cervical: cervical, postural, scapular, scapulothoracic and UE control with self care, reaching, carrying, lifting, house/yardwork, driving, computer work.  [] (98195) Provided verbal/tactile cueing for activities related to improving balance, coordination, kinesthetic sense, posture, motor skill, proprioception to assist with   [] LE / lumbar: LE, proximal hip, and core control in self care, mobility, lifting, ambulation and eccentric single leg control. [] UE / cervical: cervical, scapular, scapulothoracic and UE control with self care, reaching, carrying, lifting, house/yardwork, driving, computer work.   [] (16206) Therapist is in constant attendance of 2 or more patients providing skilled therapy interventions, but not providing any significant amount of measurable one-on-one time to either patient, for improvements in  [] LE / lumbar: LE, proximal hip, and core control in self care, mobility, lifting, ambulation and eccentric single leg control. [] UE / cervical: cervical, scapular, scapulothoracic and UE control with self care, reaching, carrying, lifting, house/yardwork, driving, computer work.      NMR and Therapeutic Activities:    [x] (55924 or 76740) Provided verbal/tactile cueing for activities related to improving balance, coordination, kinesthetic sense, posture, motor skill, proprioception and motor activation to allow for proper function of   [x] LE: / Lumbar core, proximal hip and LE with self care and ADLs  [] UE / Cervical: cervical, postural, scapular, scapulothoracic and UE control with self care, carrying, lifting, driving, computer work.   [] (00418) Gait Re-education- Provided training and Cervical: self care, reaching, carrying, lifting, house/yardwork, driving, computer work. Modalities:  [] (20414) Vasopneumatic compression: Utilized vasopneumatic compression to decrease edema / swelling for the purpose of improving mobility and quad tone / recruitment which will allow for increased overall function including but not limited to self-care, transfers, ambulation, and ascending / descending stairs. Modalities:      Charges:  Timed Code Treatment Minutes: 40   Total Treatment Minutes: 40     [] EVAL - LOW (75496)   [] EVAL - MOD (08144)  [] EVAL - HIGH (93130)  [] RE-EVAL (06997)  [x] JM(38548) x   1   [] Ionto  [x] NMR (31455) x   1    [] Vaso  [] Manual (49910) x  1     [] Ultrasound  [x] TA x 1       [] Mech Traction (64545)  [] Aquatic Therapy x     [] ES (un) (05793):   [] Home Management Training x  [] ES(attended) (68989)   [] Dry Needling 1-2 muscles (29261):  [] Dry Needling 3+ muscles (293831  [] Group:      [] Other:        GOALS:  Patient stated goal:improve balance and walking  []? Progressing: []? Met: []? Not Met: []? Adjusted     Therapist goals for Patient:   Short Term Goals: To be achieved in: 2 weeks  1. Independent in HEP and progression per patient tolerance, in order to prevent re-injury. [x]? Progressing: []? Met: []? Not Met: []? Adjusted  2. Patient will have a decrease in pain to facilitate improvement in movement, function, and ADLs as indicated by improvement with respect to Functional Deficits. [x]? Progressing: []? Met: []? Not Met: []? Adjusted     Long Term Goals: To be achieved in: 6  weeks  1. Disability index score of 10% or less on the oswestry to assist with reaching prior level of function. [x]? Progressing: []? Met: []? Not Met: []? Adjusted  2.  Patient will demonstrate increased AROM  to [facility], to allow for proper joint functioning to allow pt to resume squatting to lift items, use of good posture and body mechs for home chores without  increase in complete tx [] Patient limited by fatigue  [] Patient limited by pain  [] Patient limited by other medical complications  [] Other:     Prognosis: [x] Good [] Fair  [] Poor    Patient Requires Follow-up: [x] Yes  [] No    Plan for next treatment session:  d/w pt re aquatic therapy and orient to pool if she is interested in pool trial    PLAN:  strength, ROM/flexibility, posture and body mechs, manual, MOC, HEP, pt education     Frequency/Duration: 2 days per week for 6 Weeks:  Interventions:  [x]? Therapeutic exercise including:strength, ROM, flexibility  [x]? NMR activation and proprioception including postural re-education  [x]? Manual therapy as indicated to include: IASTM, STM, PROM, Gr I-IV mobilizations, manipulation. [x]? Modalities as needed that may include: thermal agents, E-stim, Biofeedback, US, iontophoresis as indicated  [x]? Patient education on joint protection, postural re-education, activity modification, progression of HEP. PLAN:   See eval. PT 1x / week for 5 weeks. ( 5 visits)   [x] Continue per plan of care [] Alter current plan (see comments)  [] Plan of care initiated [] Hold pending MD visit [] Discharge    Electronically signed by: Salome Concepcion PT, DPT  Note: If patient does not return for scheduled/ recommended follow up visits, this note will serve as a discharge from care along with most recent update on progress.

## 2021-05-18 ENCOUNTER — HOSPITAL ENCOUNTER (OUTPATIENT)
Dept: PHYSICAL THERAPY | Age: 71
Setting detail: THERAPIES SERIES
Discharge: HOME | End: 2021-05-18
Payer: MEDICARE

## 2021-05-18 PROCEDURE — 97110 THERAPEUTIC EXERCISES: CPT

## 2021-05-18 PROCEDURE — 97112 NEUROMUSCULAR REEDUCATION: CPT

## 2021-05-18 NOTE — FLOWSHEET NOTE
look good - no loose hardware. Went uyp/down the steps a lot yesterday (about 3x) reports the L LE is still problematic and feels weaker. Has been more consistent with HEP      States she wakes frequently - but not due to pain. States it is hard ot go back to sleep. Reports she thinks if she calls PCP, the MD will tell her to take melatonin. Her oldest brother  last wk; he was 80. States her son will build her a step to do step ups on and that he will add handrails by the steps to enter her home    OBJECTIVE:     pt brings script for 8 additional PT visits form Marisa Li NP for tx of lumbar post op, dated 5/3/21   4/27,  antalgic gait with increased lateral trunk sway B       MRI 2020  [facility]):  Stable small central disc extrusion and mild central canal stenosis at L3-L4. There is progressive left moderate foraminal stenosis at this level.         New laminotomy changes at L4-L5.  The central canal is mildly stenotic which    is improved.  There is worsening severe right foraminal stenosis and moderate    left foraminal stenosis at this level.         Stable moderate right foraminal stenosis and mild left foraminal stenosis at    L5-S1. RESTRICTIONS/PRECAUTIONS: lower lumbar fusion 21 previous lumbar surgery - had cyst removed form lower back. HTN.  Intermittent LOB - no falls, impaired kidneys, frequent john horses in calves    Exercises/Interventions:     Therapeutic Exercises (28739) Resistance / level Sets/sec Reps Notes   Nu step    or bike 9 min  S=9, arms = 11   Step stretches-   HS  Hip flexor 30 sec  30 sec 3  3 B ea      Step ups with TrA iso   fwd    side 6\"  1  1  15x B ea  15x B ea B UE support   IB calf stretches  HR/TR  gastroc stretches at  wall  2 x 30 sec  2x10 ea           Mat ex  S/l glut med clam shell  SLR hip AB  S/l  Hip circles frd and back     TrA iso  fig 4 piriformis stretch          All with TrA iso    pt reports she is doing the lying down hip ex consistently at home                 Mat ex w SB  Fig 4 piriformis stretch    TrA iso  DKTC   B LTR                        and L         Min cues for good TrA iso   CC --multifidi walkouts with paloff press x 5 2pl 1 3 B SBA when sidestepping back in                 Therapeutic Activities (63428)       Pt ed re importance of good hydration in management of john horses  Postural ed-good posture /body mechs  PT POC,rationale for tx reveiw   11/24, 11/20 talked with pt about hydration and trying to decrease coffee, pt says she has 8 cups per day   Instructed in how to sleep with neutral spine with pillow(s) bn LE and under top arm  Review              GAIT TRG    Cues to reduce lateral sway           Neuromuscular Re-ed (26668)       Balance exercises with TrA iso       Seated SB :   A/P, M/L, circles: CW/CCW 1 15 ea    SLS 3x15\" eyes open      2x15\" eyes closed  B needs B UE support  Intermittent UE support   Glides 3-way  1 10 Ea B          Postural ed for sleeping in L s/l  '                            Manual Intervention (67827)        IAS                                               Pt. Education:  4/27 encouraged pt to f/u with PCP re apparent insomnia  4/20 pt ed on sleep with neutral spine, review HEP,   d/w pt re aquatic therapy - she prefers to wait until it warms up and do pool ex in her son's pool. instruct on pool ex  In her son's pool. Pt ed re importance of AD to reduce trunk lateral sway - to interrupt pain cycle  4/6 patient educated on diagnosis, prognosis and expectations for rehab  d/w pt re aquatic therapy pros and cons  -all patient questions were answered      HEP instruction:  4/20 walk in her son's pool once it's warm enough - walk frd, sideways, retro, float and tread will pool noodle.   Anniece Rough /wear new tennis shoes for improved shock absorption, use RW/shoppig cart  Prn to reduce trunk lateral sway    4/6  gastroc stretch at wall, SLS, TrA iso, fig 4 piriformis stretch    11/24 gastroc stretch at counter  10/27 sleep with neutral spine; walk 3x/d x 5-10 min ea time; go up and down the basement steps >/= 1-2x/d  10/8 Fig 4 piriformis stretch, Supine HS stretch, TrA iso, Prone press ups, posture    Therapeutic Exercise and NMR EXR  [x] (33283) Provided verbal/tactile cueing for activities related to strengthening, flexibility, endurance, ROM for improvements in  [x] LE / Lumbar: LE, proximal hip, and core control with self care, mobility, lifting, ambulation. [] UE / Cervical: cervical, postural, scapular, scapulothoracic and UE control with self care, reaching, carrying, lifting, house/yardwork, driving, computer work.  [] (33562) Provided verbal/tactile cueing for activities related to improving balance, coordination, kinesthetic sense, posture, motor skill, proprioception to assist with   [] LE / lumbar: LE, proximal hip, and core control in self care, mobility, lifting, ambulation and eccentric single leg control. [] UE / cervical: cervical, scapular, scapulothoracic and UE control with self care, reaching, carrying, lifting, house/yardwork, driving, computer work.   [] (19084) Therapist is in constant attendance of 2 or more patients providing skilled therapy interventions, but not providing any significant amount of measurable one-on-one time to either patient, for improvements in  [] LE / lumbar: LE, proximal hip, and core control in self care, mobility, lifting, ambulation and eccentric single leg control. [] UE / cervical: cervical, scapular, scapulothoracic and UE control with self care, reaching, carrying, lifting, house/yardwork, driving, computer work.      NMR and Therapeutic Activities:    [x] (46243 or 29311) Provided verbal/tactile cueing for activities related to improving balance, coordination, kinesthetic sense, posture, motor skill, proprioception and motor activation to allow for proper function of   [x] LE: / Lumbar core, proximal hip and LE with self care and ADLs  [] UE / Cervical: cervical, postural, scapular, scapulothoracic and UE control with self care, carrying, lifting, driving, computer work.   [] (99469) Gait Re-education- Provided training and instruction to the patient for proper LE, core and proximal hip recruitment and positioning and eccentric body weight control with ambulation re-education including up and down stairs     Home Management Training / Self Care:  [] (04773) Provided self-care/home management training related to activities of daily living and compensatory training, and/or use of adaptive equipment for improvement with: ADLs and compensatory training, meal preparation, safety procedures and instruction in use of adaptive equipment, including bathing, grooming, dressing, personal hygiene, basic household cleaning and chores.      Home Exercise Program:    [] (52520) Reviewed/Progressed HEP activities related to strengthening, flexibility, endurance, ROM of   [] LE / Lumbar: core, proximal hip and LE for functional self-care, mobility, lifting and ambulation/stair navigation   [] UE / Cervical: cervical, postural, scapular, scapulothoracic and UE control with self care, reaching, carrying, lifting, house/yardwork, driving, computer work  [] (01670)Reviewed/Progressed HEP activities related to improving balance, coordination, kinesthetic sense, posture, motor skill, proprioception of   [] LE: core, proximal hip and LE for self care, mobility, lifting, and ambulation/stair navigation    [] UE / Cervical: cervical, postural,  scapular, scapulothoracic and UE control with self care, reaching, carrying, lifting, house/yardwork, driving, computer work    Manual Treatments:  PROM / STM / Oscillations-Mobs:  G-I, II, III, IV (PA's, Inf., Post.)  [] (58898) Provided manual therapy to mobilize LE, proximal hip and/or LS spine soft tissue/joints for the purpose of modulating pain, promoting relaxation,  increasing ROM, reducing/eliminating soft tissue swelling/inflammation/restriction, improving soft tissue extensibility and allowing for proper ROM for normal function with   [] LE / lumbar: self care, mobility, lifting and ambulation. [] UE / Cervical: self care, reaching, carrying, lifting, house/yardwork, driving, computer work. Modalities:  [] (70508) Vasopneumatic compression: Utilized vasopneumatic compression to decrease edema / swelling for the purpose of improving mobility and quad tone / recruitment which will allow for increased overall function including but not limited to self-care, transfers, ambulation, and ascending / descending stairs. Modalities:      Charges:  Timed Code Treatment Minutes: 44   Total Treatment Minutes: 44     [] EVAL - LOW (48595)   [] EVAL - MOD (16970)  [] EVAL - HIGH (33129)  [] RE-EVAL (17006)  [x] PS(11565) x   2   [] Ionto  [x] NMR (60080) x   1    [] Vaso  [] Manual (30504) x  1     [] Ultrasound  [] TA x 1       [] Mech Traction (62965)  [] Aquatic Therapy x     [] ES (un) (39394):   [] Home Management Training x  [] ES(attended) (71898)   [] Dry Needling 1-2 muscles (83182):  [] Dry Needling 3+ muscles (107205  [] Group:      [] Other:        GOALS:  Patient stated goal:improve balance and walking  []? Progressing: []? Met: []? Not Met: []? Adjusted     Therapist goals for Patient:   Short Term Goals: To be achieved in: 2 weeks  1. Independent in HEP and progression per patient tolerance, in order to prevent re-injury. [x]? Progressing: []? Met: []? Not Met: []? Adjusted  2. Patient will have a decrease in pain to facilitate improvement in movement, function, and ADLs as indicated by improvement with respect to Functional Deficits. [x]? Progressing: []? Met: []? Not Met: []? Adjusted     Long Term Goals: To be achieved in: 6  weeks  1. Disability index score of 10% or less on the oswestry to assist with reaching prior level of function. [x]? Progressing: []? Met: []?  Not Met: []? Adjusted  2. Patient will demonstrate increased AROM  to [facility], to allow for proper joint functioning to allow pt to resume squatting to lift items, use of good posture and body mechs for home chores without  increase in symptoms. [x]? Progressing: []? Met: []? Not Met: []? Adjusted  3. Patient will demonstrate increased Strength by 1/2 mmt grade  and core activation to allow for proper functional mobility as indicated by patients Functional Deficits to allow pt to resume up/down the basement stairs and curbs prn without difficulty without increase in symptoms. [x]? Progressing: []? Met: []? Not Met: []? Adjusted  4. Patient will return to functional activities including amb prn for grocery shopping and errands without increased symptoms and without need of AD. [x]? Progressing: []? Met: []? Not Met: []? Adjusted  NEEDS TO USE SHOPPING CART  5. Improve balance so that pt reports no LOB in the preceding week and no sense of unsteadiness on stairs. [x]? Progressing: []? Met: []? Not Met: []? Adjusted          Overall Progression Towards Functional goals/ Treatment Progress Update:  [x] Patient is progressing as expected towards functional goals listed. [] Progression is slowed due to complexities/Impairments listed. [] Progression has been slowed due to co-morbidities. [] Plan just implemented, too soon to assess goals progression <30days   [] Goals require adjustment due to lack of progress  [] Patient is not progressing as expected and requires additional follow up with physician  [] Other    Persisting Functional Limitations/Impairments:  []Sleeping [x]Sitting               [x]Standing [x]Transfers        [x]Walking [x]Kneeling               [x]Stairs [x]Squatting / bending   [x]ADLs []Reaching  [x]Lifting  [x]Housework  []Driving []Job related tasks  []Sports/Recreation []Other:        ASSESSMENT:  5/18 improving core and hip strength and less pain. increased ex carlos without pain  5/4 good carlos of strength ex- cont to need increased hip and LP stabl strength  4/27 pt improving but cont to need Needs increased glut med, glut max, trA strength for improved stability/reduced lateral sway with gait. Also needs improved consistency with HEP. Treatment/Activity Tolerance:  [x] Patient able to complete tx [] Patient limited by fatigue  [] Patient limited by pain  [] Patient limited by other medical complications  [] Other:     Prognosis: [x] Good [] Fair  [] Poor    Patient Requires Follow-up: [x] Yes  [] No    Plan for next treatment session:  d/w pt re aquatic therapy and orient to pool if she is interested in pool trial    PLAN:  strength, ROM/flexibility, posture and body mechs, manual, MOC, HEP, pt education     Frequency/Duration: 2 days per week for 6 Weeks:  Interventions:  [x]? Therapeutic exercise including:strength, ROM, flexibility  [x]? NMR activation and proprioception including postural re-education  [x]? Manual therapy as indicated to include: IASTM, STM, PROM, Gr I-IV mobilizations, manipulation. [x]? Modalities as needed that may include: thermal agents, E-stim, Biofeedback, US, iontophoresis as indicated  [x]? Patient education on joint protection, postural re-education, activity modification, progression of HEP. PLAN:   See eval. PT 1x / week for 5 weeks. ( 5 visits)   [x] Continue per plan of care [] Alter current plan (see comments)  [] Plan of care initiated [] Hold pending MD visit [] Discharge    Electronically signed by: Maru Boston, PT PT, DPT  Note: If patient does not return for scheduled/ recommended follow up visits, this note will serve as a discharge from care along with most recent update on progress.

## 2021-05-21 ENCOUNTER — HOSPITAL ENCOUNTER (OUTPATIENT)
Dept: PHYSICAL THERAPY | Age: 71
Setting detail: THERAPIES SERIES
Discharge: HOME | End: 2021-05-21
Payer: MEDICARE

## 2021-05-21 PROCEDURE — 97112 NEUROMUSCULAR REEDUCATION: CPT

## 2021-05-21 PROCEDURE — 97110 THERAPEUTIC EXERCISES: CPT

## 2021-05-21 NOTE — FLOWSHEET NOTE
Physical Therapy Daily Treatment Note  Date:  2021    Patient Name:  Ever Bee    :  1950  MRN: [id number]  Medical/Treatment Diagnosis Information:  Diagnosis: post op lower lumbar fusion 21, M48.06  SPINAL STENOSIS OF LUMBAR REGION M43.16  LUMBAR SPONDYLOLISTHESISI, ACQUIRED L1-L5           Treatment Diagnosis: pt to PT after lumbar fusion surgery on 21. She presents with impaired gait, flexibility/strength deficits, poor habitual postures/body [facility]                                      Insurance/Certification information:  PT Insurance Information: medicare and [employer], med nec  Physician Information:  Referring Practitioner: Dr. Sajan Bose of care signed (Y/N): Y    Date of Patient follow up with Physician:    Functional scale:   21 oswestry score 14 disability 31%   eval: OSWESTRY raw score =11 ; dysfunction =24%    Progress Report: []  Yes   [x]  No     Date Range for reporting period:  Beginning21   Ending    Progress report due (10 Rx/or 30 days whichever is less): visit #72 or     Recertification due (POC duration/ or 90 days whichever is less): visit # or     Visit # Insurance Allowable Auth required? Date Range    +3/8   Med nec []  Yes  [x]  No na        Latex Allergy:  [x]NO      []YES  Preferred Language for Healthcare:   [x]English       []other    Pain level: 5-6/10 upon waking but goes away quicker than before, 0-5/10 t/o day. SUBJECTIVE:   cont to feel better. Requests to do the steps at end bc the step ups make her sweat     less pain overall. Pain in am goes away more quickly. Sometimes can walk without any pain. More time with less pain    : Pain changes greatly. Usually a 5/10 getting out of bed. Legs are still really tired. 6: reports her pain is a little lower today and made it out to turn on the coffee without a rest today. 5 reports she is feeling a little better. No pain with sitting.  Pain of a bout 5/10 with walking during the day. Saw Tiffany Denson NP for f/u. States [facility] and Dr. Carmen Richard say xrays look good - no loose hardware. Went uyp/down the steps a lot yesterday (about 3x) reports the L LE is still problematic and feels weaker. Has been more consistent with HEP      States she wakes frequently - but not due to pain. States it is hard ot go back to sleep. Reports she thinks if she calls PCP, the MD will tell her to take melatonin. Her oldest brother  last wk; he was 80. States her son will build her a step to do step ups on and that he will add handrails by the steps to enter her home    OBJECTIVE:     pt brings script for 8 additional PT visits form Tiffany Denson NP for tx of lumbar post op, dated 5/3/21   4/27,  antalgic gait with increased lateral trunk sway B       MRI 2020  [facility]):  Stable small central disc extrusion and mild central canal stenosis at L3-L4. There is progressive left moderate foraminal stenosis at this level.         New laminotomy changes at L4-L5.  The central canal is mildly stenotic which    is improved.  There is worsening severe right foraminal stenosis and moderate    left foraminal stenosis at this level.         Stable moderate right foraminal stenosis and mild left foraminal stenosis at    L5-S1. RESTRICTIONS/PRECAUTIONS: lower lumbar fusion 21 previous lumbar surgery - had cyst removed form lower back. HTN.  Intermittent LOB - no falls, impaired kidneys, frequent john horses in calves    Exercises/Interventions:     Therapeutic Exercises (46900) Resistance / level Sets/sec Reps Notes   Nu step    or bike 39 min  S=9, arms = 11   Step stretches-   HS  Hip flexor 30 sec  30 sec 3  3 B ea      Step ups with TrA iso   fwd    side 6\"  1  1  20x B ea  20x B ea B UE support   IB calf stretches  HR/TR  gastroc stretches at  wall  2 x 30 sec  2x10 ea           Mat ex  S/l glut med clam shell  SLR hip AB  S/l  Hip circles frd and back     TrA iso  fig 4 piriformis stretch          All with TrA iso   5/18 pt reports she is doing the lying down hip ex consistently at home                 Mat ex w SB  Fig 4 piriformis stretch    TrA iso  DKTC   B LTR                        and L         Min cues for good TrA iso   CC --multifidi walkouts with paloff press x 5 2pl 1 3 B SBA when sidestepping back in                 Therapeutic Activities (80165)       Pt ed re importance of good hydration in management of john horses  Postural ed-good posture /body mechs  PT POC,rationale for tx reveiw   11/24, 11/20 talked with pt about hydration and trying to decrease coffee, pt says she has 8 cups per day   Instructed in how to sleep with neutral spine with pillow(s) bn LE and under top arm  Review              GAIT TRG    Cues to reduce lateral sway           Neuromuscular Re-ed (42608)       Balance exercises with TrA iso       Seated SB :   A/P, M/L, circles: CW/CCW 1 20 ea    SLS          AIREX:  Partial tandem         3x30\" eyes open      3x30\" eyes closed  B needs B UE support      3x15\" ea way eyes open  3x15\" ea way eyes closed  Intermittent fingertips   Glides 3-way  1 10 Ea B          Postural ed for sleeping in L s/l  '                            Manual Intervention (50947)        IAS                                               Pt. Education:  4/27 encouraged pt to f/u with PCP re apparent insomnia  4/20 pt ed on sleep with neutral spine, review HEP,   d/w pt re aquatic therapy - she prefers to wait until it warms up and do pool ex in her son's pool. instruct on pool ex  In her son's pool. Pt ed re importance of AD to reduce trunk lateral sway - to interrupt pain cycle  4/6 patient educated on diagnosis, prognosis and expectations for rehab  d/w pt re aquatic therapy pros and cons  -all patient questions were answered      HEP instruction:  4/20 walk in her son's pool once it's warm enough - walk frd, sideways, retro, float and tread will pool noodle. Garciaey [employer] /wear new tennis shoes for improved shock absorption, use RW/shoppig cart  Prn to reduce trunk lateral sway    4/6  gastroc stretch at wall, SLS, TrA iso, fig 4 piriformis stretch    11/24 gastroc stretch at counter  10/27 sleep with neutral spine; walk 3x/d x 5-10 min ea time; go up and down the basement steps >/= 1-2x/d  10/8 Fig 4 piriformis stretch, Supine HS stretch, TrA iso, Prone press ups, posture    Therapeutic Exercise and NMR EXR  [x] (25955) Provided verbal/tactile cueing for activities related to strengthening, flexibility, endurance, ROM for improvements in  [x] LE / Lumbar: LE, proximal hip, and core control with self care, mobility, lifting, ambulation. [] UE / Cervical: cervical, postural, scapular, scapulothoracic and UE control with self care, reaching, carrying, lifting, house/yardwork, driving, computer work.  [] (28772) Provided verbal/tactile cueing for activities related to improving balance, coordination, kinesthetic sense, posture, motor skill, proprioception to assist with   [] LE / lumbar: LE, proximal hip, and core control in self care, mobility, lifting, ambulation and eccentric single leg control. [] UE / cervical: cervical, scapular, scapulothoracic and UE control with self care, reaching, carrying, lifting, house/yardwork, driving, computer work.   [] (47312) Therapist is in constant attendance of 2 or more patients providing skilled therapy interventions, but not providing any significant amount of measurable one-on-one time to either patient, for improvements in  [] LE / lumbar: LE, proximal hip, and core control in self care, mobility, lifting, ambulation and eccentric single leg control. [] UE / cervical: cervical, scapular, scapulothoracic and UE control with self care, reaching, carrying, lifting, house/yardwork, driving, computer work.      NMR and Therapeutic Activities:    [x] (74850 or 13459) Provided verbal/tactile cueing for activities related to improving balance, coordination, kinesthetic sense, posture, motor skill, proprioception and motor activation to allow for proper function of   [x] LE: / Lumbar core, proximal hip and LE with self care and ADLs  [] UE / Cervical: cervical, postural, scapular, scapulothoracic and UE control with self care, carrying, lifting, driving, computer work.   [] (76735) Gait Re-education- Provided training and instruction to the patient for proper LE, core and proximal hip recruitment and positioning and eccentric body weight control with ambulation re-education including up and down stairs     Home Management Training / Self Care:  [] (75706) Provided self-care/home management training related to activities of daily living and compensatory training, and/or use of adaptive equipment for improvement with: ADLs and compensatory training, meal preparation, safety procedures and instruction in use of adaptive equipment, including bathing, grooming, dressing, personal hygiene, basic household cleaning and chores.      Home Exercise Program:    [] (21634) Reviewed/Progressed HEP activities related to strengthening, flexibility, endurance, ROM of   [] LE / Lumbar: core, proximal hip and LE for functional self-care, mobility, lifting and ambulation/stair navigation   [] UE / Cervical: cervical, postural, scapular, scapulothoracic and UE control with self care, reaching, carrying, lifting, house/yardwork, driving, computer work  [] (02866)Reviewed/Progressed HEP activities related to improving balance, coordination, kinesthetic sense, posture, motor skill, proprioception of   [] LE: core, proximal hip and LE for self care, mobility, lifting, and ambulation/stair navigation    [] UE / Cervical: cervical, postural,  scapular, scapulothoracic and UE control with self care, reaching, carrying, lifting, house/yardwork, driving, computer work    Manual Treatments:  PROM / STM / Oscillations-Mobs:  G-I, II, III, IV (PA's, Inf., Post.)  [] (01170) Provided manual therapy to mobilize LE, proximal hip and/or LS spine soft tissue/joints for the purpose of modulating pain, promoting relaxation,  increasing ROM, reducing/eliminating soft tissue swelling/inflammation/restriction, improving soft tissue extensibility and allowing for proper ROM for normal function with   [] LE / lumbar: self care, mobility, lifting and ambulation. [] UE / Cervical: self care, reaching, carrying, lifting, house/yardwork, driving, computer work. Modalities:  [] (49499) Vasopneumatic compression: Utilized vasopneumatic compression to decrease edema / swelling for the purpose of improving mobility and quad tone / recruitment which will allow for increased overall function including but not limited to self-care, transfers, ambulation, and ascending / descending stairs. Modalities:      Charges:  Timed Code Treatment Minutes: 45   Total Treatment Minutes: 45     [] EVAL - LOW (44223)   [] EVAL - MOD (89335)  [] EVAL - HIGH (94817)  [] RE-EVAL (04092)  [x] DF(61059) x   2   [] Ionto  [x] NMR (97011) x   1    [] Vaso  [] Manual (80245) x  1     [] Ultrasound  [] TA x 1       [] Mech Traction (07635)  [] Aquatic Therapy x     [] ES (un) (51582):   [] Home Management Training x  [] ES(attended) (37790)   [] Dry Needling 1-2 muscles (92089):  [] Dry Needling 3+ muscles (698606  [] Group:      [] Other:        GOALS:  Patient stated goal:improve balance and walking  []? Progressing: []? Met: []? Not Met: []? Adjusted     Therapist goals for Patient:   Short Term Goals: To be achieved in: 2 weeks  1. Independent in HEP and progression per patient tolerance, in order to prevent re-injury. [x]? Progressing: []? Met: []? Not Met: []? Adjusted  2. Patient will have a decrease in pain to facilitate improvement in movement, function, and ADLs as indicated by improvement with respect to Functional Deficits. [x]? Progressing: []? Met: []? Not Met: []? Adjusted     Long Term Goals:  To be achieved in: 6  weeks  1. Disability index score of 10% or less on the oswestry to assist with reaching prior level of function. [x]? Progressing: []? Met: []? Not Met: []? Adjusted  2. Patient will demonstrate increased AROM  to [facility], to allow for proper joint functioning to allow pt to resume squatting to lift items, use of good posture and body mechs for home chores without  increase in symptoms. [x]? Progressing: []? Met: []? Not Met: []? Adjusted  3. Patient will demonstrate increased Strength by 1/2 mmt grade  and core activation to allow for proper functional mobility as indicated by patients Functional Deficits to allow pt to resume up/down the basement stairs and curbs prn without difficulty without increase in symptoms. [x]? Progressing: []? Met: []? Not Met: []? Adjusted  4. Patient will return to functional activities including amb prn for grocery shopping and errands without increased symptoms and without need of AD. [x]? Progressing: []? Met: []? Not Met: []? Adjusted  NEEDS TO USE SHOPPING CART  5. Improve balance so that pt reports no LOB in the preceding week and no sense of unsteadiness on stairs. [x]? Progressing: []? Met: []? Not Met: []? Adjusted          Overall Progression Towards Functional goals/ Treatment Progress Update:  [x] Patient is progressing as expected towards functional goals listed. [] Progression is slowed due to complexities/Impairments listed. [] Progression has been slowed due to co-morbidities.   [] Plan just implemented, too soon to assess goals progression <30days   [] Goals require adjustment due to lack of progress  [] Patient is not progressing as expected and requires additional follow up with physician  [] Other    Persisting Functional Limitations/Impairments:  []Sleeping [x]Sitting               [x]Standing [x]Transfers        [x]Walking [x]Kneeling               [x]Stairs [x]Squatting / bending   [x]ADLs []Reaching  [x]Lifting

## 2021-05-25 ENCOUNTER — HOSPITAL ENCOUNTER (OUTPATIENT)
Dept: PHYSICAL THERAPY | Age: 71
Setting detail: THERAPIES SERIES
Discharge: HOME | End: 2021-05-25
Payer: MEDICARE

## 2021-05-25 PROCEDURE — 97110 THERAPEUTIC EXERCISES: CPT

## 2021-05-25 PROCEDURE — 97112 NEUROMUSCULAR REEDUCATION: CPT

## 2021-05-25 PROCEDURE — 97530 THERAPEUTIC ACTIVITIES: CPT

## 2021-05-25 NOTE — FLOWSHEET NOTE
with sitting. Pain of a bout 5/10 with walking during the day. Saw Alexis Schulz NP for f/u. States [employer] and Dr. Migel Tran say xrays look good - no loose hardware. Went uyp/down the steps a lot yesterday (about 3x) reports the L LE is still problematic and feels weaker. Has been more consistent with HEP    States she wakes frequently - but not due to pain. States it is hard ot go back to sleep. Reports she thinks if she calls PCP, the MD will tell her to take melatonin. Her oldest brother  last wk; he was 80. States her son will build her a step to do step ups on and that he will add handrails by the steps to enter her home    OBJECTIVE:     pt brings script for 8 additional PT visits form Alexis Schulz NP for tx of lumbar post op, dated 5/3/21   4/27,  antalgic gait with increased lateral trunk sway B       MRI 2020  [facility]):  Stable small central disc extrusion and mild central canal stenosis at L3-L4. There is progressive left moderate foraminal stenosis at this level.         New laminotomy changes at L4-L5.  The central canal is mildly stenotic which    is improved.  There is worsening severe right foraminal stenosis and moderate    left foraminal stenosis at this level.         Stable moderate right foraminal stenosis and mild left foraminal stenosis at    L5-S1. RESTRICTIONS/PRECAUTIONS: lower lumbar fusion 21 previous lumbar surgery - had cyst removed form lower back. HTN.  Intermittent LOB - no falls, impaired kidneys, frequent john horses in calves    Exercises/Interventions:     Therapeutic Exercises (19931) Resistance / level Sets/sec Reps Notes   Nu step    or bike 49 min  S=9, arms = 11   Step stretches-   HS  Hip flexor 30 sec  30 sec 3  3 B ea      Step ups with TrA iso   fwd    side 6\"  1  1  20x B ea  20x B ea B UE support   IB calf stretches  HR/TR  gastroc stretches at  wall  2 x 30 sec  2x10 ea           Mat ex  S/l glut med clam shell  SLR hip AB  S/l  Hip circles frd and back     TrA iso  fig 4 piriformis stretch          All with TrA iso   5/18 pt reports she is doing the lying down hip ex consistently at home                 Mat ex w SB  Fig 4 piriformis stretch    TrA iso  DKTC   B LTR                        and L         Min cues for good TrA iso   CC --multifidi walkouts with paloff press x 5 2pl 1 3 B SBA when sidestepping back in                 Therapeutic Activities (90844)       Pt ed re importance of good hydration in management of john horses  Postural ed-good posture /body mechs  PT POC,rationale for tx reveiw   11/24, 11/20 talked with pt about hydration and trying to decrease coffee, pt says she has 8 cups per day   Instructed in how to sleep with neutral spine with pillow(s) bn LE and under top arm  Review              GAIT TRG    Cues to reduce lateral sway           Neuromuscular Re-ed (75865)       Balance exercises with TrA iso       Seated SB :   A/P, M/L, circles: CW/CCW 1 20 ea    SLS          AIREX:  Partial tandem         3x30\" eyes open      3x30\" eyes closed  B needs B UE support      3x15\" ea way eyes open  3x15\" ea way eyes closed  Intermittent fingertips   Glides 3-way  1 10 Ea B          Postural ed for sleeping in L s/l  '                            Manual Intervention (81091)        IAS                                               Pt. Education:  4/27 encouraged pt to f/u with PCP re apparent insomnia  4/20 pt ed on sleep with neutral spine, review HEP,   d/w pt re aquatic therapy - she prefers to wait until it warms up and do pool ex in her son's pool. instruct on pool ex  In her son's pool.  Pt ed re importance of AD to reduce trunk lateral sway - to interrupt pain cycle  4/6 patient educated on diagnosis, prognosis and expectations for rehab  d/w pt re aquatic therapy pros and cons  -all patient questions were answered      HEP instruction:  4/20 walk in her son's pool once it's warm enough - walk frd, sideways, retro, float and tread will pool noodle. Gubernardo Roup /wear new tennis shoes for improved shock absorption, use RW/shoppig cart  Prn to reduce trunk lateral sway    4/6  gastroc stretch at wall, SLS, TrA iso, fig 4 piriformis stretch    11/24 gastroc stretch at counter  10/27 sleep with neutral spine; walk 3x/d x 5-10 min ea time; go up and down the basement steps >/= 1-2x/d  10/8 Fig 4 piriformis stretch, Supine HS stretch, TrA iso, Prone press ups, posture    Therapeutic Exercise and NMR EXR  [x] (50904) Provided verbal/tactile cueing for activities related to strengthening, flexibility, endurance, ROM for improvements in  [x] LE / Lumbar: LE, proximal hip, and core control with self care, mobility, lifting, ambulation. [] UE / Cervical: cervical, postural, scapular, scapulothoracic and UE control with self care, reaching, carrying, lifting, house/yardwork, driving, computer work.  [] (27104) Provided verbal/tactile cueing for activities related to improving balance, coordination, kinesthetic sense, posture, motor skill, proprioception to assist with   [] LE / lumbar: LE, proximal hip, and core control in self care, mobility, lifting, ambulation and eccentric single leg control. [] UE / cervical: cervical, scapular, scapulothoracic and UE control with self care, reaching, carrying, lifting, house/yardwork, driving, computer work.   [] (88222) Therapist is in constant attendance of 2 or more patients providing skilled therapy interventions, but not providing any significant amount of measurable one-on-one time to either patient, for improvements in  [] LE / lumbar: LE, proximal hip, and core control in self care, mobility, lifting, ambulation and eccentric single leg control. [] UE / cervical: cervical, scapular, scapulothoracic and UE control with self care, reaching, carrying, lifting, house/yardwork, driving, computer work.      NMR and Therapeutic Activities:    [x] (96734 or 91778) Provided verbal/tactile cueing for activities Post.)  [] (14325) Provided manual therapy to mobilize LE, proximal hip and/or LS spine soft tissue/joints for the purpose of modulating pain, promoting relaxation,  increasing ROM, reducing/eliminating soft tissue swelling/inflammation/restriction, improving soft tissue extensibility and allowing for proper ROM for normal function with   [] LE / lumbar: self care, mobility, lifting and ambulation. [] UE / Cervical: self care, reaching, carrying, lifting, house/yardwork, driving, computer work. Modalities:  [] (94235) Vasopneumatic compression: Utilized vasopneumatic compression to decrease edema / swelling for the purpose of improving mobility and quad tone / recruitment which will allow for increased overall function including but not limited to self-care, transfers, ambulation, and ascending / descending stairs. Modalities:      Charges:  Timed Code Treatment Minutes: 45   Total Treatment Minutes: 45     [] EVAL - LOW (09863)   [] EVAL - MOD (44662)  [] EVAL - HIGH (46577)  [] RE-EVAL (24096)  [x] XE(11488) x   2   [] Ionto  [x] NMR (29289) x   1    [] Vaso  [] Manual (89177) x  1     [] Ultrasound  [] TA x 1       [] Mech Traction (95727)  [] Aquatic Therapy x     [] ES (un) (53452):   [] Home Management Training x  [] ES(attended) (00833)   [] Dry Needling 1-2 muscles (19020):  [] Dry Needling 3+ muscles (292107  [] Group:      [] Other:        GOALS:  Patient stated goal:improve balance and walking  []? Progressing: []? Met: []? Not Met: []? Adjusted     Therapist goals for Patient:   Short Term Goals: To be achieved in: 2 weeks  1. Independent in HEP and progression per patient tolerance, in order to prevent re-injury. [x]? Progressing: []? Met: []? Not Met: []? Adjusted  2. Patient will have a decrease in pain to facilitate improvement in movement, function, and ADLs as indicated by improvement with respect to Functional Deficits. [x]? Progressing: []? Met: []? Not Met: []?  Adjusted     Long Term Goals: To be achieved in: 6  weeks  1. Disability index score of 10% or less on the oswestry to assist with reaching prior level of function. [x]? Progressing: []? Met: []? Not Met: []? Adjusted  2. Patient will demonstrate increased AROM  to [facility], to allow for proper joint functioning to allow pt to resume squatting to lift items, use of good posture and body mechs for home chores without  increase in symptoms. [x]? Progressing: []? Met: []? Not Met: []? Adjusted  3. Patient will demonstrate increased Strength by 1/2 mmt grade  and core activation to allow for proper functional mobility as indicated by patients Functional Deficits to allow pt to resume up/down the basement stairs and curbs prn without difficulty without increase in symptoms. [x]? Progressing: []? Met: []? Not Met: []? Adjusted  4. Patient will return to functional activities including amb prn for grocery shopping and errands without increased symptoms and without need of AD. [x]? Progressing: []? Met: []? Not Met: []? Adjusted  NEEDS TO USE SHOPPING CART  5. Improve balance so that pt reports no LOB in the preceding week and no sense of unsteadiness on stairs. [x]? Progressing: []? Met: []? Not Met: []? Adjusted          Overall Progression Towards Functional goals/ Treatment Progress Update:  [x] Patient is progressing as expected towards functional goals listed. [] Progression is slowed due to complexities/Impairments listed. [] Progression has been slowed due to co-morbidities.   [] Plan just implemented, too soon to assess goals progression <30days   [] Goals require adjustment due to lack of progress  [] Patient is not progressing as expected and requires additional follow up with physician  [] Other    Persisting Functional Limitations/Impairments:  []Sleeping [x]Sitting               [x]Standing [x]Transfers        [x]Walking [x]Kneeling               [x]Stairs [x]Squatting / bending   [x]ADLs []Reaching  [x]Lifting [x]Housework  []Driving []Job related tasks  []Sports/Recreation []Other:        ASSESSMENT:  5/20 cont to demo increased carlos of exercise. Less pain after doing ex in PT tx  5/18 improving core and hip strength and less pain. increased ex carlos without pain  5/4 good carlos of strength ex- cont to need increased hip and LP stabl strength  4/27 pt improving but cont to need Needs increased glut med, glut max, trA strength for improved stability/reduced lateral sway with gait. Also needs improved consistency with HEP. Treatment/Activity Tolerance:  [x] Patient able to complete tx [] Patient limited by fatigue  [] Patient limited by pain  [] Patient limited by other medical complications  [] Other:     Prognosis: [x] Good [] Fair  [] Poor    Patient Requires Follow-up: [x] Yes  [] No    Plan for next treatment session:  d/w pt re aquatic therapy and orient to pool if she is interested in pool trial    PLAN:  strength, ROM/flexibility, posture and body mechs, manual, MOC, HEP, pt education     Frequency/Duration: 2 days per week for 6 Weeks:  Interventions:  [x]? Therapeutic exercise including:strength, ROM, flexibility  [x]? NMR activation and proprioception including postural re-education  [x]? Manual therapy as indicated to include: IASTM, STM, PROM, Gr I-IV mobilizations, manipulation. [x]? Modalities as needed that may include: thermal agents, E-stim, Biofeedback, US, iontophoresis as indicated  [x]? Patient education on joint protection, postural re-education, activity modification, progression of HEP. PLAN:   See eval. PT 1x / week for 5 weeks.  ( 5 visits)   [x] Continue per plan of care [] Alter current plan (see comments)  [] Plan of care initiated [] Hold pending MD visit [] Discharge    Electronically signed by: Vasu Lab, PT PT, DPT  Note: If patient does not return for scheduled/ recommended follow up visits, this note will serve as a discharge from care along with most recent update on progress.

## 2021-05-27 ENCOUNTER — HOSPITAL ENCOUNTER (OUTPATIENT)
Dept: PHYSICAL THERAPY | Age: 71
Setting detail: THERAPIES SERIES
Discharge: HOME | End: 2021-05-27
Payer: MEDICARE

## 2021-05-27 PROCEDURE — 97110 THERAPEUTIC EXERCISES: CPT

## 2021-05-27 PROCEDURE — 97530 THERAPEUTIC ACTIVITIES: CPT

## 2021-05-27 PROCEDURE — 97112 NEUROMUSCULAR REEDUCATION: CPT

## 2021-05-27 NOTE — FLOWSHEET NOTE
coffee without a rest today.  reports she is feeling a little better. No pain with sitting. Pain of a bout 5/10 with walking during the day. Saw Lorence Primrose NP for f/u. States [facility] and Dr. Hiern Ca say xrays look good - no loose hardware. Went uyp/down the steps a lot yesterday (about 3x) reports the L LE is still problematic and feels weaker. Has been more consistent with HEP    States she wakes frequently - but not due to pain. States it is hard ot go back to sleep. Reports she thinks if she calls PCP, the MD will tell her to take melatonin. Her oldest brother  last wk; he was 80. States her son will build her a step to do step ups on and that he will add handrails by the steps to enter her home    OBJECTIVE:     pt brings script for 8 additional PT visits form Lorence Primrose NP for tx of lumbar post op, dated 5/3/21   4/27,  antalgic gait with increased lateral trunk sway B       MRI 2020  [facility]):  Stable small central disc extrusion and mild central canal stenosis at L3-L4. There is progressive left moderate foraminal stenosis at this level.         New laminotomy changes at L4-L5.  The central canal is mildly stenotic which    is improved.  There is worsening severe right foraminal stenosis and moderate    left foraminal stenosis at this level.         Stable moderate right foraminal stenosis and mild left foraminal stenosis at    L5-S1. RESTRICTIONS/PRECAUTIONS: lower lumbar fusion 21 previous lumbar surgery - had cyst removed form lower back. HTN.  Intermittent LOB - no falls, impaired kidneys, frequent john horses in calves    Exercises/Interventions:     Therapeutic Exercises (84569) Resistance / level Sets/sec Reps Notes   Nu step    or bike 49 min  S=9, arms = 11   Step stretches-   HS  Hip flexor 30 sec  30 sec 3  3 B ea      Step ups with TrA iso   fwd    side 6\"  1  1  20x B ea  20x B ea B UE support   IB calf stretches  HR/TR  gastroc stretches at  wall  2 x 30 sec  2x10 ea           Mat ex  S/l glut med clam shell  SLR hip AB  S/l  Hip circles frd and back     TrA iso  fig 4 piriformis stretch          All with TrA iso   5/18 pt reports she is doing the lying down hip ex consistently at home                 Mat ex w SB  Fig 4 piriformis stretch    TrA iso  DKTC   B LTR                        and L         Min cues for good TrA iso   CC --multifidi walkouts with paloff press x 5 2pl 1 3 B SBA when sidestepping back in                 Therapeutic Activities (06657)       Pt ed re importance of good hydration in management of john horses  Postural ed-good posture /body mechs  PT POC,rationale for tx reveiw   11/24, 11/20 talked with pt about hydration and trying to decrease coffee, pt says she has 8 cups per day   Instructed in how to sleep with neutral spine with pillow(s) bn LE and under top arm  Review              GAIT TRG    Cues to reduce lateral sway           Neuromuscular Re-ed (58855)       Balance exercises with TrA iso       Seated SB :   A/P, M/L, circles: CW/CCW 1 20 ea    SLS          AIREX:  Partial tandem         3x30\" eyes open      3x30\" eyes closed  B needs B UE support      3x15\" ea way eyes open  3x15\" ea way eyes closed  Intermittent fingertips   Glides 3-way  1 10 Ea B          Postural ed for sleeping in L s/l  '                            Manual Intervention (09540)        IASTM                                               Pt. Education:  4/27 encouraged pt to f/u with PCP re apparent insomnia  4/20 pt ed on sleep with neutral spine, review HEP,   d/w pt re aquatic therapy - she prefers to wait until it warms up and do pool ex in her son's pool. instruct on pool ex  In her son's pool.  Pt ed re importance of AD to reduce trunk lateral sway - to interrupt pain cycle  4/6 patient educated on diagnosis, prognosis and expectations for rehab  d/w pt re aquatic therapy pros and cons  -all patient questions were answered      HEP instruction:  4/20 work    Manual Treatments:  PROM / STM / Oscillations-Mobs:  G-I, II, III, IV (PA's, Inf., Post.)  [] (51786) Provided manual therapy to mobilize LE, proximal hip and/or LS spine soft tissue/joints for the purpose of modulating pain, promoting relaxation,  increasing ROM, reducing/eliminating soft tissue swelling/inflammation/restriction, improving soft tissue extensibility and allowing for proper ROM for normal function with   [] LE / lumbar: self care, mobility, lifting and ambulation. [] UE / Cervical: self care, reaching, carrying, lifting, house/yardwork, driving, computer work. Modalities:  [] (18362) Vasopneumatic compression: Utilized vasopneumatic compression to decrease edema / swelling for the purpose of improving mobility and quad tone / recruitment which will allow for increased overall function including but not limited to self-care, transfers, ambulation, and ascending / descending stairs. Modalities:      Charges:  Timed Code Treatment Minutes: 45   Total Treatment Minutes: 45     [] EVAL - LOW (33326)   [] EVAL - MOD (92350)  [] EVAL - HIGH (13833)  [] RE-EVAL (94814)  [x] II(30490) x   1   [] Ionto  [x] NMR (65907) x   1    [] Vaso  [] Manual (56848) x  1     [] Ultrasound  [x] TA x 1       [] Mech Traction (58849)  [] Aquatic Therapy x     [] ES (un) (91093):   [] Home Management Training x  [] ES(attended) (23715)   [] Dry Needling 1-2 muscles (23907):  [] Dry Needling 3+ muscles (026142  [] Group:      [] Other:        GOALS:  Patient stated goal:improve balance and walking  []? Progressing: []? Met: []? Not Met: []? Adjusted     Therapist goals for Patient:   Short Term Goals: To be achieved in: 2 weeks  1. Independent in HEP and progression per patient tolerance, in order to prevent re-injury. [x]? Progressing: []? Met: []? Not Met: []? Adjusted  2.  Patient will have a decrease in pain to facilitate improvement in movement, function, and ADLs as indicated by improvement with respect to Functional Deficits. [x]? Progressing: []? Met: []? Not Met: []? Adjusted     Long Term Goals: To be achieved in: 6  weeks  1. Disability index score of 10% or less on the oswestry to assist with reaching prior level of function. [x]? Progressing: []? Met: []? Not Met: []? Adjusted  2. Patient will demonstrate increased AROM  to [facility], to allow for proper joint functioning to allow pt to resume squatting to lift items, use of good posture and body mechs for home chores without  increase in symptoms. [x]? Progressing: []? Met: []? Not Met: []? Adjusted  3. Patient will demonstrate increased Strength by 1/2 mmt grade  and core activation to allow for proper functional mobility as indicated by patients Functional Deficits to allow pt to resume up/down the basement stairs and curbs prn without difficulty without increase in symptoms. [x]? Progressing: []? Met: []? Not Met: []? Adjusted  4. Patient will return to functional activities including amb prn for grocery shopping and errands without increased symptoms and without need of AD. [x]? Progressing: []? Met: []? Not Met: []? Adjusted  NEEDS TO USE SHOPPING CART  5. Improve balance so that pt reports no LOB in the preceding week and no sense of unsteadiness on stairs. [x]? Progressing: []? Met: []? Not Met: []? Adjusted          Overall Progression Towards Functional goals/ Treatment Progress Update:  [x] Patient is progressing as expected towards functional goals listed. [] Progression is slowed due to complexities/Impairments listed. [] Progression has been slowed due to co-morbidities.   [] Plan just implemented, too soon to assess goals progression <30days   [] Goals require adjustment due to lack of progress  [] Patient is not progressing as expected and requires additional follow up with physician  [] Other    Persisting Functional Limitations/Impairments:  []Sleeping [x]Sitting               [x]Standing [x]Transfers        [x]Walking [x]Kneeling               [x]Stairs [x]Squatting / bending   [x]ADLs []Reaching  [x]Lifting  [x]Housework  []Driving []Job related tasks  []Sports/Recreation []Other:        ASSESSMENT:  5/20 cont to demo increased carlos of exercise. Less pain after doing ex in PT tx  5/18 improving core and hip strength and less pain. increased ex carlos without pain  5/4 good carlos of strength ex- cont to need increased hip and LP stabl strength  4/27 pt improving but cont to need Needs increased glut med, glut max, trA strength for improved stability/reduced lateral sway with gait. Also needs improved consistency with HEP. Treatment/Activity Tolerance:  [x] Patient able to complete tx [] Patient limited by fatigue  [] Patient limited by pain  [] Patient limited by other medical complications  [] Other:     Prognosis: [x] Good [] Fair  [] Poor    Patient Requires Follow-up: [x] Yes  [] No    Plan for next treatment session:  d/w pt re aquatic therapy and orient to pool if she is interested in pool trial    PLAN:  strength, ROM/flexibility, posture and body mechs, manual, MOC, HEP, pt education     Frequency/Duration: 2 days per week for 6 Weeks:  Interventions:  [x]? Therapeutic exercise including:strength, ROM, flexibility  [x]? NMR activation and proprioception including postural re-education  [x]? Manual therapy as indicated to include: IASTM, STM, PROM, Gr I-IV mobilizations, manipulation. [x]? Modalities as needed that may include: thermal agents, E-stim, Biofeedback, US, iontophoresis as indicated  [x]? Patient education on joint protection, postural re-education, activity modification, progression of HEP. PLAN:   See eval. PT 1x / week for 5 weeks.  ( 5 visits)   [x] Continue per plan of care [] Alter current plan (see comments)  [] Plan of care initiated [] Hold pending MD visit [] Discharge    Electronically signed by: Vasu Lab, PT PT, DPT  Note: If patient does not return for scheduled/ recommended follow up visits, this note will serve as a discharge from care along with most recent update on progress.

## 2021-06-01 ENCOUNTER — HOSPITAL ENCOUNTER (OUTPATIENT)
Dept: PHYSICAL THERAPY | Age: 71
Setting detail: THERAPIES SERIES
Discharge: HOME | End: 2021-06-01
Payer: MEDICARE

## 2021-06-01 PROCEDURE — 97530 THERAPEUTIC ACTIVITIES: CPT

## 2021-06-01 PROCEDURE — 97110 THERAPEUTIC EXERCISES: CPT

## 2021-06-01 PROCEDURE — 97112 NEUROMUSCULAR REEDUCATION: CPT

## 2021-06-01 NOTE — FLOWSHEET NOTE
Physical Therapy Daily Treatment Note  Date:  2021    Patient Name:  Latosha Berg    :  1950  MRN: [id number]  Medical/Treatment Diagnosis Information:  Diagnosis: post op lower lumbar fusion 21, M48.06  SPINAL STENOSIS OF LUMBAR REGION M43.16  LUMBAR SPONDYLOLISTHESISI, ACQUIRED L1-L5           Treatment Diagnosis: pt to PT after lumbar fusion surgery on 21. She presents with impaired gait, flexibility/strength deficits, poor habitual postures/body [facility]                                      Insurance/Certification information:  PT Insurance Information: medicare and [employer], med nec  Physician Information:  Referring Practitioner: Dr. Federico Denise of care signed (Y/N): Y    Date of Patient follow up with Physician:    Functional scale:   21 oswestry score 14 disability 31%   eval: OSWESTRY raw score =11 ; dysfunction =24%    Progress Report: []  Yes   [x]  No     Date Range for reporting period:  Beginning21   Ending    Progress report due (10 Rx/or 30 days whichever is less): visit #56 or     Recertification due (POC duration/ or 90 days whichever is less): visit # or     Visit # Insurance Allowable Auth required? Date Range    +   Med Yesika Sims []  Yes  [x]  No na        Latex Allergy:  [x]NO      []YES  Preferred Language for Healthcare:   [x]English       []other    Pain level: 0/10  Now. Still gets up to 7/10 upon waking, by about 10am it is better    SUBJECTIVE: 6 cont to improve, but still has am pain. :pain was bad this am 7/10 but now a 5/10. Moving a lot makes it better. :  States she woke u at 3:00 in the morning and her leg hurts for some reason today. Not sure why   cont to feel better. Requests to do the steps at end bc the step ups make her sweat   less pain overall. Pain in am goes away more quickly. Sometimes can walk without any pain. More time with less pain  : Pain changes greatly. Usually a 5/10 getting out of bed. Legs are still really tired. : reports her pain is a little lower today and made it out to turn on the coffee without a rest today.  reports she is feeling a little better. No pain with sitting. Pain of a bout 5/10 with walking during the day. Saw Aidee Bartholomew NP for f/u. States Vickiamarilys Freire and Dr. Lui Mccann say xrays look good - no loose hardware. Went uyp/down the steps a lot yesterday (about 3x) reports the L LE is still problematic and feels weaker. Has been more consistent with HEP    States she wakes frequently - but not due to pain. States it is hard ot go back to sleep. Reports she thinks if she calls PCP, the MD will tell her to take melatonin. Her oldest brother  last wk; he was 80. States her son will build her a step to do step ups on and that he will add handrails by the steps to enter her home    OBJECTIVE:     pt brings script for 8 additional PT visits form Aidee Bartholomew NP for tx of lumbar post op, dated 5/3/21   4/27,  antalgic gait with increased lateral trunk sway B       MRI 2020  [facility]):  Stable small central disc extrusion and mild central canal stenosis at L3-L4. There is progressive left moderate foraminal stenosis at this level.         New laminotomy changes at L4-L5.  The central canal is mildly stenotic which    is improved.  There is worsening severe right foraminal stenosis and moderate    left foraminal stenosis at this level.         Stable moderate right foraminal stenosis and mild left foraminal stenosis at    L5-S1. RESTRICTIONS/PRECAUTIONS: lower lumbar fusion 21 previous lumbar surgery - had cyst removed form lower back. HTN.  Intermittent LOB - no falls, impaired kidneys, frequent john horses in calves    Exercises/Interventions:     Therapeutic Exercises (44795) Resistance / level Sets/sec Reps Notes   Nu step    or bike 47 min  S=9, arms = 11   Step stretches-   HS  Hip flexor 30 sec  30 sec 3  3 B ea      Step ups with TrA iso   fwd side 6\"  1  1  20x B ea  20x B ea B UE support   IB calf stretches  HR/TR  gastroc stretches at  wall  2 x 30 sec  2x10 ea           Mat ex  S/l glut med clam shell  SLR hip AB  S/l  Hip circles frd and back     TrA iso  fig 4 piriformis stretch          All with TrA iso   5/18 pt reports she is doing the lying down hip ex consistently at home                 Mat ex w SB  Fig 4 piriformis stretch    TrA iso  DKTC   B LTR                        and L         Min cues for good TrA iso   CC --multifidi walkouts with paloff press x 5 2pl 1 3 B SBA when sidestepping back in                 Therapeutic Activities (60387)       Pt ed re importance of good hydration in management of john horses  Postural ed-good posture /body mec  PT POC,rationale for tx reveiw   11/24, 11/20 talked with pt about hydration and trying to decrease coffee, pt says she has 8 cups per day   Instructed in how to sleep with neutral spine with pillow(s) bn LE and under top arm  Review              GAIT TRG    Cues to reduce lateral sway           Neuromuscular Re-ed (79119)       Balance exercises with TrA iso       Seated SB :   A/P, M/L, circles: CW/CCW 1 20 ea    SLS          AIREX:  Partial tandem         3x30\" eyes open      3x30\" eyes closed  B needs B UE support      3x15\" ea way eyes open  3x15\" ea way eyes closed  Intermittent fingertips   Glides 3-way  1 15 Ea B          Postural ed for sleeping in L s/l  '                            Manual Intervention (19822)        IAS                                               Pt. Education:  4/27 encouraged pt to f/u with PCP re apparent insomnia  4/20 pt ed on sleep with neutral spine, review HEP,   d/w pt re aquatic therapy - she prefers to wait until it warms up and do pool ex in her son's pool. instruct on pool ex  In her son's pool.  Pt ed re importance of AD to reduce trunk lateral sway - to interrupt pain cycle  4/6 patient educated on diagnosis, prognosis and expectations for rehab  d/w pt re aquatic therapy pros and cons  -all patient questions were answered      HEP instruction:  6/1  walk in her son's pool  frd, sideways, retro, float and tread will pool noodle    4/20 walk in her son's pool once it's warm enough - walk frd, sideways, retro, float and tread will pool noodle. Patino Cardenas /wear new tennis shoes for improved shock absorption, use RW/shoppig cart  Prn to reduce trunk lateral sway    4/6  gastroc stretch at wall, SLS, TrA iso, fig 4 piriformis stretch    11/24 gastroc stretch at counter  10/27 sleep with neutral spine; walk 3x/d x 5-10 min ea time; go up and down the basement steps >/= 1-2x/d  10/8 Fig 4 piriformis stretch, Supine HS stretch, TrA iso, Prone press ups, posture    Therapeutic Exercise and NMR EXR  [x] (32869) Provided verbal/tactile cueing for activities related to strengthening, flexibility, endurance, ROM for improvements in  [x] LE / Lumbar: LE, proximal hip, and core control with self care, mobility, lifting, ambulation. [] UE / Cervical: cervical, postural, scapular, scapulothoracic and UE control with self care, reaching, carrying, lifting, house/yardwork, driving, computer work.  [] (35051) Provided verbal/tactile cueing for activities related to improving balance, coordination, kinesthetic sense, posture, motor skill, proprioception to assist with   [x] LE / lumbar: LE, proximal hip, and core control in self care, mobility, lifting, ambulation and eccentric single leg control.    [] UE / cervical: cervical, scapular, scapulothoracic and UE control with self care, reaching, carrying, lifting, house/yardwork, driving, computer work.   [] (38626) Therapist is in constant attendance of 2 or more patients providing skilled therapy interventions, but not providing any significant amount of measurable one-on-one time to either patient, for improvements in  [] LE / lumbar: LE, proximal hip, and core control in self care, mobility, lifting, ambulation and eccentric single leg control. [] UE / cervical: cervical, scapular, scapulothoracic and UE control with self care, reaching, carrying, lifting, house/yardwork, driving, computer work. NMR and Therapeutic Activities:    [x] (71127 or 68066) Provided verbal/tactile cueing for activities related to improving balance, coordination, kinesthetic sense, posture, motor skill, proprioception and motor activation to allow for proper function of   [x] LE: / Lumbar core, proximal hip and LE with self care and ADLs  [] UE / Cervical: cervical, postural, scapular, scapulothoracic and UE control with self care, carrying, lifting, driving, computer work.   [] (90893) Gait Re-education- Provided training and instruction to the patient for proper LE, core and proximal hip recruitment and positioning and eccentric body weight control with ambulation re-education including up and down stairs     Home Management Training / Self Care:  [x] (97481) Provided self-care/home management training related to activities of daily living and compensatory training, and/or use of adaptive equipment for improvement with: ADLs and compensatory training, meal preparation, safety procedures and instruction in use of adaptive equipment, including bathing, grooming, dressing, personal hygiene, basic household cleaning and chores.      Home Exercise Program:    [x] (79080) Reviewed/Progressed HEP activities related to strengthening, flexibility, endurance, ROM of   [x] LE / Lumbar: core, proximal hip and LE for functional self-care, mobility, lifting and ambulation/stair navigation   [] UE / Cervical: cervical, postural, scapular, scapulothoracic and UE control with self care, reaching, carrying, lifting, house/yardwork, driving, computer work  [] (80694)Reviewed/Progressed HEP activities related to improving balance, coordination, kinesthetic sense, posture, motor skill, proprioception of   [] LE: core, proximal hip and LE for self care, mobility, lifting, and ambulation/stair navigation    [] UE / Cervical: cervical, postural,  scapular, scapulothoracic and UE control with self care, reaching, carrying, lifting, house/yardwork, driving, computer work    Manual Treatments:  PROM / STM / Oscillations-Mobs:  G-I, II, III, IV (PA's, Inf., Post.)  [] (90019) Provided manual therapy to mobilize LE, proximal hip and/or LS spine soft tissue/joints for the purpose of modulating pain, promoting relaxation,  increasing ROM, reducing/eliminating soft tissue swelling/inflammation/restriction, improving soft tissue extensibility and allowing for proper ROM for normal function with   [] LE / lumbar: self care, mobility, lifting and ambulation. [] UE / Cervical: self care, reaching, carrying, lifting, house/yardwork, driving, computer work. Modalities:  [] (21975) Vasopneumatic compression: Utilized vasopneumatic compression to decrease edema / swelling for the purpose of improving mobility and quad tone / recruitment which will allow for increased overall function including but not limited to self-care, transfers, ambulation, and ascending / descending stairs. Modalities:      Charges:  Timed Code Treatment Minutes: 45   Total Treatment Minutes: 45     [] EVAL - LOW (31748)   [] EVAL - MOD (41671)  [] EVAL - HIGH (91281)  [] RE-EVAL (40209)  [x] QT(48658) x   1   [] Ionto  [x] NMR (49551) x   1    [] Vaso  [] Manual (09193) x  1     [] Ultrasound  [x] TA x 1       [] Mech Traction (66026)  [] Aquatic Therapy x     [] ES (un) (76389):   [] Home Management Training x  [] ES(attended) (82863)   [] Dry Needling 1-2 muscles (14924):  [] Dry Needling 3+ muscles (241396  [] Group:      [] Other:        GOALS:  Patient stated goal:improve balance and walking  []? Progressing: []? Met: []? Not Met: []? Adjusted     Therapist goals for Patient:   Short Term Goals: To be achieved in: 2 weeks  1. Independent in HEP and progression per patient tolerance, in order to prevent re-injury. [x]? Progressing: []? Met: []? Not Met: []? Adjusted  2. Patient will have a decrease in pain to facilitate improvement in movement, function, and ADLs as indicated by improvement with respect to Functional Deficits. [x]? Progressing: []? Met: []? Not Met: []? Adjusted     Long Term Goals: To be achieved in: 6  weeks  1. Disability index score of 10% or less on the oswestry to assist with reaching prior level of function. [x]? Progressing: []? Met: []? Not Met: []? Adjusted  2. Patient will demonstrate increased AROM  to [facility], to allow for proper joint functioning to allow pt to resume squatting to lift items, use of good posture and body mechs for home chores without  increase in symptoms. [x]? Progressing: []? Met: []? Not Met: []? Adjusted  3. Patient will demonstrate increased Strength by 1/2 mmt grade  and core activation to allow for proper functional mobility as indicated by patients Functional Deficits to allow pt to resume up/down the basement stairs and curbs prn without difficulty without increase in symptoms. [x]? Progressing: []? Met: []? Not Met: []? Adjusted  4. Patient will return to functional activities including amb prn for grocery shopping and errands without increased symptoms and without need of AD. [x]? Progressing: []? Met: []? Not Met: []? Adjusted  NEEDS TO USE SHOPPING CART  5. Improve balance so that pt reports no LOB in the preceding week and no sense of unsteadiness on stairs. [x]? Progressing: []? Met: []? Not Met: []? Adjusted          Overall Progression Towards Functional goals/ Treatment Progress Update:  [x] Patient is progressing as expected towards functional goals listed. [] Progression is slowed due to complexities/Impairments listed. [] Progression has been slowed due to co-morbidities.   [] Plan just implemented, too soon to assess goals progression <30days   [] Goals require adjustment due to lack of progress  [] Patient is not progressing as expected and Continue per plan of care [] Alter current plan (see comments)  [] Plan of care initiated [] Hold pending MD visit [] Discharge    Electronically signed by: Gavi Villeda, PT PT, DPT  Note: If patient does not return for scheduled/ recommended follow up visits, this note will serve as a discharge from care along with most recent update on progress.

## 2021-06-03 ENCOUNTER — APPOINTMENT (OUTPATIENT)
Dept: PHYSICAL THERAPY | Age: 71
End: 2021-06-03
Payer: MEDICARE

## 2021-06-15 ENCOUNTER — HOSPITAL ENCOUNTER (OUTPATIENT)
Dept: PHYSICAL THERAPY | Age: 71
Setting detail: THERAPIES SERIES
Discharge: HOME | End: 2021-06-15
Payer: MEDICARE

## 2021-06-15 PROCEDURE — 97112 NEUROMUSCULAR REEDUCATION: CPT

## 2021-06-15 PROCEDURE — 97530 THERAPEUTIC ACTIVITIES: CPT

## 2021-06-15 NOTE — FLOWSHEET NOTE
Physical Therapy Discharge Summary    Dear Priscilla Menard  ,    We had the pleasure of treating the following patient for physical therapy services at [facility]. A summary of our findings can be found in the discharge summary below. If you have any questions or concerns regarding these findings, please do not hesitate to contact me at the office phone number checked above. Thank you for the referral.     Physician Signature:________________________________Date:__________________  By signing above (or electronic signature), therapists plan is approved by physician      Functional Outcome:                      Owsestry Disability Total Scores: 5  Oswestry Disability Scores %: 11.11                           Overall Response to Treatment:   [x]Patient is responding well to treatment and improvement is noted with regards  to goals   []Patient should continue to improve in reasonable time if they continue HEP   []Patient has plateaued and is no longer responding to skilled PT intervention    []Patient is getting worse and would benefit from return to referring MD   []Patient unable to adhere to initial POC   []Other:     Date range of Visits: 21  Total Visits: 12    Recommendation:    [x] Discharge to HEP. Follow up with PT or MD PRN. Physical Therapy Daily Treatment Note  Date:  6/15/2021    Patient Name:  Gustavo Castillo    :  1950  MRN: [id number]  Medical/Treatment Diagnosis Information:  Diagnosis: post op lower lumbar fusion 21, M48.06  SPINAL STENOSIS OF LUMBAR REGION M43.16  LUMBAR SPONDYLOLISTHESISI, ACQUIRED L1-L5           Treatment Diagnosis: pt to PT after lumbar fusion surgery on 21.   She presents with impaired gait, flexibility/strength deficits, poor habitual postures/body [facility]                                      Insurance/Certification information:  PT Insurance Information: medicare and humana, med nec  Physician Information:  Referring Practitioner: Dr. Albina Schmitz of care signed (Y/N): Y    Date of Patient follow up with Physician:    Functional scale:   4/27/21 oswestry score 14 disability 31%   eval: OSWESTRY raw score =11 ; dysfunction =24%    Progress Report: []  Yes   [x]  No     Date Range for reporting period:  Beginning4/6/21   Ending    Progress report due (10 Rx/or 30 days whichever is less): visit #85 or 3/11    Recertification due (POC duration/ or 90 days whichever is less): visit # or 7/6    Visit # Insurance Allowable Auth required? Date Range   5/5 +7/8   Med [facility] []  Yes  [x]  No na        Latex Allergy:  [x]NO      []YES  Preferred Language for Healthcare:   [x]English       []other    Pain level: 0/10  Now. Still gets up to 7/10 upon waking, by about 10am it is better    SUBJECTIVE: 6/15: ready for discharge, requesting pictures of aquatic exercises because she plans to do some in her family pool. 6/1 cont to improve, but still has am pain. 5/27:pain was bad this am 7/10 but now a 5/10. Moving a lot makes it better. 5/24:  States she woke u at 3:00 in the morning and her leg hurts for some reason today. Not sure why  5/20 cont to feel better. Requests to do the steps at end bc the step ups make her sweat  5/18 less pain overall. Pain in am goes away more quickly. Sometimes can walk without any pain. More time with less pain  5/13: Pain changes greatly. Usually a 5/10 getting out of bed. Legs are still really tired. 5/6: reports her pain is a little lower today and made it out to turn on the coffee without a rest today. 5/4 reports she is feeling a little better. No pain with sitting. Pain of a bout 5/10 with walking during the day. Saw Zonia Estrada NP for f/u. States Claudean Few and Dr. Boogie Estes say xrays look good - no loose hardware. Went uyp/down the steps a lot yesterday (about 3x) reports the L LE is still problematic and feels weaker.  Has been more consistent with HEP   4/27 States she wakes frequently - but not due to pain. States it is hard ot go back to sleep. Reports she thinks if she calls PCP, the MD will tell her to take melatonin. Her oldest brother  last wk; he was 80. States her son will build her a step to do step ups on and that he will add handrails by the steps to enter her home    OBJECTIVE:     pt brings script for 8 additional PT visits form Barrie Avery NP for tx of lumbar post op, dated 5/3/21   4/27,  antalgic gait with increased lateral trunk sway B       MRI 2020  [facility]):  Stable small central disc extrusion and mild central canal stenosis at L3-L4. There is progressive left moderate foraminal stenosis at this level.         New laminotomy changes at L4-L5.  The central canal is mildly stenotic which    is improved.  There is worsening severe right foraminal stenosis and moderate    left foraminal stenosis at this level.         Stable moderate right foraminal stenosis and mild left foraminal stenosis at    L5-S1. RESTRICTIONS/PRECAUTIONS: lower lumbar fusion 21 previous lumbar surgery - had cyst removed form lower back. HTN.  Intermittent LOB - no falls, impaired kidneys, frequent john horses in calves    Exercises/Interventions:     Therapeutic Exercises (54676) Resistance / level Sets/sec Reps Notes   Nu step    or bike 47 min  S=9, arms = 11   Step stretches-   HS  Hip flexor B ea      Step ups with TrA iso   fwd    side 6\" B UE support   IB calf stretches  HR/TR  gastroc stretches at  wall  2 x 30 sec  2x10 ea           Mat ex  S/l glut med clam shell  SLR hip AB  S/l  Hip circles frd and back     TrA iso  fig 4 piriformis stretch          All with TrA iso    pt reports she is doing the lying down hip ex consistently at home                 Mat ex w SB  Fig 4 piriformis stretch    TrA iso  DKTC   B LTR                        and L         Min cues for good TrA iso   CC --multifidi walkouts with paloff press x 5 2pl 1 3 B SBA when sidestepping back in                 Therapeutic Activities (52490)       Pt ed re importance of good hydration in management of john horses  Postural ed-good posture /body [facility]  PT POC,rationale for tx reveiw   11/24, 11/20 talked with pt about hydration and trying to decrease coffee, pt says she has 8 cups per day   Instructed in how to sleep with neutral spine with pillow(s) bn LE and under top arm  Review              GAIT TRG    Cues to reduce lateral sway           Neuromuscular Re-ed (44139)       Balance exercises with TrA iso       Seated SB :   A/P, M/L, circles: CW/CCW    SLS          AIREX:  Partial tandem         3x30\" eyes open      3x30\" eyes closed  B needs B UE support      3x15\" ea way eyes open  3x15\" ea way eyes closed  Intermittent fingertips   Glides 3-way  1 15 Ea B          Postural ed for sleeping in L s/l  '                            Manual Intervention (78799)        IASTM                                               Pt. Education:  4/27 encouraged pt to f/u with PCP re apparent insomnia  4/20 pt ed on sleep with neutral spine, review HEP,   d/w pt re aquatic therapy - she prefers to wait until it warms up and do pool ex in her son's pool. instruct on pool ex  In her son's pool. Pt ed re importance of AD to reduce trunk lateral sway - to interrupt pain cycle  4/6 patient educated on diagnosis, prognosis and expectations for rehab  d/w pt re aquatic therapy pros and cons  -all patient questions were answered      HEP instruction:  Access Code: [email or web address]  URL: [employer]  Date: 06/15/2021  Prepared by: Amy Bazzi    Exercises  Squat - 1 x daily - 7 x weekly - 10 reps - 3 sets  Side Stepping - 1 x daily - 7 x weekly - 10 reps - 3 sets  Forward Walking - 1 x daily - 7 x weekly - 10 reps - 3 sets  Backward Walking - 1 x daily - 7 x weekly - 10 reps - 3 sets  Forward March - 1 x daily - 7 x weekly - 10 reps - 3 sets  Standing Knee Flexion - 1 x daily - 7 x weekly - 10 self care, mobility, lifting, ambulation and eccentric single leg control. [] UE / cervical: cervical, scapular, scapulothoracic and UE control with self care, reaching, carrying, lifting, house/yardwork, driving, computer work. NMR and Therapeutic Activities:    [x] (22530 or 81538) Provided verbal/tactile cueing for activities related to improving balance, coordination, kinesthetic sense, posture, motor skill, proprioception and motor activation to allow for proper function of   [x] LE: / Lumbar core, proximal hip and LE with self care and ADLs  [] UE / Cervical: cervical, postural, scapular, scapulothoracic and UE control with self care, carrying, lifting, driving, computer work.   [] (71605) Gait Re-education- Provided training and instruction to the patient for proper LE, core and proximal hip recruitment and positioning and eccentric body weight control with ambulation re-education including up and down stairs     Home Management Training / Self Care:  [x] (70467) Provided self-care/home management training related to activities of daily living and compensatory training, and/or use of adaptive equipment for improvement with: ADLs and compensatory training, meal preparation, safety procedures and instruction in use of adaptive equipment, including bathing, grooming, dressing, personal hygiene, basic household cleaning and chores.      Home Exercise Program:    [x] (45054) Reviewed/Progressed HEP activities related to strengthening, flexibility, endurance, ROM of   [x] LE / Lumbar: core, proximal hip and LE for functional self-care, mobility, lifting and ambulation/stair navigation   [] UE / Cervical: cervical, postural, scapular, scapulothoracic and UE control with self care, reaching, carrying, lifting, house/yardwork, driving, computer work  [] (25316)Reviewed/Progressed HEP activities related to improving balance, coordination, kinesthetic sense, posture, motor skill, proprioception of   [] LE: core, proximal hip and LE for self care, mobility, lifting, and ambulation/stair navigation    [] UE / Cervical: cervical, postural,  scapular, scapulothoracic and UE control with self care, reaching, carrying, lifting, house/yardwork, driving, computer work    Manual Treatments:  PROM / STM / Oscillations-Mobs:  G-I, II, III, IV (PA's, Inf., Post.)  [] (11270) Provided manual therapy to mobilize LE, proximal hip and/or LS spine soft tissue/joints for the purpose of modulating pain, promoting relaxation,  increasing ROM, reducing/eliminating soft tissue swelling/inflammation/restriction, improving soft tissue extensibility and allowing for proper ROM for normal function with   [] LE / lumbar: self care, mobility, lifting and ambulation. [] UE / Cervical: self care, reaching, carrying, lifting, house/yardwork, driving, computer work. Modalities:  [] (19320) Vasopneumatic compression: Utilized vasopneumatic compression to decrease edema / swelling for the purpose of improving mobility and quad tone / recruitment which will allow for increased overall function including but not limited to self-care, transfers, ambulation, and ascending / descending stairs. Modalities:      Charges:  Timed Code Treatment Minutes: 40   Total Treatment Minutes: 40     [] EVAL - LOW (92080)   [] EVAL - MOD (56563)  [] EVAL - HIGH (57761)  [] RE-EVAL (72896)  [] XE(95459) x      [] Ionto  [x] NMR (75384) x   1    [] Vaso  [] Manual (91557) x  1     [] Ultrasound  [x] TA x 2       [] Mech Traction (75853)  [] Aquatic Therapy x     [] ES (un) (73085):   [] Home Management Training x  [] ES(attended) (37679)   [] Dry Needling 1-2 muscles (05650):  [] Dry Needling 3+ muscles (123562  [] Group:      [] Other:        GOALS:  Patient stated goal:improve balance and walking  []? Progressing: []? Met: []? Not Met: []? Adjusted     Therapist goals for Patient:   Short Term Goals: To be achieved in: 2 weeks  1.  Independent in [facility] and progression per patient tolerance, in order to prevent re-injury. []? Progressing: [x]? Met: []? Not Met: []? Adjusted  2. Patient will have a decrease in pain to facilitate improvement in movement, function, and ADLs as indicated by improvement with respect to Functional Deficits. []? Progressing: [x]? Met: []? Not Met: []? Adjusted     Long Term Goals: To be achieved in: 6  weeks  1. Disability index score of 10% or less on the oswestry to assist with reaching prior level of function. [x]? Progressing: [x]? Met: []? Not Met: []? Adjusted  2. Patient will demonstrate increased AROM  to [facility], to allow for proper joint functioning to allow pt to resume squatting to lift items, use of good posture and body mechs for home chores without  increase in symptoms. []? Progressing: [x]? Met: []? Not Met: []? Adjusted  3. Patient will demonstrate increased Strength by 1/2 mmt grade  and core activation to allow for proper functional mobility as indicated by patients Functional Deficits to allow pt to resume up/down the basement stairs and curbs prn without difficulty without increase in symptoms. []? Progressing: [x]? Met: []? Not Met: []? Adjusted  4. Patient will return to functional activities including amb prn for grocery shopping and errands without increased symptoms and without need of AD.   []? Progressing: [x]? Met: []? Not Met: []? Adjusted  NEEDS TO USE SHOPPING CART  5. Improve balance so that pt reports no LOB in the preceding week and no sense of unsteadiness on stairs. []? Progressing: [x]? Met: []? Not Met: []? Adjusted          Overall Progression Towards Functional goals/ Treatment Progress Update:  [x] Patient is progressing as expected towards functional goals listed. [] Progression is slowed due to complexities/Impairments listed. [] Progression has been slowed due to co-morbidities.   [] Plan just implemented, too soon to assess goals progression <30days   [] Goals require adjustment due modification, progression of HEP. PLAN:      [x] Continue per plan of care [] Alter current plan (see comments)  [] Plan of care initiated [] Hold pending MD visit [x] Discharge    Electronically signed by: Samir Crowley, PT PT, DPT  Note: If patient does not return for scheduled/ recommended follow up visits, this note will serve as a discharge from care along with most recent update on progress.

## 2021-07-26 ENCOUNTER — TELEPHONE (OUTPATIENT)
Dept: FAMILY MEDICINE CLINIC | Age: 71
End: 2021-07-26

## 2021-07-26 NOTE — TELEPHONE ENCOUNTER
She has to wait until after her appt with me to do her labs, it won't have been a year yet. I can give her orders when she comes in.

## 2021-07-26 NOTE — TELEPHONE ENCOUNTER
----- Message from Cateserarosio Essex sent at 7/26/2021 10:55 AM EDT -----  Subject: Message to Provider    QUESTIONS  Information for Provider? Patient wants to get bloodwork done for appt on   8/20, is going to hospital tomorrow for other doctor and would like to get   it done tomorrow is possible.  ---------------------------------------------------------------------------  --------------  [address]  What is the best way for the office to contact you? OK to leave message on   voicemail  Preferred Call Back Phone Number? [phone]  ---------------------------------------------------------------------------  --------------  SCRIPT ANSWERS  Relationship to Patient?  Self

## 2021-07-28 ENCOUNTER — TELEPHONE (OUTPATIENT)
Dept: FAMILY MEDICINE CLINIC | Age: 71
End: 2021-07-28

## 2021-07-28 NOTE — TELEPHONE ENCOUNTER
----- Message from Apolonia Rachela sent at 7/28/2021  2:08 PM EDT -----  Subject: Referral Request    QUESTIONS   Reason for referral request? blood work   Has the physician seen you for this condition before? Yes  Select a date? 2021-02-04  Select the Provider the patient wants to be referred to, if known (PCP or   Specialist)? Dwayne Quiñonez   Preferred Specialist (if applicable)? Do you already have an appointment scheduled? Yes  Select Scheduled Date? 2021-09-07  Select Scheduled Physician? Dwayne Quiñonez   Additional Information for Provider?   ---------------------------------------------------------------------------  --------------  Rashid Sportfortzen INFO  What is the best way for the office to contact you? OK to leave message on   voicemail  Preferred Call Back Phone Number?  [phone]

## 2021-07-29 ENCOUNTER — HOSPITAL ENCOUNTER (OUTPATIENT)
Dept: GENERAL RADIOLOGY | Age: 71
Discharge: HOME | End: 2021-07-29
Payer: MEDICARE

## 2021-07-29 ENCOUNTER — HOSPITAL ENCOUNTER (OUTPATIENT)
Age: 71
Discharge: HOME | End: 2021-07-29
Payer: MEDICARE

## 2021-07-29 DIAGNOSIS — Z98.1 ARTHRODESIS STATUS: ICD-10-CM

## 2021-07-29 PROCEDURE — 72110 X-RAY EXAM L-2 SPINE 4/>VWS: CPT

## 2021-08-30 DIAGNOSIS — E78.00 PURE HYPERCHOLESTEROLEMIA: ICD-10-CM

## 2021-08-30 RX ORDER — EZETIMIBE 10 MG/1
TABLET ORAL
Qty: 30 TABLET | Refills: 0 | Status: SHIPPED | OUTPATIENT
Start: 2021-08-30 | End: 2021-10-05 | Stop reason: SDUPTHER

## 2021-08-30 NOTE — TELEPHONE ENCOUNTER
----- Message from Mag Schmidt sent at 8/30/2021  2:16 PM EDT -----  Subject: Message to Provider    QUESTIONS  Information for Provider? Pt is trying to go get her bloodwirk before her   appt please call in her orders ahead of time. ---------------------------------------------------------------------------  --------------  Rene Muse INFO  What is the best way for the office to contact you? OK to leave message on   voicemail  Preferred Call Back Phone Number? [phone]  ---------------------------------------------------------------------------  --------------  SCRIPT ANSWERS  Relationship to Patient?  Self

## 2021-08-30 NOTE — TELEPHONE ENCOUNTER
Medication:   Requested Prescriptions     Pending Prescriptions Disp Refills    ezetimibe (ZETIA) 10 MG tablet 30 tablet 0     Sig: TAKE 1 TABLET BY MOUTH EVERY DAY        Last Filled: 8/19/2020     Patient Phone Number: [phone] (home)     Last appt: 8/19/2020   Next appt: 9/8/2021    Last OARRS:   RX Monitoring 2/17/2021   Periodic Controlled Substance Monitoring Possible medication side effects, risk of tolerance/dependence & alternative treatments discussed. ;No signs of potential drug abuse or diversion identified.

## 2021-09-01 ENCOUNTER — TELEPHONE (OUTPATIENT)
Dept: FAMILY MEDICINE CLINIC | Age: 71
End: 2021-09-01

## 2021-09-01 NOTE — TELEPHONE ENCOUNTER
[facility] states prescription will be ready by later today. Pt notified that pharmacy will give pt a call in regards to her rx.

## 2021-09-01 NOTE — TELEPHONE ENCOUNTER
Can we please resend medication     Pharm is stating they do not have the below .    Patient has been out of medication since Sunday           ezetimibe (ZETIA) 10 MG tablet 30 tablet 0 8/30/2021     Sig: TAKE 1 TABLET BY MOUTH EVERY DAY    Sent to pharmacy as: Ezetimibe 10 MG Oral Tablet (Gabi Bruce)    E-Prescribing Status: Receipt confirmed by pharmacy (8/30/2021  4:04 PM EDT          Thank Micaela Villar

## 2021-09-08 ENCOUNTER — OFFICE VISIT (OUTPATIENT)
Dept: FAMILY MEDICINE CLINIC | Age: 71
End: 2021-09-08
Payer: MEDICARE

## 2021-09-08 VITALS
WEIGHT: 172 LBS | SYSTOLIC BLOOD PRESSURE: 118 MMHG | BODY MASS INDEX: 31.46 KG/M2 | DIASTOLIC BLOOD PRESSURE: 82 MMHG | OXYGEN SATURATION: 98 % | TEMPERATURE: 97.9 F | HEART RATE: 76 BPM

## 2021-09-08 DIAGNOSIS — I10 ESSENTIAL HYPERTENSION: ICD-10-CM

## 2021-09-08 DIAGNOSIS — Z01.818 PREOP EXAMINATION: Primary | ICD-10-CM

## 2021-09-08 DIAGNOSIS — H26.9 CATARACT OF LEFT EYE, UNSPECIFIED CATARACT TYPE: ICD-10-CM

## 2021-09-08 PROCEDURE — 4040F PNEUMOC VAC/ADMIN/RCVD: CPT | Performed by: PHYSICIAN ASSISTANT

## 2021-09-08 PROCEDURE — 4004F PT TOBACCO SCREEN RCVD TLK: CPT | Performed by: PHYSICIAN ASSISTANT

## 2021-09-08 PROCEDURE — G8417 CALC BMI ABV UP PARAM F/U: HCPCS | Performed by: PHYSICIAN ASSISTANT

## 2021-09-08 PROCEDURE — 1090F PRES/ABSN URINE INCON ASSESS: CPT | Performed by: PHYSICIAN ASSISTANT

## 2021-09-08 PROCEDURE — G8427 DOCREV CUR MEDS BY ELIG CLIN: HCPCS | Performed by: PHYSICIAN ASSISTANT

## 2021-09-08 PROCEDURE — 99214 OFFICE O/P EST MOD 30 MIN: CPT | Performed by: PHYSICIAN ASSISTANT

## 2021-09-08 PROCEDURE — G8399 PT W/DXA RESULTS DOCUMENT: HCPCS | Performed by: PHYSICIAN ASSISTANT

## 2021-09-08 PROCEDURE — 1123F ACP DISCUSS/DSCN MKR DOCD: CPT | Performed by: PHYSICIAN ASSISTANT

## 2021-09-08 PROCEDURE — 3017F COLORECTAL CA SCREEN DOC REV: CPT | Performed by: PHYSICIAN ASSISTANT

## 2021-09-08 NOTE — PATIENT INSTRUCTIONS
Dany Schultz was seen today for pre-op exam.    Diagnoses and all orders for this visit:    Preop examination    Cataract of left eye, unspecified cataract type    Essential hypertension       Cleared for surgery. Try Senokot or Colace or Miralax.

## 2021-09-08 NOTE — PROGRESS NOTES
Preoperative Consultation    [address] [place] [address]  YOB: 1950    This patient presents to the office today for a preoperative consultation at the request of surgeon, Dr. Amanda Montiel, who plans on performing left cataract removal on September 23 at [address] [place] [address]. She gets her right eye done on 10/3. She has had the cataracts for about 5 years. Her vision is worsening but worse at night. She has stable, controlled and treated hypertension, no SE's to medicine. No recent illnesses. Planned anesthesia: Local and IV sedation   Known anesthesia problems: None   Bleeding risk: No recent or remote history of abnormal bleeding  Personal or FH of DVT/PE: No      Patient Active Problem List   Diagnosis    Essential hypertension    Pure hypercholesterolemia    Generalized osteoarthrosis, involving multiple sites    Impaired fasting glucose    Insomnia    Osteoarthritis    Dupuytren's contracture,mild    Tobacco dependence    Back pain    Spinal stenosis of lumbar region    Synovial cyst of lumbar spine    Acquired hypothyroidism    Spondylolisthesis at L4-L5 level    JAGDEEP (acute kidney injury) [facility])     Past Surgical History:   Procedure Laterality Date    CARPAL TUNNEL RELEASE Left [id number]    lemus    CHOLECYSTECTOMY      LUMBAR FUSION N/A 2/16/2021    LUMBAR4-LUMBAR5 TRANSVERSE LUMBAR INTERBODY FUSION (97 [id number], 58801, 89398, 11470, 17524, 40524, 01461, 14678) performed by Severiano Speak, MD at [address]  8/20/15    L45 B/L decompressive hobson and excision of synovial cyst    ROTATOR CUFF REPAIR Right 2/98            Allergies   Allergen Reactions    Influenza Virus Vacc Split Pf      bronchitis    Pravastatin Sodium [Pravastatin Sodium] Other (See Comments)     Elevated liver enzymes.      Vicodin [Hydrocodone-Acetaminophen]      Nausea     Outpatient Medications Marked as Taking for the 9/8/21 encounter (Office Visit) with VANNA Everett   Medication Sig Dispense Refill    ezetimibe (ZETIA) 10 MG tablet TAKE 1 TABLET BY MOUTH EVERY DAY 30 tablet 0    diclofenac (VOLTAREN) 75 MG EC tablet Take 1 tablet by mouth twice daily 180 tablet 1    lisinopril (PRINIVIL;ZESTRIL) 30 MG tablet TAKE 1 TABLET BY MOUTH DAILY 90 tablet 2    levothyroxine (SYNTHROID) 25 MCG tablet TAKE 1 TABLET BY MOUTH DAILY (Patient taking differently: Take 25 mcg by mouth nightly ) 90 tablet 3    glucose monitoring kit (FREESTYLE) monitoring kit 1 kit by Does not apply route daily 1 kit 0    Lancets (BD LANCET ULTRAFINE 30G) MISC 1 Device by In Vitro route every morning CHECK BS EVERY MORNING FASTING 100 each 3    blood glucose monitor strips Test blood sugar every morning fasting. 100 strip 3    fenofibrate (TRIGLIDE) 160 MG tablet Take 1 tablet by mouth daily 90 tablet 3    Ascorbic Acid (VITAMIN C CR) 1000 MG TBCR Take by mouth      vitamin E 400 UNIT capsule Take 400 Units by mouth daily         Social History     Tobacco Use    Smoking status: Current Every Day Smoker     Packs/day: 1.00     Years: 40.00     Pack years: 40.00     Types: Cigarettes    Smokeless tobacco: Never Used   Substance Use Topics    Alcohol use: No     Alcohol/week: 0.0 standard drinks     Family History   Problem Relation Age of Onset    Cancer Father     Cancer Brother        Review of Systems  A comprehensive review of systems was negative except for what was noted in the HPI. Physical Exam   /82 (Site: Left Upper Arm, Position: Sitting, Cuff Size: Medium Adult)   Pulse 76   Temp 97.9 °F (36.6 °C)   Wt 172 lb (78 kg)   SpO2 98%   BMI 31.46 kg/m²   Weight: 172 lb (78 kg)   Constitutional: She is oriented to person, place, and time. She appears well-developed and well-nourished. No distress. HENT:   Head: Normocephalic and atraumatic.    Mouth/Throat: Uvula is midline, oropharynx is clear and moist and mucous membranes are normal.   Eyes: Conjunctivae and EOM are normal. Pupils are equal, round,

## 2021-10-04 DIAGNOSIS — E78.00 PURE HYPERCHOLESTEROLEMIA: ICD-10-CM

## 2021-10-04 RX ORDER — EZETIMIBE 10 MG/1
TABLET ORAL
Qty: 30 TABLET | Refills: 0 | Status: CANCELLED | OUTPATIENT
Start: 2021-10-04

## 2021-10-04 NOTE — TELEPHONE ENCOUNTER
----- Message from Dacia Chisholm sent at 10/2/2021 10:09 AM EDT -----  Subject: Refill Request    QUESTIONS  Name of Medication? ezetimibe (ZETIA) 10 MG tablet  Patient-reported dosage and instructions? TAKE 1 TABLET BY MOUTH EVERY DAY  How many days do you have left? 0  Preferred Pharmacy? [address] [place] [address] [employer]  Pharmacy phone number (if available)? [phone]  ---------------------------------------------------------------------------  --------------  Jaime IBARRA  What is the best way for the office to contact you? OK to leave message on   voicemail  Preferred Call Back Phone Number?  [phone]

## 2021-10-04 NOTE — TELEPHONE ENCOUNTER
Medication:   Requested Prescriptions     Pending Prescriptions Disp Refills    ezetimibe (ZETIA) 10 MG tablet 30 tablet 0     Sig: TAKE 1 TABLET BY MOUTH EVERY DAY      Last Filled:      Patient Phone Number: [phone] (home)     Last appt: 9/8/2021   Next appt: Visit date not found    Last OARRS:   RX Monitoring 2/17/2021   Periodic Controlled Substance Monitoring Possible medication side effects, risk of tolerance/dependence & alternative treatments discussed. ;No signs of potential drug abuse or diversion identified.      PDMP Monitoring:    Last PDMP Braden López as Reviewed [facility]):  Review User Review Instant Review Result   Jensen Prow 2/17/2021 11:53 AM Reviewed PDMP [1]     Preferred Pharmacy:   [facility] [address] [phone] - f [phone]  [address] [place] [address]  Phone: [phone] Fax: [phone]    [place] [address], [address] [place], [place] [id number]  F [phone]  [facility] [address]  Phone: [phone] Fax: [address] [place] [address]  Phone: [phone] Fax: [phone]

## 2021-10-05 ENCOUNTER — TELEPHONE (OUTPATIENT)
Dept: FAMILY MEDICINE CLINIC | Age: 71
End: 2021-10-05

## 2021-10-05 DIAGNOSIS — E78.00 PURE HYPERCHOLESTEROLEMIA: ICD-10-CM

## 2021-10-05 DIAGNOSIS — E03.9 ACQUIRED HYPOTHYROIDISM: ICD-10-CM

## 2021-10-05 RX ORDER — EZETIMIBE 10 MG/1
TABLET ORAL
Qty: 30 TABLET | Refills: 0 | Status: SHIPPED | OUTPATIENT
Start: 2021-10-05 | End: 2021-10-29 | Stop reason: SDUPTHER

## 2021-10-05 RX ORDER — LEVOTHYROXINE SODIUM 0.03 MG/1
25 TABLET ORAL DAILY
Qty: 90 TABLET | Refills: 0 | Status: SHIPPED | OUTPATIENT
Start: 2021-10-05 | End: 2022-01-10 | Stop reason: SDUPTHER

## 2021-10-05 RX ORDER — EZETIMIBE 10 MG/1
TABLET ORAL
Qty: 90 TABLET | Refills: 0 | Status: CANCELLED | OUTPATIENT
Start: 2021-10-05

## 2021-10-05 NOTE — TELEPHONE ENCOUNTER
Medication:   Requested Prescriptions     Pending Prescriptions Disp Refills    ezetimibe (ZETIA) 10 MG tablet 30 tablet 0     Sig: TAKE 1 TABLET BY MOUTH EVERY DAY      Last Filled:      Patient Phone Number: [phone] (home)     Last appt: 9/8/2021   Next appt: Visit date not found    Last OARRS:   RX Monitoring 2/17/2021   Periodic Controlled Substance Monitoring Possible medication side effects, risk of tolerance/dependence & alternative treatments discussed. ;No signs of potential drug abuse or diversion identified.      PDMP Monitoring:    Last PDMP Benjamín Crews as Reviewed [facility]):  Review User Review Instant Review Result   Anya Bedolla 2/17/2021 11:53 AM Reviewed PDMP [1]     Preferred Pharmacy:   [facility] [place] , [facility] [address] [phone] - f [phone]  [address] [place] [address]  Phone: [phone] Fax: [phone]    [place] [address] [place], [address] [place] [address] [phone]  f [phone]  [place] [address]  Phone: [phone] Fax: [address] [place] [address]  Phone: [phone] Fax: [phone] Nadeem Mancia [address] [phone]  [address] [place] [address]  Phone: [phone] Fax: [phone]

## 2021-10-08 ENCOUNTER — OFFICE VISIT (OUTPATIENT)
Dept: FAMILY MEDICINE CLINIC | Age: 71
End: 2021-10-08
Payer: MEDICARE

## 2021-10-08 VITALS
WEIGHT: 174 LBS | DIASTOLIC BLOOD PRESSURE: 74 MMHG | TEMPERATURE: 97.5 F | HEIGHT: 61 IN | OXYGEN SATURATION: 98 % | SYSTOLIC BLOOD PRESSURE: 130 MMHG | BODY MASS INDEX: 32.85 KG/M2 | HEART RATE: 68 BPM

## 2021-10-08 DIAGNOSIS — E03.9 ACQUIRED HYPOTHYROIDISM: ICD-10-CM

## 2021-10-08 DIAGNOSIS — Z00.00 PREVENTATIVE HEALTH CARE: Primary | ICD-10-CM

## 2021-10-08 DIAGNOSIS — E78.00 PURE HYPERCHOLESTEROLEMIA: ICD-10-CM

## 2021-10-08 DIAGNOSIS — Z23 NEED FOR VACCINATION AGAINST STREPTOCOCCUS PNEUMONIAE: ICD-10-CM

## 2021-10-08 DIAGNOSIS — M15.9 GENERALIZED OSTEOARTHROSIS, INVOLVING MULTIPLE SITES: ICD-10-CM

## 2021-10-08 DIAGNOSIS — I10 ESSENTIAL HYPERTENSION: ICD-10-CM

## 2021-10-08 DIAGNOSIS — Z12.31 ENCOUNTER FOR SCREENING MAMMOGRAM FOR MALIGNANT NEOPLASM OF BREAST: ICD-10-CM

## 2021-10-08 DIAGNOSIS — Z78.0 MENOPAUSE: ICD-10-CM

## 2021-10-08 DIAGNOSIS — R73.01 IMPAIRED FASTING GLUCOSE: ICD-10-CM

## 2021-10-08 DIAGNOSIS — Z12.11 SCREEN FOR COLON CANCER: ICD-10-CM

## 2021-10-08 DIAGNOSIS — F17.200 TOBACCO DEPENDENCE: ICD-10-CM

## 2021-10-08 PROCEDURE — 90670 PCV13 VACCINE IM: CPT | Performed by: PHYSICIAN ASSISTANT

## 2021-10-08 PROCEDURE — 1123F ACP DISCUSS/DSCN MKR DOCD: CPT | Performed by: PHYSICIAN ASSISTANT

## 2021-10-08 PROCEDURE — 4004F PT TOBACCO SCREEN RCVD TLK: CPT | Performed by: PHYSICIAN ASSISTANT

## 2021-10-08 PROCEDURE — G8417 CALC BMI ABV UP PARAM F/U: HCPCS | Performed by: PHYSICIAN ASSISTANT

## 2021-10-08 PROCEDURE — G8484 FLU IMMUNIZE NO ADMIN: HCPCS | Performed by: PHYSICIAN ASSISTANT

## 2021-10-08 PROCEDURE — G0009 ADMIN PNEUMOCOCCAL VACCINE: HCPCS | Performed by: PHYSICIAN ASSISTANT

## 2021-10-08 PROCEDURE — 4040F PNEUMOC VAC/ADMIN/RCVD: CPT | Performed by: PHYSICIAN ASSISTANT

## 2021-10-08 PROCEDURE — G8399 PT W/DXA RESULTS DOCUMENT: HCPCS | Performed by: PHYSICIAN ASSISTANT

## 2021-10-08 PROCEDURE — 99214 OFFICE O/P EST MOD 30 MIN: CPT | Performed by: PHYSICIAN ASSISTANT

## 2021-10-08 PROCEDURE — G0439 PPPS, SUBSEQ VISIT: HCPCS | Performed by: PHYSICIAN ASSISTANT

## 2021-10-08 PROCEDURE — 1090F PRES/ABSN URINE INCON ASSESS: CPT | Performed by: PHYSICIAN ASSISTANT

## 2021-10-08 PROCEDURE — G8427 DOCREV CUR MEDS BY ELIG CLIN: HCPCS | Performed by: PHYSICIAN ASSISTANT

## 2021-10-08 PROCEDURE — 3017F COLORECTAL CA SCREEN DOC REV: CPT | Performed by: PHYSICIAN ASSISTANT

## 2021-10-08 RX ORDER — LISINOPRIL 30 MG/1
TABLET ORAL
Qty: 90 TABLET | Refills: 3 | Status: SHIPPED | OUTPATIENT
Start: 2021-10-08 | End: 2021-12-03 | Stop reason: DRUGHIGH

## 2021-10-08 NOTE — PATIENT INSTRUCTIONS
Sarai Skinnerimmer was seen today for medicare awv. Diagnoses and all orders for this visit:    Preventative health care    Essential hypertension  -     lisinopril (PRINIVIL;ZESTRIL) 30 MG tablet; TAKE 1 TABLET BY MOUTH DAILY  -     Comprehensive Metabolic Panel    Acquired hypothyroidism    Generalized osteoarthrosis, involving multiple sites    Impaired fasting glucose  -     Comprehensive Metabolic Panel  -     Hemoglobin A1C    Pure hypercholesterolemia  -     LIPID PANEL; Future    Tobacco dependence  -     CT Lung Screen (Initial or Annual); Future    Need for vaccination against Streptococcus pneumoniae  -     PREVNAR 13 IM (Pneumococcal conjugate vaccine 13-valent)    Encounter for screening mammogram for malignant neoplasm of breast  -     GWENDOLYN DIGITAL SCREEN W OR WO CAD BILATERAL; Future    Screen for colon cancer  -     Cologuard (For External Results Only); Future          Prevnar 13 today, get routine labs, use senna or miralax for constipation, get mammogram, cologuard, ct scan of lungs and DEXA scan. Get eye exam and get a living will.

## 2021-10-08 NOTE — PROGRESS NOTES
Patient's medications, allergies, past medical, surgical, social and family histories were reviewed and updated in the EHR under History. Reviewed. Care Team:  Patient's list of care team members was updated in EHR under the Snap Shot. She has to see her back surgeon one more time but then will be discharged, had surgery 2/21. Immunizations: Reviewed with patient. She had both Covid vaccines, does not get flu vaccine. Needs prevnar 13. Health Maintenance Due   Topic Date Due    DTaP/Tdap/Td vaccine (1 - Tdap) Never done    Shingles Vaccine (1 of 2) Never done   [facility] Annual Wellness Visit (AWV)  Never done    Low dose CT lung screening  08/13/2020    TSH testing  08/21/2021    Flu vaccine (1) Never done       CHRONIC CONDITIONS    Hypertension is stable, taking her medication as directed, no SE's. She is taking her synthroid daily, no SE's. She takes her Diclofenac usually just at night. Does help her back, so she might consider taking it in the morning as well. She takes her fenofibrate daily, no SE's. She does not sleep well. She doesn't go to bed until at least 12am but wakes up at 4-5am. She naps through the day. She has some constipation issues. She has a BM daily or every other day. She says she does not drink much water. Urinating fine. Physical Exam:    Body mass index is 32.88 kg/m². Vitals:    10/08/21 0943   BP: 130/74   Site: Left Upper Arm   Position: Sitting   Cuff Size: Large Adult   Pulse: 68   Temp: 97.5 °F (36.4 °C)   TempSrc: Infrared   SpO2: 98%   Weight: 174 lb (78.9 kg)   Height: 5' 1\" (1.549 m)     Wt Readings from Last 3 Encounters:   10/08/21 174 lb (78.9 kg)   09/08/21 172 lb (78 kg)   02/16/21 166 lb 1.6 oz (75.3 kg)       GENERAL:Alert and oriented x 4 NAD, no acute distress, normally developed and overweight, well hydrated, well developed.   LUNG:clear to auscultation bilaterally with normal respiratory effort  CV: Normal heart sounds, regular rate and rhythm without murmurs  EXTREMETY: no loss of hair, no edema, normal pedal pulses bilaterally    Was the timed get up and go unsteady or longer than 20 seconds: No    Vision Screen for Initial Exam: not applicable    EKG for Initial Exam at 65 (): not applicable    AAA U/S screen for men 65-75 who smoked (): not applicable    Abigail Pena was seen today for medicare aw. Diagnoses and all orders for this visit:    Preventative health care    Essential hypertension  -     lisinopril (PRINIVIL;ZESTRIL) 30 MG tablet; TAKE 1 TABLET BY MOUTH DAILY  -     Comprehensive Metabolic Panel    Acquired hypothyroidism    Generalized osteoarthrosis, involving multiple sites    Impaired fasting glucose  -     Comprehensive Metabolic Panel  -     Hemoglobin A1C    Pure hypercholesterolemia  -     LIPID PANEL; Future    Tobacco dependence  -     CT Lung Screen (Initial or Annual); Future    Need for vaccination against Streptococcus pneumoniae  -     PREVNAR 13 IM (Pneumococcal conjugate vaccine 13-valent)    Encounter for screening mammogram for malignant neoplasm of breast  -     GWENDOLYN DIGITAL SCREEN W OR WO CAD BILATERAL; Future    Screen for colon cancer  -     Cologuard (For External Results Only); Future    Menopause  -     DEXA BONE DENSITY AXIAL SKELETON; Future          Get routine labs, DEXA scan, do cologuard, mammogram, ct scan of lungs and prevnar 13 today, return here in a year or sooner if needed. Discussed smoking cessation and she is not ready to quit smoking.

## 2021-10-25 DIAGNOSIS — E78.00 PURE HYPERCHOLESTEROLEMIA: ICD-10-CM

## 2021-10-25 NOTE — TELEPHONE ENCOUNTER
Medication:   Requested Prescriptions     Pending Prescriptions Disp Refills    fenofibrate (TRIGLIDE) 160 MG tablet 90 tablet 3     Sig: Take 1 tablet by mouth daily        Last Filled: 8/19/2020     Patient Phone Number: [phone] (home)     Last appt: 10/8/2021   Next appt: Visit date not found    Last OARRS:   RX Monitoring 2/17/2021   Periodic Controlled Substance Monitoring Possible medication side effects, risk of tolerance/dependence & alternative treatments discussed. ;No signs of potential drug abuse or diversion identified.

## 2021-10-25 NOTE — TELEPHONE ENCOUNTER
fenofibrate (TRIGLIDE) tablet 160 mg (Discontinued) 160 mg DAILY 2/16/2021 2/17/2021   Route: Oral         [facility] in chart        Patient is wanting a list of all her medication sent to this pharmacy     Provider out of the office

## 2021-10-26 ENCOUNTER — TELEPHONE (OUTPATIENT)
Dept: FAMILY MEDICINE CLINIC | Age: 71
End: 2021-10-26

## 2021-10-26 NOTE — TELEPHONE ENCOUNTER
fenofibrate (TRIGLIDE) tablet 160 mg   [[id number]]    [address] [place] [address], [address] [place] [address]   Phone:  [phone]  Fax:  [phone]

## 2021-10-27 RX ORDER — FENOFIBRATE 160 MG/1
160 TABLET ORAL DAILY
Qty: 90 TABLET | Refills: 0 | Status: SHIPPED | OUTPATIENT
Start: 2021-10-27 | End: 2022-01-17 | Stop reason: SDUPTHER

## 2021-10-29 DIAGNOSIS — E78.00 PURE HYPERCHOLESTEROLEMIA: ICD-10-CM

## 2021-10-29 RX ORDER — EZETIMIBE 10 MG/1
TABLET ORAL
Qty: 90 TABLET | Refills: 3 | Status: SHIPPED | OUTPATIENT
Start: 2021-10-29 | End: 2022-10-31 | Stop reason: SDUPTHER

## 2021-10-29 NOTE — TELEPHONE ENCOUNTER
PT is requesting a refill on ezetimibe (ZETIA) 10 MG tablet to please be called into [facility], [address] PT is also requesting to get more refills if possible?

## 2021-11-02 ENCOUNTER — HOSPITAL ENCOUNTER (OUTPATIENT)
Age: 71
Discharge: HOME | End: 2021-11-02
Payer: MEDICARE

## 2021-11-02 DIAGNOSIS — E78.00 PURE HYPERCHOLESTEROLEMIA: ICD-10-CM

## 2021-11-02 LAB
A/G RATIO: 1.4 (ref 1.1–2.2)
ALBUMIN SERPL-MCNC: 4.1 G/DL (ref 3.4–5)
ALP BLD-CCNC: 63 U/L (ref 40–129)
ALT SERPL-CCNC: 22 U/L (ref 10–40)
ANION GAP SERPL CALCULATED.3IONS-SCNC: 14 MMOL/L (ref 3–16)
AST SERPL-CCNC: 29 U/L (ref 15–37)
BILIRUB SERPL-MCNC: <0.2 MG/DL (ref 0–1)
BUN BLDV-MCNC: 41 MG/DL (ref 7–20)
CALCIUM SERPL-MCNC: 10.1 MG/DL (ref 8.3–10.6)
CHLORIDE BLD-SCNC: 106 MMOL/L (ref 99–110)
CHOLESTEROL, TOTAL: 164 MG/DL (ref 0–199)
CO2: 22 MMOL/L (ref 21–32)
CREAT SERPL-MCNC: 1.9 MG/DL (ref 0.6–1.2)
GFR AFRICAN AMERICAN: 31
GFR NON-AFRICAN AMERICAN: 26
GLUCOSE BLD-MCNC: 111 MG/DL (ref 70–99)
HDLC SERPL-MCNC: 65 MG/DL (ref 40–60)
LDL CHOLESTEROL CALCULATED: 78 MG/DL
POTASSIUM SERPL-SCNC: 5.8 MMOL/L (ref 3.5–5.1)
SODIUM BLD-SCNC: 142 MMOL/L (ref 136–145)
TOTAL PROTEIN: 7 G/DL (ref 6.4–8.2)
TRIGL SERPL-MCNC: 107 MG/DL (ref 0–150)
VLDLC SERPL CALC-MCNC: 21 MG/DL

## 2021-11-02 PROCEDURE — 83036 HEMOGLOBIN GLYCOSYLATED A1C: CPT

## 2021-11-02 PROCEDURE — 36415 COLL VENOUS BLD VENIPUNCTURE: CPT

## 2021-11-02 PROCEDURE — 80053 COMPREHEN METABOLIC PANEL: CPT

## 2021-11-02 PROCEDURE — 80061 LIPID PANEL: CPT

## 2021-11-03 DIAGNOSIS — E87.5 SERUM POTASSIUM ELEVATED: ICD-10-CM

## 2021-11-03 DIAGNOSIS — N28.9 ABNORMAL KIDNEY FUNCTION: Primary | ICD-10-CM

## 2021-11-03 LAB
ESTIMATED AVERAGE GLUCOSE: 99.7 MG/DL
HBA1C MFR BLD: 5.1 %

## 2021-11-22 ENCOUNTER — HOSPITAL ENCOUNTER (OUTPATIENT)
Age: 71
Discharge: HOME | End: 2021-11-22
Payer: MEDICARE

## 2021-11-22 DIAGNOSIS — E87.5 SERUM POTASSIUM ELEVATED: ICD-10-CM

## 2021-11-22 DIAGNOSIS — N28.9 ABNORMAL KIDNEY FUNCTION: ICD-10-CM

## 2021-11-22 LAB
ANION GAP SERPL CALCULATED.3IONS-SCNC: 10 MMOL/L (ref 3–16)
BUN BLDV-MCNC: 32 MG/DL (ref 7–20)
CALCIUM SERPL-MCNC: 9.8 MG/DL (ref 8.3–10.6)
CHLORIDE BLD-SCNC: 107 MMOL/L (ref 99–110)
CO2: 25 MMOL/L (ref 21–32)
CREAT SERPL-MCNC: 1.8 MG/DL (ref 0.6–1.2)
GFR AFRICAN AMERICAN: 33
GFR NON-AFRICAN AMERICAN: 28
GLUCOSE BLD-MCNC: 102 MG/DL (ref 70–99)
POTASSIUM SERPL-SCNC: 5.6 MMOL/L (ref 3.5–5.1)
SODIUM BLD-SCNC: 142 MMOL/L (ref 136–145)

## 2021-11-22 PROCEDURE — 36415 COLL VENOUS BLD VENIPUNCTURE: CPT

## 2021-11-22 PROCEDURE — 80048 BASIC METABOLIC PNL TOTAL CA: CPT

## 2021-11-23 DIAGNOSIS — E87.5 SERUM POTASSIUM ELEVATED: ICD-10-CM

## 2021-11-23 DIAGNOSIS — N28.9 ABNORMAL KIDNEY FUNCTION: Primary | ICD-10-CM

## 2021-12-06 ENCOUNTER — TELEPHONE (OUTPATIENT)
Dept: FAMILY MEDICINE CLINIC | Age: 71
End: 2021-12-06

## 2021-12-07 ENCOUNTER — HOSPITAL ENCOUNTER (OUTPATIENT)
Age: 71
Discharge: HOME | End: 2021-12-07
Payer: MEDICARE

## 2021-12-07 DIAGNOSIS — N18.32 STAGE 3B CHRONIC KIDNEY DISEASE (HCC): ICD-10-CM

## 2021-12-07 LAB
ANION GAP SERPL CALCULATED.3IONS-SCNC: 11 MMOL/L (ref 3–16)
BACTERIA: ABNORMAL /HPF
BILIRUBIN URINE: NEGATIVE
BLOOD, URINE: NEGATIVE
BUN BLDV-MCNC: 32 MG/DL (ref 7–20)
CALCIUM SERPL-MCNC: 9.5 MG/DL (ref 8.3–10.6)
CHLORIDE BLD-SCNC: 108 MMOL/L (ref 99–110)
CLARITY: CLEAR
CO2: 25 MMOL/L (ref 21–32)
COLOR: YELLOW
CREAT SERPL-MCNC: 1.7 MG/DL (ref 0.6–1.2)
CREATININE URINE: 118.6 MG/DL (ref 28–259)
EPITHELIAL CELLS, UA: 4 /HPF (ref 0–5)
GFR AFRICAN AMERICAN: 36
GFR NON-AFRICAN AMERICAN: 29
GLUCOSE BLD-MCNC: 125 MG/DL (ref 70–99)
GLUCOSE URINE: NEGATIVE MG/DL
HCT VFR BLD CALC: 37.2 % (ref 36–48)
HEMOGLOBIN: 12.4 G/DL (ref 12–16)
HYALINE CASTS: 1 /LPF (ref 0–8)
KETONES, URINE: NEGATIVE MG/DL
LEUKOCYTE ESTERASE, URINE: NEGATIVE
MCH RBC QN AUTO: 32 PG (ref 26–34)
MCHC RBC AUTO-ENTMCNC: 33.3 G/DL (ref 31–36)
MCV RBC AUTO: 96.2 FL (ref 80–100)
MICROSCOPIC EXAMINATION: YES
NITRITE, URINE: NEGATIVE
PARATHYROID HORMONE INTACT: 49.9 PG/ML (ref 14–72)
PDW BLD-RTO: 14.3 % (ref 12.4–15.4)
PH UA: 5.5 (ref 5–8)
PLATELET # BLD: 158 K/UL (ref 135–450)
PMV BLD AUTO: 10.2 FL (ref 5–10.5)
POTASSIUM SERPL-SCNC: 4.9 MMOL/L (ref 3.5–5.1)
PROTEIN PROTEIN: 56 MG/DL
PROTEIN UA: 100 MG/DL
RBC # BLD: 3.87 M/UL (ref 4–5.2)
RBC UA: 1 /HPF (ref 0–4)
SODIUM BLD-SCNC: 144 MMOL/L (ref 136–145)
SPECIFIC GRAVITY UA: 1.02 (ref 1–1.03)
URINE TYPE: ABNORMAL
UROBILINOGEN, URINE: 0.2 E.U./DL
VITAMIN D 25-HYDROXY: 20.1 NG/ML
WBC # BLD: 4.5 K/UL (ref 4–11)
WBC UA: 3 /HPF (ref 0–5)

## 2021-12-07 PROCEDURE — 85027 COMPLETE CBC AUTOMATED: CPT

## 2021-12-07 PROCEDURE — 81001 URINALYSIS AUTO W/SCOPE: CPT

## 2021-12-07 PROCEDURE — 84156 ASSAY OF PROTEIN URINE: CPT

## 2021-12-07 PROCEDURE — 82306 VITAMIN D 25 HYDROXY: CPT

## 2021-12-07 PROCEDURE — 83970 ASSAY OF PARATHORMONE: CPT

## 2021-12-07 PROCEDURE — 84155 ASSAY OF PROTEIN SERUM: CPT

## 2021-12-07 PROCEDURE — 82570 ASSAY OF URINE CREATININE: CPT

## 2021-12-07 PROCEDURE — 80048 BASIC METABOLIC PNL TOTAL CA: CPT

## 2021-12-07 PROCEDURE — 84165 PROTEIN E-PHORESIS SERUM: CPT

## 2021-12-07 PROCEDURE — 36415 COLL VENOUS BLD VENIPUNCTURE: CPT

## 2021-12-08 ENCOUNTER — HOSPITAL ENCOUNTER (OUTPATIENT)
Dept: GENERAL RADIOLOGY | Age: 71
Discharge: HOME | End: 2021-12-08
Payer: MEDICARE

## 2021-12-08 DIAGNOSIS — Z78.0 MENOPAUSE: ICD-10-CM

## 2021-12-08 LAB — SPE/IFE INTERPRETATION: NORMAL

## 2021-12-08 PROCEDURE — 77080 DXA BONE DENSITY AXIAL: CPT

## 2021-12-09 ENCOUNTER — HOSPITAL ENCOUNTER (OUTPATIENT)
Dept: CT IMAGING | Age: 71
Discharge: HOME | End: 2021-12-09
Payer: MEDICARE

## 2021-12-09 DIAGNOSIS — F17.200 TOBACCO DEPENDENCE: ICD-10-CM

## 2021-12-09 LAB
ALBUMIN SERPL-MCNC: 3.1 G/DL (ref 3.1–4.9)
ALPHA-1-GLOBULIN: 0.3 G/DL (ref 0.2–0.4)
ALPHA-2-GLOBULIN: 0.6 G/DL (ref 0.4–1.1)
BETA GLOBULIN: 1.3 G/DL (ref 0.9–1.6)
GAMMA GLOBULIN: 1.4 G/DL (ref 0.6–1.8)
M SPIKE 1 SERUM PROTEIN ELEC: 0.6 G/DL
TOTAL PROTEIN: 6.6 G/DL (ref 6.4–8.2)

## 2021-12-09 PROCEDURE — 71271 CT THORAX LUNG CANCER SCR C-: CPT

## 2022-01-10 DIAGNOSIS — E03.9 ACQUIRED HYPOTHYROIDISM: ICD-10-CM

## 2022-01-10 RX ORDER — LEVOTHYROXINE SODIUM 0.03 MG/1
25 TABLET ORAL DAILY
Qty: 90 TABLET | Refills: 0 | Status: SHIPPED | OUTPATIENT
Start: 2022-01-10 | End: 2022-04-18

## 2022-01-12 DIAGNOSIS — I10 ESSENTIAL HYPERTENSION: ICD-10-CM

## 2022-01-12 RX ORDER — LISINOPRIL 20 MG/1
TABLET ORAL
Qty: 90 TABLET | Refills: 0 | Status: SHIPPED | OUTPATIENT
Start: 2022-01-12 | End: 2022-04-26

## 2022-01-12 NOTE — TELEPHONE ENCOUNTER
Medication:   Requested Prescriptions      No prescriptions requested or ordered in this encounter      Requesting 90 day vs 30    Patient Phone Number: [phone] (home)     Last appt: 10/8/2021   Next appt: Visit date not found    Last OARRS:   RX Monitoring 2/17/2021   Periodic Controlled Substance Monitoring Possible medication side effects, risk of tolerance/dependence & alternative treatments discussed. ;No signs of potential drug abuse or diversion identified.      PDMP Monitoring:    Last PDMP Sonia Stewart as Reviewed [facility]):  Review User Review Instant Review Result   John Goldshan 2/17/2021 11:53 AM Reviewed PDMP [1]     Preferred Pharmacy:   [address] [place] [address],[address]  [address] [facility],[address] [address]  Phone: [phone] Fax: [phone]

## 2022-01-17 DIAGNOSIS — E78.00 PURE HYPERCHOLESTEROLEMIA: ICD-10-CM

## 2022-01-17 RX ORDER — FENOFIBRATE 160 MG/1
160 TABLET ORAL DAILY
Qty: 90 TABLET | Refills: 3 | Status: SHIPPED | OUTPATIENT
Start: 2022-01-17 | End: 2022-01-24 | Stop reason: SDUPTHER

## 2022-01-17 NOTE — TELEPHONE ENCOUNTER
fenofibrate (TRIGLIDE) 160 MG tablet 90 tablet 0 10/27/2021     Sig - Route:  Take 1 tablet by mouth daily - Oral            Oliver in chart      Provider out of the office

## 2022-01-24 RX ORDER — FENOFIBRATE 160 MG/1
160 TABLET ORAL DAILY
Qty: 90 TABLET | Refills: 1 | Status: SHIPPED | OUTPATIENT
Start: 2022-01-24 | End: 2022-07-26

## 2022-01-24 NOTE — TELEPHONE ENCOUNTER
This was sent to [employer] and she wants it sent to the Mark in the chart.      Please advise     Provider out of the office

## 2022-01-26 NOTE — TELEPHONE ENCOUNTER
----- Message from Felicity Concepcion sent at 12/6/2021 12:11 PM EST -----  Subject: Message to Provider    QUESTIONS  Information for Provider? Patient states Dr. Elizabeth Bassett took her off of   Arthritis med and since then she has been in pain, and ask if PCP can   prescribe something else that won't conflict with other meds prescribed so   she's not in arthritis pain all the time because of the med change. Would   like someone to reach out asap.   ---------------------------------------------------------------------------  --------------  CALL BACK INFO  What is the best way for the office to contact you? OK to leave message on   voicemail  Preferred Call Back Phone Number? [phone]  ---------------------------------------------------------------------------  --------------  SCRIPT ANSWERS  Relationship to Patient?  Self Patient returning call to MD and staff. She states she has \"taken time to think things over\". And would like to talk to MA

## 2022-02-08 ENCOUNTER — HOSPITAL ENCOUNTER (OUTPATIENT)
Age: 72
Discharge: HOME | End: 2022-02-08
Payer: MEDICARE

## 2022-02-08 ENCOUNTER — HOSPITAL ENCOUNTER (OUTPATIENT)
Dept: GENERAL RADIOLOGY | Age: 72
Discharge: HOME | End: 2022-02-08
Payer: MEDICARE

## 2022-02-08 DIAGNOSIS — Z98.1 ARTHRODESIS STATUS: ICD-10-CM

## 2022-02-08 PROCEDURE — 72100 X-RAY EXAM L-S SPINE 2/3 VWS: CPT

## 2022-02-21 LAB — MAMMOGRAPHY, EXTERNAL: NORMAL

## 2022-04-16 DIAGNOSIS — E03.9 ACQUIRED HYPOTHYROIDISM: ICD-10-CM

## 2022-04-18 RX ORDER — LEVOTHYROXINE SODIUM 0.03 MG/1
25 TABLET ORAL DAILY
Qty: 90 TABLET | Refills: 0 | Status: SHIPPED | OUTPATIENT
Start: 2022-04-18 | End: 2022-07-19

## 2022-04-18 NOTE — TELEPHONE ENCOUNTER
Requested Prescriptions     Pending Prescriptions Disp Refills    levothyroxine (SYNTHROID) 25 MCG tablet [Pharmacy Med Name: LEVOTHYROXINE 0.025MG (25MCG) TAB] 90 tablet 0     Sig: TAKE 1 TABLET BY MOUTH DAILY     Last OV - 10/8/21  Next OV - 10/11/22  Last labs - 12/7/21

## 2022-04-21 ENCOUNTER — HOSPITAL ENCOUNTER (OUTPATIENT)
Age: 72
Discharge: HOME | End: 2022-04-21
Payer: MEDICARE

## 2022-04-21 DIAGNOSIS — N18.32 STAGE 3B CHRONIC KIDNEY DISEASE (HCC): ICD-10-CM

## 2022-04-21 LAB
ANION GAP SERPL CALCULATED.3IONS-SCNC: 11 MMOL/L (ref 3–16)
BUN BLDV-MCNC: 34 MG/DL (ref 7–20)
CALCIUM SERPL-MCNC: 9.8 MG/DL (ref 8.3–10.6)
CHLORIDE BLD-SCNC: 105 MMOL/L (ref 99–110)
CO2: 26 MMOL/L (ref 21–32)
CREAT SERPL-MCNC: 1.7 MG/DL (ref 0.6–1.2)
CREATININE URINE: 90.3 MG/DL (ref 28–259)
GFR AFRICAN AMERICAN: 36
GFR NON-AFRICAN AMERICAN: 29
GLUCOSE BLD-MCNC: 116 MG/DL (ref 70–99)
MICROALBUMIN UR-MCNC: 32.7 MG/DL
MICROALBUMIN/CREAT UR-RTO: 362.1 MG/G (ref 0–30)
POTASSIUM SERPL-SCNC: 4.7 MMOL/L (ref 3.5–5.1)
SODIUM BLD-SCNC: 142 MMOL/L (ref 136–145)

## 2022-04-21 PROCEDURE — 82570 ASSAY OF URINE CREATININE: CPT

## 2022-04-21 PROCEDURE — 80048 BASIC METABOLIC PNL TOTAL CA: CPT

## 2022-04-21 PROCEDURE — 82043 UR ALBUMIN QUANTITATIVE: CPT

## 2022-04-21 PROCEDURE — 36415 COLL VENOUS BLD VENIPUNCTURE: CPT

## 2022-06-01 ENCOUNTER — OFFICE VISIT (OUTPATIENT)
Dept: FAMILY MEDICINE CLINIC | Age: 72
End: 2022-06-01
Payer: MEDICARE

## 2022-06-01 VITALS
WEIGHT: 192.13 LBS | BODY MASS INDEX: 36.3 KG/M2 | HEART RATE: 87 BPM | SYSTOLIC BLOOD PRESSURE: 134 MMHG | OXYGEN SATURATION: 98 % | TEMPERATURE: 98.1 F | DIASTOLIC BLOOD PRESSURE: 72 MMHG

## 2022-06-01 DIAGNOSIS — G47.00 INSOMNIA, UNSPECIFIED TYPE: ICD-10-CM

## 2022-06-01 DIAGNOSIS — S89.92XA KNEE INJURY, LEFT, INITIAL ENCOUNTER: Primary | ICD-10-CM

## 2022-06-01 DIAGNOSIS — M19.90 OSTEOARTHRITIS, UNSPECIFIED OSTEOARTHRITIS TYPE, UNSPECIFIED SITE: ICD-10-CM

## 2022-06-01 DIAGNOSIS — I10 ESSENTIAL HYPERTENSION: ICD-10-CM

## 2022-06-01 PROCEDURE — G8399 PT W/DXA RESULTS DOCUMENT: HCPCS | Performed by: NURSE PRACTITIONER

## 2022-06-01 PROCEDURE — 4004F PT TOBACCO SCREEN RCVD TLK: CPT | Performed by: NURSE PRACTITIONER

## 2022-06-01 PROCEDURE — 1123F ACP DISCUSS/DSCN MKR DOCD: CPT | Performed by: NURSE PRACTITIONER

## 2022-06-01 PROCEDURE — G8427 DOCREV CUR MEDS BY ELIG CLIN: HCPCS | Performed by: NURSE PRACTITIONER

## 2022-06-01 PROCEDURE — 3017F COLORECTAL CA SCREEN DOC REV: CPT | Performed by: NURSE PRACTITIONER

## 2022-06-01 PROCEDURE — 99214 OFFICE O/P EST MOD 30 MIN: CPT | Performed by: NURSE PRACTITIONER

## 2022-06-01 PROCEDURE — G8417 CALC BMI ABV UP PARAM F/U: HCPCS | Performed by: NURSE PRACTITIONER

## 2022-06-01 PROCEDURE — 1090F PRES/ABSN URINE INCON ASSESS: CPT | Performed by: NURSE PRACTITIONER

## 2022-06-01 RX ORDER — TRAZODONE HYDROCHLORIDE 50 MG/1
50 TABLET ORAL NIGHTLY PRN
Qty: 30 TABLET | Refills: 0 | Status: SHIPPED | OUTPATIENT
Start: 2022-06-01 | End: 2022-06-28

## 2022-06-01 SDOH — ECONOMIC STABILITY: FOOD INSECURITY: WITHIN THE PAST 12 MONTHS, THE FOOD YOU BOUGHT JUST DIDN'T LAST AND YOU DIDN'T HAVE MONEY TO GET MORE.: NEVER TRUE

## 2022-06-01 SDOH — ECONOMIC STABILITY: FOOD INSECURITY: WITHIN THE PAST 12 MONTHS, YOU WORRIED THAT YOUR FOOD WOULD RUN OUT BEFORE YOU GOT MONEY TO BUY MORE.: NEVER TRUE

## 2022-06-01 ASSESSMENT — PATIENT HEALTH QUESTIONNAIRE - PHQ9
1. LITTLE INTEREST OR PLEASURE IN DOING THINGS: 0
SUM OF ALL RESPONSES TO PHQ QUESTIONS 1-9: 0
SUM OF ALL RESPONSES TO PHQ9 QUESTIONS 1 & 2: 0
2. FEELING DOWN, DEPRESSED OR HOPELESS: 0
SUM OF ALL RESPONSES TO PHQ QUESTIONS 1-9: 0

## 2022-06-01 ASSESSMENT — SOCIAL DETERMINANTS OF HEALTH (SDOH): HOW HARD IS IT FOR YOU TO PAY FOR THE VERY BASICS LIKE FOOD, HOUSING, MEDICAL CARE, AND HEATING?: NOT HARD AT ALL

## 2022-06-01 NOTE — PROGRESS NOTES
Loni Velásquez  : 1950  Encounter date: 2022    This jaun 67 y.o. female who presents with  Chief Complaint   Patient presents with    Leg Injury     c/o LT twist like knee injury last Tuesday, swelling in bilateral lower extremeties, fatigue    Other     recently taken off arthritis medicine d/t kidneys, started on tylenol which is not helping       History of present illness:    HPI Pt is 67year old female reports twisting L knee 7 days ago, did not have imaging completed, reports starting to feel better. Pt with existing OA. Recent DEXA bone scan NL, low risk for fracture. Pt is being followed by Dr. Federico Shone, nephrology for stage 3 CKD. Advised to limit/stop NSAID medications due to harmful affects, only taking tylenol arthritis for pain. CKD stage 3 B   Likely 2/2 uncontrolled HTN  And  h/o long term NSAID use   Has moderate proteinuria      HTN. On lisinopril. Decrease dose to 20 mg po daily   Had hyperkalemia . Educated on low k diet   Potassium level normal now     Dr. Federico Shone also added norvasc 10 mg daily, reports new onset bilateral LE swelling. Pt also concerned about insomnia, has taken ambien in past.  Reports falling asleep at 12-1 and then waking 2 hours later.     Current Outpatient Medications on File Prior to Visit   Medication Sig Dispense Refill    lisinopril (PRINIVIL;ZESTRIL) 20 MG tablet TAKE 1 TABLET BY MOUTH DAILY 90 tablet 3    levothyroxine (SYNTHROID) 25 MCG tablet TAKE 1 TABLET BY MOUTH DAILY 90 tablet 0    fenofibrate (TRIGLIDE) 160 MG tablet Take 1 tablet by mouth daily 90 tablet 1    vitamin D3 (CHOLECALCIFEROL) 25 MCG (1000 UT) TABS tablet Take 1 tablet by mouth daily 90 tablet 1    ezetimibe (ZETIA) 10 MG tablet TAKE 1 TABLET BY MOUTH EVERY DAY 90 tablet 3    glucose monitoring kit (FREESTYLE) monitoring kit 1 kit by Does not apply route daily 1 kit 0    Lancets (BD LANCET ULTRAFINE 30G) MISC 1 Device by In Vitro route every morning CHECK BS EVERY MORNING FASTING 100 each 3    blood glucose monitor strips Test blood sugar every morning fasting. 100 strip 3    Ascorbic Acid (VITAMIN C CR) 1000 MG TBCR Take by mouth      vitamin E 400 UNIT capsule Take 400 Units by mouth daily       No current facility-administered medications on file prior to visit. Allergies   Allergen Reactions    Influenza Virus Vacc Split Pf      bronchitis    Pravastatin Sodium [Pravastatin Sodium] Other (See Comments)     Elevated liver enzymes.      Vicodin [Hydrocodone-Acetaminophen]      Nausea     Past Medical History:   Diagnosis Date    Hyperlipidemia     Hypertension     Impaired fasting glucose     Insomnia, unspecified     Osteoarthritis     Thyroid disease       Past Surgical History:   Procedure Laterality Date    CARPAL TUNNEL RELEASE Left [id number]    lemus    CHOLECYSTECTOMY      LUMBAR FUSION N/A 2/16/2021    LUMBAR4-LUMBAR5 TRANSVERSE LUMBAR INTERBODY FUSION ([id number], 57905, 30497, 69923, 95822, 35652, 30154, 66898) performed by Dion Hutchinson MD at [address]  8/20/15    L45 B/L decompressive hobson and excision of synovial cyst    ROTATOR CUFF REPAIR Right 2/98    Brittany Persons       Family History   Problem Relation Age of Onset    Cancer Father     Cancer Brother       Social History     Tobacco Use    Smoking status: Current Every Day Smoker     Packs/day: 1.00     Years: 40.00     Pack years: 40.00     Types: Cigarettes    Smokeless tobacco: Never Used   Substance Use Topics    Alcohol use: No     Alcohol/week: 0.0 standard drinks        Review of Systems    Objective:    /72 (Site: Right Upper Arm, Position: Sitting, Cuff Size: Large Adult)   Pulse 87   Temp 98.1 °F (36.7 °C) (Infrared)   Wt 192 lb 2 oz (87.1 kg)   SpO2 98%   BMI 36.30 kg/m²   Weight: 192 lb 2 oz (87.1 kg)     BP Readings from Last 3 Encounters:   06/01/22 134/72   04/26/22 (!) 140/82   01/25/22 (!) 152/77     Wt Readings from Last 3 Encounters:   06/01/22 192 lb 2 oz (87.1 kg)   04/26/22 189 lb (85.7 kg)   01/25/22 183 lb 3.2 oz (83.1 kg)     BMI Readings from Last 3 Encounters:   06/01/22 36.30 kg/m²   04/26/22 35.71 kg/m²   01/25/22 34.62 kg/m²       Physical Exam  Vitals reviewed. Constitutional:       Appearance: Normal appearance. She is well-developed. She is obese. Cardiovascular:      Rate and Rhythm: Normal rate and regular rhythm. Pulses: Normal pulses. Heart sounds: Normal heart sounds. No murmur heard. Pulmonary:      Effort: Pulmonary effort is normal.      Breath sounds: Normal breath sounds. Musculoskeletal:         General: Swelling and tenderness (L knee medial side, muscular) present. Right lower leg: Edema present. Left lower leg: Edema present. Skin:     General: Skin is warm and dry. Findings: No bruising or lesion. Neurological:      Mental Status: She is alert and oriented to person, place, and time. Psychiatric:         Mood and Affect: Mood normal.         Assessment/Plan    1. Knee injury, left, initial encounter  Advised ace wrap, ice, stretches  - XR KNEE LEFT (1-2 VIEWS); Future    2. Essential hypertension  Advised to hold norvasc d/t possible LE swelling  Advised daily monitoring  - verapamil (CALAN SR) 120 MG extended release tablet; Take 1 tablet by mouth daily  Dispense: 30 tablet; Refill: 0    3. Insomnia, unspecified type  Discussed good sleep hygeine  - traZODone (DESYREL) 50 MG tablet; Take 1 tablet by mouth nightly as needed for Sleep  Dispense: 30 tablet; Refill: 0    4. Osteoarthritis, unspecified osteoarthritis type, unspecified site  Continue OTC tylenol arthritis  - diclofenac sodium (VOLTAREN) 1 % GEL; Apply 2 g topically 4 times daily  Dispense: 100 g; Refill: 1      Return in about 1 month (around 7/1/2022) for hypertension re-check, medication re-check. This dictation was generated by voice recognition computer software.   Although all attempts are made to edit the dictation for accuracy, there may be errors in the transcription that are not intended.

## 2022-06-20 ENCOUNTER — HOSPITAL ENCOUNTER (OUTPATIENT)
Dept: GENERAL RADIOLOGY | Age: 72
Discharge: HOME | End: 2022-06-20
Payer: MEDICARE

## 2022-06-20 ENCOUNTER — HOSPITAL ENCOUNTER (OUTPATIENT)
Age: 72
Discharge: HOME | End: 2022-06-20
Payer: MEDICARE

## 2022-06-20 DIAGNOSIS — S89.92XA KNEE INJURY, LEFT, INITIAL ENCOUNTER: ICD-10-CM

## 2022-06-20 PROCEDURE — 73560 X-RAY EXAM OF KNEE 1 OR 2: CPT

## 2022-06-23 ENCOUNTER — TELEPHONE (OUTPATIENT)
Dept: FAMILY MEDICINE CLINIC | Age: 72
End: 2022-06-23

## 2022-06-23 DIAGNOSIS — M17.12 LOCALIZED OSTEOARTHRITIS OF LEFT KNEE: Primary | ICD-10-CM

## 2022-06-23 NOTE — TELEPHONE ENCOUNTER
----- Message from Mat Edward sent at 6/23/2022 12:43 PM EDT -----  Subject: Message to Provider    QUESTIONS  Information for Provider? pt has labs and xray of left leg and no one got   back to her.   ---------------------------------------------------------------------------  --------------  CALL BACK INFO  What is the best way for the office to contact you? OK to leave message on   voicemail  Preferred Call Back Phone Number? [phone]  ---------------------------------------------------------------------------  --------------  SCRIPT ANSWERS  Relationship to Patient?  Self

## 2022-06-28 DIAGNOSIS — G47.00 INSOMNIA, UNSPECIFIED TYPE: ICD-10-CM

## 2022-06-28 DIAGNOSIS — I10 ESSENTIAL HYPERTENSION: ICD-10-CM

## 2022-06-28 RX ORDER — TRAZODONE HYDROCHLORIDE 50 MG/1
TABLET ORAL
Qty: 30 TABLET | Refills: 0 | Status: SHIPPED | OUTPATIENT
Start: 2022-06-28 | End: 2022-07-06 | Stop reason: SDUPTHER

## 2022-07-06 ENCOUNTER — OFFICE VISIT (OUTPATIENT)
Dept: FAMILY MEDICINE CLINIC | Age: 72
End: 2022-07-06
Payer: MEDICARE

## 2022-07-06 VITALS
SYSTOLIC BLOOD PRESSURE: 134 MMHG | BODY MASS INDEX: 36.16 KG/M2 | DIASTOLIC BLOOD PRESSURE: 80 MMHG | OXYGEN SATURATION: 98 % | TEMPERATURE: 97.3 F | HEART RATE: 88 BPM | WEIGHT: 191.38 LBS

## 2022-07-06 DIAGNOSIS — G47.00 INSOMNIA, UNSPECIFIED TYPE: Primary | ICD-10-CM

## 2022-07-06 DIAGNOSIS — I10 ESSENTIAL HYPERTENSION: ICD-10-CM

## 2022-07-06 DIAGNOSIS — M62.831 MUSCLE SPASM OF CALF: ICD-10-CM

## 2022-07-06 DIAGNOSIS — E55.9 VITAMIN D INSUFFICIENCY: ICD-10-CM

## 2022-07-06 PROCEDURE — 1123F ACP DISCUSS/DSCN MKR DOCD: CPT | Performed by: NURSE PRACTITIONER

## 2022-07-06 PROCEDURE — 3017F COLORECTAL CA SCREEN DOC REV: CPT | Performed by: NURSE PRACTITIONER

## 2022-07-06 PROCEDURE — 99214 OFFICE O/P EST MOD 30 MIN: CPT | Performed by: NURSE PRACTITIONER

## 2022-07-06 PROCEDURE — G8399 PT W/DXA RESULTS DOCUMENT: HCPCS | Performed by: NURSE PRACTITIONER

## 2022-07-06 PROCEDURE — G8427 DOCREV CUR MEDS BY ELIG CLIN: HCPCS | Performed by: NURSE PRACTITIONER

## 2022-07-06 PROCEDURE — 1090F PRES/ABSN URINE INCON ASSESS: CPT | Performed by: NURSE PRACTITIONER

## 2022-07-06 PROCEDURE — G8417 CALC BMI ABV UP PARAM F/U: HCPCS | Performed by: NURSE PRACTITIONER

## 2022-07-06 PROCEDURE — 4004F PT TOBACCO SCREEN RCVD TLK: CPT | Performed by: NURSE PRACTITIONER

## 2022-07-06 RX ORDER — TRAZODONE HYDROCHLORIDE 50 MG/1
TABLET ORAL
Qty: 90 TABLET | Refills: 1 | Status: SHIPPED | OUTPATIENT
Start: 2022-07-06

## 2022-07-06 NOTE — PROGRESS NOTES
Avni Rodriges  : 1950  Encounter date: 2022    This jaun 67 y.o. female who presents with  Chief Complaint   Patient presents with    Follow-up     change in bp med & sleep med, both working well, swelling gone    Pain     knot in calf, red bump, painful, since        History of present illness:    HPI Pt is 67year old female for medication recheck, recently started on verapamil, due to existing swelling with norvasc. Reports swelling has improved. Pt also started on trazodone for sleep, reports working well and only waking one time per night. Pt reports continued pain and has moved down to L calf with john horse sensation. Will repeat labs to check D, magnesium and K. Reports has not yet established with PT, but has message on phone to return call. Current Outpatient Medications on File Prior to Visit   Medication Sig Dispense Refill    diclofenac sodium (VOLTAREN) 1 % GEL Apply 2 g topically 4 times daily 100 g 1    lisinopril (PRINIVIL;ZESTRIL) 20 MG tablet TAKE 1 TABLET BY MOUTH DAILY 90 tablet 3    levothyroxine (SYNTHROID) 25 MCG tablet TAKE 1 TABLET BY MOUTH DAILY 90 tablet 0    fenofibrate (TRIGLIDE) 160 MG tablet Take 1 tablet by mouth daily 90 tablet 1    vitamin D3 (CHOLECALCIFEROL) 25 MCG (1000 UT) TABS tablet Take 1 tablet by mouth daily 90 tablet 1    ezetimibe (ZETIA) 10 MG tablet TAKE 1 TABLET BY MOUTH EVERY DAY 90 tablet 3    glucose monitoring kit (FREESTYLE) monitoring kit 1 kit by Does not apply route daily 1 kit 0    Lancets (BD LANCET ULTRAFINE 30G) MISC 1 Device by In Vitro route every morning CHECK BS EVERY MORNING FASTING 100 each 3    blood glucose monitor strips Test blood sugar every morning fasting. 100 strip 3    Ascorbic Acid (VITAMIN C CR) 1000 MG TBCR Take by mouth      vitamin E 400 UNIT capsule Take 400 Units by mouth daily       No current facility-administered medications on file prior to visit.       Allergies   Allergen Reactions    Influenza Virus Vacc Split Pf      bronchitis    Pravastatin Sodium [Pravastatin Sodium] Other (See Comments)     Elevated liver enzymes.  Vicodin [Hydrocodone-Acetaminophen]      Nausea     Past Medical History:   Diagnosis Date    Hyperlipidemia     Hypertension     Impaired fasting glucose     Insomnia, unspecified     Osteoarthritis     Thyroid disease       Past Surgical History:   Procedure Laterality Date    CARPAL TUNNEL RELEASE Left [id number]    lemus    CHOLECYSTECTOMY      LUMBAR FUSION N/A 2/16/2021    LUMBAR4-LUMBAR5 TRANSVERSE LUMBAR INTERBODY FUSION (97 [id number], 51285, 73498, 42242, 14671, 17133, 50272, 87817) performed by Jazlyn Soares MD at [address]  8/20/15    L45 B/L decompressive hobson and excision of synovial cyst    ROTATOR CUFF REPAIR Right 2/98    Andrea Young       Family History   Problem Relation Age of Onset    Cancer Father     Cancer Brother       Social History     Tobacco Use    Smoking status: Current Every Day Smoker     Packs/day: 1.00     Years: 40.00     Pack years: 40.00     Types: Cigarettes    Smokeless tobacco: Never Used   Substance Use Topics    Alcohol use: No     Alcohol/week: 0.0 standard drinks        Review of Systems    Objective:    /80 (Site: Right Upper Arm, Position: Sitting, Cuff Size: Medium Adult)   Pulse 88   Temp 97.3 °F (36.3 °C) (Infrared)   Wt 191 lb 6 oz (86.8 kg)   SpO2 98%   BMI 36.16 kg/m²   Weight: 191 lb 6 oz (86.8 kg)     BP Readings from Last 3 Encounters:   07/06/22 134/80   06/01/22 134/72   04/26/22 (!) 140/82     Wt Readings from Last 3 Encounters:   07/06/22 191 lb 6 oz (86.8 kg)   06/01/22 192 lb 2 oz (87.1 kg)   04/26/22 189 lb (85.7 kg)     BMI Readings from Last 3 Encounters:   07/06/22 36.16 kg/m²   06/01/22 36.30 kg/m²   04/26/22 35.71 kg/m²       Physical Exam  Vitals reviewed. Constitutional:       Appearance: Normal appearance. She is well-developed.    Cardiovascular:      Rate and Rhythm: Normal rate and regular rhythm. Pulses: Normal pulses. Heart sounds: Normal heart sounds. No murmur heard. Pulmonary:      Effort: Pulmonary effort is normal.      Breath sounds: Normal breath sounds. Musculoskeletal:         General: Tenderness (posterior L calf) present. Right lower leg: Edema present. Left lower leg: Edema present. Skin:     General: Skin is warm and dry. Findings: No bruising, erythema or lesion. Neurological:      Mental Status: She is alert and oriented to person, place, and time. Psychiatric:         Mood and Affect: Mood normal.         Assessment/Plan    1. Insomnia, unspecified type  controlled  - traZODone (DESYREL) 50 MG tablet; TAKE ONE TABLET BY MOUTH ONCE NIGHTLY AS NEEDED FOR SLEEP  Dispense: 90 tablet; Refill: 1    2. Vitamin D insufficiency  - Vitamin D 25 Hydroxy; Future    3. Muscle spasm of calf  Advised stretching exercise  Follow up with PT  Increased hydration  - Basic Metabolic Panel; Future  - Vitamin D 25 Hydroxy; Future  - Magnesium; Future    4. Essential hypertension  controlled  - verapamil (CALAN SR) 120 MG extended release tablet; TAKE ONE TABLET BY MOUTH DAILY  Dispense: 90 tablet; Refill: 1      Return in about 6 months (around 1/6/2023) for medication re-check, hypertension re-check. This dictation was generated by voice recognition computer software. Although all attempts are made to edit the dictation for accuracy, there may be errors in the transcription that are not intended. PACU

## 2022-07-11 ENCOUNTER — TELEPHONE (OUTPATIENT)
Dept: FAMILY MEDICINE CLINIC | Age: 72
End: 2022-07-11

## 2022-07-11 NOTE — TELEPHONE ENCOUNTER
----- Message from Chris Finder sent at 7/11/2022 11:41 AM EDT -----  Subject: Message to Provider    QUESTIONS  Information for Provider? PT needs form stating that she can not do [employer]   duty due to knee and back injury. Please Fax paperwork to [phone]   attention Brayan Bates   ---------------------------------------------------------------------------  --------------  [address] [employer]  [phone]; OK to leave message on voicemail  ---------------------------------------------------------------------------  --------------  SCRIPT ANSWERS  Relationship to Patient?  Self

## 2022-07-13 ENCOUNTER — HOSPITAL ENCOUNTER (OUTPATIENT)
Age: 72
Discharge: HOME | End: 2022-07-13
Payer: MEDICARE

## 2022-07-13 DIAGNOSIS — E55.9 VITAMIN D INSUFFICIENCY: ICD-10-CM

## 2022-07-13 DIAGNOSIS — M62.831 MUSCLE SPASM OF CALF: ICD-10-CM

## 2022-07-13 LAB
ANION GAP SERPL CALCULATED.3IONS-SCNC: 9 MMOL/L (ref 3–16)
BUN BLDV-MCNC: 26 MG/DL (ref 7–20)
CALCIUM SERPL-MCNC: 9.5 MG/DL (ref 8.3–10.6)
CHLORIDE BLD-SCNC: 106 MMOL/L (ref 99–110)
CO2: 26 MMOL/L (ref 21–32)
CREAT SERPL-MCNC: 1.8 MG/DL (ref 0.6–1.2)
GFR AFRICAN AMERICAN: 33
GFR NON-AFRICAN AMERICAN: 28
GLUCOSE BLD-MCNC: 92 MG/DL (ref 70–99)
MAGNESIUM: 2.1 MG/DL (ref 1.8–2.4)
POTASSIUM SERPL-SCNC: 4.7 MMOL/L (ref 3.5–5.1)
SODIUM BLD-SCNC: 141 MMOL/L (ref 136–145)
VITAMIN D 25-HYDROXY: 31.9 NG/ML

## 2022-07-13 PROCEDURE — 80048 BASIC METABOLIC PNL TOTAL CA: CPT

## 2022-07-13 PROCEDURE — 83735 ASSAY OF MAGNESIUM: CPT

## 2022-07-13 PROCEDURE — 82306 VITAMIN D 25 HYDROXY: CPT

## 2022-07-13 PROCEDURE — 36415 COLL VENOUS BLD VENIPUNCTURE: CPT

## 2022-07-18 DIAGNOSIS — E03.9 ACQUIRED HYPOTHYROIDISM: ICD-10-CM

## 2022-07-19 ENCOUNTER — HOSPITAL ENCOUNTER (OUTPATIENT)
Dept: PHYSICAL THERAPY | Age: 72
Setting detail: THERAPIES SERIES
Discharge: HOME | End: 2022-07-19
Payer: MEDICARE

## 2022-07-19 PROCEDURE — 97110 THERAPEUTIC EXERCISES: CPT

## 2022-07-19 PROCEDURE — 97530 THERAPEUTIC ACTIVITIES: CPT

## 2022-07-19 PROCEDURE — 97161 PT EVAL LOW COMPLEX 20 MIN: CPT

## 2022-07-19 RX ORDER — LEVOTHYROXINE SODIUM 0.03 MG/1
25 TABLET ORAL DAILY
Qty: 90 TABLET | Refills: 0 | Status: SHIPPED | OUTPATIENT
Start: 2022-07-19 | End: 2022-10-18 | Stop reason: SDUPTHER

## 2022-07-19 NOTE — FLOWSHEET NOTE
[facility]  Phone: [phone]   Fax: [phone]    Physical Therapy Daily Treatment Note  Date:  2022    Patient Name:  Hayede Keyes    :  1950  MRN: [id number]  Medical/Treatment Diagnosis Information:  Diagnosis: M17.12 (ICD-10-CM) - Localized osteoarthritis of left knee  Treatment Diagnosis: hypomobility L-P spine, strength/flexibility/balance deficits L >R LE. Myofascial pain at  trunk and hips, especially at R QL. Flexion bias reduces symptoms. Insurance/Certification information:  PT Insurance Information: medicare and [employer]. $233 deductible. [facility]  Physician Information: BAYRON Loera NP; referral date: 22  Plan of care signed (Y/N): []  Yes [x]  No     Date of Patient follow up with Physician:      Progress Report: []  Yes  [x]  No     Date Range for reporting period:  Beginnin/19  Ending:     Progress report due (10 Rx/or 30 days whichever is less): visit #10 or  (date)     Recertification due (POC duration/ or 90 days whichever is less): visit # 12 or 10/19     Visit # Insurance Allowable Auth required?  Date Range    Med nec []  Yes  [x]  No NA     Latex Allergy:  [x]NO      []YES  Preferred Language for Healthcare:   [x]English       []other:    Functional Scale:        Date assessed:  FOTO: lumbar raw score = 42, risk adjusted score:  42 ,    taken at initial eval  FOTO knee raw score 36, risk adjusted 44,     taken at initial eval        Pain level:  0-8/10     SUBJECTIVE:  See eval    OBJECTIVE: See eval      RESTRICTIONS/PRECAUTIONS: kidney failure,  BACK PAIN, Kidney failure - sees Dr. Yamile Quijano, smokes 1 pack/d  LUMBAR FUSION N/A 2021 LUMBAR4-LUMBAR5 TRANSVERSE LUMBAR INTERBODY FUSION ([id number], 24939, 35288, 30214, 46779, 07161, 05346, 02626) performed by Vasu Fuentes MD at [facility]  8/20/15 L45 B/L decompressive hobson and excision of synovial cyst    ROTATOR CUFF REPAIR Right 2/98          Exercises/Interventions: flexion bias    Therapeutic Exercises (29096) Resistance / level Sets/sec Reps Notes   Nu step or bike       IB       HR/TR       Step stretches   HS  Hip flex                     Mat ex  Supine HS stretch  SKTC  Fig 4 piriformis stretch    30\"  30\"  30\"   2  2  2                  Therapeutic Activities (95356)       TG squats  Later when tols      Posture /body mechs Instructed in neutral spine for bed mob      Diaphragmatic breathing in hooklying  Next session as carlos to improve rib pain R side                    Neuromuscular Re-ed (37479)       Stabilization progression, flexion bias  TrA iso    5\"   10x    Multifidi walkouts         Seated SB                            Manual Intervention (11613)       STM/IASTM  Prn to R QL and R>L  lower quarter /trunk                                             Modalities:     Pt. Education:  -7/19/22 patient educated on diagnosis, prognosis and expectations for rehab  -all patient questions were answered    Home Exercise Program:  7/19/22   Access Code: [email or web address]  URL: [email or web address]  Date: 07/19/2022  Prepared by: Selvin Haji    Exercises  Supine Hamstring Stretch - 2 x daily - 7 x weekly - 1-2 sets - 3 reps - 30 hold  Hooklying Single Knee to Chest Stretch - 2 x daily - 7 x weekly - 1 sets - 3 reps - 30 hold  Supine Piriformis Stretch with Foot on Ground - 2 x daily - 7 x weekly - 1 sets - 3 reps - 30 hold  Supine Transversus Abdominis Bracing - Hands on Stomach - 2 x daily - 7 x weekly - 1 sets - 10 reps - 5 hold        Therapeutic Exercise and NMR EXR  [x] (23760) Provided verbal/tactile cueing for activities related to strengthening, flexibility, endurance, ROM for improvements in  [x] LE / Lumbar: LE, proximal hip, and core control with self care, mobility, lifting, ambulation.   [] UE / Cervical: cervical, postural, scapular, scapulothoracic and UE control with self care, reaching, carrying, lifting, house/yardwork, driving, computer work. [x] (62413) Provided verbal/tactile cueing for activities related to improving balance, coordination, kinesthetic sense, posture, motor skill, proprioception to assist with   [x] LE / lumbar: LE, proximal hip, and core control in self care, mobility, lifting, ambulation and eccentric single leg control. [] UE / cervical: cervical, scapular, scapulothoracic and UE control with self care, reaching, carrying, lifting, house/yardwork, driving, computer work.   [] (16861) Therapist is in constant attendance of 2 or more patients providing skilled therapy interventions, but not providing any significant amount of measurable one-on-one time to either patient, for improvements in  [] LE / lumbar: LE, proximal hip, and core control in self care, mobility, lifting, ambulation and eccentric single leg control. [] UE / cervical: cervical, scapular, scapulothoracic and UE control with self care, reaching, carrying, lifting, house/yardwork, driving, computer work. NMR and Therapeutic Activities:    [x] (10419 or 85503) Provided verbal/tactile cueing for activities related to improving balance, coordination, kinesthetic sense, posture, motor skill, proprioception and motor activation to allow for proper function of   [x] LE: / Lumbar core, proximal hip and LE with self care and ADLs  [] UE / Cervical: cervical, postural, scapular, scapulothoracic and UE control with self care, carrying, lifting, driving, computer work.    [x] (63904) Gait Re-education- Provided training and instruction to the patient for proper LE, core and proximal hip recruitment and positioning and eccentric body weight control with ambulation re-education including up and down stairs     Home Management Training / Self Care:  [] (13579) Provided self-care/home management training related to activities of daily living and compensatory training, and/or use of adaptive equipment for improvement ascending / descending stairs. Charges:  Timed Code Treatment Minutes: 40   Total Treatment Minutes: 60     [x] EVAL - LOW (51203)   [] EVAL - MOD (32454)  [] EVAL - HIGH (01208)  [] RE-EVAL (75519)  [x] FD(76493) x   1    [] Ionto  [] NMR (80373) x       [] Vaso  [] Manual (06844) x       [] Ultrasound  [x] TA x 2       [] Mech Traction (98946)  [] Aquatic Therapy x     [] ES (un) (72553):   [] Home Management Training x  [] ES(attended) (77277)   [] Dry Needling 1-2 muscles (89050):  [] Dry Needling 3+ muscles (092034)  [] Group:      [] Other:          GOALS:  Patient stated goal:  less pain with ADLs  [] Progressing: [] Met: [] Not Met: [] Adjusted     Therapist goals for Patient:  Short Term Goals: To be achieved in: 2 weeks  1. Independent in HEP and progression per patient tolerance, in order to prevent re-injury. [] Progressing: [] Met: [] Not Met: [] Adjusted  2. Patient will have a decrease in pain to facilitate improvement in movement, function, and ADLs as indicated by improvement with respect to Functional Deficits. [] Progressing: [] Met: [] Not Met: [] Adjusted     Long Term Goals: To be achieved in: 6 weeks  1. FOTO LUMBAR functional survey score of >/= 63   and FOTO knee  functional survey score of >/=36  to assist with reaching prior level of function. [] Progressing: [] Met: [] Not Met: [] Adjusted  2. Patient will demonstrate increased AROM  to [facility], to allow for proper joint functioning to allow pt to resume home chores with good posture and body mechanics without increase in symptoms. [] Progressing: [] Met: [] Not Met: [] Adjusted  3. Patient will demonstrate increased Strength and core activation to allow for proper functional mobility as indicated by patients Functional Deficits to allow pt to resume up/down</1 flight of stairs without difficulty, tolerate shopping x 30-60 min without excess fatigue and without increase in symptoms.     [] Progressing: [] Met: [] Not Met: [] Adjusted  4. Patient will return to functional activities including working yard/garden x 30-60 min without rest and  without increased symptoms or restriction. [] Progressing: [] Met: [] Not Met: [] Adjusted    Overall Progression Towards Functional goals/ Treatment Progress Update:  [] Patient is progressing as expected towards functional goals listed. [] Progression is slowed due to complexities/Impairments listed. [] Progression has been slowed due to co-morbidities. [x] Plan just implemented, too soon to assess goals progression <30days   [] Goals require adjustment due to lack of progress  [] Patient is not progressing as expected and requires additional follow up with physician  [] Other    Persisting Functional Limitations/Impairments:  []Sleeping []Sitting               []Standing []Transfers        []Walking []Kneeling               []Stairs []Squatting / bending   []ADLs []Reaching  []Lifting  []Housework  []Driving []Job related tasks  []Sports/Recreation []Other:        ASSESSMENT:  See eval  Treatment/Activity Tolerance:  [x] Patient able to complete tx [] Patient limited by fatigue  [] Patient limited by pain  [] Patient limited by other medical complications  [] Other:     Prognosis: [x] Good [] Fair  [] Poor    Patient Requires Follow-up: [x] Yes  [] No    Plan for next treatment session: gentle stabs and stretches with flexion bias. Manual therapy prn for myofascial pain. Normalize symmetry of gait. Posture and body mechs. F/u re did pt call Dr Joesph Charles  (Kidney doctor) re L>R LE swelling. Consider addition of MLD if/as approp for LE swelling - would need to check with Dr. Joesph Charles.      PLAN:  strength, ROM/flexibility, posture and body mechs, manual, MOC, HEP, pt education     Frequency/Duration:  2 days per week for 4 Weeks:  Interventions:  [x]  Therapeutic exercise including:strength, ROM, flexibility  [x]  NMR activation and proprioception including postural re-education  [x]  Manual therapy as indicated to include: IASTM, STM, PROM, Gr I-IV mobilizations, manipulation. [x]  Modalities as needed that may include: thermal agents, E-stim, Biofeedback, US, iontophoresis as indicated  [x]  Patient education on joint protection, postural re-education, activity modification, progression of HEP. Aquatic therapy    PLAN: See eval. PT 2x / week for 6 weeks. [] Continue per plan of care [] Alter current plan (see comments)  [x] Plan of care initiated [] Hold pending MD visit [] Discharge    Electronically signed by: Eugene Strong PT , DPT OMT-C    Note: If patient does not return for scheduled/ recommended follow up visits, this note will serve as a discharge from care along with most recent update on progress.

## 2022-07-19 NOTE — TELEPHONE ENCOUNTER
Medication:   Requested Prescriptions     Pending Prescriptions Disp Refills    levothyroxine (SYNTHROID) 25 MCG tablet [Pharmacy Med Name: LEVOTHYROXINE 0.025MG (25MCG) TAB] 90 tablet 0     Sig: TAKE 1 TABLET BY MOUTH DAILY      Last Filled:      Patient Phone Number: [phone] (home)     Last appt: 7/6/2022   Next appt: 10/11/2022    Last OARRS:   RX Monitoring 2/17/2021   Periodic Controlled Substance Monitoring Possible medication side effects, risk of tolerance/dependence & alternative treatments discussed. ;No signs of potential drug abuse or diversion identified.      PDMP Monitoring:    Last PDMP Nilo Anaya as Reviewed [facility]):  Review User Review Instant Review Result   Joselo George 2/17/2021 11:53 AM Reviewed PDMP [1]     Preferred Pharmacy:   [facility] [id number] - [address] [address]  Phone: [phone] Fax: [phone] Nadeem Mancia [address] [address]  Phone: [phone] Fax: [phone]

## 2022-07-19 NOTE — PLAN OF CARE
[address] [address], [address] [place] [address] Drive  Phone: [phone]   Fax: [phone]                                                       Physical Therapy Certification    Dear BAYRON Gonzalez NP; referral date: 22,    We had the pleasure of evaluating the following patient for physical therapy services at [address] [place] [address]. A summary of our findings can be found in the initial assessment below. This includes our plan of care. If you have any questions or concerns regarding these findings, please do not hesitate to contact me at the office phone number checked above. Thank you for the referral.       Physician Signature:_______________________________Date:__________________  By signing above (or electronic signature), therapists plan is approved by physician      Patient: Mandy Gomez   : 1950   MRN: [id number]  Referring Physician: BAYRON Gonzalez NP; referral date: 22      Evaluation Date: 2022      Medical Diagnosis Information:  Diagnosis: M17.12 (ICD-10-CM) - Localized osteoarthritis of left knee   Treatment Diagnosis: hypomobility L-P spine, strength/flexibility/balance deficits L >R LE. Myofascial pain at  trunk and hips, especially at R QL. Flexion bias reduces symptoms. Insurance information: PT Insurance Information: medicare and humana. $233 deductible. Med [employer]      Preferred Language for Healthcare:   [x]English       []Other:    C-SSRS Triggered by Intake questionnaire (Past 2 wk assessment ):   [x] No, Questionnaire did not trigger screening.   [] Yes, Patient intake triggered C-SSRS Screening     [] Completed, no further action required.    [] Completed, PCP notified via Epic    SUBJECTIVE: insidious onset. hurt L knee while walking in back yard. Then 1 wk later while standing in kitchen, L calf gave way. then intermittent L hip pain. Had xays of entire leg - showed OA. L hip pain went away. L knee pain mostly gone. Calf is better - still has knot. Has had long standing low back pain for years. Back pain is flared up since start of L hip pain - got pain that wrapped around the low  back and went  into L hip. Also has paint/o spine/trunk/side that worsens with deep breathing. ONSET: 7/5/22     Fear avoidance: I should not do physical activities that (might) make my pain worse   [x] True   [] False     Current Level of Function: back/trunk/LE pain with all ADLs/IADLs, walking  Prior Level of Function: Prior to this injury / incident, pt was independent with ADLs and IADLs    Living Status: lives alone in ranch home, 1 flight of steps to basement  Occupation/School: retired from [employer]    PAIN:  Pain Scale: 0-8/10  Easing factors: sitting or lying   Provocative factors: walking or standing too long - greater than 10 min    Do you have any of the following? Numbness/tingling/change in sensation/strength? Decreased strength B LE  History of trauma? no  Bowel or bladder symptoms? no  Changes in walking, balance, falls? No. No falls in a long time  Dizziness/HAs? No   Face symptoms/difficulty with speech/swallowing/change in taste/smell? no    Functional Outcome: FOTO: lumbar raw score = 42, risk adjusted score:  42 ,  taken at initial eval  FOTO knee raw score 36, risk adjusted 44, taken at initial eval    Precautions/ Contra-indications: BACK PAIN, Kidney failure - sees Dr. Claudia Rogel, smokes 1 pack/d  Latex Allergy:  [x]NO      []YES    Relevant Medical History:    [x] Patient history, allergies, meds reviewed. Medical chart reviewed. See intake form. Review Of Systems (ROS):  [x]Performed Review of systems (Integumentary, CardioPulmonary, Neurological) by intake and observation.  Intake form has been scanned into medical record. Patient has been instructed to contact their primary care physician regarding ROS issues if not already being addressed at this time.       Co-morbidities/Complexities (which will affect course of rehabilitation):  []None        []Hx of COVID   Arthritic conditions   []Rheumatoid arthritis (M05.9)  [x]Osteoarthritis (M19.91)  []Gout   Cardiovascular conditions   [x]Hypertension (I10)  []Hyperlipidemia (E78.5)  []Angina pectoris (I20)  []Atherosclerosis (I70)  []Pacemaker  []Hx of CABG/stent/  cardiac surgeries   Musculoskeletal conditions   []Disc pathology   []Congenital spine pathologies   []Osteoporosis (M81.8)  []Osteopenia (M85.8)  []Scoliosis       Endocrine conditions   []Hypothyroid (E03.9)  []Hyperthyroid Gastrointestinal conditions   []Constipation (Q94.71)   Metabolic conditions   []Morbid obesity (E66.01)  []Diabetes type 1(E10.65) or 2 (E11.65)   []Neuropathy (G60.9)     Cardio/Pulmonary conditions   []Asthma (J45)  []Coughing   []COPD (J44.9)  []CHF  []A-fib   Psychological Disorders  []Anxiety (F41.9)  []Depression (F32.9)   []Other:   Developmental Disorders  []Autism (F84.0)  []CP (G80)  []Down Syndrome (Q90.9)  []Developmental delay     Neurological conditions  []Prior Stroke (I69.30)  []Parkinson's (G20)  []Encephalopathy (G93.40)  []MS (G35)  []Post-polio (G14)  []SCI  []TBI  []ALS Other conditions  []Fibromyalgia (M79.7)  []Vertigo  []Syncope  [x] Kidney failure - sees Dr. Viktoria Patel  []Cancer      []currently undergoing                treatment  []Pregnancy  []Incontinence   Prior surgeries  []involved limb  []previous spinal surgery  [] section birth  []hysterectomy  []bowel / bladder surgery  []other relevant surgeries   [x]Other:     LUMBAR FUSION N/A 2021 LUMBAR4-LUMBAR5 TRANSVERSE LUMBAR INTERBODY FUSION (42428, 65279, 00184, 07596, 09468, 10484, 20637, 63930) performed by Meghann Saab MD at [place] [address]/20/15 L45 B/L decompressive hobson and excision of synovial cyst    ROTATOR CUFF REPAIR Right 2/98                   OBJECTIVE:   Observe: swollen L>R LE below knee. Firm fibrosis L LE near ankle. Advised pt to call Dr. Chelle Laird to discuss - she is agreeable. Balance Eyes open > 15 sec? Eyes closed > 15 sec? Romberg  Yes []  No []  Yes []  No []   SLS  Yes []  No [x]  SLS: R 1 sec, L 1 sec  Yes []  No []        Hautard's Test (UE proprioception): B shd flex 90 deg with supination, eyes closed with cerv rot or ext   > 15 sec > 15 sec   Head turned: L/R L: Yes []  No [] R: Yes []  No []   Head turned: up/down Up: Yes []  No [] Down: Yes []  No []     Gait: antalgic      Reflexes Grade Comments    C5-6 Biceps R 2  L 1  0 = absent   C5-6 Brachioradialis 1  1 = diminished   C7-8 Triceps 1  2 = normal   S1-2 Seated achilles 1  3 = hyper reflexive   S1-2 Prone knee bend      L3-4 Patellar tendon 1     Clonus 1     Babinski      Souza's (flick down)  Trommner's (flick up) WNL  + is flexion of thumb IP       Dermatomes Normal Abnormal Comments   inguinal area (L1)  1     anterior mid-thigh (L2) 1     distal ant thigh/med knee (L3) 1     Anterior knee, medial lower leg & great toe (L4) 1     lateral lower leg & dorsal foot (L5) 1     Posterior/lateral calf (S1) 1     Posterior/medial calf and medial calcaneus (S2) 1       ROM  Comments   Lumbar Flex 90    Lumbar Ext 11 painful      ROM LEFT RIGHT Comments   Lumbar SB 21 deg  R LBP 23 deg  R LBP    Ext and SB      Flex and SB      Thoracic/Lumbar Rotation Approx 20 deg no pain Approx 30 deg painful on R trunk seated   Hip IR      Segmental mobility         Lower Quarter Special Tests Normal Abnormal Comments   Jayleen's sign      Trendelenburg      FB test      PSIS      Iliac crest      Repeated movements      Directional preference? Painful arc? Aberrant motion?         Posture: slouched, flexed trunk    Palpation:   Myofascial pain/tenderness: tender knot L medial gastroc head, TPs R QL   Reduced segmental mobility L-P spine going into ext    ROM Right Left Comments   Cervical flex WFL     Cervical ext [facility]     Cervical SB [facility] WFL    Cervical Rot WFL WFL      Endrange holds      Segmental mobility            UPPER QUARTER TESTS, seated      Spurling's (SB with compression, with/without contralateral rot)      Compression      Distraction      Sharp Nell test (transverse ligament)      C2 Kick (alar ligament)      EAST test (shd AB/ER to 90 deg, open close fist x 3 min)        ROM RIGHT AROM LEFT AROM Comments   LE    WFL WFL    Hip Flexion      Hip Abd      Hip ER      Hip IR      Hip Extension      Knee Ext      Knee Flex      DF      PF      Ankle INV      Ankle Ever            UE        Shoulder flex      Shoulder AB      Shoulder ER      Shoulder IR                      Joint mobility:    []Normal    [x]Hypo - L-Pspine   []Hyper      Strength / Myotomes RIGHT LEFT   Multifidus     Transverse Ab     LE     Hip Flexors (L1-2) 4- 4-   Quads (L2-4) 4 4   Ankle Dorsiflexion (L4-5) 5 5   Great Toe Extension (L5) 4+ 4+   Ankle Eversion (S1-2) 5 5   Ankle Plantarflexion (S1-2)     Hip Abductors     Hip Extensors     Hip Internal Rotators 4- 3+ painful at hip   Hip External Rotators 4- 3+   Hamstrings  5 5   Ankle Inversion 5 5             Strength / Myotomes RIGHT LEFT   Cervical Flexion (C1-2)     Cervical SB (C3)     Shoulder Shrug (C4)     Shoulder Abduction (C5)     Shoulder ER (C5)     Biceps (C6)     Triceps (C7)     Wrist Extension (C6)     Wrist Flexion (C7)      (C8)     Thumb Abduction (C8)     Finger Abduction (T1)     Shoulder Flex     Shoulder Scap     Shoulder IR                    SUPINE: & PRONE:    TA Muscle Contraction Scale    Criteria                                               Score  Quality of Contraction   Not Present      [] 0   Rapid, Superificial     [] 1   Just Perceptible     [x] 2     Gentle, Slow      [] 3    Substitution   Resting       [] 0   Moderate to Strong     [x] 1    Subtle Perceptible     [] 2   None       [] 3    Symmetry of Contraction   Unilateral       [] 0   Bilateral/Asymmetrical     [x] 1   Symmetrical       [] 2    Breathing     Inability/Difficulty Breathing during contraction [x] 0   Able to hold contraction while Breathing  [] 1    Holding   Able to Hold Contraction <10 s   [x] 0   Able to Hold Contraction >10 s   [] 1      __/10  Adapted from [address] [facility], Copyright 2009    Neural dynamic tension testing Normal Abnormal Comments   Slump Test  - Degree of knee flexion:       SLR  x     0-40 x     40-70 x     Crossed SLR x     Femoral nerve (L2-4)   Prone knee flex:  R:  L:       Flexibility LEFT RIGHT Comments   Hip flexors stiff stiff    HS (90/90) Min stiff Min stiff    Glut max  90 deg 100 deg    piriformis Very stiff Mod stiff    gastroc Very stiff Mod stiff          Upper trapezius                Lower Quarter Special Tests Normal Abnormal Comments   Basil test      JAYESH/Ashvin NAVAS            Long axis distraction            Pelvic component:   Pt has a pelvic component if:   3 of 4 signs positive   OR   Positive Jayleen's sign and 2 of 4 other signs positive   Prone knee bend test      Supine to sit test      Assymmetry of iliac crests and PSIS? x  seated   FB Test  x          Sacral component:   Pt has a sacral component if:  3 of 5 signs positive   OR   Positive Jayleen's sign, SI distraction, and hip thrust   SI distraction      Hip thrust (shear)      Ganesland's      S/L compression      Sacral Spring            PA/Spring                       Barriers to/and or personal factors that will affect rehab potential:              [x]Age  []Sex    [x]Smoker              [x]Motivation/Lack of Motivation                        [x]Co-Morbidities              []Cognitive Function, education/learning barriers              []Environmental, home barriers              []profession/work barriers  [x]past PT/medical experience  []other:  Justification:    Falls Risk Assessment (30 days):   [x] Falls Risk assessed and no intervention required. [] Falls Risk assessed and Patient requires intervention due to being higher risk   TUG score (>12s at risk):     [] Falls education provided, including         ASSESSMENT: Pt presents with hypomobility L-P spine, strength/flexibility/balance deficits L >R LE. Myofascial pain at  trunk and hips, especially at R QL. Flexion bias reduces symptoms. These deficits contribute to pain, antalgia, and reduced tolerance of functional activities. Pt will benefit from skilled PT services to restore PLOF.       Functional Impairments:  Lumbar/lower quarter:     [x]Noted lumbar/proximal hip hypomobility   []Noted lumbosacral and/or generalized hypermobility   [x]Decreased Lumbosacral/hip/LE functional ROM   [x]Decreased core/proximal hip strength and neuromuscular control    [x]Decreased LE functional strength    []Abnormal reflexes/sensation/myotomal/dermatomal deficits  []Reduced ability to run, hop, cut or jump  []Reduced balance/proprioceptive control    []other:  reduced functional ROM of    []other:  reduce functional strength of    []other: myofascial changes and pain at    [] Postural impairments:   [x]other: impaired gait      Functional Activity Limitations (from functional questionnaire and intake)   [x]Reduced ability to tolerate prolonged functional positions   [x]Reduced ability or difficulty with changes of positions or transfers between positions   [x]Reduced ability to maintain good posture and demonstrate good body mechanics with sitting, bending, and lifting   [x]Reduced ability to sleep   [x] Reduced ability or tolerance with driving and/or computer work   [x]Reduced ability to perform lifting, reaching, carrying tasks   [x]Reduced ability to squat   [x]Reduced ability to forward bend   [x]Reduced ability to ambulate prolonged functional periods/distances/surfaces   [x]Reduced ability to ascend/descend stairs   []Reduced ability to concentrate    []Reduced ability to tolerate any impact through UE or spine   []other:     Participation Restrictions   [x]Reduced participation in self care activities   [x]Reduced participation in home management activities   [x]Reduced participation in work activities   [x]Reduced participation in social activities. [x]Reduced participation in sport/recreational activities. Classification:  Lumbar/Lower quarter:    []Signs/symptoms consistent with Lumbar instability/stabilization subgroup. []Signs/symptoms consistent with Lumbar mobilization/manipulation subgroup, myotomes and dermatomes intact. Meets manipulation criteria. []Signs/symptoms consistent with Lumbar direction specific/centralization subgroup   []Signs/symptoms consistent with Lumbar traction subgroup     []Signs/symptoms consistent with lumbar facet dysfunction   [x]Signs/symptoms consistent with lumbar stenosis type dysfunction   []Signs/symptoms consistent with nerve root involvement including myotome & dermatome dysfunction   []Signs/symptoms consistent with post-surgical status including: decreased ROM, strength and function.    []signs/symptoms consistent with pathology which may benefit from Dry needling    []Signs/symptoms consistent with joint sprain/strain  []Signs/symptoms consistent with patella-femoral syndrome   []Signs/symptoms consistent with knee OA/hip OA   []Signs/symptoms consistent with internal derangement of knee/Hip   []Signs/symptoms consistent with functional hip weakness/NMR control      []Signs/symptoms consistent with tendinitis/tendinosis    []signs/symptoms consistent with pathology which may benefit from Dry needling   []other:        Prognosis/Rehab Potential:      []Excellent   [x]Good    []Fair   []Poor    Tolerance of evaluation/treatment:    []Excellent   [x]Good    []Fair   []Poor     Physical Therapy Evaluation Complexity Justification  [x] A history of present problem with:  [] no personal factors and/or comorbidities that impact the plan of care;  [x]1-2 personal factors and/or comorbidities that impact the plan of care  []3 personal factors and/or comorbidities that impact the plan of care  [x] An examination of body systems using standardized tests and measures addressing any of the following: body structures and functions (impairments), activity limitations, and/or participation restrictions;:  [x] a total of 1-2 or more elements   [] a total of 3 or more elements   [] a total of 4 or more elements   [x] A clinical presentation with:  [x] stable and/or uncomplicated characteristics   [] evolving clinical presentation with changing characteristics  [] unstable and unpredictable characteristics;   [x] Clinical decision making of [x] low, [] moderate, [] high complexity using standardized patient assessment instrument and/or measurable assessment of functional outcome. [x] EVAL (LOW) 73461 (typically 15 minutes face-to-face)  [] EVAL (MOD) 99057 (typically 30 minutes face-to-face)  [] EVAL (HIGH) 62031 (typically 45 minutes face-to-face)  [] RE-EVAL     HEP instruction: Written HEP instructions provided and reviewed    PLAN:  strength, ROM/flexibility, posture and body mechs, manual, MOC, HEP, pt education    Frequency/Duration:  2 days per week for 4 Weeks:  Interventions:  [x]  Therapeutic exercise including:strength, ROM, flexibility  [x]  NMR activation and proprioception including postural re-education  [x]  Manual therapy as indicated to include: IASTM, STM, PROM, Gr I-IV mobilizations, manipulation. [x]  Modalities as needed that may include: thermal agents, E-stim, Biofeedback, US, iontophoresis as indicated  [x]  Patient education on joint protection, postural re-education, activity modification, progression of HEP.   Aquatic therapy    GOALS:  Patient stated goal:  less pain with ADLs  [] Progressing: [] Met: [] Not Met: []

## 2022-07-25 DIAGNOSIS — E78.00 PURE HYPERCHOLESTEROLEMIA: ICD-10-CM

## 2022-07-26 RX ORDER — FENOFIBRATE 160 MG/1
TABLET ORAL
Qty: 90 TABLET | Refills: 1 | Status: SHIPPED | OUTPATIENT
Start: 2022-07-26 | End: 2022-10-31 | Stop reason: SDUPTHER

## 2022-07-27 ENCOUNTER — HOSPITAL ENCOUNTER (OUTPATIENT)
Dept: PHYSICAL THERAPY | Age: 72
Setting detail: THERAPIES SERIES
Discharge: HOME | End: 2022-07-27
Payer: MEDICARE

## 2022-07-27 PROCEDURE — 97110 THERAPEUTIC EXERCISES: CPT

## 2022-07-27 PROCEDURE — 97530 THERAPEUTIC ACTIVITIES: CPT

## 2022-07-27 PROCEDURE — 97112 NEUROMUSCULAR REEDUCATION: CPT

## 2022-07-27 NOTE — FLOWSHEET NOTE
[address] [facility] Physical Therapy  Phone: [phone]   Fax: [phone]    Physical Therapy Daily Treatment Note  Date:  2022    Patient Name:  Mandy Gomez    :  1950  MRN: [id number]  Medical/Treatment Diagnosis Information:  Diagnosis: M17.12 (ICD-10-CM) - Localized osteoarthritis of left knee  Treatment Diagnosis: hypomobility L-P spine, strength/flexibility/balance deficits L >R LE. Myofascial pain at  trunk and hips, especially at R QL. Flexion bias reduces symptoms. Insurance/Certification information:  PT Insurance Information: medicare and [employer]. $233 deductible. [facility]  Physician Information: BAYRON Lane NP; referral date: 22  Plan of care signed (Y/N): []  Yes [x]  No     Date of Patient follow up with Physician:      Progress Report: []  Yes  [x]  No     Date Range for reporting period:  Beginnin/19  Ending:     Progress report due (10 Rx/or 30 days whichever is less): visit #10 or  (date)     Recertification due (POC duration/ or 90 days whichever is less): visit # 12 or 10/19     Visit # Insurance Allowable Auth required? Date Range    Med nec []  Yes  [x]  No NA     Latex Allergy:  [x]NO      []YES  Preferred Language for Healthcare:   [x]English       []other:    Functional Scale:        Date assessed:  FOTO: lumbar raw score = 42, risk adjusted score:  42 ,    taken at initial eval  FOTO knee raw score 36, risk adjusted 44,     taken at initial eval        Pain level:  4/10 L knee     SUBJECTIVE:  Pt states that she was able to do her exercises on Saturday. Pain did not disparate until Friday last week. Called kidney MD office- no call back.      OBJECTIVE: See eval    RESTRICTIONS/PRECAUTIONS: kidney failure,  BACK PAIN, Kidney failure - sees Dr. Bob Murguia, smokes 1 pack/d  LUMBAR FUSION N/A 2021 LUMBAR4-LUMBAR5 TRANSVERSE LUMBAR INTERBODY FUSION ([address], [address], [address], [address], 91672, 22488, 58894, sets - 3 reps - 30 hold  Supine Transversus Abdominis Bracing - Hands on Stomach - 2 x daily - 7 x weekly - 1 sets - 10 reps - 5 hold        Therapeutic Exercise and NMR EXR  [x] (84661) Provided verbal/tactile cueing for activities related to strengthening, flexibility, endurance, ROM for improvements in  [x] LE / Lumbar: LE, proximal hip, and core control with self care, mobility, lifting, ambulation. [] UE / Cervical: cervical, postural, scapular, scapulothoracic and UE control with self care, reaching, carrying, lifting, house/yardwork, driving, computer work. [x] (21083) Provided verbal/tactile cueing for activities related to improving balance, coordination, kinesthetic sense, posture, motor skill, proprioception to assist with   [x] LE / lumbar: LE, proximal hip, and core control in self care, mobility, lifting, ambulation and eccentric single leg control. [] UE / cervical: cervical, scapular, scapulothoracic and UE control with self care, reaching, carrying, lifting, house/yardwork, driving, computer work.   [] (13254) Therapist is in constant attendance of 2 or more patients providing skilled therapy interventions, but not providing any significant amount of measurable one-on-one time to either patient, for improvements in  [] LE / lumbar: LE, proximal hip, and core control in self care, mobility, lifting, ambulation and eccentric single leg control. [] UE / cervical: cervical, scapular, scapulothoracic and UE control with self care, reaching, carrying, lifting, house/yardwork, driving, computer work.      NMR and Therapeutic Activities:    [x] (36743 or 67148) Provided verbal/tactile cueing for activities related to improving balance, coordination, kinesthetic sense, posture, motor skill, proprioception and motor activation to allow for proper function of   [x] LE: / Lumbar core, proximal hip and LE with self care and ADLs  [] UE / Cervical: cervical, postural, scapular, scapulothoracic and UE control with self care, carrying, lifting, driving, computer work.   [] (78545) Gait Re-education- Provided training and instruction to the patient for proper LE, core and proximal hip recruitment and positioning and eccentric body weight control with ambulation re-education including up and down stairs     Home Management Training / Self Care:  [] (62781) Provided self-care/home management training related to activities of daily living and compensatory training, and/or use of adaptive equipment for improvement with: ADLs and compensatory training, meal preparation, safety procedures and instruction in use of adaptive equipment, including bathing, grooming, dressing, personal hygiene, basic household cleaning and chores.      Home Exercise Program:    [] (04361) Reviewed/Progressed HEP activities related to strengthening, flexibility, endurance, ROM of   [] LE / Lumbar: core, proximal hip and LE for functional self-care, mobility, lifting and ambulation/stair navigation   [] UE / Cervical: cervical, postural, scapular, scapulothoracic and UE control with self care, reaching, carrying, lifting, house/yardwork, driving, computer work  [] (62783)Reviewed/Progressed HEP activities related to improving balance, coordination, kinesthetic sense, posture, motor skill, proprioception of   [] LE: core, proximal hip and LE for self care, mobility, lifting, and ambulation/stair navigation    [] UE / Cervical: cervical, postural,  scapular, scapulothoracic and UE control with self care, reaching, carrying, lifting, house/yardwork, driving, computer work    Manual Treatments:  PROM / STM / Oscillations-Mobs:  G-I, II, III, IV (PA's, Inf., Post.)  [] (87473) Provided manual therapy to mobilize LE, proximal hip and/or LS spine soft tissue/joints for the purpose of modulating pain, promoting relaxation,  increasing ROM, reducing/eliminating soft tissue swelling/inflammation/restriction, improving soft tissue extensibility and allowing for proper ROM for normal function with   [] LE / lumbar: self care, mobility, lifting and ambulation. [] UE / Cervical: self care, reaching, carrying, lifting, house/yardwork, driving, computer work. Modalities:  [] (28996) Vasopneumatic compression: Utilized vasopneumatic compression to decrease edema / swelling for the purpose of improving mobility and quad tone / recruitment which will allow for increased overall function including but not limited to self-care, transfers, ambulation, and ascending / descending stairs. Charges:  Timed Code Treatment Minutes: 45   Total Treatment Minutes: 45     [] EVAL - LOW (32032)   [] EVAL - MOD (41061)  [] EVAL - HIGH (17204)  [] RE-EVAL (22518)  [x] OK(02651) x   1    [] Ionto  [x] NMR (14039) x 1      [] Vaso  [] Manual (87112) x       [] Ultrasound  [x] TA x 1       [] Mech Traction (78380)  [] Aquatic Therapy x     [] ES (un) (45874):   [] Home Management Training x  [] ES(attended) (92066)   [] Dry Needling 1-2 muscles (20915):  [] Dry Needling 3+ muscles (329269)  [] Group:      [] Other:          GOALS:  Patient stated goal:  less pain with ADLs  [] Progressing: [] Met: [] Not Met: [] Adjusted     Therapist goals for Patient:  Short Term Goals: To be achieved in: 2 weeks  1. Independent in HEP and progression per patient tolerance, in order to prevent re-injury. [] Progressing: [] Met: [] Not Met: [] Adjusted  2. Patient will have a decrease in pain to facilitate improvement in movement, function, and ADLs as indicated by improvement with respect to Functional Deficits. [] Progressing: [] Met: [] Not Met: [] Adjusted     Long Term Goals: To be achieved in: 6 weeks  1. FOTO LUMBAR functional survey score of >/= 63   and FOTO knee  functional survey score of >/=36  to assist with reaching prior level of function. [] Progressing: [] Met: [] Not Met: [] Adjusted  2.  Patient will demonstrate increased AROM  to [facility], to allow for proper joint functioning to allow pt to resume home chores with good posture and body mechanics without increase in symptoms. [] Progressing: [] Met: [] Not Met: [] Adjusted  3. Patient will demonstrate increased Strength and core activation to allow for proper functional mobility as indicated by patients Functional Deficits to allow pt to resume up/down</1 flight of stairs without difficulty, tolerate shopping x 30-60 min without excess fatigue and without increase in symptoms. [] Progressing: [] Met: [] Not Met: [] Adjusted  4. Patient will return to functional activities including working yard/garden x 30-60 min without rest and  without increased symptoms or restriction. [] Progressing: [] Met: [] Not Met: [] Adjusted    Overall Progression Towards Functional goals/ Treatment Progress Update:  [] Patient is progressing as expected towards functional goals listed. [] Progression is slowed due to complexities/Impairments listed. [] Progression has been slowed due to co-morbidities. [x] Plan just implemented, too soon to assess goals progression <30days   [] Goals require adjustment due to lack of progress  [] Patient is not progressing as expected and requires additional follow up with physician  [] Other    Persisting Functional Limitations/Impairments:  []Sleeping []Sitting               []Standing []Transfers        []Walking []Kneeling               []Stairs []Squatting / bending   []ADLs []Reaching  []Lifting  []Housework  []Driving []Job related tasks  []Sports/Recreation []Other:        ASSESSMENT:  Initiated exercises for BLE strengthening and stretching today. VC provided to limit anterior knee translation with mini squat. Pt educated on importance of stepping with whole foot onto step vs just met heads; reports she falls on steps at home frequently. Pt felt her BLE muscles were fatigued at the end of session. Plan to continue to strengthen and stretch BLE as Pt tolerates.      Treatment/Activity Tolerance:  [x] Patient able to complete tx [] Patient limited by fatigue  [] Patient limited by pain  [] Patient limited by other medical complications  [] Other:     Prognosis: [x] Good [] Fair  [] Poor    Patient Requires Follow-up: [x] Yes  [] No    Plan for next treatment session: gentle stabs and stretches with flexion bias. Manual therapy prn for myofascial pain. Normalize symmetry of gait. Posture and body mechs. F/u re did pt call Dr Joesph Charles  (Kidney doctor) re L>R LE swelling. Consider addition of MLD if/as approp for LE swelling - would need to check with Dr. Joesph Charles. PLAN:  strength, ROM/flexibility, posture and body mechs, manual, MOC, HEP, pt education     Frequency/Duration:  2 days per week for 4 Weeks:  Interventions:  [x]  Therapeutic exercise including:strength, ROM, flexibility  [x]  NMR activation and proprioception including postural re-education  [x]  Manual therapy as indicated to include: IASTM, STM, PROM, Gr I-IV mobilizations, manipulation. [x]  Modalities as needed that may include: thermal agents, E-stim, Biofeedback, US, iontophoresis as indicated  [x]  Patient education on joint protection, postural re-education, activity modification, progression of HEP. Aquatic therapy    PLAN: See eval. PT 2x / week for 6 weeks. [x] Continue per plan of care [] Alter current plan (see comments)  [] Plan of care initiated [] Hold pending MD visit [] Discharge    Electronically signed by: Adebayo Shepherd, PT , DPT     Note: If patient does not return for scheduled/ recommended follow up visits, this note will serve as a discharge from care along with most recent update on progress.

## 2022-08-01 ENCOUNTER — HOSPITAL ENCOUNTER (OUTPATIENT)
Dept: PHYSICAL THERAPY | Age: 72
Setting detail: THERAPIES SERIES
Discharge: HOME | End: 2022-08-01
Payer: MEDICARE

## 2022-08-01 NOTE — FLOWSHEET NOTE
Physical Therapy  Cancellation/No-show Note  Patient Name:  Saud Frias  :  1950   Date:  2022  Cancelled visits to date: 1  No-shows to date: 0    Patient status for today's appointment patient:  [x]  Cancelled 22  []  Rescheduled appointment  []  No-show     Reason given by patient:  []  Patient ill  []  Conflicting appointment  []  No transportation    []  Conflict with work  []  No reason given  [x]  Other:     Comments:  called and stated forgot about apt    Phone call information:   []  Phone call made today to patient at _ time at number provided:      []  Patient answered, conversation as follows:    []  Patient did not answer, message left as follows:  []  Phone call not made today  [x]  Phone call not needed - pt contacted us to cancel and provided reason for cancellation.      Electronically signed by:  Cindie Kocher, PT, DPT

## 2022-08-04 ENCOUNTER — HOSPITAL ENCOUNTER (OUTPATIENT)
Dept: PHYSICAL THERAPY | Age: 72
Setting detail: THERAPIES SERIES
Discharge: HOME | End: 2022-08-04
Payer: MEDICARE

## 2022-08-04 PROCEDURE — 97110 THERAPEUTIC EXERCISES: CPT

## 2022-08-04 PROCEDURE — 97530 THERAPEUTIC ACTIVITIES: CPT

## 2022-08-04 PROCEDURE — 97112 NEUROMUSCULAR REEDUCATION: CPT

## 2022-08-04 NOTE — FLOWSHEET NOTE
performed by Cookie Fontana MD at [facility]  8/20/15 L45 B/L decompressive hobson and excision of synovial cyst    ROTATOR CUFF REPAIR Right 2/98          Exercises/Interventions: flexion bias    Therapeutic Exercises (47698) Resistance / level Sets/sec Reps Notes   Nu step or bike  5 min     IB  2 x 30 sec     HR/TR  2 10    Step stretches   HS  Hip flex    2 x 30 sec  2 x 30 sec                   Mat ex  Supine HS stretch  SKTC  Fig 4 piriformis stretch  -pull in  -push down     Low back stretch at bar  5 x 10 sec            Therapeutic Activities (69824)       TG squats   2 10    Posture /body mechs Instructed in neutral spine for bed mob      Diaphragmatic breathing in hooklying  Next session as carlos to improve rib pain R side      Steps  -fwd  -lateral    2  2   10 B  10 B    Mini squat  1 10    Cone taps  1 10 B    hurdles  1 10 B           Neuromuscular Re-ed (56501)       Stabilization progression, flexion bias  TrA iso              Seated SB       Balance  -rhomberg +horz head turn  -semi tandem  -semi  tandem + horz head turn  Airex  -rhomberg + EC  -semi tandem  -semi tandem + EC  -marching    30 sec  15 sec B  X10    15 sec B   15 sec B  10     Multifidi walkouts                     Manual Intervention (35845)       STM/IASTM  Prn to R QL and R>L  lower quarter /trunk                                             Modalities:     Pt. Education:  -7/19/22 patient educated on diagnosis, prognosis and expectations for rehab  -all patient questions were answered    Home Exercise Program:  7/19/22   Access Code: [email or web address]  URL: [email or web address]  Date: 07/19/2022  Prepared by: Shweta Best    Exercises  Supine Hamstring Stretch - 2 x daily - 7 x weekly - 1-2 sets - 3 reps - 30 hold  Hooklying Single Knee to Chest Stretch - 2 x daily - 7 x weekly - 1 sets - 3 reps - 30 hold  Supine Piriformis Stretch with Foot on Ground - 2 x daily - 7 x weekly - 1 sets - 3 reps - 30 hold  Supine Transversus Abdominis Bracing - Hands on Stomach - 2 x daily - 7 x weekly - 1 sets - 10 reps - 5 hold        Therapeutic Exercise and NMR EXR  [x] (33053) Provided verbal/tactile cueing for activities related to strengthening, flexibility, endurance, ROM for improvements in  [x] LE / Lumbar: LE, proximal hip, and core control with self care, mobility, lifting, ambulation. [] UE / Cervical: cervical, postural, scapular, scapulothoracic and UE control with self care, reaching, carrying, lifting, house/yardwork, driving, computer work. [x] (06770) Provided verbal/tactile cueing for activities related to improving balance, coordination, kinesthetic sense, posture, motor skill, proprioception to assist with   [x] LE / lumbar: LE, proximal hip, and core control in self care, mobility, lifting, ambulation and eccentric single leg control. [] UE / cervical: cervical, scapular, scapulothoracic and UE control with self care, reaching, carrying, lifting, house/yardwork, driving, computer work.   [] (95444) Therapist is in constant attendance of 2 or more patients providing skilled therapy interventions, but not providing any significant amount of measurable one-on-one time to either patient, for improvements in  [] LE / lumbar: LE, proximal hip, and core control in self care, mobility, lifting, ambulation and eccentric single leg control. [] UE / cervical: cervical, scapular, scapulothoracic and UE control with self care, reaching, carrying, lifting, house/yardwork, driving, computer work.      NMR and Therapeutic Activities:    [x] (14588 or 50252) Provided verbal/tactile cueing for activities related to improving balance, coordination, kinesthetic sense, posture, motor skill, proprioception and motor activation to allow for proper function of   [x] LE: / Lumbar core, proximal hip and LE with self care and ADLs  [] UE / Cervical: cervical, postural, scapular, scapulothoracic and UE control with self care, carrying, lifting, driving, computer work.   [] (51834) Gait Re-education- Provided training and instruction to the patient for proper LE, core and proximal hip recruitment and positioning and eccentric body weight control with ambulation re-education including up and down stairs     Home Management Training / Self Care:  [] (59493) Provided self-care/home management training related to activities of daily living and compensatory training, and/or use of adaptive equipment for improvement with: ADLs and compensatory training, meal preparation, safety procedures and instruction in use of adaptive equipment, including bathing, grooming, dressing, personal hygiene, basic household cleaning and chores.      Home Exercise Program:    [] (26883) Reviewed/Progressed HEP activities related to strengthening, flexibility, endurance, ROM of   [] LE / Lumbar: core, proximal hip and LE for functional self-care, mobility, lifting and ambulation/stair navigation   [] UE / Cervical: cervical, postural, scapular, scapulothoracic and UE control with self care, reaching, carrying, lifting, house/yardwork, driving, computer work  [] (40934)Reviewed/Progressed HEP activities related to improving balance, coordination, kinesthetic sense, posture, motor skill, proprioception of   [] LE: core, proximal hip and LE for self care, mobility, lifting, and ambulation/stair navigation    [] UE / Cervical: cervical, postural,  scapular, scapulothoracic and UE control with self care, reaching, carrying, lifting, house/yardwork, driving, computer work    Manual Treatments:  PROM / STM / Oscillations-Mobs:  G-I, II, III, IV (PA's, Inf., Post.)  [] (82914) Provided manual therapy to mobilize LE, proximal hip and/or LS spine soft tissue/joints for the purpose of modulating pain, promoting relaxation,  increasing ROM, reducing/eliminating soft tissue swelling/inflammation/restriction, improving soft tissue extensibility and allowing for proper ROM for normal function with   [] LE / lumbar: self care, mobility, lifting and ambulation. [] UE / Cervical: self care, reaching, carrying, lifting, house/yardwork, driving, computer work. Modalities:  [] (56525) Vasopneumatic compression: Utilized vasopneumatic compression to decrease edema / swelling for the purpose of improving mobility and quad tone / recruitment which will allow for increased overall function including but not limited to self-care, transfers, ambulation, and ascending / descending stairs. Charges:  Timed Code Treatment Minutes: 38   Total Treatment Minutes: 38     [] EVAL - LOW (27406)   [] EVAL - MOD (71942)  [] EVAL - HIGH (51054)  [] RE-EVAL (28320)  [x] WP(01560) x   1    [] Ionto  [x] NMR (96460) x 1      [] Vaso  [] Manual (00901) x       [] Ultrasound  [x] TA x 1       [] Mech Traction (42646)  [] Aquatic Therapy x     [] ES (un) (12209):   [] Home Management Training x  [] ES(attended) (64309)   [] Dry Needling 1-2 muscles (26741):  [] Dry Needling 3+ muscles (886174)  [] Group:      [] Other:          GOALS:  Patient stated goal:  less pain with ADLs  [] Progressing: [] Met: [] Not Met: [] Adjusted     Therapist goals for Patient:  Short Term Goals: To be achieved in: 2 weeks  1. Independent in HEP and progression per patient tolerance, in order to prevent re-injury. [] Progressing: [] Met: [] Not Met: [] Adjusted  2. Patient will have a decrease in pain to facilitate improvement in movement, function, and ADLs as indicated by improvement with respect to Functional Deficits. [] Progressing: [] Met: [] Not Met: [] Adjusted     Long Term Goals: To be achieved in: 6 weeks  1. FOTO LUMBAR functional survey score of >/= 63   and FOTO knee  functional survey score of >/=36  to assist with reaching prior level of function. [] Progressing: [] Met: [] Not Met: [] Adjusted  2.  Patient will demonstrate increased AROM  to [facility], to allow for proper joint functioning to allow pt to resume home chores with good posture and body mechanics without increase in symptoms. [] Progressing: [] Met: [] Not Met: [] Adjusted  3. Patient will demonstrate increased Strength and core activation to allow for proper functional mobility as indicated by patients Functional Deficits to allow pt to resume up/down</1 flight of stairs without difficulty, tolerate shopping x 30-60 min without excess fatigue and without increase in symptoms. [] Progressing: [] Met: [] Not Met: [] Adjusted  4. Patient will return to functional activities including working yard/garden x 30-60 min without rest and  without increased symptoms or restriction. [] Progressing: [] Met: [] Not Met: [] Adjusted    Overall Progression Towards Functional goals/ Treatment Progress Update:  [] Patient is progressing as expected towards functional goals listed. [] Progression is slowed due to complexities/Impairments listed. [] Progression has been slowed due to co-morbidities. [x] Plan just implemented, too soon to assess goals progression <30days   [] Goals require adjustment due to lack of progress  [] Patient is not progressing as expected and requires additional follow up with physician  [] Other    Persisting Functional Limitations/Impairments:  []Sleeping []Sitting               []Standing []Transfers        []Walking []Kneeling               []Stairs []Squatting / bending   []ADLs []Reaching  []Lifting  []Housework  []Driving []Job related tasks  []Sports/Recreation []Other:        ASSESSMENT:  Pt tolerated initiation of TG squats and cone taps today. States that at time she has trouble lifting her feet at times with walking. Fatigue noted after about 6 reps of nestor step overs. Plan to continue to strengthen and stretch BLE as Pt tolerates.      Treatment/Activity Tolerance:  [x] Patient able to complete tx [] Patient limited by fatigue  [] Patient limited by pain  [] Patient limited by other medical complications  [] Other: Prognosis: [x] Good [] Fair  [] Poor    Patient Requires Follow-up: [x] Yes  [] No    Plan for next treatment session: gentle stabs and stretches with flexion bias. Manual therapy prn for myofascial pain. Normalize symmetry of gait. Posture and body mechs. F/u re did pt call Dr Ivonne Cassidy  (Kidney doctor) re L>R LE swelling. Consider addition of MLD if/as approp for LE swelling - would need to check with Dr. Ivonne Cassidy. PLAN:  strength, ROM/flexibility, posture and body mechs, manual, MOC, HEP, pt education     Frequency/Duration:  2 days per week for 4 Weeks:  Interventions:  [x]  Therapeutic exercise including:strength, ROM, flexibility  [x]  NMR activation and proprioception including postural re-education  [x]  Manual therapy as indicated to include: IASTM, STM, PROM, Gr I-IV mobilizations, manipulation. [x]  Modalities as needed that may include: thermal agents, E-stim, Biofeedback, US, iontophoresis as indicated  [x]  Patient education on joint protection, postural re-education, activity modification, progression of HEP. Aquatic therapy    PLAN: See eval. PT 2x / week for 6 weeks. [x] Continue per plan of care [] Alter current plan (see comments)  [] Plan of care initiated [] Hold pending MD visit [] Discharge    Electronically signed by: Gokul Conley PT , DPT     Note: If patient does not return for scheduled/ recommended follow up visits, this note will serve as a discharge from care along with most recent update on progress.

## 2022-08-09 ENCOUNTER — HOSPITAL ENCOUNTER (OUTPATIENT)
Dept: PHYSICAL THERAPY | Age: 72
Setting detail: THERAPIES SERIES
Discharge: HOME | End: 2022-08-09
Payer: MEDICARE

## 2022-08-09 PROCEDURE — 97110 THERAPEUTIC EXERCISES: CPT

## 2022-08-09 PROCEDURE — 97530 THERAPEUTIC ACTIVITIES: CPT

## 2022-08-09 PROCEDURE — 97112 NEUROMUSCULAR REEDUCATION: CPT

## 2022-08-09 NOTE — FLOWSHEET NOTE
[address] [facility]  Phone: [phone]   Fax: [phone]    Physical Therapy Daily Treatment Note  Date:  2022    Patient Name:  Renaee Rinne    :  1950  MRN: [id number]  Medical/Treatment Diagnosis Information:  Diagnosis: M17.12 (ICD-10-CM) - Localized osteoarthritis of left knee  Treatment Diagnosis: hypomobility L-P spine, strength/flexibility/balance deficits L >R LE. Myofascial pain at  trunk and hips, especially at R QL. Flexion bias reduces symptoms. Insurance/Certification information:  PT Insurance Information: medicare and humana. $233 deductible. [facility]  Physician Information: BAYRON Alvarez NP; referral date: 22  Plan of care signed (Y/N): []  Yes [x]  No     Date of Patient follow up with Physician:      Progress Report: []  Yes  [x]  No     Date Range for reporting period:  Beginnin/19  Ending:     Progress report due (10 Rx/or 30 days whichever is less): visit #10 or  (date)     Recertification due (POC duration/ or 90 days whichever is less): visit # 12 or 10/19     Visit # Insurance Allowable Auth required? Date Range    Med nec []  Yes  [x]  No NA     Latex Allergy:  [x]NO      []YES  Preferred Language for Healthcare:   [x]English       []other:    Functional Scale:        Date assessed:  FOTO: lumbar raw score = 42, risk adjusted score:  42 ,    taken at initial eval  FOTO knee raw score 36, risk adjusted 44,     taken at initial eval          Pain level:  4/10 L knee, 5-6/10 B buttocks    SUBJECTIVE:  / L knee pain is intermittent - pt unsure what makes it hurt more. Buttock pain is worse when she gets up in middle of night to pee or after lying for a while. Reports she feels steadier since starting PT tx    / Pt states that she was able to do her exercises on Saturday. Pain did not disparate until Friday last week. Called kidney MD office- no call back.      OBJECTIVE: See eval    RESTRICTIONS/PRECAUTIONS: kidney failure,  BACK PAIN, Kidney failure - sees Dr. Klever Murray, smokes 1 pack/d  LUMBAR FUSION N/A 2/16/2021 LUMBAR4-LUMBAR5 TRANSVERSE LUMBAR INTERBODY FUSION ([id number], 61649, 77888, 35769, 62826, 48120, 66729, 46236) performed by Sarah Lerma MD at [facility]  8/20/15 L45 B/L decompressive hobson and excision of synovial cyst    ROTATOR CUFF REPAIR Right 2/98          Exercises/Interventions: flexion bias    Therapeutic Exercises (83814) Resistance / level Sets/sec Reps Notes   Nu step or bike  5 min  Sci fit s=17, arms = 7    IB  3 x 30 sec     HR/TR  2 10    Step stretches   HS  Hip flex    2 x 30 sec  2 x 30 sec                   Mat ex  Supine HS stretch  SKTC  Fig 4 piriformis stretch  -pull in  -push down     Low back stretch at bar  5 x 10 sec            Therapeutic Activities (75300)       TG squats   2 10 CGA on/off   Posture /body mechs REVIEW:  Instructed in neutral spine for bed mob      Diaphragmatic breathing in hooklying  Next session as carlos to improve rib pain R side      Steps  -fwd  -lateral 4\"   2  2   10 B  10 B    Mini squat     Cone taps  1 10 B SBA   Step over and back - 1 nestor  1 10 B CGA          Neuromuscular Re-ed (10267)       Stabilization progression, flexion bias  TrA iso              Seated SB       Balance  -rhomberg +horz head turn  -semi tandem  -semi  tandem + horz head turn  Airex  -rhomberg + EC  -semi tandem  -semi tandem + EC  -marching    30 sec  15 sec B  X10    15 sec B   15 sec B  10  SBA/CGA   Wobble board  A/P wt shifts    10x  Light B UE support   Multifidi walkouts                     Manual Intervention (32961)       STM/IASTM  Prn to R QL and R>L  lower quarter /trunk                                             Modalities:     Pt. Education:  8/9 review and progress HEP - see below     7/19/22 patient educated on diagnosis, prognosis and expectations for rehab  -all patient questions were answered    Home Exercise Program:    Access Code: [email or web address]  URL: [email or web address]  Date: 08/09/2022  Prepared by: Ladell Catching    Exercises  Supine Hamstring Stretch - 2 x daily - 7 x weekly - 1-2 sets - 3 reps - 30 hold  Hooklying Single Knee to Chest Stretch - 2 x daily - 7 x weekly - 1 sets - 3 reps - 30 hold  Supine Piriformis Stretch with Foot on Ground - 2 x daily - 7 x weekly - 1 sets - 3 reps - 30 hold  Supine Transversus Abdominis Bracing - Hands on Stomach - 2 x daily - 7 x weekly - 1 sets - 10 reps - 5 hold  Romberg Stance with Eyes Closed - 2 x daily - 7 x weekly - 1-2 sets - 5-10 reps - 5-20sec hold  Tandem Stance - 2 x daily - 7 x weekly - 1-3 sets - 3-5 reps - 15-20sec hold    7/19/22   Access Code: [email or web address]  URL: [email or web address]  Date: 07/19/2022  Prepared by: Ladell Catching    Exercises  Supine Hamstring Stretch - 2 x daily - 7 x weekly - 1-2 sets - 3 reps - 30 hold  Hooklying Single Knee to Chest Stretch - 2 x daily - 7 x weekly - 1 sets - 3 reps - 30 hold  Supine Piriformis Stretch with Foot on Ground - 2 x daily - 7 x weekly - 1 sets - 3 reps - 30 hold  Supine Transversus Abdominis Bracing - Hands on Stomach - 2 x daily - 7 x weekly - 1 sets - 10 reps - 5 hold        Therapeutic Exercise and NMR EXR  [x] (11819) Provided verbal/tactile cueing for activities related to strengthening, flexibility, endurance, ROM for improvements in  [x] LE / Lumbar: LE, proximal hip, and core control with self care, mobility, lifting, ambulation. [] UE / Cervical: cervical, postural, scapular, scapulothoracic and UE control with self care, reaching, carrying, lifting, house/yardwork, driving, computer work.   [x] (97697) Provided verbal/tactile cueing for activities related to improving balance, coordination, kinesthetic sense, posture, motor skill, proprioception to assist with   [x] LE / lumbar: LE, proximal hip, and core control in self care, mobility, lifting, ambulation and eccentric single leg Lumbar: core, proximal hip and LE for functional self-care, mobility, lifting and ambulation/stair navigation   [] UE / Cervical: cervical, postural, scapular, scapulothoracic and UE control with self care, reaching, carrying, lifting, house/yardwork, driving, computer work  [] (26283)Reviewed/Progressed HEP activities related to improving balance, coordination, kinesthetic sense, posture, motor skill, proprioception of   [] LE: core, proximal hip and LE for self care, mobility, lifting, and ambulation/stair navigation    [] UE / Cervical: cervical, postural,  scapular, scapulothoracic and UE control with self care, reaching, carrying, lifting, house/yardwork, driving, computer work    Manual Treatments:  PROM / STM / Oscillations-Mobs:  G-I, II, III, IV (PA's, Inf., Post.)  [] (91059) Provided manual therapy to mobilize LE, proximal hip and/or LS spine soft tissue/joints for the purpose of modulating pain, promoting relaxation,  increasing ROM, reducing/eliminating soft tissue swelling/inflammation/restriction, improving soft tissue extensibility and allowing for proper ROM for normal function with   [] LE / lumbar: self care, mobility, lifting and ambulation. [] UE / Cervical: self care, reaching, carrying, lifting, house/yardwork, driving, computer work. Modalities:  [] (44412) Vasopneumatic compression: Utilized vasopneumatic compression to decrease edema / swelling for the purpose of improving mobility and quad tone / recruitment which will allow for increased overall function including but not limited to self-care, transfers, ambulation, and ascending / descending stairs.        Charges:  Timed Code Treatment Minutes: 45   Total Treatment Minutes: 45     [] EVAL - LOW (10191)   [] EVAL - MOD (46239)  [] EVAL - HIGH (86176)  [] RE-EVAL (31302)  [x] IL(60857) x   1    [] Ionto  [x] NMR (47496) x 1      [] Vaso  [] Manual (22566) x       [] Ultrasound  [x] TA x 1       [] [facility] (047[facility]  [] Aquatic Therapy x     [] ES (un) (00009):   [] Home Management Training x  [] ES(attended) (88960)   [] Dry Needling 1-2 muscles (67018):  [] Dry Needling 3+ muscles (877516)  [] Group:      [] Other:          GOALS:  Patient stated goal:  less pain with ADLs  [] Progressing: [] Met: [] Not Met: [] Adjusted     Therapist goals for Patient:  Short Term Goals: To be achieved in: 2 weeks  1. Independent in HEP and progression per patient tolerance, in order to prevent re-injury. [] Progressing: [] Met: [] Not Met: [] Adjusted  2. Patient will have a decrease in pain to facilitate improvement in movement, function, and ADLs as indicated by improvement with respect to Functional Deficits. [] Progressing: [] Met: [] Not Met: [] Adjusted     Long Term Goals: To be achieved in: 6 weeks  1. FOTO LUMBAR functional survey score of >/= 63   and FOTO knee  functional survey score of >/=36  to assist with reaching prior level of function. [] Progressing: [] Met: [] Not Met: [] Adjusted  2. Patient will demonstrate increased AROM  to [facility], to allow for proper joint functioning to allow pt to resume home chores with good posture and body mechanics without increase in symptoms. [] Progressing: [] Met: [] Not Met: [] Adjusted  3. Patient will demonstrate increased Strength and core activation to allow for proper functional mobility as indicated by patients Functional Deficits to allow pt to resume up/down</1 flight of stairs without difficulty, tolerate shopping x 30-60 min without excess fatigue and without increase in symptoms. [] Progressing: [] Met: [] Not Met: [] Adjusted  4. Patient will return to functional activities including working yard/garden x 30-60 min without rest and  without increased symptoms or restriction. [] Progressing: [] Met: [] Not Met: [] Adjusted    Overall Progression Towards Functional goals/ Treatment Progress Update:  [x] Patient is progressing as expected towards functional goals listed.     [] re-education  [x]  Manual therapy as indicated to include: IASTM, STM, PROM, Gr I-IV mobilizations, manipulation. [x]  Modalities as needed that may include: thermal agents, E-stim, Biofeedback, US, iontophoresis as indicated  [x]  Patient education on joint protection, postural re-education, activity modification, progression of HEP. Aquatic therapy    PLAN: See eval. PT 2x / week for 6 weeks. [x] Continue per plan of care [] Alter current plan (see comments)  [] Plan of care initiated [] Hold pending MD visit [] Discharge    Electronically signed by: Albina Foy, PT , DPT     Note: If patient does not return for scheduled/ recommended follow up visits, this note will serve as a discharge from care along with most recent update on progress.

## 2022-08-11 ENCOUNTER — HOSPITAL ENCOUNTER (OUTPATIENT)
Dept: PHYSICAL THERAPY | Age: 72
Setting detail: THERAPIES SERIES
Discharge: HOME | End: 2022-08-11
Payer: MEDICARE

## 2022-08-11 PROCEDURE — 97110 THERAPEUTIC EXERCISES: CPT

## 2022-08-11 PROCEDURE — 97530 THERAPEUTIC ACTIVITIES: CPT

## 2022-08-11 PROCEDURE — 97112 NEUROMUSCULAR REEDUCATION: CPT

## 2022-08-11 NOTE — FLOWSHEET NOTE
[address] [facility]  Phone: [phone]   Fax: [phone]    Physical Therapy Daily Treatment Note  Date:  2022    Patient Name:  Ino Garcia    :  1950  MRN: [id number]  Medical/Treatment Diagnosis Information:  Diagnosis: M17.12 (ICD-10-CM) - Localized osteoarthritis of left knee  Treatment Diagnosis: hypomobility L-P spine, strength/flexibility/balance deficits L >R LE. Myofascial pain at  trunk and hips, especially at R QL. Flexion bias reduces symptoms. Insurance/Certification information:  PT Insurance Information: medicare and humana. $233 deductible. [facility]  Physician Information: BAYRON Quick NP; referral date: 22  Plan of care signed (Y/N): []  Yes [x]  No     Date of Patient follow up with Physician: kidney doctor on      Progress Report: []  Yes  [x]  No     Date Range for reporting period:  Beginnin/19  Ending:     Progress report due (10 Rx/or 30 days whichever is less): visit #10 or  (date)     Recertification due (POC duration/ or 90 days whichever is less): visit # 12 or 10/19     Visit # Insurance Allowable Auth required? Date Range    Med nec []  Yes  [x]  No NA     Latex Allergy:  [x]NO      []YES  Preferred Language for Healthcare:   [x]English       []other:    Functional Scale:        Date assessed:  FOTO: lumbar raw score = 42, risk adjusted score:  42 ,    taken at initial eval  FOTO knee raw score 36, risk adjusted 44,     taken at initial eval          Pain level:  2/10 L knee, 0-3/10 B buttocks/low back     SUBJECTIVE:   reports her legs fel;t like jelly after last session but didn't hurt. Exercises are going well. Reports she cont with ankle swelling. Reports she called kidney doc but they didn't call her back. Reports her dtr says her walking looks better.    States she has 4 granddtrs:   Ivelisse (21), Zander Valles (17 y/o),  Marija 15 y/o- has autism, Whitney Covert (10)     L knee pain is intermittent - pt unsure what makes it hurt more. Buttock pain is worse when she gets up in middle of night to pee or after lying for a while. Reports she feels steadier since starting PT tx    8/4 Pt states that she was able to do her exercises on Saturday. Pain did not disparate until Friday last week. Called kidney MD office- no call back.      OBJECTIVE: See eval    RESTRICTIONS/PRECAUTIONS: kidney failure,  BACK PAIN, Kidney failure - sees Dr. Chelle Laird, smokes 1 pack/d  LUMBAR FUSION N/A 2/16/2021 LUMBAR4-LUMBAR5 TRANSVERSE LUMBAR INTERBODY FUSION (97 [id number], 23697, 27071, 52334, 96737, 80483, 55562, 08690) performed by Anila Mead MD at [facility]  8/20/15 L45 B/L decompressive hobson and excision of synovial cyst    ROTATOR CUFF REPAIR Right 2/98          Exercises/Interventions: flexion bias    Therapeutic Exercises (75014) Resistance / level Sets/sec Reps Notes   Nu step or bike  5.5min  Sci fit s=17, arms = 7    IB  3 x 30 sec     HR/TR  2 10    Step stretches   HS  Hip flex    2 x 30 sec  2 x 30 sec                   Mat ex  Supine HS stretch  SKTC  Fig 4 piriformis stretch  -pull in  -push down     Low back stretch at bar  5 x 10 sec            Therapeutic Activities (41569)       TG squats   2 10 CGA on/off   Posture /body [facility] REVIEW:  Instructed in neutral spine for bed mob      Diaphragmatic breathing in hooklying  Next session as carlos to improve rib pain R side      Steps  -fwd  -lateral 4\"   2  2   10 B  10 B    Mini squat     Cone taps  1 10 B SBA   Step over and back - 1 nestor  1 10 B CGA          Neuromuscular Re-ed (46050)       Stabilization progression, flexion bias  TrA iso     Supine SB         Seated SB       Balance  -rhomberg +horz head turn + EC  -semi tandem  -semi  tandem + horz head turn  Airex  Rhomberg + horz head turns  -rhomberg + EC  -semi tandem  -semi tandem + EC  -marching    30 sec  30 sec    30sec B   15 sec B  15 sec B  10 sec B  SBA/CGA Wobble board  A/P wt shifts    10x  CGA/ Light B UE support   Multifidi walkouts                     Manual Intervention ([phone])       STM/IASTM  Prn to R QL and R>L  lower quarter /trunk                                             Modalities:     Pt. Education:  8/9 review and progress HEP - see below     7/19/22 patient educated on diagnosis, prognosis and expectations for rehab  -all patient questions were answered    Home Exercise Program:    Access Code: [email or web address]  URL: [email or web address]  Date: 08/09/2022  Prepared by: Melissa Dibbles    Exercises  Supine Hamstring Stretch - 2 x daily - 7 x weekly - 1-2 sets - 3 reps - 30 hold  Hooklying Single Knee to Chest Stretch - 2 x daily - 7 x weekly - 1 sets - 3 reps - 30 hold  Supine Piriformis Stretch with Foot on Ground - 2 x daily - 7 x weekly - 1 sets - 3 reps - 30 hold  Supine Transversus Abdominis Bracing - Hands on Stomach - 2 x daily - 7 x weekly - 1 sets - 10 reps - 5 hold  Romberg Stance with Eyes Closed - 2 x daily - 7 x weekly - 1-2 sets - 5-10 reps - 5-20sec hold  Tandem Stance - 2 x daily - 7 x weekly - 1-3 sets - 3-5 reps - 15-20sec hold    7/19/22   Access Code: [email or web address]  URL: [email or web address]  Date: 07/19/2022  Prepared by: Melissa Dibbles    Exercises  Supine Hamstring Stretch - 2 x daily - 7 x weekly - 1-2 sets - 3 reps - 30 hold  Hooklying Single Knee to Chest Stretch - 2 x daily - 7 x weekly - 1 sets - 3 reps - 30 hold  Supine Piriformis Stretch with Foot on Ground - 2 x daily - 7 x weekly - 1 sets - 3 reps - 30 hold  Supine Transversus Abdominis Bracing - Hands on Stomach - 2 x daily - 7 x weekly - 1 sets - 10 reps - 5 hold        Therapeutic Exercise and NMR EXR  [x] (07799) Provided verbal/tactile cueing for activities related to strengthening, flexibility, endurance, ROM for improvements in  [x] LE / Lumbar: LE, proximal hip, and core control with self care, mobility, lifting, ambulation.   [] UE / Cervical: cervical, postural, scapular, scapulothoracic and UE control with self care, reaching, carrying, lifting, house/yardwork, driving, computer work. [x] (82374) Provided verbal/tactile cueing for activities related to improving balance, coordination, kinesthetic sense, posture, motor skill, proprioception to assist with   [x] LE / lumbar: LE, proximal hip, and core control in self care, mobility, lifting, ambulation and eccentric single leg control. [] UE / cervical: cervical, scapular, scapulothoracic and UE control with self care, reaching, carrying, lifting, house/yardwork, driving, computer work.   [] (74342) Therapist is in constant attendance of 2 or more patients providing skilled therapy interventions, but not providing any significant amount of measurable one-on-one time to either patient, for improvements in  [] LE / lumbar: LE, proximal hip, and core control in self care, mobility, lifting, ambulation and eccentric single leg control. [] UE / cervical: cervical, scapular, scapulothoracic and UE control with self care, reaching, carrying, lifting, house/yardwork, driving, computer work.      NMR and Therapeutic Activities:    [x] (36267 or 71402) Provided verbal/tactile cueing for activities related to improving balance, coordination, kinesthetic sense, posture, motor skill, proprioception and motor activation to allow for proper function of   [x] LE: / Lumbar core, proximal hip and LE with self care and ADLs  [] UE / Cervical: cervical, postural, scapular, scapulothoracic and UE control with self care, carrying, lifting, driving, computer work.   [] (58107) Gait Re-education- Provided training and instruction to the patient for proper LE, core and proximal hip recruitment and positioning and eccentric body weight control with ambulation re-education including up and down stairs     Home Management Training / Self Care:  [] (72796) Provided self-care/home management training related to activities of daily living including but not limited to self-care, transfers, ambulation, and ascending / descending stairs. Charges:  Timed Code Treatment Minutes: 44   Total Treatment Minutes: 44     [] EVAL - LOW (92358)   [] EVAL - MOD (39047)  [] EVAL - HIGH (87903)  [] RE-EVAL (71034)  [x] WC(34191) x   1    [] Ionto  [x] NMR (04524) x 1      [] Vaso  [] Manual (03155) x       [] Ultrasound  [x] TA x 1       [] Mech Traction (60012)  [] Aquatic Therapy x     [] ES (un) (99559):   [] Home Management Training x  [] ES(attended) (93882)   [] Dry Needling 1-2 muscles (05846):  [] Dry Needling 3+ muscles (125430)  [] Group:      [] Other:          GOALS:  Patient stated goal:  less pain with ADLs  [] Progressing: [] Met: [] Not Met: [] Adjusted     Therapist goals for Patient:  Short Term Goals: To be achieved in: 2 weeks  1. Independent in HEP and progression per patient tolerance, in order to prevent re-injury. [] Progressing: [] Met: [] Not Met: [] Adjusted  2. Patient will have a decrease in pain to facilitate improvement in movement, function, and ADLs as indicated by improvement with respect to Functional Deficits. [] Progressing: [] Met: [] Not Met: [] Adjusted     Long Term Goals: To be achieved in: 6 weeks  1. FOTO LUMBAR functional survey score of >/= 63   and FOTO knee  functional survey score of >/=36  to assist with reaching prior level of function. [] Progressing: [] Met: [] Not Met: [] Adjusted  2. Patient will demonstrate increased AROM  to [facility], to allow for proper joint functioning to allow pt to resume home chores with good posture and body mechanics without increase in symptoms. [] Progressing: [] Met: [] Not Met: [] Adjusted  3.  Patient will demonstrate increased Strength and core activation to allow for proper functional mobility as indicated by patients Functional Deficits to allow pt to resume up/down</1 flight of stairs without difficulty, tolerate shopping x 30-60 min without excess fatigue and without increase in symptoms. [] Progressing: [] Met: [] Not Met: [] Adjusted  4. Patient will return to functional activities including working yard/garden x 30-60 min without rest and  without increased symptoms or restriction. [] Progressing: [] Met: [] Not Met: [] Adjusted    Overall Progression Towards Functional goals/ Treatment Progress Update:  [x] Patient is progressing as expected towards functional goals listed. [] Progression is slowed due to complexities/Impairments listed. [] Progression has been slowed due to co-morbidities. [] Plan just implemented, too soon to assess goals progression <30days   [] Goals require adjustment due to lack of progress  [] Patient is not progressing as expected and requires additional follow up with physician  [] Other    Persisting Functional Limitations/Impairments:  []Sleeping []Sitting               []Standing []Transfers        []Walking []Kneeling               []Stairs []Squatting / bending   []ADLs []Reaching  []Lifting  []Housework  []Driving []Job related tasks  []Sports/Recreation []Other:        ASSESSMENT:  8/11 increasing balance and carlos of ex  8/9 good carlos of exercise, increasing carlos of balance ex. Pt reports she feels like she is steadier on her feet, but still has losses of balance    7/27 Pt tolerated initiation of TG squats and cone taps today. States that at time she has trouble lifting her feet at times with walking. Fatigue noted after about 6 reps of nestor step overs. Plan to continue to strengthen and stretch BLE as Pt tolerates. Treatment/Activity Tolerance:  [x] Patient able to complete tx [] Patient limited by fatigue  [] Patient limited by pain  [] Patient limited by other medical complications  [] Other:     Prognosis: [x] Good [] Fair  [] Poor    Patient Requires Follow-up: [x] Yes  [] No    Plan for next treatment session: gentle stabs and stretches with flexion bias. Manual therapy prn for myofascial pain. Normalize symmetry of gait. Posture and body mechs. F/u re did pt call Dr Kyaw Art  (Kidney doctor) re L>R LE swelling. Consider addition of MLD if/as approp for LE swelling - would need to check with Dr. Kyaw Art. PLAN:  strength, ROM/flexibility, posture and body mechs, manual, MOC, HEP, pt education     Frequency/Duration:  2 days per week for 4 Weeks:  Interventions:  [x]  Therapeutic exercise including:strength, ROM, flexibility  [x]  NMR activation and proprioception including postural re-education  [x]  Manual therapy as indicated to include: IASTM, STM, PROM, Gr I-IV mobilizations, manipulation. [x]  Modalities as needed that may include: thermal agents, E-stim, Biofeedback, US, iontophoresis as indicated  [x]  Patient education on joint protection, postural re-education, activity modification, progression of HEP. Aquatic therapy    PLAN: See eval. PT 2x / week for 6 weeks. [x] Continue per plan of care [] Alter current plan (see comments)  [] Plan of care initiated [] Hold pending MD visit [] Discharge    Electronically signed by: Yasmine Donovan, PT , DPT     Note: If patient does not return for scheduled/ recommended follow up visits, this note will serve as a discharge from care along with most recent update on progress.

## 2022-08-15 ENCOUNTER — HOSPITAL ENCOUNTER (OUTPATIENT)
Age: 72
Discharge: HOME | End: 2022-08-15
Payer: MEDICARE

## 2022-08-15 DIAGNOSIS — I10 ESSENTIAL HYPERTENSION: ICD-10-CM

## 2022-08-15 DIAGNOSIS — N18.32 STAGE 3B CHRONIC KIDNEY DISEASE (HCC): ICD-10-CM

## 2022-08-15 LAB
ANION GAP SERPL CALCULATED.3IONS-SCNC: 12 MMOL/L (ref 3–16)
BUN BLDV-MCNC: 20 MG/DL (ref 7–20)
CALCIUM SERPL-MCNC: 9.8 MG/DL (ref 8.3–10.6)
CHLORIDE BLD-SCNC: 105 MMOL/L (ref 99–110)
CO2: 26 MMOL/L (ref 21–32)
CREAT SERPL-MCNC: 1.6 MG/DL (ref 0.6–1.2)
CREATININE URINE: 126.7 MG/DL (ref 28–259)
GFR AFRICAN AMERICAN: 38
GFR NON-AFRICAN AMERICAN: 32
GLUCOSE BLD-MCNC: 119 MG/DL (ref 70–99)
MICROALBUMIN UR-MCNC: 132.4 MG/DL
MICROALBUMIN/CREAT UR-RTO: 1045 MG/G (ref 0–30)
POTASSIUM SERPL-SCNC: 4.6 MMOL/L (ref 3.5–5.1)
SODIUM BLD-SCNC: 143 MMOL/L (ref 136–145)

## 2022-08-15 PROCEDURE — 80048 BASIC METABOLIC PNL TOTAL CA: CPT

## 2022-08-15 PROCEDURE — 82043 UR ALBUMIN QUANTITATIVE: CPT

## 2022-08-15 PROCEDURE — 82570 ASSAY OF URINE CREATININE: CPT

## 2022-08-15 PROCEDURE — 36415 COLL VENOUS BLD VENIPUNCTURE: CPT

## 2022-08-16 ENCOUNTER — HOSPITAL ENCOUNTER (OUTPATIENT)
Dept: PHYSICAL THERAPY | Age: 72
Setting detail: THERAPIES SERIES
Discharge: HOME | End: 2022-08-16
Payer: MEDICARE

## 2022-08-16 PROCEDURE — 97112 NEUROMUSCULAR REEDUCATION: CPT

## 2022-08-16 PROCEDURE — 97110 THERAPEUTIC EXERCISES: CPT

## 2022-08-16 PROCEDURE — 97530 THERAPEUTIC ACTIVITIES: CPT

## 2022-08-16 NOTE — FLOWSHEET NOTE
[facility]  Phone: [phone]   Fax: [phone]    Physical Therapy Daily Treatment Note  Date:  2022    Patient Name:  Renaee Rinne    :  1950  MRN: [id number]  Medical/Treatment Diagnosis Information:  Diagnosis: M17.12 (ICD-10-CM) - Localized osteoarthritis of left knee  Treatment Diagnosis: hypomobility L-P spine, strength/flexibility/balance deficits L >R LE. Myofascial pain at  trunk and hips, especially at R QL. Flexion bias reduces symptoms. Insurance/Certification information:  PT Insurance Information: medicare and [employer]. $233 deductible. [facility]  Physician Information: BAYRON Alvarez NP; referral date: 22  Plan of care signed (Y/N): []  Yes [x]  No     Date of Patient follow up with Physician: kidney doctor on      Progress Report: []  Yes  [x]  No     Date Range for reporting period:  Beginnin/19  Ending:     Progress report due (10 Rx/or 30 days whichever is less): visit #10 or  (date)     Recertification due (POC duration/ or 90 days whichever is less): visit # 12 or 10/19     Visit # Insurance Allowable Auth required? Date Range    Med nec []  Yes  [x]  No NA     Latex Allergy:  [x]NO      []YES  Preferred Language for Healthcare:   [x]English       []other:    Functional Scale:        Date assessed:  FOTO: lumbar raw score = 42, risk adjusted score:  42 ,    taken at initial eval  FOTO knee raw score 36, risk adjusted 44,     taken at initial eval          Pain level:  0-2/10 L knee, 4/10 B buttocks/post thigh, 0-1/10 low back  low back     SUBJECTIVE:   reports her post  thighs ache today. Reports she notices she can walk further without hurting - walked from handicapped spot at [facility] in to the  registration area without increased pain.   Feels really tired - wonders if it is from her depression meds     reports her legs fel;t like jelly after last session but didn't hurt. Exercises are going well. Reports she cont with ankle swelling. Reports she called kidney doc but they didn't call her back. Reports her dtr says her walking looks better. States she has 4 granddtrs:   Ivelisse (21), Namita Rojas (17 y/o),  Marija 15 y/o- has autism, Scottie Hunter (10)    8/9 L knee pain is intermittent - pt unsure what makes it hurt more. Buttock pain is worse when she gets up in middle of night to pee or after lying for a while. Reports she feels steadier since starting PT tx    8/4 Pt states that she was able to do her exercises on Saturday. Pain did not disparate until Friday last week. Called kidney MD office- no call back.      OBJECTIVE: See eval    RESTRICTIONS/PRECAUTIONS: kidney failure,  BACK PAIN, Kidney failure - sees Dr. Kumar Minors, smokes 1 pack/d  LUMBAR FUSION N/A 2/16/2021 LUMBAR4-LUMBAR5 TRANSVERSE LUMBAR INTERBODY FUSION ([id number], 16164, 42212, 10356, 59211, 31453, 15836, 20192) performed by Aleksander Waddell MD at [facility]  8/20/15 L45 B/L decompressive hobson and excision of synovial cyst    ROTATOR CUFF REPAIR Right 2/98          Exercises/Interventions: flexion bias    Therapeutic Exercises (57071) Resistance / level Sets/sec Reps Notes   Nu step or bike  5.5min  Sci fit s=17, arms = 7    IB  3 x 30 sec     HR/TR  2 10    Step stretches   HS  Hip flex    2 x 30 sec  2 x 30 sec                   Mat ex  Supine HS stretch  SKTC  Fig 4 piriformis stretch  -pull in  -push down     Low back stretch at bar  5 x 10 sec            Therapeutic Activities (07045)       TG squats B squats  2 10 CGA on/off   Posture /body [facility] REVIEW:  Instructed in neutral spine for bed mob      Diaphragmatic breathing in hooklying  Pt reports rib pain has almost completely resolved      Steps  -fwd  -lateral 4\"   2  2   15 B  10 B    Mini squat     Cone taps  1 10 B SBA   Step over and back - 1 nestor  1 10 B CGA          Neuromuscular Re-ed (80012)       Stabilization progression, flexion bias  TrA iso     Supine SB         Seated SB       Balance  -rhomberg +horz head turn + EC  -semi tandem  -semi  tandem + horz head turn  Airex  Rhomberg + horz head turns  -rhomberg + EC  homberg + EC + horiz head turns (light fingertips)  -semi tandem  -semi tandem + EC  -marching      60sec B   30 sec B  15x B    15 sec B  15 sec B  SBA/CGA   Wobble board  A/P wt shifts    2x10x  CGA/ Light B UE support   Multifidi walkouts                     Manual Intervention (84659)       STM/IASTM  Prn to R QL and R>L  lower quarter /trunk                                             Modalities:     Pt. Education:  8/16 d/w pt that side effects of depression meds can cause fatigue - encouraged pt to d/w her PCP re depression, fatigue, and depression meds. 8/9 review and progress HEP - see below     7/19/22 patient educated on diagnosis, prognosis and expectations for rehab  -all patient questions were answered    Home Exercise Program:    Access Code: [email or web address]  URL: [email or web address]  Date: 08/09/2022  Prepared by: Evelyn Genre    Exercises  Supine Hamstring Stretch - 2 x daily - 7 x weekly - 1-2 sets - 3 reps - 30 hold  Hooklying Single Knee to Chest Stretch - 2 x daily - 7 x weekly - 1 sets - 3 reps - 30 hold  Supine Piriformis Stretch with Foot on Ground - 2 x daily - 7 x weekly - 1 sets - 3 reps - 30 hold  Supine Transversus Abdominis Bracing - Hands on Stomach - 2 x daily - 7 x weekly - 1 sets - 10 reps - 5 hold  Romberg Stance with Eyes Closed - 2 x daily - 7 x weekly - 1-2 sets - 5-10 reps - 5-20sec hold  Tandem Stance - 2 x daily - 7 x weekly - 1-3 sets - 3-5 reps - 15-20sec hold    7/19/22   Access Code: [email or web address]  URL: [email or web address]  Date: 07/19/2022  Prepared by: Evelyn Genre    Exercises  Supine Hamstring Stretch - 2 x daily - 7 x weekly - 1-2 sets - 3 reps - 30 hold  Hooklying Single Knee to Chest Stretch - 2 x daily - 7 x weekly - 1 sets - 3 reps - 30 hold  Supine Piriformis Stretch with Foot on Ground - 2 x daily - 7 x weekly - 1 sets - 3 reps - 30 hold  Supine Transversus Abdominis Bracing - Hands on Stomach - 2 x daily - 7 x weekly - 1 sets - 10 reps - 5 hold        Therapeutic Exercise and NMR EXR  [x] (74733) Provided verbal/tactile cueing for activities related to strengthening, flexibility, endurance, ROM for improvements in  [x] LE / Lumbar: LE, proximal hip, and core control with self care, mobility, lifting, ambulation. [] UE / Cervical: cervical, postural, scapular, scapulothoracic and UE control with self care, reaching, carrying, lifting, house/yardwork, driving, computer work. [x] (66614) Provided verbal/tactile cueing for activities related to improving balance, coordination, kinesthetic sense, posture, motor skill, proprioception to assist with   [x] LE / lumbar: LE, proximal hip, and core control in self care, mobility, lifting, ambulation and eccentric single leg control. [] UE / cervical: cervical, scapular, scapulothoracic and UE control with self care, reaching, carrying, lifting, house/yardwork, driving, computer work.   [] (09585) Therapist is in constant attendance of 2 or more patients providing skilled therapy interventions, but not providing any significant amount of measurable one-on-one time to either patient, for improvements in  [] LE / lumbar: LE, proximal hip, and core control in self care, mobility, lifting, ambulation and eccentric single leg control. [] UE / cervical: cervical, scapular, scapulothoracic and UE control with self care, reaching, carrying, lifting, house/yardwork, driving, computer work.      NMR and Therapeutic Activities:    [x] (94530 or 15925) Provided verbal/tactile cueing for activities related to improving balance, coordination, kinesthetic sense, posture, motor skill, proprioception and motor activation to allow for proper function of   [x] LE: / Lumbar core, proximal hip and LE with self care and ADLs  [] UE / Cervical: cervical, postural, scapular, scapulothoracic and UE control with self care, carrying, lifting, driving, computer work.   [] (60251) Gait Re-education- Provided training and instruction to the patient for proper LE, core and proximal hip recruitment and positioning and eccentric body weight control with ambulation re-education including up and down stairs     Home Management Training / Self Care:  [] (92929) Provided self-care/home management training related to activities of daily living and compensatory training, and/or use of adaptive equipment for improvement with: ADLs and compensatory training, meal preparation, safety procedures and instruction in use of adaptive equipment, including bathing, grooming, dressing, personal hygiene, basic household cleaning and chores.      Home Exercise Program:    [] (08947) Reviewed/Progressed HEP activities related to strengthening, flexibility, endurance, ROM of   [] LE / Lumbar: core, proximal hip and LE for functional self-care, mobility, lifting and ambulation/stair navigation   [] UE / Cervical: cervical, postural, scapular, scapulothoracic and UE control with self care, reaching, carrying, lifting, house/yardwork, driving, computer work  [] (56412)Reviewed/Progressed HEP activities related to improving balance, coordination, kinesthetic sense, posture, motor skill, proprioception of   [] LE: core, proximal hip and LE for self care, mobility, lifting, and ambulation/stair navigation    [] UE / Cervical: cervical, postural,  scapular, scapulothoracic and UE control with self care, reaching, carrying, lifting, house/yardwork, driving, computer work    Manual Treatments:  PROM / STM / Oscillations-Mobs:  G-I, II, III, IV (PA's, Inf., Post.)  [] (92706) Provided manual therapy to mobilize LE, proximal hip and/or LS spine soft tissue/joints for the purpose of modulating pain, promoting relaxation,  increasing ROM, reducing/eliminating soft tissue swelling/inflammation/restriction, improving soft tissue extensibility and allowing for proper ROM for normal function with   [] LE / lumbar: self care, mobility, lifting and ambulation. [] UE / Cervical: self care, reaching, carrying, lifting, house/yardwork, driving, computer work. Modalities:  [] (10923) Vasopneumatic compression: Utilized vasopneumatic compression to decrease edema / swelling for the purpose of improving mobility and quad tone / recruitment which will allow for increased overall function including but not limited to self-care, transfers, ambulation, and ascending / descending stairs. Charges:  Timed Code Treatment Minutes: 44   Total Treatment Minutes: 44     [] EVAL - LOW (15302)   [] EVAL - MOD (17223)  [] EVAL - HIGH (90649)  [] RE-EVAL (18096)  [x] SG(43549) x   1    [] Ionto  [x] NMR (08513) x 1      [] Vaso  [] Manual (78831) x       [] Ultrasound  [x] TA x 1       [] Mech Traction (57864)  [] Aquatic Therapy x     [] ES (un) (21585):   [] Home Management Training x  [] ES(attended) (60882)   [] Dry Needling 1-2 muscles (70320):  [] Dry Needling 3+ muscles (591033)  [] Group:      [] Other:          GOALS:  Patient stated goal:  less pain with ADLs  [] Progressing: [] Met: [] Not Met: [] Adjusted     Therapist goals for Patient:  Short Term Goals: To be achieved in: 2 weeks  1. Independent in HEP and progression per patient tolerance, in order to prevent re-injury. [] Progressing: [] Met: [] Not Met: [] Adjusted  2. Patient will have a decrease in pain to facilitate improvement in movement, function, and ADLs as indicated by improvement with respect to Functional Deficits. [] Progressing: [] Met: [] Not Met: [] Adjusted     Long Term Goals: To be achieved in: 6 weeks  1. FOTO LUMBAR functional survey score of >/= 63   and FOTO knee  functional survey score of >/=36  to assist with reaching prior level of function. [] Progressing: [] Met: [] Not Met: [] Adjusted  2.  Patient will demonstrate increased AROM  to [facility], to allow for proper joint functioning to allow pt to resume home chores with good posture and body mechanics without increase in symptoms. [] Progressing: [] Met: [] Not Met: [] Adjusted  3. Patient will demonstrate increased Strength and core activation to allow for proper functional mobility as indicated by patients Functional Deficits to allow pt to resume up/down</1 flight of stairs without difficulty, tolerate shopping x 30-60 min without excess fatigue and without increase in symptoms. [] Progressing: [] Met: [] Not Met: [] Adjusted  4. Patient will return to functional activities including working yard/garden x 30-60 min without rest and  without increased symptoms or restriction. [] Progressing: [] Met: [] Not Met: [] Adjusted    Overall Progression Towards Functional goals/ Treatment Progress Update:  [x] Patient is progressing as expected towards functional goals listed. [] Progression is slowed due to complexities/Impairments listed. [] Progression has been slowed due to co-morbidities. [] Plan just implemented, too soon to assess goals progression <30days   [] Goals require adjustment due to lack of progress  [] Patient is not progressing as expected and requires additional follow up with physician  [] Other    Persisting Functional Limitations/Impairments:  []Sleeping []Sitting               [x]Standing []Transfers        [x]Walking []Kneeling               [x]Stairs []Squatting / bending   [x]ADLs []Reaching  []Lifting  [x]Housework  []Driving []Job related tasks  []Sports/Recreation []Other:        ASSESSMENT:  8/16 increasing activity carlos with less pain. pt struggling with fatigue and agreeable to d/w her PCP. Report she has had multiple large losses this last year that contribute to depression    8/11 increasing balance and carlos of ex  8/9 good carlos of exercise, increasing carlos of balance ex.   Pt reports she feels like she is steadier on her feet, but still has losses of balance    7/27 Pt tolerated initiation of TG squats and cone taps today. States that at time she has trouble lifting her feet at times with walking. Fatigue noted after about 6 reps of nestor step overs. Plan to continue to strengthen and stretch BLE as Pt tolerates. Treatment/Activity Tolerance:  [x] Patient able to complete tx [] Patient limited by fatigue  [] Patient limited by pain  [] Patient limited by other medical complications  [] Other:     Prognosis: [x] Good [] Fair  [] Poor    Patient Requires Follow-up: [x] Yes  [] No    Plan for next treatment session: gentle stabs and stretches with flexion bias. Manual therapy prn for myofascial pain. Normalize symmetry of gait. Posture and body mechs. F/u re did pt call Dr Kyaw Art  (Kidney doctor) re L>R LE swelling. Consider addition of MLD if/as approp for LE swelling - would need to check with Dr. Kyaw Art. PLAN:  strength, ROM/flexibility, posture and body mechs, manual, MOC, HEP, pt education     Frequency/Duration:  2 days per week for 4 Weeks:  Interventions:  [x]  Therapeutic exercise including:strength, ROM, flexibility  [x]  NMR activation and proprioception including postural re-education  [x]  Manual therapy as indicated to include: IASTM, STM, PROM, Gr I-IV mobilizations, manipulation. [x]  Modalities as needed that may include: thermal agents, E-stim, Biofeedback, US, iontophoresis as indicated  [x]  Patient education on joint protection, postural re-education, activity modification, progression of HEP. Aquatic therapy    PLAN: See eval. PT 2x / week for 6 weeks. [x] Continue per plan of care [] Alter current plan (see comments)  [] Plan of care initiated [] Hold pending MD visit [] Discharge    Electronically signed by: Yasmine Donovan, PT , DPT     Note: If patient does not return for scheduled/ recommended follow up visits, this note will serve as a discharge from care along with most recent update on progress.

## 2022-08-18 ENCOUNTER — HOSPITAL ENCOUNTER (OUTPATIENT)
Dept: PHYSICAL THERAPY | Age: 72
Setting detail: THERAPIES SERIES
Discharge: HOME | End: 2022-08-18
Payer: MEDICARE

## 2022-08-18 NOTE — FLOWSHEET NOTE
Physical Therapy  Cancellation/No-show Note  Patient Name:  Fernando Smiley  :  1950   Date:  2022  Cancelled visits to date: 2  No-shows to date: 0    Patient status for today's appointment patient:  [x]  Cancelled 22,   []  Rescheduled appointment  []  No-show     Reason given by patient:  [x]  Patient ill   []  Conflicting appointment  []  No transportation    []  Conflict with work  []  No reason given  []  Other:     Comments:   called and stated forgot about apt    Phone call information:   []  Phone call made today to patient at _ time at number provided:      []  Patient answered, conversation as follows:    []  Patient did not answer, message left as follows:  []  Phone call not made today  [x]  Phone call not needed - pt contacted us to cancel and provided reason for cancellation.      Electronically signed by:  Heidi Rutledge, PT, DPT

## 2022-08-23 ENCOUNTER — HOSPITAL ENCOUNTER (OUTPATIENT)
Dept: PHYSICAL THERAPY | Age: 72
Setting detail: THERAPIES SERIES
Discharge: HOME | End: 2022-08-23
Payer: MEDICARE

## 2022-08-23 PROCEDURE — 97110 THERAPEUTIC EXERCISES: CPT

## 2022-08-23 PROCEDURE — 97530 THERAPEUTIC ACTIVITIES: CPT

## 2022-08-23 PROCEDURE — 97112 NEUROMUSCULAR REEDUCATION: CPT

## 2022-08-23 NOTE — PLAN OF CARE
[facility]  Phone: [phone]   Fax: [phone]  Physical Therapy Re-Certification Plan of Care    Dear Nory Brandon  ,    We had the pleasure of treating the following patient for physical therapy services at [facility]. A summary of our findings can be found in the updated assessment below. This includes our plan of care. If you have any questions or concerns regarding these findings, please do not hesitate to contact me at the office phone number checked above. Thank you for the referral.     Physician Signature:________________________________Date:__________________  By signing above (or electronic signature), therapist's plan is approved by physician      Functional Outcome:                    FOTO: 50                                30 sec sit to stand: 11x    Overall Response to Treatment:   [x]Patient is responding well to treatment and improvement is noted with regards  to goals   []Patient should continue to improve in reasonable time if they continue HEP   []Patient has plateaued and is no longer responding to skilled PT intervention    []Patient is getting worse and would benefit from return to referring MD   []Patient unable to adhere to initial POC   [x]Other:   Fxnl carlos  Carlos of steps:  1 step at a time, sideways to descend /ascend with laundry basket and 1-2 UE support  shopping carlos: 15 min without cart, all day with shopping cart     Strength  R L  Hip flex 4+ 4   Quad  5 4+   HS  5 5    A: Increased strength, balance and carlos of amb and steps without increased knee and back pain. Her lumbar issues benefit from ex with flexion bias. Rec cont with skilled PT services to assist pt to restore PLOF. Date range of Visits: 7/19/22 - 8/23/22  Total Visits: 7/12    Recommendation:    [x]Continue PT 2x / wk for 6 weeks.   8 additional visits              []Hold PT, pending MD visit     Physical Therapy Daily Treatment Note  Date:  2022    Patient Name:  Blake Sender    :  1950  MRN: [id number]  Medical/Treatment Diagnosis Information:  Diagnosis: M17.12 (ICD-10-CM) - Localized osteoarthritis of left knee  Treatment Diagnosis: hypomobility L-P spine, strength/flexibility/balance deficits L >R LE. Myofascial pain at  trunk and hips, especially at R QL. Flexion bias reduces symptoms. Insurance/Certification information:  PT Insurance Information: medicare and [employer]. $233 deductible. Med [facility] Sessions  Physician Information: BAYRON Dan NP; referral date: 22  Plan of care signed (Y/N): []  Yes [x]  No     Date of Patient follow up with Physician: kidney doctor on      Progress Report: []  Yes  [x]  No     Date Range for reporting period:  Beginnin/19  PT POC 22   Ending:     Progress report due (10 Rx/or 30 days whichever is less): visit #10 or  (date)     Recertification due (POC duration/ or 90 days whichever is less): visit # 12 or 10/19     Visit # Insurance Allowable Auth required? Date Range    Med nec []  Yes  [x]  No NA     Latex Allergy:  [x]NO      []YES  Preferred Language for Healthcare:   [x]English       []other:    Functional Scale:        Date assessed:  FOTO: lumbar raw score = 42, risk adjusted score:  42 ,    taken at initial eval  FOTO knee raw score 36, risk adjusted 44,     taken at initial eval    FOTO:50        22  30 sec sit to stand: 11x          Pain level:  0/10 L knee, 0/10 B buttocks/post thigh, 5/10 low back      SUBJECTIVE:   reports she woke up with back pain this am.  Reports her buttocks and post thighs were sore yesterday. States she saw Dr. Jimmy Greer the kidney doc yesterday - he is overall happy with her numbers but watching the amount of protein in urine. States he said her ankle swelling is ok.  reports her post  thighs ache today.  Reports she notices she can walk further without hurting - walked from handicapped spot at [facility] in to the  registration area without increased pain. Feels really tired - wonders if it is from her depression meds    8/11 reports her legs fel;t like jelly after last session but didn't hurt. Exercises are going well. Reports she cont with ankle swelling. Reports she called kidney doc but they didn't call her back. Reports her dtr says her walking looks better.    States she has 4 granddtrs:   Ivelisse (21), Mikhail Torrez (15 y/o),  Marija 15 y/o- has autism, Lee Ann Ott (10)      OBJECTIVE: See  PT POC    RESTRICTIONS/PRECAUTIONS: kidney failure,  BACK PAIN, Kidney failure - sees Dr. Camila Osborne, smokes 1 pack/d  LUMBAR FUSION N/A 2/16/2021 LUMBAR4-LUMBAR5 TRANSVERSE LUMBAR INTERBODY FUSION ([id number], 93731, 62909, 17136, 18106, 62680, 89881, 91492) performed by Jude Ewing MD at [facility]  8/20/15 L45 B/L decompressive hobson and excision of synovial cyst    ROTATOR CUFF REPAIR Right 2/98          Exercises/Interventions: flexion bias    Therapeutic Exercises (62755) Resistance / level Sets/sec Reps Notes   Nu step or bike  5.5min  Sci fit s=17, arms = 7    IB  3 x 30 sec     HR/TR  2 10    Step stretches   HS  Hip flex    2 x 30 sec  2 x 30 sec                   Mat ex  Supine HS stretch  SKTC  Fig 4 piriformis stretch  -pull in  -push down     Low back stretch at bar- latissimus stretch  2x30\"sec            Therapeutic Activities (48137)       TG squats B squats  CGA on/off   Posture /body [facility] REVIEW:  Instructed in neutral spine for bed mob      Diaphragmatic breathing in hooklying       Steps  -fwd  -lateral 6\"   1  1   10 B  10 B 8/23 mild L knee pain    Mini squat     Cone taps  1 10 B SBA   Step over and back - 1 nestor/yoga block  1 10 B CGA 1 UE light support          Neuromuscular Re-ed (27020)       Stabilization progression, flexion bias  TrA iso     Supine SB         Seated SB       Balance  -rhomberg +horz head turn + EC  -semi tandem  -semi  tandem + horz head turn    Airex  Rhomberg  Rhomberg + horz head turns  -homberg + EC + horiz head turns (light fingertips)  -semi tandem  -semi tandem + EC  -marching      30 sec  30sec B   30x B    15 sec B  15 sec B  SBA/CGA   Wobble board  A/P wt shifts    2x10x  CGA/ Light B UE support   Multifidi walkouts                     Manual Intervention (10190)       STM/IASTM  Prn to R QL and R>L  lower quarter /trunk                                             Modalities:     Pt. Education:  8/16 d/w pt that side effects of depression meds can cause fatigue - encouraged pt to d/w her PCP re depression, fatigue, and depression meds. 8/9 review and progress HEP - see below     7/19/22 patient educated on diagnosis, prognosis and expectations for rehab  -all patient questions were answered    Home Exercise Program:    Access Code: [email or web address]  URL: [email or web address]  Date: 08/09/2022  Prepared by: Julien Tello    Exercises  Supine Hamstring Stretch - 2 x daily - 7 x weekly - 1-2 sets - 3 reps - 30 hold  Hooklying Single Knee to Chest Stretch - 2 x daily - 7 x weekly - 1 sets - 3 reps - 30 hold  Supine Piriformis Stretch with Foot on Ground - 2 x daily - 7 x weekly - 1 sets - 3 reps - 30 hold  Supine Transversus Abdominis Bracing - Hands on Stomach - 2 x daily - 7 x weekly - 1 sets - 10 reps - 5 hold  Romberg Stance with Eyes Closed - 2 x daily - 7 x weekly - 1-2 sets - 5-10 reps - 5-20sec hold  Tandem Stance - 2 x daily - 7 x weekly - 1-3 sets - 3-5 reps - 15-20sec hold    7/19/22   Access Code: [email or web address]  URL: [email or web address]  Date: 07/19/2022  Prepared by: Julien Lansing    Exercises  Supine Hamstring Stretch - 2 x daily - 7 x weekly - 1-2 sets - 3 reps - 30 hold  Hooklying Single Knee to Chest Stretch - 2 x daily - 7 x weekly - 1 sets - 3 reps - 30 hold  Supine Piriformis Stretch with Foot on Ground - 2 x daily - 7 x weekly - 1 sets - 3 reps - 30 hold  Supine Transversus Abdominis Bracing - Hands on Stomach - 2 x daily - 7 x weekly - 1 sets - 10 reps - 5 hold        Therapeutic Exercise and NMR EXR  [x] (80872) Provided verbal/tactile cueing for activities related to strengthening, flexibility, endurance, ROM for improvements in  [x] LE / Lumbar: LE, proximal hip, and core control with self care, mobility, lifting, ambulation. [] UE / Cervical: cervical, postural, scapular, scapulothoracic and UE control with self care, reaching, carrying, lifting, house/yardwork, driving, computer work. [x] (44559) Provided verbal/tactile cueing for activities related to improving balance, coordination, kinesthetic sense, posture, motor skill, proprioception to assist with   [x] LE / lumbar: LE, proximal hip, and core control in self care, mobility, lifting, ambulation and eccentric single leg control. [] UE / cervical: cervical, scapular, scapulothoracic and UE control with self care, reaching, carrying, lifting, house/yardwork, driving, computer work.   [] (26993) Therapist is in constant attendance of 2 or more patients providing skilled therapy interventions, but not providing any significant amount of measurable one-on-one time to either patient, for improvements in  [] LE / lumbar: LE, proximal hip, and core control in self care, mobility, lifting, ambulation and eccentric single leg control. [] UE / cervical: cervical, scapular, scapulothoracic and UE control with self care, reaching, carrying, lifting, house/yardwork, driving, computer work.      NMR and Therapeutic Activities:    [x] (02777 or 41852) Provided verbal/tactile cueing for activities related to improving balance, coordination, kinesthetic sense, posture, motor skill, proprioception and motor activation to allow for proper function of   [x] LE: / Lumbar core, proximal hip and LE with self care and ADLs  [] UE / Cervical: cervical, postural, scapular, scapulothoracic and UE control with self care, carrying, lifting, driving, computer work.   [] (85173) Gait Re-education- Provided training and instruction to the patient for proper LE, core and proximal hip recruitment and positioning and eccentric body weight control with ambulation re-education including up and down stairs     Home Management Training / Self Care:  [] (49909) Provided self-care/home management training related to activities of daily living and compensatory training, and/or use of adaptive equipment for improvement with: ADLs and compensatory training, meal preparation, safety procedures and instruction in use of adaptive equipment, including bathing, grooming, dressing, personal hygiene, basic household cleaning and chores.      Home Exercise Program:    [] (42894) Reviewed/Progressed HEP activities related to strengthening, flexibility, endurance, ROM of   [] LE / Lumbar: core, proximal hip and LE for functional self-care, mobility, lifting and ambulation/stair navigation   [] UE / Cervical: cervical, postural, scapular, scapulothoracic and UE control with self care, reaching, carrying, lifting, house/yardwork, driving, computer work  [] (18123)Reviewed/Progressed HEP activities related to improving balance, coordination, kinesthetic sense, posture, motor skill, proprioception of   [] LE: core, proximal hip and LE for self care, mobility, lifting, and ambulation/stair navigation    [] UE / Cervical: cervical, postural,  scapular, scapulothoracic and UE control with self care, reaching, carrying, lifting, house/yardwork, driving, computer work    Manual Treatments:  PROM / STM / Oscillations-Mobs:  G-I, II, III, IV (PA's, Inf., Post.)  [] (69669) Provided manual therapy to mobilize LE, proximal hip and/or LS spine soft tissue/joints for the purpose of modulating pain, promoting relaxation,  increasing ROM, reducing/eliminating soft tissue swelling/inflammation/restriction, improving soft tissue extensibility and allowing for proper ROM for normal function with   [] LE / lumbar: self care, mobility, lifting and ambulation. [] UE / Cervical: self care, reaching, carrying, lifting, house/yardwork, driving, computer work. Modalities:  [] (19850) Vasopneumatic compression: Utilized vasopneumatic compression to decrease edema / swelling for the purpose of improving mobility and quad tone / recruitment which will allow for increased overall function including but not limited to self-care, transfers, ambulation, and ascending / descending stairs. Charges:  Timed Code Treatment Minutes: 45   Total Treatment Minutes: 45     [] EVAL - LOW (04443)   [] EVAL - MOD (45865)  [] EVAL - HIGH (66244)  [] RE-EVAL (04230)  [x] SN(78934) x   1    [] Ionto  [x] NMR (67985) x 1      [] Vaso  [] Manual (34890) x       [] Ultrasound  [x] TA x 1       [] Mech Traction (14543)  [] Aquatic Therapy x     [] ES (un) (63844):   [] Home Management Training x  [] ES(attended) (55559)   [] Dry Needling 1-2 muscles (88444):  [] Dry Needling 3+ muscles (549318)  [] Group:      [] Other:          GOALS:  Patient stated goal:  less pain with ADLs  [] Progressing: [] Met: [] Not Met: [] Adjusted     Therapist goals for Patient:  Short Term Goals: To be achieved in: 2 weeks  1. Independent in HEP and progression per patient tolerance, in order to prevent re-injury. [] Progressing: [x] Met: [] Not Met: [] Adjusted  2. Patient will have a decrease in pain to facilitate improvement in movement, function, and ADLs as indicated by improvement with respect to Functional Deficits. [] Progressing: [x] Met: [] Not Met: [] Adjusted     Long Term Goals: To be achieved in: 6 weeks  1. FOTO LUMBAR functional survey score of >/= 63   and FOTO knee  functional survey score of >/=36  to assist with reaching prior level of function. [x] Progressing: [] Met: [] Not Met: [] Adjusted  2.  Patient will demonstrate increased AROM  to [facility], to allow for proper joint functioning to allow pt to resume home chores with good posture and body mechanics without increase in symptoms. [x] Progressing: [] Met: [] Not Met: [] Adjusted  3. Patient will demonstrate increased Strength and core activation to allow for proper functional mobility as indicated by patients Functional Deficits to allow pt to resume up/down</1 flight of stairs without difficulty, tolerate shopping x 30-60 min without excess fatigue and without increase in symptoms. [x] Progressing: [] Met: [] Not Met: [] Adjusted  4. Patient will return to functional activities including working yard/garden x 30-60 min without rest and  without increased symptoms or restriction. [x] Progressing: [] Met: [] Not Met: [] Adjusted    Overall Progression Towards Functional goals/ Treatment Progress Update:  [x] Patient is progressing as expected towards functional goals listed. [] Progression is slowed due to complexities/Impairments listed. [] Progression has been slowed due to co-morbidities. [] Plan just implemented, too soon to assess goals progression <30days   [] Goals require adjustment due to lack of progress  [] Patient is not progressing as expected and requires additional follow up with physician  [] Other    Persisting Functional Limitations/Impairments:  []Sleeping []Sitting               [x]Standing []Transfers        [x]Walking []Kneeling               [x]Stairs []Squatting / bending   [x]ADLs []Reaching  []Lifting  [x]Housework  []Driving []Job related tasks  []Sports/Recreation []Other:        ASSESSMENT:  8/23 Increased strength, balance and carlos of amb and steps without increased knee and back pain. Her lumbar issues benefit from ex with flexion bias. 8/16 increasing activity carlos with less pain. pt struggling with fatigue and agreeable to d/w her PCP. Report she has had multiple large losses this last year that contribute to depression    8/11 increasing balance and carlos of ex  8/9 good carlos of exercise, increasing carlos of balance ex.   Pt care along with most recent update on progress.

## 2022-08-25 ENCOUNTER — HOSPITAL ENCOUNTER (OUTPATIENT)
Dept: PHYSICAL THERAPY | Age: 72
Setting detail: THERAPIES SERIES
Discharge: HOME | End: 2022-08-25
Payer: MEDICARE

## 2022-08-25 PROCEDURE — 97110 THERAPEUTIC EXERCISES: CPT

## 2022-08-25 PROCEDURE — 97112 NEUROMUSCULAR REEDUCATION: CPT

## 2022-08-25 PROCEDURE — 97530 THERAPEUTIC ACTIVITIES: CPT

## 2022-08-25 NOTE — FLOWSHEET NOTE
[address] [facility] Physical Therapy  Phone: [phone]   Fax: [phone]                     Physical Therapy Daily Treatment Note  Date:  2022    Patient Name:  Jennifer Fleming    :  1950  MRN: [id number]  Medical/Treatment Diagnosis Information:  Diagnosis: M17.12 (ICD-10-CM) - Localized osteoarthritis of left knee  Treatment Diagnosis: hypomobility L-P spine, strength/flexibility/balance deficits L >R LE. Myofascial pain at  trunk and hips, especially at R QL. Flexion bias reduces symptoms. Insurance/Certification information:  PT Insurance Information: medicare and [employer]. $233 deductible. [facility]  Physician Information: BAYRON Wild NP; referral date: 22  Plan of care signed (Y/N): []  Yes [x]  No     Date of Patient follow up with Physician: kidney doctor on      Progress Report: []  Yes  [x]  No     Date Range for reporting period:  Beginnin/19  PT POC 22   Ending:     Progress report due (10 Rx/or 30 days whichever is less): visit #10 or 3/88 (date)     Recertification due (POC duration/ or 90 days whichever is less): visit # 12 or 10/19     Visit # Insurance Allowable Auth required? Date Range    Med nec []  Yes  [x]  No NA     Latex Allergy:  [x]NO      []YES  Preferred Language for Healthcare:   [x]English       []other:    Functional Scale:        Date assessed:  FOTO: lumbar raw score = 42, risk adjusted score:  42 ,    taken at initial eval  FOTO knee raw score 36, risk adjusted 44,     taken at initial eval    FOTO:50        22  30 sec sit to stand: 11x          Pain level:  0/10 L knee, 0/10 B buttocks/post thigh, 5/10 low back      SUBJECTIVE:   reports she woke up with back pain this am.  Reports her buttocks and post thighs were sore yesterday. States she saw Dr. Isauro Neri the kidney doc yesterday - he is overall happy with her numbers but watching the amount of protein in urine.   States he said her ankle swelling is ok. 8/16 reports her post  thighs ache today. Reports she notices she can walk further without hurting - walked from handicapped spot at [facility] in to the  registration area without increased pain. Feels really tired - wonders if it is from her depression meds    8/11 reports her legs fel;t like jelly after last session but didn't hurt. Exercises are going well. Reports she cont with ankle swelling. Reports she called kidney doc but they didn't call her back. Reports her dtr says her walking looks better.    States she has 4 granddtrs:   Ivelisse (21), Ivy Cobb (15 y/o),  Marija 15 y/o- has autism, Arminda Ponce (10)      OBJECTIVE: See  PT POC    RESTRICTIONS/PRECAUTIONS: kidney failure,  BACK PAIN, Kidney failure - sees Dr. Desiree White, smokes 1 pack/d  LUMBAR FUSION N/A 2/16/2021 LUMBAR4-LUMBAR5 TRANSVERSE LUMBAR INTERBODY FUSION ([id number], 12393, 62024, 89543, 14613, 78721, 42345, 95661) performed by Mack Peng MD at [facility]  8/20/15 L45 B/L decompressive hobson and excision of synovial cyst    ROTATOR CUFF REPAIR Right 2/98          Exercises/Interventions: flexion bias    Therapeutic Exercises (80805) Resistance / level Sets/sec Reps Notes   Nu step or bike  5.5min  Sci fit s=17, arms = 7    IB  3 x 30 sec     HR/TR  2 10    Step stretches   HS  Hip flex    2 x 30 sec  2 x 30 sec                   Mat ex  Supine HS stretch  SKTC  Fig 4 piriformis stretch  -pull in  -push down     Low back stretch at bar- latissimus stretch  2x30\"sec            Therapeutic Activities (51346)       TG squats B squats  2 10 SBA/CGA on/off   Posture /body [facility] REVIEW:  Instructed in neutral spine for bed mob      Diaphragmatic breathing in hooklying       Steps  -fwd  -lateral 6\"   1  1   10 B  10 B 8/23 mild L knee pain    Mini squat     Cone taps  1 10 B SBA   Step over and back - 1 nestor/yoga block  1 10 B CGA 0-1 UE light support          Neuromuscular Re-ed (76114) Stabilization progression, flexion bias  TrA iso     Supine SB         Seated SB       Balance  -rhomberg +horz head turn + EC  -semi tandem  -semi  tandem + horz head turn    Airex  Rhomberg  Rhomberg + horz head turns  -Rhomberg + EC + horiz head turns (light fingertips)  -semi tandem  -semi tandem + EC  -marching      30 sec  30sec B   30x B    15 sec B  15 sec B  SBA/CGA   Wobble board  A/P wt shifts  A/P: DLS    1F34G  8V54\"  CGA/ Light B UE support   Multifidi walkouts                     Manual Intervention (04476)       STM/IASTM  Prn to R QL and R>L  lower quarter /trunk                                             Modalities:     Pt. Education:  8/16 d/w pt that side effects of depression meds can cause fatigue - encouraged pt to d/w her PCP re depression, fatigue, and depression meds. 8/9 review and progress HEP - see below     7/19/22 patient educated on diagnosis, prognosis and expectations for rehab  -all patient questions were answered    Home Exercise Program:    Access Code: [email or web address]  URL: [email or web address]  Date: 08/09/2022  Prepared by: Agustín Dodd    Exercises  Supine Hamstring Stretch - 2 x daily - 7 x weekly - 1-2 sets - 3 reps - 30 hold  Hooklying Single Knee to Chest Stretch - 2 x daily - 7 x weekly - 1 sets - 3 reps - 30 hold  Supine Piriformis Stretch with Foot on Ground - 2 x daily - 7 x weekly - 1 sets - 3 reps - 30 hold  Supine Transversus Abdominis Bracing - Hands on Stomach - 2 x daily - 7 x weekly - 1 sets - 10 reps - 5 hold  Romberg Stance with Eyes Closed - 2 x daily - 7 x weekly - 1-2 sets - 5-10 reps - 5-20sec hold  Tandem Stance - 2 x daily - 7 x weekly - 1-3 sets - 3-5 reps - 15-20sec hold    7/19/22   Access Code: [email or web address]  URL: [email or web address]  Date: 07/19/2022  Prepared by: Agustín Dodd    Exercises  Supine Hamstring Stretch - 2 x daily - 7 x weekly - 1-2 sets - 3 reps - 30 hold  Hooklying Single Knee to Chest Stretch - 2 x daily - 7 x weekly - 1 sets - 3 reps - 30 hold  Supine Piriformis Stretch with Foot on Ground - 2 x daily - 7 x weekly - 1 sets - 3 reps - 30 hold  Supine Transversus Abdominis Bracing - Hands on Stomach - 2 x daily - 7 x weekly - 1 sets - 10 reps - 5 hold        Therapeutic Exercise and NMR EXR  [x] (33702) Provided verbal/tactile cueing for activities related to strengthening, flexibility, endurance, ROM for improvements in  [x] LE / Lumbar: LE, proximal hip, and core control with self care, mobility, lifting, ambulation. [] UE / Cervical: cervical, postural, scapular, scapulothoracic and UE control with self care, reaching, carrying, lifting, house/yardwork, driving, computer work. [x] (48779) Provided verbal/tactile cueing for activities related to improving balance, coordination, kinesthetic sense, posture, motor skill, proprioception to assist with   [x] LE / lumbar: LE, proximal hip, and core control in self care, mobility, lifting, ambulation and eccentric single leg control. [] UE / cervical: cervical, scapular, scapulothoracic and UE control with self care, reaching, carrying, lifting, house/yardwork, driving, computer work.   [] (34210) Therapist is in constant attendance of 2 or more patients providing skilled therapy interventions, but not providing any significant amount of measurable one-on-one time to either patient, for improvements in  [] LE / lumbar: LE, proximal hip, and core control in self care, mobility, lifting, ambulation and eccentric single leg control. [] UE / cervical: cervical, scapular, scapulothoracic and UE control with self care, reaching, carrying, lifting, house/yardwork, driving, computer work.      NMR and Therapeutic Activities:    [x] (69759 or 69637) Provided verbal/tactile cueing for activities related to improving balance, coordination, kinesthetic sense, posture, motor skill, proprioception and motor activation to allow for proper function of   [x] LE: / Lumbar core, proximal hip and LE with self care and ADLs  [] UE / Cervical: cervical, postural, scapular, scapulothoracic and UE control with self care, carrying, lifting, driving, computer work.   [] (37370) Gait Re-education- Provided training and instruction to the patient for proper LE, core and proximal hip recruitment and positioning and eccentric body weight control with ambulation re-education including up and down stairs     Home Management Training / Self Care:  [] (11264) Provided self-care/home management training related to activities of daily living and compensatory training, and/or use of adaptive equipment for improvement with: ADLs and compensatory training, meal preparation, safety procedures and instruction in use of adaptive equipment, including bathing, grooming, dressing, personal hygiene, basic household cleaning and chores.      Home Exercise Program:    [] (57184) Reviewed/Progressed HEP activities related to strengthening, flexibility, endurance, ROM of   [] LE / Lumbar: core, proximal hip and LE for functional self-care, mobility, lifting and ambulation/stair navigation   [] UE / Cervical: cervical, postural, scapular, scapulothoracic and UE control with self care, reaching, carrying, lifting, house/yardwork, driving, computer work  [] (01499)Reviewed/Progressed HEP activities related to improving balance, coordination, kinesthetic sense, posture, motor skill, proprioception of   [] LE: core, proximal hip and LE for self care, mobility, lifting, and ambulation/stair navigation    [] UE / Cervical: cervical, postural,  scapular, scapulothoracic and UE control with self care, reaching, carrying, lifting, house/yardwork, driving, computer work    Manual Treatments:  PROM / STM / Oscillations-Mobs:  G-I, II, III, IV (PA's, Inf., Post.)  [] (10566) Provided manual therapy to mobilize LE, proximal hip and/or LS spine soft tissue/joints for the purpose of modulating pain, promoting relaxation,  increasing ROM, reducing/eliminating soft tissue swelling/inflammation/restriction, improving soft tissue extensibility and allowing for proper ROM for normal function with   [] LE / lumbar: self care, mobility, lifting and ambulation. [] UE / Cervical: self care, reaching, carrying, lifting, house/yardwork, driving, computer work. Modalities:  [] (22344) Vasopneumatic compression: Utilized vasopneumatic compression to decrease edema / swelling for the purpose of improving mobility and quad tone / recruitment which will allow for increased overall function including but not limited to self-care, transfers, ambulation, and ascending / descending stairs. Charges:  Timed Code Treatment Minutes: 43   Total Treatment Minutes: 43     [] EVAL - LOW (24955)   [] EVAL - MOD (67318)  [] EVAL - HIGH (10013)  [] RE-EVAL (94603)  [x] TX(10817) x   1    [] Ionto  [x] NMR (21764) x 1      [] Vaso  [] Manual (14467) x       [] Ultrasound  [x] TA x 1       [] Mech Traction (92219)  [] Aquatic Therapy x     [] ES (un) (23470):   [] Home Management Training x  [] ES(attended) (36886)   [] Dry Needling 1-2 muscles (92861):  [] Dry Needling 3+ muscles (301212)  [] Group:      [] Other:          GOALS:  Patient stated goal:  less pain with ADLs  [] Progressing: [] Met: [] Not Met: [] Adjusted     Therapist goals for Patient:  Short Term Goals: To be achieved in: 2 weeks  1. Independent in HEP and progression per patient tolerance, in order to prevent re-injury. [] Progressing: [x] Met: [] Not Met: [] Adjusted  2. Patient will have a decrease in pain to facilitate improvement in movement, function, and ADLs as indicated by improvement with respect to Functional Deficits. [] Progressing: [x] Met: [] Not Met: [] Adjusted     Long Term Goals: To be achieved in: 6 weeks  1. FOTO LUMBAR functional survey score of >/= 63   and FOTO knee  functional survey score of >/=36  to assist with reaching prior level of function.   [x] Progressing: [] Met: [] Not Met: [] Adjusted  2. Patient will demonstrate increased AROM  to [facility], to allow for proper joint functioning to allow pt to resume home chores with good posture and body mechanics without increase in symptoms. [x] Progressing: [] Met: [] Not Met: [] Adjusted  3. Patient will demonstrate increased Strength and core activation to allow for proper functional mobility as indicated by patients Functional Deficits to allow pt to resume up/down</1 flight of stairs without difficulty, tolerate shopping x 30-60 min without excess fatigue and without increase in symptoms. [x] Progressing: [] Met: [] Not Met: [] Adjusted  4. Patient will return to functional activities including working yard/garden x 30-60 min without rest and  without increased symptoms or restriction. [x] Progressing: [] Met: [] Not Met: [] Adjusted    Overall Progression Towards Functional goals/ Treatment Progress Update:  [x] Patient is progressing as expected towards functional goals listed. [] Progression is slowed due to complexities/Impairments listed. [] Progression has been slowed due to co-morbidities. [] Plan just implemented, too soon to assess goals progression <30days   [] Goals require adjustment due to lack of progress  [] Patient is not progressing as expected and requires additional follow up with physician  [] Other    Persisting Functional Limitations/Impairments:  []Sleeping []Sitting               [x]Standing []Transfers        [x]Walking []Kneeling               [x]Stairs []Squatting / bending   [x]ADLs []Reaching  []Lifting  [x]Housework  []Driving []Job related tasks  []Sports/Recreation []Other:        ASSESSMENT:    8/25 improvement with balance ex with less UE support. gentle ROM ex help to reduce both back and knee pain. Cont to benefit form flexion bias for back pain  8/23 Increased strength, balance and carlos of amb and steps without increased knee and back pain.   Her lumbar issues benefit from ex with flexion

## 2022-08-30 ENCOUNTER — HOSPITAL ENCOUNTER (OUTPATIENT)
Dept: PHYSICAL THERAPY | Age: 72
Setting detail: THERAPIES SERIES
Discharge: HOME | End: 2022-08-30
Payer: MEDICARE

## 2022-08-30 PROCEDURE — 97530 THERAPEUTIC ACTIVITIES: CPT

## 2022-08-30 PROCEDURE — 97110 THERAPEUTIC EXERCISES: CPT

## 2022-08-30 PROCEDURE — 97112 NEUROMUSCULAR REEDUCATION: CPT

## 2022-08-30 NOTE — FLOWSHEET NOTE
[facility]  Phone: [phone]   Fax: [phone]                     Physical Therapy Daily Treatment Note  Date:  2022    Patient Name:  Francesco Herrera    :  1950  MRN: [id number]  Medical/Treatment Diagnosis Information:  Diagnosis: M17.12 (ICD-10-CM) - Localized osteoarthritis of left knee  Treatment Diagnosis: hypomobility L-P spine, strength/flexibility/balance deficits L >R LE. Myofascial pain at  trunk and hips, especially at R QL. Flexion bias reduces symptoms. Insurance/Certification information:  PT Insurance Information: medicare and [employer]. $233 deductible. Formerly [facility]  Physician Information: BAYRON Barcenas NP; referral date: 22  Plan of care signed (Y/N): []  Yes [x]  No     Date of Patient follow up with Physician: kidney doctor on      Progress Report: []  Yes  [x]  No     Date Range for reporting period:  Beginnin/19  PT POC 22   Ending:     Progress report due (10 Rx/or 30 days whichever is less): visit #10 or  (date)     Recertification due (POC duration/ or 90 days whichever is less): visit # 12 or 10/19     Visit # Insurance Allowable Auth required? Date Range    Med nec []  Yes  [x]  No NA     Latex Allergy:  [x]NO      []YES  Preferred Language for Healthcare:   [x]English       []other:    Functional Scale:        Date assessed:  FOTO: lumbar raw score = 42, risk adjusted score:  42 ,    taken at initial eval  FOTO knee raw score 36, risk adjusted 44,     taken at initial eval    FOTO:50        22  30 sec sit to stand: 11x          Pain level:  0/10 L knee, 0-5 /10 B buttocks/post thigh - hurts most upon waking up, 0/10 low back      SUBJECTIVE:   reports less pain with ADLs. Feels stronger - easier to do normal daily tasks. reports she couldn't sleep last night - only slept 1 hr.   Went out to eat with her son and family last wk on  - went in his tall truck - flared up her knee getting out of it. States L knee only hurts if she moves the wrong way    8/23 reports she woke up with back pain this am.  Reports her buttocks and post thighs were sore yesterday. States she saw Dr. Ivonne Cassidy the kidney doc yesterday - he is overall happy with her numbers but watching the amount of protein in urine. States he said her ankle swelling is ok. 8/16 reports her post  thighs ache today. Reports she notices she can walk further without hurting - walked from handicapped spot at [facility] in to the  registration area without increased pain. Feels really tired - wonders if it is from her depression meds    8/11 reports her legs fel;t like jelly after last session but didn't hurt. Exercises are going well. Reports she cont with ankle swelling. Reports she called kidney doc but they didn't call her back. Reports her dtr says her walking looks better.    States she has 4 granddtrs:   Ivelisse (21), Mansoor Rodarte (17 y/o),  Marija 15 y/o- has autism, Rosemary Sauceda (10)      OBJECTIVE: See  PT POC    RESTRICTIONS/PRECAUTIONS: kidney failure,  BACK PAIN, Kidney failure - sees Dr. Ivonne Cassidy, smokes 1 pack/d  LUMBAR FUSION N/A 2/16/2021 LUMBAR4-LUMBAR5 TRANSVERSE LUMBAR INTERBODY FUSION ([id number], 08859, 39960, 53273, 93925, 58052, 51478, 03206) performed by Jazlyn Soares MD at [facility]  8/20/15 L45 B/L decompressive hobson and excision of synovial cyst    ROTATOR CUFF REPAIR Right 2/98          Exercises/Interventions: flexion bias    Therapeutic Exercises (18320) Resistance / level Sets/sec Reps Notes   Nu step or bike  5.5min  Sci fit s=17, arms = 7    IB  3 x 30 sec     HR/TR  2 10    Step stretches  B  HS  Hip flex    2 x 30 sec  2 x 30 sec                   Mat ex  Supine HS stretch  SKTC  Fig 4 piriformis stretch  -pull in  -push down     Low back stretch at bar- latissimus stretch  2x30\"sec            Therapeutic Activities (37334)       TG squats B squats  2 12 SBA/CGA on/off   Posture /body [facility] REVIEW:  Instructed in neutral spine for bed mob      Diaphragmatic breathing in hooklying       Steps  -fwd  -lateral 6\"   1  1   12 B  12 B 8/23 mild L knee pain    Mini squat     Cone taps  1 10 B SBA   Step over and back - 1 nestor/yoga block  1 12 B CGA 0-1 UE light support          Neuromuscular Re-ed (73005)       Stabilization progression, flexion bias  TrA iso     Supine SB         Seated SB       Balance  -rhomberg +horz head turn + EC  -semi tandem  -semi  tandem + horz head turn    Airex  Rhomberg  Rhomberg + horz head turns  -Rhomberg + EC + horiz head turns (light fingertips)  -semi tandem  -semi tandem + EC + head turns  -marching      30 sec  30sec B   30x B    30 sec B  30 sec B  SBA/CGA   Wobble board  A/P wt shifts  A/P: DLS    [id number]  1V44\"  CGA/ Light B UE support   Multifidi walkouts                     Manual Intervention (92294)       STM/IASTM  Prn to R QL and R>L  lower quarter /trunk                                             Modalities:     Pt. Education:  8/30 review HEP. Encourage pt to consider purchase of airex foam pad for HEP progression  8/16 d/w pt that side effects of depression meds can cause fatigue - encouraged pt to d/w her PCP re depression, fatigue, and depression meds. 8/9 review and progress HEP - see below     7/19/22 patient educated on diagnosis, prognosis and expectations for rehab  -all patient questions were answered    Home Exercise Program:    Access Code: [email or web address]  URL: [email or web address]  Date: 08/09/2022  Prepared by: Argenis Tobin    Exercises  Supine Hamstring Stretch - 2 x daily - 7 x weekly - 1-2 sets - 3 reps - 30 hold  Hooklying Single Knee to Chest Stretch - 2 x daily - 7 x weekly - 1 sets - 3 reps - 30 hold  Supine Piriformis Stretch with Foot on Ground - 2 x daily - 7 x weekly - 1 sets - 3 reps - 30 hold  Supine Transversus Abdominis Bracing - Hands on Stomach - 2 x daily - 7 x weekly - 1 sets - 10 reps - 5 hold  Romberg Stance with Eyes Closed - 2 x daily - 7 x weekly - 1-2 sets - 5-10 reps - 5-20sec hold  Tandem Stance - 2 x daily - 7 x weekly - 1-3 sets - 3-5 reps - 15-20sec hold    7/19/22   Access Code: [email or web address]  URL: [email or web address]  Date: 07/19/2022  Prepared by: Ceci Tim    Exercises  Supine Hamstring Stretch - 2 x daily - 7 x weekly - 1-2 sets - 3 reps - 30 hold  Hooklying Single Knee to Chest Stretch - 2 x daily - 7 x weekly - 1 sets - 3 reps - 30 hold  Supine Piriformis Stretch with Foot on Ground - 2 x daily - 7 x weekly - 1 sets - 3 reps - 30 hold  Supine Transversus Abdominis Bracing - Hands on Stomach - 2 x daily - 7 x weekly - 1 sets - 10 reps - 5 hold        Therapeutic Exercise and NMR EXR  [x] (81305) Provided verbal/tactile cueing for activities related to strengthening, flexibility, endurance, ROM for improvements in  [x] LE / Lumbar: LE, proximal hip, and core control with self care, mobility, lifting, ambulation. [] UE / Cervical: cervical, postural, scapular, scapulothoracic and UE control with self care, reaching, carrying, lifting, house/yardwork, driving, computer work. [x] (38532) Provided verbal/tactile cueing for activities related to improving balance, coordination, kinesthetic sense, posture, motor skill, proprioception to assist with   [x] LE / lumbar: LE, proximal hip, and core control in self care, mobility, lifting, ambulation and eccentric single leg control.    [] UE / cervical: cervical, scapular, scapulothoracic and UE control with self care, reaching, carrying, lifting, house/yardwork, driving, computer work.   [] (74846) Therapist is in constant attendance of 2 or more patients providing skilled therapy interventions, but not providing any significant amount of measurable one-on-one time to either patient, for improvements in  [] LE / lumbar: LE, proximal hip, and core control in self care, mobility, lifting, ambulation and eccentric single leg ambulation/stair navigation    [] UE / Cervical: cervical, postural,  scapular, scapulothoracic and UE control with self care, reaching, carrying, lifting, house/yardwork, driving, computer work    Manual Treatments:  PROM / STM / Oscillations-Mobs:  G-I, II, III, IV (PA's, Inf., Post.)  [] (80752) Provided manual therapy to mobilize LE, proximal hip and/or LS spine soft tissue/joints for the purpose of modulating pain, promoting relaxation,  increasing ROM, reducing/eliminating soft tissue swelling/inflammation/restriction, improving soft tissue extensibility and allowing for proper ROM for normal function with   [] LE / lumbar: self care, mobility, lifting and ambulation. [] UE / Cervical: self care, reaching, carrying, lifting, house/yardwork, driving, computer work. Modalities:  [] (72985) Vasopneumatic compression: Utilized vasopneumatic compression to decrease edema / swelling for the purpose of improving mobility and quad tone / recruitment which will allow for increased overall function including but not limited to self-care, transfers, ambulation, and ascending / descending stairs. Charges:  Timed Code Treatment Minutes: 40   Total Treatment Minutes: 40     [] EVAL - LOW (21971)   [] EVAL - MOD (05039)  [] EVAL - HIGH (42223)  [] RE-EVAL (48705)  [x] FN(66894) x   1    [] Ionto  [x] NMR (05623) x 1      [] Vaso  [] Manual (56843) x       [] Ultrasound  [x] TA x 1       [] Mech Traction (20406)  [] Aquatic Therapy x     [] ES (un) (23294):   [] Home Management Training x  [] ES(attended) (72466)   [] Dry Needling 1-2 muscles (29476):  [] Dry Needling 3+ muscles (333810)  [] Group:      [] Other:          GOALS:  Patient stated goal:  less pain with ADLs  [] Progressing: [] Met: [] Not Met: [] Adjusted     Therapist goals for Patient:  Short Term Goals: To be achieved in: 2 weeks  1. Independent in HEP and progression per patient tolerance, in order to prevent re-injury.   [] Progressing: [x] Met: [] Not Met: [] Adjusted  2. Patient will have a decrease in pain to facilitate improvement in movement, function, and ADLs as indicated by improvement with respect to Functional Deficits. [] Progressing: [x] Met: [] Not Met: [] Adjusted     Long Term Goals: To be achieved in: 6 weeks  1. FOTO LUMBAR functional survey score of >/= 63   and FOTO knee  functional survey score of >/=36  to assist with reaching prior level of function. [x] Progressing: [] Met: [] Not Met: [] Adjusted  2. Patient will demonstrate increased AROM  to [facility], to allow for proper joint functioning to allow pt to resume home chores with good posture and body mechanics without increase in symptoms. [x] Progressing: [] Met: [] Not Met: [] Adjusted  3. Patient will demonstrate increased Strength and core activation to allow for proper functional mobility as indicated by patients Functional Deficits to allow pt to resume up/down</1 flight of stairs without difficulty, tolerate shopping x 30-60 min without excess fatigue and without increase in symptoms. [x] Progressing: [] Met: [] Not Met: [] Adjusted  4. Patient will return to functional activities including working yard/garden x 30-60 min without rest and  without increased symptoms or restriction. [x] Progressing: [] Met: [] Not Met: [] Adjusted    Overall Progression Towards Functional goals/ Treatment Progress Update:  [x] Patient is progressing as expected towards functional goals listed. [] Progression is slowed due to complexities/Impairments listed. [] Progression has been slowed due to co-morbidities.   [] Plan just implemented, too soon to assess goals progression <30days   [] Goals require adjustment due to lack of progress  [] Patient is not progressing as expected and requires additional follow up with physician  [] Other    Persisting Functional Limitations/Impairments:  []Sleeping []Sitting               [x]Standing []Transfers        [x]Walking []Kneeling               [x]Stairs []Squatting / bending   [x]ADLs []Reaching  []Lifting  [x]Housework  []Driving []Job related tasks  []Sports/Recreation []Other:        ASSESSMENT:    8/30 pt reports increased carlos of ADLs with less difficulty. Much less knee pain   8/25 improvement with balance ex with less UE support. gentle ROM ex help to reduce both back and knee pain. Cont to benefit form flexion bias for back pain  8/23 Increased strength, balance and carlos of amb and steps without increased knee and back pain. Her lumbar issues benefit from ex with flexion bias. 8/16 increasing activity carlos with less pain. pt struggling with fatigue and agreeable to d/w her PCP. Report she has had multiple large losses this last year that contribute to depression    8/11 increasing balance and carlos of ex  8/9 good carlos of exercise, increasing carlos of balance ex. Pt reports she feels like she is steadier on her feet, but still has losses of balance    7/27 Pt tolerated initiation of TG squats and cone taps today. States that at time she has trouble lifting her feet at times with walking. Fatigue noted after about 6 reps of nestor step overs. Plan to continue to strengthen and stretch BLE as Pt tolerates. Treatment/Activity Tolerance:  [x] Patient able to complete tx [] Patient limited by fatigue  [] Patient limited by pain  [] Patient limited by other medical complications  [] Other:     Prognosis: [x] Good [] Fair  [] Poor    Patient Requires Follow-up: [x] Yes  [] No    Plan for next treatment session: gentle stabs and stretches with flexion bias. Manual therapy prn for myofascial pain. Normalize symmetry of gait. Posture and body mechs. F/u re did pt call Dr Aurelia Vargas  (Kidney doctor) re L>R LE swelling. Consider addition of MLD if/as approp for LE swelling - would need to check with Dr. Aurelia Vargas.      PLAN:  strength, ROM/flexibility, posture and body mechs, manual, MOC, HEP, pt education     Frequency/Duration:  2 days per week for 4 Weeks:  Interventions:  [x]  Therapeutic exercise including:strength, ROM, flexibility  [x]  NMR activation and proprioception including postural re-education  [x]  Manual therapy as indicated to include: IASTM, STM, PROM, Gr I-IV mobilizations, manipulation. [x]  Modalities as needed that may include: thermal agents, E-stim, Biofeedback, US, iontophoresis as indicated  [x]  Patient education on joint protection, postural re-education, activity modification, progression of HEP. Aquatic therapy    PLAN: See eval. PT 2x / week for 6 weeks. [x] Continue per plan of care [] Alter current plan (see comments)  [] Plan of care initiated [] Hold pending MD visit [] Discharge    Electronically signed by: Michael Nair PT , DPT     Note: If patient does not return for scheduled/ recommended follow up visits, this note will serve as a discharge from care along with most recent update on progress.

## 2022-09-01 ENCOUNTER — HOSPITAL ENCOUNTER (OUTPATIENT)
Dept: PHYSICAL THERAPY | Age: 72
Setting detail: THERAPIES SERIES
Discharge: HOME | End: 2022-09-01
Payer: MEDICARE

## 2022-09-01 PROCEDURE — 97112 NEUROMUSCULAR REEDUCATION: CPT

## 2022-09-01 PROCEDURE — 97110 THERAPEUTIC EXERCISES: CPT

## 2022-09-01 PROCEDURE — 97530 THERAPEUTIC ACTIVITIES: CPT

## 2022-09-01 NOTE — FLOWSHEET NOTE
[address] [facility] Physical Therapy  Phone: [phone]   Fax: [phone]                     Physical Therapy Daily Treatment Note  Date:  2022    Patient Name:  Bridget Posada    :  1950  MRN: [id number]  Medical/Treatment Diagnosis Information:  Diagnosis: M17.12 (ICD-10-CM) - Localized osteoarthritis of left knee  Treatment Diagnosis: hypomobility L-P spine, strength/flexibility/balance deficits L >R LE. Myofascial pain at  trunk and hips, especially at R QL. Flexion bias reduces symptoms. Insurance/Certification information:  PT Insurance Information: medicare and [employer]. $233 deductible. [facility]  Physician Information: Celedonio Lundborg, APRN - NP; referral date: 22  Plan of care signed (Y/N): []  Yes [x]  No     Date of Patient follow up with Physician: kidney doctor on      Progress Report: []  Yes  [x]  No     Date Range for reporting period:  Beginnin/19  PT POC 22   Ending:     Progress report due (10 Rx/or 30 days whichever is less): visit #10 or  (date)     Recertification due (POC duration/ or 90 days whichever is less): visit # 12 or 10/19     Visit # Insurance Allowable Auth required? Date Range    Med nec []  Yes  [x]  No NA     Latex Allergy:  [x]NO      []YES  Preferred Language for Healthcare:   [x]English       []other:    Functional Scale:        Date assessed:  FOTO: lumbar raw score = 42, risk adjusted score:  42 ,    taken at initial eval  FOTO knee raw score 36, risk adjusted 44,     taken at initial eval    FOTO:50        22  30 sec sit to stand: 11x          Pain level:  0-1/10 L knee, 0-4 /10 B buttocks/post thigh - hurts most upon waking up, 0/10 low back      SUBJECTIVE:   reports less pain with ADLs. Feels stronger - easier to do normal daily tasks. reports she couldn't sleep last night - only slept 1 hr.   Went out to eat with her son and family last wk on  - went in his tall truck - Activities (54459)       TG squats B squats  2 12 SBA/CGA on/off   Posture /body [facility] REVIEW:  Instructed in neutral spine for bed mob      Diaphragmatic breathing in hooklying       Steps  -fwd  -lateral 6\"   1  1   12 B  12 B 8/23 mild L knee pain    Mini squat     Cone taps  1 15 B SBA   Step over and back - 1 nestor/yoga block  1 12 B CGA 0-1 UE light support          Neuromuscular Re-ed (31696)       Stabilization progression, flexion bias  TrA iso     Supine SB         Seated SB       Balance  -rhomberg +horz head turn + EC  -semi tandem  -semi  tandem + horz head turn    Airex  Rhomberg  Rhomberg + horz head turns  -Rhomberg + EC + horiz head turns (light fingertips)  -semi tandem  -semi tandem + EC + head turns  -marching      30 sec  30sec B   30x B    30 sec B  30 sec B  SBA/CGA   Wobble board  A/P wt shifts  A/P: DLS    2R33I  2F84\"  CGA/ Light B UE support   Multifidi walkouts                     Manual Intervention (26362)       STM/IASTM  Prn to R QL and R>L  lower quarter /trunk                                             Modalities:     Pt. Education:  9/1  instructed in how to stretch calves and feet when gets john horses at night  8/30 review HEP. Encourage pt to consider purchase of airex foam pad for HEP progression  8/16 d/w pt that side effects of depression meds can cause fatigue - encouraged pt to d/w her PCP re depression, fatigue, and depression meds. 8/9 review and progress HEP - see below     7/19/22 patient educated on diagnosis, prognosis and expectations for rehab  -all patient questions were answered    Home Exercise Program:    Access Code: [email or web address]  URL: [email or web address]  Date: 08/09/2022  Prepared by: Wil Person    Exercises  Supine Hamstring Stretch - 2 x daily - 7 x weekly - 1-2 sets - 3 reps - 30 hold  Hooklying Single Knee to Chest Stretch - 2 x daily - 7 x weekly - 1 sets - 3 reps - 30 hold  Supine Piriformis Stretch with Foot on Ground - 2 x daily - 7 x weekly - 1 sets - 3 reps - 30 hold  Supine Transversus Abdominis Bracing - Hands on Stomach - 2 x daily - 7 x weekly - 1 sets - 10 reps - 5 hold  Romberg Stance with Eyes Closed - 2 x daily - 7 x weekly - 1-2 sets - 5-10 reps - 5-20sec hold  Tandem Stance - 2 x daily - 7 x weekly - 1-3 sets - 3-5 reps - 15-20sec hold    7/19/22   Access Code: [email or web address]  URL: [email or web address]  Date: 07/19/2022  Prepared by: Ceci Tim    Exercises  Supine Hamstring Stretch - 2 x daily - 7 x weekly - 1-2 sets - 3 reps - 30 hold  Hooklying Single Knee to Chest Stretch - 2 x daily - 7 x weekly - 1 sets - 3 reps - 30 hold  Supine Piriformis Stretch with Foot on Ground - 2 x daily - 7 x weekly - 1 sets - 3 reps - 30 hold  Supine Transversus Abdominis Bracing - Hands on Stomach - 2 x daily - 7 x weekly - 1 sets - 10 reps - 5 hold        Therapeutic Exercise and NMR EXR  [x] (74385) Provided verbal/tactile cueing for activities related to strengthening, flexibility, endurance, ROM for improvements in  [x] LE / Lumbar: LE, proximal hip, and core control with self care, mobility, lifting, ambulation. [] UE / Cervical: cervical, postural, scapular, scapulothoracic and UE control with self care, reaching, carrying, lifting, house/yardwork, driving, computer work. [x] (94778) Provided verbal/tactile cueing for activities related to improving balance, coordination, kinesthetic sense, posture, motor skill, proprioception to assist with   [x] LE / lumbar: LE, proximal hip, and core control in self care, mobility, lifting, ambulation and eccentric single leg control.    [] UE / cervical: cervical, scapular, scapulothoracic and UE control with self care, reaching, carrying, lifting, house/yardwork, driving, computer work.   [] (84675) Therapist is in constant attendance of 2 or more patients providing skilled therapy interventions, but not providing any significant amount of measurable one-on-one time to either patient, for improvements in  [] LE / lumbar: LE, proximal hip, and core control in self care, mobility, lifting, ambulation and eccentric single leg control. [] UE / cervical: cervical, scapular, scapulothoracic and UE control with self care, reaching, carrying, lifting, house/yardwork, driving, computer work. NMR and Therapeutic Activities:    [x] (00487 or 05776) Provided verbal/tactile cueing for activities related to improving balance, coordination, kinesthetic sense, posture, motor skill, proprioception and motor activation to allow for proper function of   [x] LE: / Lumbar core, proximal hip and LE with self care and ADLs  [] UE / Cervical: cervical, postural, scapular, scapulothoracic and UE control with self care, carrying, lifting, driving, computer work.   [] (88035) Gait Re-education- Provided training and instruction to the patient for proper LE, core and proximal hip recruitment and positioning and eccentric body weight control with ambulation re-education including up and down stairs     Home Management Training / Self Care:  [] (37561) Provided self-care/home management training related to activities of daily living and compensatory training, and/or use of adaptive equipment for improvement with: ADLs and compensatory training, meal preparation, safety procedures and instruction in use of adaptive equipment, including bathing, grooming, dressing, personal hygiene, basic household cleaning and chores.      Home Exercise Program:    [] (44406) Reviewed/Progressed HEP activities related to strengthening, flexibility, endurance, ROM of   [] LE / Lumbar: core, proximal hip and LE for functional self-care, mobility, lifting and ambulation/stair navigation   [] UE / Cervical: cervical, postural, scapular, scapulothoracic and UE control with self care, reaching, carrying, lifting, house/yardwork, driving, computer work  [] (85296)Reviewed/Progressed HEP activities related to improving balance, coordination, kinesthetic sense, posture, motor skill, proprioception of   [] LE: core, proximal hip and LE for self care, mobility, lifting, and ambulation/stair navigation    [] UE / Cervical: cervical, postural,  scapular, scapulothoracic and UE control with self care, reaching, carrying, lifting, house/yardwork, driving, computer work    Manual Treatments:  PROM / STM / Oscillations-Mobs:  G-I, II, III, IV (PA's, Inf., Post.)  [] (92563) Provided manual therapy to mobilize LE, proximal hip and/or LS spine soft tissue/joints for the purpose of modulating pain, promoting relaxation,  increasing ROM, reducing/eliminating soft tissue swelling/inflammation/restriction, improving soft tissue extensibility and allowing for proper ROM for normal function with   [] LE / lumbar: self care, mobility, lifting and ambulation. [] UE / Cervical: self care, reaching, carrying, lifting, house/yardwork, driving, computer work. Modalities:  [] (46572) Vasopneumatic compression: Utilized vasopneumatic compression to decrease edema / swelling for the purpose of improving mobility and quad tone / recruitment which will allow for increased overall function including but not limited to self-care, transfers, ambulation, and ascending / descending stairs. Charges:  Timed Code Treatment Minutes: 43   Total Treatment Minutes: 43     [] EVAL - LOW (65235)   [] EVAL - MOD (81335)  [] EVAL - HIGH (45494)  [] RE-EVAL (08686)  [x] FP(77886) x   1    [] Ionto  [x] NMR (47032) x 1      [] Vaso  [] Manual (35711) x       [] Ultrasound  [x] TA x 1       [] Mech Traction (91010)  [] Aquatic Therapy x     [] ES (un) (47022):   [] Home Management Training x  [] ES(attended) (46838)   [] Dry Needling 1-2 muscles (22180):  [] Dry Needling 3+ muscles (364229)  [] Group:      [] Other:          GOALS:  Patient stated goal:  less pain with ADLs  [] Progressing: [] Met: [] Not Met: [] Adjusted     Therapist goals for Patient:  Short Term Goals:  To be achieved in: 2 weeks  1. Independent in HEP and progression per patient tolerance, in order to prevent re-injury. [] Progressing: [x] Met: [] Not Met: [] Adjusted  2. Patient will have a decrease in pain to facilitate improvement in movement, function, and ADLs as indicated by improvement with respect to Functional Deficits. [] Progressing: [x] Met: [] Not Met: [] Adjusted     Long Term Goals: To be achieved in: 6 weeks  1. FOTO LUMBAR functional survey score of >/= 63   and FOTO knee  functional survey score of >/=36  to assist with reaching prior level of function. [x] Progressing: [] Met: [] Not Met: [] Adjusted  2. Patient will demonstrate increased AROM  to [facility], to allow for proper joint functioning to allow pt to resume home chores with good posture and body mechanics without increase in symptoms. [x] Progressing: [] Met: [] Not Met: [] Adjusted  3. Patient will demonstrate increased Strength and core activation to allow for proper functional mobility as indicated by patients Functional Deficits to allow pt to resume up/down</1 flight of stairs without difficulty, tolerate shopping x 30-60 min without excess fatigue and without increase in symptoms. [x] Progressing: [] Met: [] Not Met: [] Adjusted  4. Patient will return to functional activities including working yard/garden x 30-60 min without rest and  without increased symptoms or restriction. [x] Progressing: [] Met: [] Not Met: [] Adjusted    Overall Progression Towards Functional goals/ Treatment Progress Update:  [x] Patient is progressing as expected towards functional goals listed. [] Progression is slowed due to complexities/Impairments listed. [] Progression has been slowed due to co-morbidities.   [] Plan just implemented, too soon to assess goals progression <30days   [] Goals require adjustment due to lack of progress  [] Patient is not progressing as expected and requires additional follow up with physician  [] Other    Persisting Functional Limitations/Impairments:  []Sleeping []Sitting               [x]Standing []Transfers        [x]Walking []Kneeling               [x]Stairs []Squatting / bending   [x]ADLs []Reaching  []Lifting  [x]Housework  []Driving []Job related tasks  []Sports/Recreation []Other:        ASSESSMENT:  9/1 increased ease with balance ex  8/30 pt reports increased carlos of ADLs with less difficulty. Much less knee pain   8/25 improvement with balance ex with less UE support. gentle ROM ex help to reduce both back and knee pain. Cont to benefit form flexion bias for back pain  8/23 Increased strength, balance and carlos of amb and steps without increased knee and back pain. Her lumbar issues benefit from ex with flexion bias. 8/16 increasing activity carlos with less pain. pt struggling with fatigue and agreeable to d/w her PCP. Report she has had multiple large losses this last year that contribute to depression    8/11 increasing balance and carlos of ex  8/9 good carlos of exercise, increasing carlos of balance ex. Pt reports she feels like she is steadier on her feet, but still has losses of balance    7/27 Pt tolerated initiation of TG squats and cone taps today. States that at time she has trouble lifting her feet at times with walking. Fatigue noted after about 6 reps of nestor step overs. Plan to continue to strengthen and stretch BLE as Pt tolerates. Treatment/Activity Tolerance:  [x] Patient able to complete tx [] Patient limited by fatigue  [] Patient limited by pain  [] Patient limited by other medical complications  [] Other:     Prognosis: [x] Good [] Fair  [] Poor    Patient Requires Follow-up: [x] Yes  [] No    Plan for next treatment session: gentle stabs and stretches with flexion bias. Manual therapy prn for myofascial pain. Normalize symmetry of gait. Posture and body mechs. F/u re did pt call Dr Bob Murguia  (Kidney doctor) re L>R LE swelling.  Consider addition of MLD if/as approp for LE swelling - would need to check with Dr. Dick Calderon. PLAN:  strength, ROM/flexibility, posture and body mechs, manual, MOC, HEP, pt education     Frequency/Duration:  2 days per week for 4 Weeks:  Interventions:  [x]  Therapeutic exercise including:strength, ROM, flexibility  [x]  NMR activation and proprioception including postural re-education  [x]  Manual therapy as indicated to include: IASTM, STM, PROM, Gr I-IV mobilizations, manipulation. [x]  Modalities as needed that may include: thermal agents, E-stim, Biofeedback, US, iontophoresis as indicated  [x]  Patient education on joint protection, postural re-education, activity modification, progression of HEP. Aquatic therapy    PLAN: See eval. PT 2x / week for 6 weeks. [x] Continue per plan of care [] Alter current plan (see comments)  [] Plan of care initiated [] Hold pending MD visit [] Discharge    Electronically signed by: Lynn Ferro, PT , DPT     Note: If patient does not return for scheduled/ recommended follow up visits, this note will serve as a discharge from care along with most recent update on progress.

## 2022-09-06 ENCOUNTER — HOSPITAL ENCOUNTER (OUTPATIENT)
Dept: PHYSICAL THERAPY | Age: 72
Setting detail: THERAPIES SERIES
Discharge: HOME | End: 2022-09-06
Payer: MEDICARE

## 2022-09-06 NOTE — FLOWSHEET NOTE
Physical Therapy  Cancellation/No-show Note  Patient Name:  Erin Jin  :  1950   Date:  2022  Cancelled visits to date: 3  No-shows to date: 0    Patient status for today's appointment patient:  [x]  Cancelled 22, ,    []  Rescheduled appointment  []  No-show     Reason given by patient:  [x]  Patient ill   []  Conflicting appointment  []  No transportation    []  Conflict with work  []  No reason given  []  Other:     Comments:   called and stated forgot about apt    Phone call information:   []  Phone call made today to patient at _ time at number provided:      []  Patient answered, conversation as follows:    []  Patient did not answer, message left as follows:  []  Phone call not made today  [x]  Phone call not needed - pt contacted us to cancel and provided reason for cancellation.      Electronically signed by:  Agustín Dodd PT, DPT

## 2022-09-08 ENCOUNTER — HOSPITAL ENCOUNTER (OUTPATIENT)
Dept: PHYSICAL THERAPY | Age: 72
Setting detail: THERAPIES SERIES
Discharge: HOME | End: 2022-09-08
Payer: MEDICARE

## 2022-09-08 PROCEDURE — 97530 THERAPEUTIC ACTIVITIES: CPT

## 2022-09-08 PROCEDURE — 97112 NEUROMUSCULAR REEDUCATION: CPT

## 2022-09-08 PROCEDURE — 97110 THERAPEUTIC EXERCISES: CPT

## 2022-09-08 NOTE — FLOWSHEET NOTE
[address] [facility] Physical Therapy  Phone: [phone]   Fax: [phone]                     Physical Therapy Daily Treatment Note  Date:  2022    Patient Name:  Andrey Jasso    :  1950  MRN: [id number]  Medical/Treatment Diagnosis Information:  Diagnosis: M17.12 (ICD-10-CM) - Localized osteoarthritis of left knee  Treatment Diagnosis: hypomobility L-P spine, strength/flexibility/balance deficits L >R LE. Myofascial pain at  trunk and hips, especially at R QL. Flexion bias reduces symptoms. Insurance/Certification information:  PT Insurance Information: medicare and humana. $233 deductible. [facility] Taina Knight  Physician Information: BAYRON Braun NP; referral date: 22  Plan of care signed (Y/N): []  Yes [x]  No     Date of Patient follow up with Physician: kidney doctor on      Progress Report: []  Yes  [x]  No     Date Range for reporting period:  Beginnin/19  PT POC 22   Ending:     Progress report due (10 Rx/or 30 days whichever is less): visit #10 or  (date)     Recertification due (POC duration/ or 90 days whichever is less): visit # 12 or 10/19     Visit # Insurance Allowable Auth required? Date Range   10/12 Med nec []  Yes  [x]  No NA     Latex Allergy:  [x]NO      []YES  Preferred Language for Healthcare:   [x]English       []other:    Functional Scale:        Date assessed:  FOTO: lumbar raw score = 42, risk adjusted score:  42 ,    taken at initial eval  FOTO knee raw score 36, risk adjusted 44,     taken at initial eval    FOTO:50        22  30 sec sit to stand: 11x          Pain level:  0-7/10 L hip /buttocks when stands up and for first few steps after sitting too long; then it goes away after loosening up, 0-2/10 L knee    SUBJECTIVE:   still has her cold. Sometimes thinks it's getting better, sometimes thinks it's the same.  reports less pain with ADLs.   Feels stronger - easier to do normal daily tasks.  reports she couldn't sleep last night - only slept 1 hr. Went out to eat with her son and family last wk on 8/23 - went in his tall truck - flared up her knee getting out of it. States L knee only hurts if she moves the wrong way. Had john horses last night while sleeping     8/23 reports she woke up with back pain this am.  Reports her buttocks and post thighs were sore yesterday. States she saw Dr. Claudia Rogel the kidney doc yesterday - he is overall happy with her numbers but watching the amount of protein in urine. States he said her ankle swelling is ok. 8/16 reports her post  thighs ache today. Reports she notices she can walk further without hurting - walked from handicapped spot at [facility] in to the  registration area without increased pain. Feels really tired - wonders if it is from her depression meds    8/11 reports her legs fel;t like jelly after last session but didn't hurt. Exercises are going well. Reports she cont with ankle swelling. Reports she called kidney doc but they didn't call her back. Reports her dtr says her walking looks better.    States she has 4 granddtrs:   Ivelisse (21), Kait (15 y/o),  Marija 15 y/o- has autism, Jorge Alpha (10)      OBJECTIVE: 9/8 pt coughing t/o session  and states she wonders if she has bronchitis; PT encouraged her to see PCP  8/23 see  PT POC    RESTRICTIONS/PRECAUTIONS: kidney failure,  BACK PAIN, Kidney failure - sees Dr. Claudia Rogel, smokes 1 pack/d  LUMBAR FUSION N/A 2/16/2021 LUMBAR4-LUMBAR5 TRANSVERSE LUMBAR INTERBODY FUSION ([id number], 26923, 87192, 54260, 71452, 86807, 47505, 39657) performed by Sulma García MD at [facility]  8/20/15 L45 B/L decompressive hobson and excision of synovial cyst    ROTATOR CUFF REPAIR Right 2/98          Exercises/Interventions: flexion bias    Therapeutic Exercises (16240) Resistance / level Sets/sec Reps Notes   Nu step or bike  5.5min  Sci fit s=17, arms = 7   9/8 O2 sats after seated stepper: 97%   IB  3 x 30 sec     HR/TR  2 10    Step stretches  B  HS  Hip flex    2 x 30 sec  2 x 30 sec                   Mat ex  Supine HS stretch  SKTC  Fig 4 piriformis stretch  -pull in  -push down     Low back stretch at bar- latissimus stretch  2x30\"sec            Therapeutic Activities (25133)       TG squats B squats  2 12 SBA/CGA on/off   Posture /body mechs REVIEW:  Instructed in neutral spine for bed mob      Diaphragmatic breathing in hooklying       Steps  -fwd  -lateral 6\"   1  1   12 B  12 B 8/23 mild L knee pain    Mini squat     Cone taps  1 15 B SBA   Step over and back - 1 nestor/yoga block  1 12 B CGA 0-1 UE light support          Neuromuscular Re-ed (69925)       Gliders   Hip AB  Hip ext    1  1   12  12 9/8 O2 sats after gliders: 95%   Stabilization progression, flexion bias  TrA iso     Supine SB         Seated SB       Balance  -rhomberg +horz head turn + EC  -semi tandem  -semi  tandem + horz head turn    Airex  Rhomberg  Rhomberg + horz head turns  -Rhomberg + EC + horiz head turns (light fingertips)  -semi tandem  -semi tandem + EC + head turns  -marching      30 sec  30sec B   30x B    30 sec B  30 sec B  SBA/CGA   Wobble board  A/P wt shifts  A/P: DLS    7G27L  2U42\"  CGA/ Light B UE support   Multifidi walkouts                     Manual Intervention (47864)       STM/IASTM  Prn to R QL and R>L  lower quarter /trunk                                             Modalities:     Pt. Education:  9/1  instructed in how to stretch calves and feet when gets john horses at night  8/30 review HEP. Encourage pt to consider purchase of airex foam pad for HEP progression  8/16 d/w pt that side effects of depression meds can cause fatigue - encouraged pt to d/w her PCP re depression, fatigue, and depression meds.     8/9 review and progress HEP - see below     7/19/22 patient educated on diagnosis, prognosis and expectations for rehab  -all patient questions were answered    Home Exercise Program:    Access Code: [email or web address]  URL: [email or web address]  Date: 08/09/2022  Prepared by: Yanickis Ju    Exercises  Supine Hamstring Stretch - 2 x daily - 7 x weekly - 1-2 sets - 3 reps - 30 hold  Hooklying Single Knee to Chest Stretch - 2 x daily - 7 x weekly - 1 sets - 3 reps - 30 hold  Supine Piriformis Stretch with Foot on Ground - 2 x daily - 7 x weekly - 1 sets - 3 reps - 30 hold  Supine Transversus Abdominis Bracing - Hands on Stomach - 2 x daily - 7 x weekly - 1 sets - 10 reps - 5 hold  Romberg Stance with Eyes Closed - 2 x daily - 7 x weekly - 1-2 sets - 5-10 reps - 5-20sec hold  Tandem Stance - 2 x daily - 7 x weekly - 1-3 sets - 3-5 reps - 15-20sec hold    7/19/22   Access Code: [email or web address]  URL: [email or web address]  Date: 07/19/2022  Prepared by: Yanickis Ju    Exercises  Supine Hamstring Stretch - 2 x daily - 7 x weekly - 1-2 sets - 3 reps - 30 hold  Hooklying Single Knee to Chest Stretch - 2 x daily - 7 x weekly - 1 sets - 3 reps - 30 hold  Supine Piriformis Stretch with Foot on Ground - 2 x daily - 7 x weekly - 1 sets - 3 reps - 30 hold  Supine Transversus Abdominis Bracing - Hands on Stomach - 2 x daily - 7 x weekly - 1 sets - 10 reps - 5 hold        Therapeutic Exercise and NMR EXR  [x] (41499) Provided verbal/tactile cueing for activities related to strengthening, flexibility, endurance, ROM for improvements in  [x] LE / Lumbar: LE, proximal hip, and core control with self care, mobility, lifting, ambulation. [] UE / Cervical: cervical, postural, scapular, scapulothoracic and UE control with self care, reaching, carrying, lifting, house/yardwork, driving, computer work.   [x] (74436) Provided verbal/tactile cueing for activities related to improving balance, coordination, kinesthetic sense, posture, motor skill, proprioception to assist with   [x] LE / lumbar: LE, proximal hip, and core control in self care, mobility, lifting, ambulation and eccentric single leg control. [] UE / cervical: cervical, scapular, scapulothoracic and UE control with self care, reaching, carrying, lifting, house/yardwork, driving, computer work.   [] (69311) Therapist is in constant attendance of 2 or more patients providing skilled therapy interventions, but not providing any significant amount of measurable one-on-one time to either patient, for improvements in  [] LE / lumbar: LE, proximal hip, and core control in self care, mobility, lifting, ambulation and eccentric single leg control. [] UE / cervical: cervical, scapular, scapulothoracic and UE control with self care, reaching, carrying, lifting, house/yardwork, driving, computer work. NMR and Therapeutic Activities:    [x] (84935 or 18396) Provided verbal/tactile cueing for activities related to improving balance, coordination, kinesthetic sense, posture, motor skill, proprioception and motor activation to allow for proper function of   [x] LE: / Lumbar core, proximal hip and LE with self care and ADLs  [] UE / Cervical: cervical, postural, scapular, scapulothoracic and UE control with self care, carrying, lifting, driving, computer work.   [] (13508) Gait Re-education- Provided training and instruction to the patient for proper LE, core and proximal hip recruitment and positioning and eccentric body weight control with ambulation re-education including up and down stairs     Home Management Training / Self Care:  [] (70655) Provided self-care/home management training related to activities of daily living and compensatory training, and/or use of adaptive equipment for improvement with: ADLs and compensatory training, meal preparation, safety procedures and instruction in use of adaptive equipment, including bathing, grooming, dressing, personal hygiene, basic household cleaning and chores.      Home Exercise Program:    [] (03643) Reviewed/Progressed HEP activities related to strengthening, flexibility, endurance, ROM of   [] LE / Lumbar: core, proximal hip and LE for functional self-care, mobility, lifting and ambulation/stair navigation   [] UE / Cervical: cervical, postural, scapular, scapulothoracic and UE control with self care, reaching, carrying, lifting, house/yardwork, driving, computer work  [] (53788)Reviewed/Progressed HEP activities related to improving balance, coordination, kinesthetic sense, posture, motor skill, proprioception of   [] LE: core, proximal hip and LE for self care, mobility, lifting, and ambulation/stair navigation    [] UE / Cervical: cervical, postural,  scapular, scapulothoracic and UE control with self care, reaching, carrying, lifting, house/yardwork, driving, computer work    Manual Treatments:  PROM / STM / Oscillations-Mobs:  G-I, II, III, IV (PA's, Inf., Post.)  [] (53429) Provided manual therapy to mobilize LE, proximal hip and/or LS spine soft tissue/joints for the purpose of modulating pain, promoting relaxation,  increasing ROM, reducing/eliminating soft tissue swelling/inflammation/restriction, improving soft tissue extensibility and allowing for proper ROM for normal function with   [] LE / lumbar: self care, mobility, lifting and ambulation. [] UE / Cervical: self care, reaching, carrying, lifting, house/yardwork, driving, computer work. Modalities:  [] (99577) Vasopneumatic compression: Utilized vasopneumatic compression to decrease edema / swelling for the purpose of improving mobility and quad tone / recruitment which will allow for increased overall function including but not limited to self-care, transfers, ambulation, and ascending / descending stairs.        Charges:  Timed Code Treatment Minutes: 43   Total Treatment Minutes: 43     [] EVAL - LOW (94853)   [] EVAL - MOD (89380)  [] EVAL - HIGH (70024)  [] RE-EVAL (97701)  [x] MU(32267) x   1    [] Ionto  [x] NMR (92609) x 1      [] Vaso  [] Manual (64931) x       [] Ultrasound  [x] TA x 1       [] Mech Traction (56967)  [] Aquatic Therapy x     [] ES (un) (66617):   [] Home Management Training x  [] ES(attended) (27419)   [] Dry Needling 1-2 muscles (31781):  [] Dry Needling 3+ muscles (107519)  [] Group:      [] Other:          GOALS:  Patient stated goal:  less pain with ADLs  [] Progressing: [] Met: [] Not Met: [] Adjusted     Therapist goals for Patient:  Short Term Goals: To be achieved in: 2 weeks  1. Independent in HEP and progression per patient tolerance, in order to prevent re-injury. [] Progressing: [x] Met: [] Not Met: [] Adjusted  2. Patient will have a decrease in pain to facilitate improvement in movement, function, and ADLs as indicated by improvement with respect to Functional Deficits. [] Progressing: [x] Met: [] Not Met: [] Adjusted     Long Term Goals: To be achieved in: 6 weeks  1. FOTO LUMBAR functional survey score of >/= 63   and FOTO knee  functional survey score of >/=36  to assist with reaching prior level of function. [x] Progressing: [] Met: [] Not Met: [] Adjusted  2. Patient will demonstrate increased AROM  to [facility], to allow for proper joint functioning to allow pt to resume home chores with good posture and body mechanics without increase in symptoms. [x] Progressing: [] Met: [] Not Met: [] Adjusted  3. Patient will demonstrate increased Strength and core activation to allow for proper functional mobility as indicated by patients Functional Deficits to allow pt to resume up/down</1 flight of stairs without difficulty, tolerate shopping x 30-60 min without excess fatigue and without increase in symptoms. [x] Progressing: [] Met: [] Not Met: [] Adjusted  4. Patient will return to functional activities including working yard/garden x 30-60 min without rest and  without increased symptoms or restriction. [x] Progressing: [] Met: [] Not Met: [] Adjusted    Overall Progression Towards Functional goals/ Treatment Progress Update:  [x] Patient is progressing as expected towards functional goals listed.     [] Progression is slowed due to complexities/Impairments listed. [] Progression has been slowed due to co-morbidities. [] Plan just implemented, too soon to assess goals progression <30days   [] Goals require adjustment due to lack of progress  [] Patient is not progressing as expected and requires additional follow up with physician  [] Other    Persisting Functional Limitations/Impairments:  []Sleeping []Sitting               [x]Standing []Transfers        [x]Walking []Kneeling               [x]Stairs []Squatting / bending   [x]ADLs []Reaching  []Lifting  [x]Housework  []Driving []Job related tasks  []Sports/Recreation []Other:        ASSESSMENT:  9/8 intermittent SOB/slight drop in O2 sats during tx today. Pt agreeable to see MD re bad cold/concerns for bronchitis. 9/1 increased ease with balance ex  8/30 pt reports increased carlos of ADLs with less difficulty. Much less knee pain   8/25 improvement with balance ex with less UE support. gentle ROM ex help to reduce both back and knee pain. Cont to benefit form flexion bias for back pain  8/23 Increased strength, balance and carlos of amb and steps without increased knee and back pain. Her lumbar issues benefit from ex with flexion bias. 8/16 increasing activity carlos with less pain. pt struggling with fatigue and agreeable to d/w her PCP. Report she has had multiple large losses this last year that contribute to depression    8/11 increasing balance and carlos of ex  8/9 good carlos of exercise, increasing carlos of balance ex. Pt reports she feels like she is steadier on her feet, but still has losses of balance    7/27 Pt tolerated initiation of TG squats and cone taps today. States that at time she has trouble lifting her feet at times with walking. Fatigue noted after about 6 reps of nestor step overs. Plan to continue to strengthen and stretch BLE as Pt tolerates.      Treatment/Activity Tolerance:  [x] Patient able to complete tx [] Patient limited by fatigue  [] Patient limited by pain  [] Patient limited by other medical complications  [] Other:     Prognosis: [x] Good [] Fair  [] Poor    Patient Requires Follow-up: [x] Yes  [] No    Plan for next treatment session: gentle stabs and stretches with flexion bias. Manual therapy prn for myofascial pain. Normalize symmetry of gait. Posture and body mechs. F/u re did pt call Dr Chang Ordaz  (Kidney doctor) re L>R LE swelling. Consider addition of MLD if/as approp for LE swelling - would need to check with Dr. Chang Ordaz. PLAN:  strength, ROM/flexibility, posture and body mechs, manual, MOC, HEP, pt education     Frequency/Duration:  2 days per week for 4 Weeks:  Interventions:  [x]  Therapeutic exercise including:strength, ROM, flexibility  [x]  NMR activation and proprioception including postural re-education  [x]  Manual therapy as indicated to include: IASTM, STM, PROM, Gr I-IV mobilizations, manipulation. [x]  Modalities as needed that may include: thermal agents, E-stim, Biofeedback, US, iontophoresis as indicated  [x]  Patient education on joint protection, postural re-education, activity modification, progression of HEP. Aquatic therapy    PLAN: See eval. PT 2x / week for 6 weeks. [x] Continue per plan of care [] Alter current plan (see comments)  [] Plan of care initiated [] Hold pending MD visit [] Discharge    Electronically signed by: Mery Zuñiga, PT , DPT     Note: If patient does not return for scheduled/ recommended follow up visits, this note will serve as a discharge from care along with most recent update on progress.

## 2022-09-13 ENCOUNTER — OFFICE VISIT (OUTPATIENT)
Dept: FAMILY MEDICINE CLINIC | Age: 72
End: 2022-09-13
Payer: MEDICARE

## 2022-09-13 ENCOUNTER — HOSPITAL ENCOUNTER (OUTPATIENT)
Dept: PHYSICAL THERAPY | Age: 72
Setting detail: THERAPIES SERIES
Discharge: HOME | End: 2022-09-13
Payer: MEDICARE

## 2022-09-13 VITALS — BODY MASS INDEX: 35.33 KG/M2 | WEIGHT: 187 LBS | OXYGEN SATURATION: 95 % | TEMPERATURE: 97.7 F | HEART RATE: 92 BPM

## 2022-09-13 DIAGNOSIS — J20.9 ACUTE BRONCHITIS, UNSPECIFIED ORGANISM: Primary | ICD-10-CM

## 2022-09-13 PROCEDURE — G8417 CALC BMI ABV UP PARAM F/U: HCPCS | Performed by: NURSE PRACTITIONER

## 2022-09-13 PROCEDURE — 1090F PRES/ABSN URINE INCON ASSESS: CPT | Performed by: NURSE PRACTITIONER

## 2022-09-13 PROCEDURE — 99213 OFFICE O/P EST LOW 20 MIN: CPT | Performed by: NURSE PRACTITIONER

## 2022-09-13 PROCEDURE — 3017F COLORECTAL CA SCREEN DOC REV: CPT | Performed by: NURSE PRACTITIONER

## 2022-09-13 PROCEDURE — 97112 NEUROMUSCULAR REEDUCATION: CPT

## 2022-09-13 PROCEDURE — 1123F ACP DISCUSS/DSCN MKR DOCD: CPT | Performed by: NURSE PRACTITIONER

## 2022-09-13 PROCEDURE — G8399 PT W/DXA RESULTS DOCUMENT: HCPCS | Performed by: NURSE PRACTITIONER

## 2022-09-13 PROCEDURE — 4004F PT TOBACCO SCREEN RCVD TLK: CPT | Performed by: NURSE PRACTITIONER

## 2022-09-13 PROCEDURE — G8427 DOCREV CUR MEDS BY ELIG CLIN: HCPCS | Performed by: NURSE PRACTITIONER

## 2022-09-13 PROCEDURE — 97110 THERAPEUTIC EXERCISES: CPT

## 2022-09-13 PROCEDURE — 97530 THERAPEUTIC ACTIVITIES: CPT

## 2022-09-13 RX ORDER — ALBUTEROL SULFATE 90 UG/1
2 AEROSOL, METERED RESPIRATORY (INHALATION) 4 TIMES DAILY PRN
Qty: 54 G | Refills: 1 | Status: SHIPPED
Start: 2022-09-13 | End: 2022-10-11

## 2022-09-13 RX ORDER — AMOXICILLIN AND CLAVULANATE POTASSIUM 875; 125 MG/1; MG/1
1 TABLET, FILM COATED ORAL 2 TIMES DAILY
Qty: 20 TABLET | Refills: 0 | Status: SHIPPED | OUTPATIENT
Start: 2022-09-13 | End: 2022-10-11 | Stop reason: ALTCHOICE

## 2022-09-13 NOTE — PROGRESS NOTES
Albert Duarte  : 1950  Encounter date: 2022    This jaun 67 y.o. female who presents with  Chief Complaint   Patient presents with    Cough         History of present illness:    HPI Pt is 67year old female with complaints of productive cough x 3 weeks. Pt reports taking mucinex D with mild relief. Reports dyspnea with nighttime. Reports sinus congestion, denies fevers or HA. Pt is chronic smoker. Current Outpatient Medications on File Prior to Visit   Medication Sig Dispense Refill    fenofibrate (TRIGLIDE) 160 MG tablet TAKE ONE TABLET BY MOUTH DAILY 90 tablet 1    levothyroxine (SYNTHROID) 25 MCG tablet TAKE 1 TABLET BY MOUTH DAILY 90 tablet 0    traZODone (DESYREL) 50 MG tablet TAKE ONE TABLET BY MOUTH ONCE NIGHTLY AS NEEDED FOR SLEEP 90 tablet 1    verapamil (CALAN SR) 120 MG extended release tablet TAKE ONE TABLET BY MOUTH DAILY 90 tablet 1    diclofenac sodium (VOLTAREN) 1 % GEL Apply 2 g topically 4 times daily 100 g 1    lisinopril (PRINIVIL;ZESTRIL) 20 MG tablet TAKE 1 TABLET BY MOUTH DAILY 90 tablet 3    vitamin D3 (CHOLECALCIFEROL) 25 MCG (1000 UT) TABS tablet Take 1 tablet by mouth daily 90 tablet 1    ezetimibe (ZETIA) 10 MG tablet TAKE 1 TABLET BY MOUTH EVERY DAY 90 tablet 3    glucose monitoring kit (FREESTYLE) monitoring kit 1 kit by Does not apply route daily 1 kit 0    Lancets (BD LANCET ULTRAFINE 30G) MISC 1 Device by In Vitro route every morning CHECK BS EVERY MORNING FASTING 100 each 3    blood glucose monitor strips Test blood sugar every morning fasting. 100 strip 3    Ascorbic Acid (VITAMIN C CR) 1000 MG TBCR Take by mouth      vitamin E 400 UNIT capsule Take 400 Units by mouth daily       No current facility-administered medications on file prior to visit. Allergies   Allergen Reactions    Influenza Virus Vacc Split Pf      bronchitis    Pravastatin Sodium [Pravastatin Sodium] Other (See Comments)     Elevated liver enzymes.      Vicodin [Hydrocodone-Acetaminophen] Nausea     Past Medical History:   Diagnosis Date    Hyperlipidemia     Hypertension     Impaired fasting glucose     Insomnia, unspecified     Osteoarthritis     Thyroid disease       Past Surgical History:   Procedure Laterality Date    CARPAL TUNNEL RELEASE Left [id number]    lemus    CHOLECYSTECTOMY      LUMBAR FUSION N/A 2/16/2021    LUMBAR4-LUMBAR5 TRANSVERSE LUMBAR INTERBODY FUSION ([id number], 81515, 16848, 92183, 72834, 95522, 56565, 93670) performed by Vicki Morris MD at [facility]  8/20/15    L45 B/L decompressive hobson and excision of synovial cyst    ROTATOR CUFF REPAIR Right 2/98    Bhavna Rutledge       Family History   Problem Relation Age of Onset    Cancer Father     Cancer Brother       Social History     Tobacco Use    Smoking status: Every Day     Packs/day: 1.00     Years: 40.00     Pack years: 40.00     Types: Cigarettes    Smokeless tobacco: Never   Substance Use Topics    Alcohol use: No     Alcohol/week: 0.0 standard drinks        Review of Systems    Objective:    Pulse 92   Temp 97.7 °F (36.5 °C) (Tympanic)   Wt 187 lb (84.8 kg)   SpO2 95%   BMI 35.33 kg/m²   Weight: 187 lb (84.8 kg)     BP Readings from Last 3 Encounters:   08/22/22 (!) 140/78   07/06/22 134/80   06/01/22 134/72     Wt Readings from Last 3 Encounters:   09/13/22 187 lb (84.8 kg)   08/22/22 186 lb (84.4 kg)   07/06/22 191 lb 6 oz (86.8 kg)     BMI Readings from Last 3 Encounters:   09/13/22 35.33 kg/m²   08/22/22 35.14 kg/m²   07/06/22 36.16 kg/m²       Physical Exam  Vitals reviewed. Constitutional:       Appearance: Normal appearance. She is well-developed. Cardiovascular:      Rate and Rhythm: Normal rate and regular rhythm. Pulses: Normal pulses. Heart sounds: Normal heart sounds. No murmur heard. Pulmonary:      Effort: Pulmonary effort is normal.      Breath sounds: Wheezing and rhonchi present. Skin:     General: Skin is warm and dry.    Neurological:      Mental Status: She is alert and oriented to person, place, and time. Psychiatric:         Mood and Affect: Mood normal.       Assessment/Plan    1. Acute bronchitis, unspecified organism  Advised OTC mucinex  hydation  - amoxicillin-clavulanate (AUGMENTIN) 875-125 MG per tablet; Take 1 tablet by mouth 2 times daily  Dispense: 20 tablet; Refill: 0  - albuterol sulfate HFA (VENTOLIN HFA) 108 (90 Base) MCG/ACT inhaler; Inhale 2 puffs into the lungs 4 times daily as needed for Wheezing  Dispense: 54 g; Refill: 1    Return if symptoms worsen or fail to improve, for unresolved symptoms. This dictation was generated by voice recognition computer software. Although all attempts are made to edit the dictation for accuracy, there may be errors in the transcription that are not intended.

## 2022-09-13 NOTE — FLOWSHEET NOTE
[facility]  Phone: [phone]   Fax: [phone]                     Physical Therapy Daily Treatment Note  Date:  2022    Patient Name:  Jessie Moreno    :  1950  MRN: [id number]  Medical/Treatment Diagnosis Information:  Diagnosis: M17.12 (ICD-10-CM) - Localized osteoarthritis of left knee  Treatment Diagnosis: hypomobility L-P spine, strength/flexibility/balance deficits L >R LE. Myofascial pain at  trunk and hips, especially at R QL. Flexion bias reduces symptoms. Insurance/Certification information:  PT Insurance Information: medicare and [employer]. $233 deductible. [facility]  Physician Information: Kee Browne, BAYRON Vega NP; referral date: 22  Plan of care signed (Y/N): []  Yes [x]  No     Date of Patient follow up with Physician: kidney doctor on      Progress Report: []  Yes  [x]  No     Date Range for reporting period:  Beginnin/19  PT POC 22   Ending:     Progress report due (10 Rx/or 30 days whichever is less): visit #10 or  (date)     Recertification due (POC duration/ or 90 days whichever is less): visit # 12 or 10/19     Visit # Insurance Allowable Auth required? Date Range    Med nec []  Yes  [x]  No NA     Latex Allergy:  [x]NO      []YES  Preferred Language for Healthcare:   [x]English       []other:    Functional Scale:        Date assessed:  FOTO: lumbar raw score = 42, risk adjusted score:  42 ,    taken at initial eval  FOTO knee raw score 36, risk adjusted 44,     taken at initial eval    FOTO:50        22  30 sec sit to stand: 11x          Pain level:  0-5/10 L hip /buttocks when stands up and for first few steps after sitting too long; then it goes away after loosening up, 0/10 L knee    SUBJECTIVE:   went to MD - has bronchitis -  states they prescribed her medicine (antibiotic ) and an inhaler     still has her cold.  Sometimes thinks it's getting better, sometimes thinks it's the same. 8/30 reports less pain with ADLs. Feels stronger - easier to do normal daily tasks. reports she couldn't sleep last night - only slept 1 hr. Went out to eat with her son and family last wk on 8/23 - went in his tall truck - flared up her knee getting out of it. States L knee only hurts if she moves the wrong way. Had john horses last night while sleeping     8/23 reports she woke up with back pain this am.  Reports her buttocks and post thighs were sore yesterday. States she saw Dr. Arash Ortiz the kidney doc yesterday - he is overall happy with her numbers but watching the amount of protein in urine. States he said her ankle swelling is ok. 8/16 reports her post  thighs ache today. Reports she notices she can walk further without hurting - walked from handicapped spot at [facility] in to the  registration area without increased pain. Feels really tired - wonders if it is from her depression meds    8/11 reports her legs fel;t like jelly after last session but didn't hurt. Exercises are going well. Reports she cont with ankle swelling. Reports she called kidney doc but they didn't call her back. Reports her dtr says her walking looks better.    States she has 4 granddtrs:   Ivelisse (21), Kait (15 y/o),  Marija 15 y/o- has autism, Wolf Maradiagaat (10)      OBJECTIVE: 9/8 pt coughing t/o session  and states she wonders if she has bronchitis; PT encouraged her to see PCP  8/23 see  PT POC    RESTRICTIONS/PRECAUTIONS: kidney failure,  BACK PAIN, Kidney failure - sees Dr. Arash Ortiz, smokes 1 pack/d  LUMBAR FUSION N/A 2/16/2021 LUMBAR4-LUMBAR5 TRANSVERSE LUMBAR INTERBODY FUSION (29432, 94084, 42197, 03231, 07025, 91437, 77967, 19621) performed by Terra Cespedes MD at [facility]  8/20/15 L45 B/L decompressive hobson and excision of synovial cyst    ROTATOR CUFF REPAIR Right 2/98          Exercises/Interventions: flexion bias    Therapeutic Exercises ([id number]) Resistance / level Sets/sec Reps Notes   Nu step or bike  5.5min  Sci fit s=17, arms = 7   Nu step s=8, arms = 10, workload 3  9/8 O2 sats after seated stepper: 97%   IB  3 x 30 sec     HR/TR  2 10    Step stretches  B  HS  Hip flex    2 x 30 sec  2 x 30 sec                   Mat ex  Supine HS stretch  SKTC  Fig 4 piriformis stretch  -pull in  -push down     Low back stretch at bar- latissimus stretch  2x30\"sec            Therapeutic Activities (68639)       TG squats B squats   1x20  1x15 SBA/CGA on/off   Posture /body [facility] REVIEW:  Instructed in neutral spine for bed mob      Diaphragmatic breathing in hooklying       Steps  -fwd  -lateral 6\"   1  1   12 B  12 B 8/23 mild L knee pain    Mini squat     Cone taps  1 15 B SBA   Step over and back - 1 nestor/yoga block  1 12 B CGA 0-1 UE light support          Neuromuscular Re-ed (36217)       Gliders   Hip AB  Hip ext  9/8 O2 sats after gliders: 95%   Stabilization progression, flexion bias  TrA iso     Supine SB         Seated SB       Balance  -rhomberg +horz head turn + EC  -semi tandem  -semi  tandem + horz head turn    Airex  Rhomberg  Rhomberg + horz head turns  -Rhomberg + EC + horiz head turns (light fingertips)  -semi tandem  mart tandem + EC  -semi tandem + EC + head turns  -marching      30 sec  30sec B   30x B    30 sec B  30 sec B  30 sec B  SBA/CGA   Wobble board  A/P wt shifts  A/P: DLS    [id number]  0E93\"  CGA/ Light B UE support   Multifidi walkouts                     Manual Intervention (64029)       STM/IASTM  Prn to R QL and R>L  lower quarter /trunk                                             Modalities:     Pt. Education:  9/1  instructed in how to stretch calves and feet when gets john horses at night  8/30 review HEP. Encourage pt to consider purchase of airex foam pad for HEP progression  8/16 d/w pt that side effects of depression meds can cause fatigue - encouraged pt to d/w her PCP re depression, fatigue, and depression meds.     8/9 review and progress HEP - see below     7/19/22 patient educated on diagnosis, prognosis and expectations for rehab  -all patient questions were answered    Home Exercise Program:    Access Code: [email or web address]  URL: [email or web address]  Date: 08/09/2022  Prepared by: Ramond Busman    Exercises  Supine Hamstring Stretch - 2 x daily - 7 x weekly - 1-2 sets - 3 reps - 30 hold  Hooklying Single Knee to Chest Stretch - 2 x daily - 7 x weekly - 1 sets - 3 reps - 30 hold  Supine Piriformis Stretch with Foot on Ground - 2 x daily - 7 x weekly - 1 sets - 3 reps - 30 hold  Supine Transversus Abdominis Bracing - Hands on Stomach - 2 x daily - 7 x weekly - 1 sets - 10 reps - 5 hold  Romberg Stance with Eyes Closed - 2 x daily - 7 x weekly - 1-2 sets - 5-10 reps - 5-20sec hold  Tandem Stance - 2 x daily - 7 x weekly - 1-3 sets - 3-5 reps - 15-20sec hold    7/19/22   Access Code: [email or web address]  URL: [email or web address]  Date: 07/19/2022  Prepared by: Ramond Busman    Exercises  Supine Hamstring Stretch - 2 x daily - 7 x weekly - 1-2 sets - 3 reps - 30 hold  Hooklying Single Knee to Chest Stretch - 2 x daily - 7 x weekly - 1 sets - 3 reps - 30 hold  Supine Piriformis Stretch with Foot on Ground - 2 x daily - 7 x weekly - 1 sets - 3 reps - 30 hold  Supine Transversus Abdominis Bracing - Hands on Stomach - 2 x daily - 7 x weekly - 1 sets - 10 reps - 5 hold        Therapeutic Exercise and NMR EXR  [x] (92001) Provided verbal/tactile cueing for activities related to strengthening, flexibility, endurance, ROM for improvements in  [x] LE / Lumbar: LE, proximal hip, and core control with self care, mobility, lifting, ambulation. [] UE / Cervical: cervical, postural, scapular, scapulothoracic and UE control with self care, reaching, carrying, lifting, house/yardwork, driving, computer work.   [x] (63255) Provided verbal/tactile cueing for activities related to improving balance, coordination, kinesthetic sense, posture, motor skill, proprioception to assist with   [x] LE / lumbar: LE, proximal hip, and core control in self care, mobility, lifting, ambulation and eccentric single leg control. [] UE / cervical: cervical, scapular, scapulothoracic and UE control with self care, reaching, carrying, lifting, house/yardwork, driving, computer work.   [] (64327) Therapist is in constant attendance of 2 or more patients providing skilled therapy interventions, but not providing any significant amount of measurable one-on-one time to either patient, for improvements in  [] LE / lumbar: LE, proximal hip, and core control in self care, mobility, lifting, ambulation and eccentric single leg control. [] UE / cervical: cervical, scapular, scapulothoracic and UE control with self care, reaching, carrying, lifting, house/yardwork, driving, computer work.      NMR and Therapeutic Activities:    [x] (63338 or 27360) Provided verbal/tactile cueing for activities related to improving balance, coordination, kinesthetic sense, posture, motor skill, proprioception and motor activation to allow for proper function of   [x] LE: / Lumbar core, proximal hip and LE with self care and ADLs  [] UE / Cervical: cervical, postural, scapular, scapulothoracic and UE control with self care, carrying, lifting, driving, computer work.   [] (36598) Gait Re-education- Provided training and instruction to the patient for proper LE, core and proximal hip recruitment and positioning and eccentric body weight control with ambulation re-education including up and down stairs     Home Management Training / Self Care:  [] (76306) Provided self-care/home management training related to activities of daily living and compensatory training, and/or use of adaptive equipment for improvement with: ADLs and compensatory training, meal preparation, safety procedures and instruction in use of adaptive equipment, including bathing, grooming, dressing, personal hygiene, basic household cleaning and chores. Home Exercise Program:    [] (94401) Reviewed/Progressed HEP activities related to strengthening, flexibility, endurance, ROM of   [] LE / Lumbar: core, proximal hip and LE for functional self-care, mobility, lifting and ambulation/stair navigation   [] UE / Cervical: cervical, postural, scapular, scapulothoracic and UE control with self care, reaching, carrying, lifting, house/yardwork, driving, computer work  [] (34701)Reviewed/Progressed HEP activities related to improving balance, coordination, kinesthetic sense, posture, motor skill, proprioception of   [] LE: core, proximal hip and LE for self care, mobility, lifting, and ambulation/stair navigation    [] UE / Cervical: cervical, postural,  scapular, scapulothoracic and UE control with self care, reaching, carrying, lifting, house/yardwork, driving, computer work    Manual Treatments:  PROM / STM / Oscillations-Mobs:  G-I, II, III, IV (PA's, Inf., Post.)  [] (62520) Provided manual therapy to mobilize LE, proximal hip and/or LS spine soft tissue/joints for the purpose of modulating pain, promoting relaxation,  increasing ROM, reducing/eliminating soft tissue swelling/inflammation/restriction, improving soft tissue extensibility and allowing for proper ROM for normal function with   [] LE / lumbar: self care, mobility, lifting and ambulation. [] UE / Cervical: self care, reaching, carrying, lifting, house/yardwork, driving, computer work. Modalities:  [] (97864) Vasopneumatic compression: Utilized vasopneumatic compression to decrease edema / swelling for the purpose of improving mobility and quad tone / recruitment which will allow for increased overall function including but not limited to self-care, transfers, ambulation, and ascending / descending stairs.        Charges:  Timed Code Treatment Minutes: 42   Total Treatment Minutes: 42     [] EVAL - LOW (88291)   [] EVAL - MOD (96307)  [] EVAL - HIGH (32737)  [] RE-EVAL (61135)  [x] RO(64558) x   1    [] Ionto  [x] NMR (25999) x 1      [] Vaso  [] Manual (91443) x       [] Ultrasound  [x] TA x 1       [] Mech Traction (15985)  [] Aquatic Therapy x     [] ES (un) (29264):   [] Home Management Training x  [] ES(attended) (32100)   [] Dry Needling 1-2 muscles (08608):  [] Dry Needling 3+ muscles (964590)  [] Group:      [] Other:          GOALS:  Patient stated goal:  less pain with ADLs  [] Progressing: [] Met: [] Not Met: [] Adjusted     Therapist goals for Patient:  Short Term Goals: To be achieved in: 2 weeks  1. Independent in HEP and progression per patient tolerance, in order to prevent re-injury. [] Progressing: [x] Met: [] Not Met: [] Adjusted  2. Patient will have a decrease in pain to facilitate improvement in movement, function, and ADLs as indicated by improvement with respect to Functional Deficits. [] Progressing: [x] Met: [] Not Met: [] Adjusted     Long Term Goals: To be achieved in: 6 weeks  1. FOTO LUMBAR functional survey score of >/= 63   and FOTO knee  functional survey score of >/=36  to assist with reaching prior level of function. [x] Progressing: [] Met: [] Not Met: [] Adjusted  2. Patient will demonstrate increased AROM  to [facility], to allow for proper joint functioning to allow pt to resume home chores with good posture and body mechanics without increase in symptoms. [x] Progressing: [] Met: [] Not Met: [] Adjusted  3. Patient will demonstrate increased Strength and core activation to allow for proper functional mobility as indicated by patients Functional Deficits to allow pt to resume up/down</1 flight of stairs without difficulty, tolerate shopping x 30-60 min without excess fatigue and without increase in symptoms. [x] Progressing: [] Met: [] Not Met: [] Adjusted  4. Patient will return to functional activities including working yard/garden x 30-60 min without rest and  without increased symptoms or restriction.   [x] Progressing: [] Met: [] Not Met: [] Adjusted    Overall Progression Towards Functional goals/ Treatment Progress Update:  [x] Patient is progressing as expected towards functional goals listed. [] Progression is slowed due to complexities/Impairments listed. [] Progression has been slowed due to co-morbidities. [] Plan just implemented, too soon to assess goals progression <30days   [] Goals require adjustment due to lack of progress  [] Patient is not progressing as expected and requires additional follow up with physician  [] Other    Persisting Functional Limitations/Impairments:  []Sleeping []Sitting               [x]Standing []Transfers        [x]Walking []Kneeling               [x]Stairs []Squatting / bending   [x]ADLs []Reaching  []Lifting  [x]Housework  []Driving []Job related tasks  []Sports/Recreation []Other:        ASSESSMENT:  9/13 increased fatigue with ex today - likely due to bronchitis  9/8 intermittent SOB/slight drop in O2 sats during tx today. Pt agreeable to see MD re bad cold/concerns for bronchitis. 9/1 increased ease with balance ex  8/30 pt reports increased carlos of ADLs with less difficulty. Much less knee pain   8/25 improvement with balance ex with less UE support. gentle ROM ex help to reduce both back and knee pain. Cont to benefit form flexion bias for back pain  8/23 Increased strength, balance and carlos of amb and steps without increased knee and back pain. Her lumbar issues benefit from ex with flexion bias. 8/16 increasing activity carlos with less pain. pt struggling with fatigue and agreeable to d/w her PCP. Report she has had multiple large losses this last year that contribute to depression    8/11 increasing balance and carlos of ex  8/9 good carlos of exercise, increasing carlos of balance ex. Pt reports she feels like she is steadier on her feet, but still has losses of balance    7/27 Pt tolerated initiation of TG squats and cone taps today.  States that at time she has trouble lifting her feet at times with walking. Fatigue noted after about 6 reps of nestor step overs. Plan to continue to strengthen and stretch BLE as Pt tolerates. Treatment/Activity Tolerance:  [x] Patient able to complete tx [] Patient limited by fatigue  [] Patient limited by pain  [] Patient limited by other medical complications  [] Other:     Prognosis: [x] Good [] Fair  [] Poor    Patient Requires Follow-up: [x] Yes  [] No    Plan for next treatment session: gentle stabs and stretches with flexion bias. Manual therapy prn for myofascial pain. Normalize symmetry of gait. Posture and body mechs. PLAN:  strength, ROM/flexibility, posture and body mechs, manual, MOC, HEP, pt education     Frequency/Duration:  2 days per week for 4 Weeks:  Interventions:  [x]  Therapeutic exercise including:strength, ROM, flexibility  [x]  NMR activation and proprioception including postural re-education  [x]  Manual therapy as indicated to include: IASTM, STM, PROM, Gr I-IV mobilizations, manipulation. [x]  Modalities as needed that may include: thermal agents, E-stim, Biofeedback, US, iontophoresis as indicated  [x]  Patient education on joint protection, postural re-education, activity modification, progression of HEP. Aquatic therapy    PLAN: See eval. PT 2x / week for 6 weeks. [x] Continue per plan of care [] Alter current plan (see comments)  [] Plan of care initiated [] Hold pending MD visit [] Discharge    Electronically signed by: Wil Person, PT , DPT     Note: If patient does not return for scheduled/ recommended follow up visits, this note will serve as a discharge from care along with most recent update on progress.

## 2022-09-20 ENCOUNTER — HOSPITAL ENCOUNTER (OUTPATIENT)
Dept: PHYSICAL THERAPY | Age: 72
Setting detail: THERAPIES SERIES
Discharge: HOME | End: 2022-09-20
Payer: MEDICARE

## 2022-09-20 PROCEDURE — 97530 THERAPEUTIC ACTIVITIES: CPT

## 2022-09-20 PROCEDURE — 97112 NEUROMUSCULAR REEDUCATION: CPT

## 2022-09-20 PROCEDURE — 97110 THERAPEUTIC EXERCISES: CPT

## 2022-09-20 NOTE — FLOWSHEET NOTE
[facility]  Phone: [phone]   Fax: [phone]                   Physical Therapy Re-Certification Plan of Care    Dear Aaliyah Wolf  ,    We had the pleasure of treating the following patient for physical therapy services at [facility]. A summary of our findings can be found in the updated assessment below. This includes our plan of care. If you have any questions or concerns regarding these findings, please do not hesitate to contact me at the office phone number checked above. Thank you for the referral.     Physician Signature:________________________________Date:__________________  By signing above (or electronic signature), therapist's plan is approved by physician      Functional Outcome:     FOTO: pt politely declined doing survey    Overall Response to Treatment:   [x]Patient is responding well to treatment and improvement is noted with regards  to goals   []Patient should continue to improve in reasonable time if they continue HEP   []Patient has plateaued and is no longer responding to skilled PT intervention    []Patient is getting worse and would benefit from return to referring MD   []Patient unable to adhere to initial POC   [x]Other:     Fxnl strength:  6 \" step ups 1x12 frd and 1x12 lateral B  Balance: DLS: 30 sec on airex    ASSESSMENT:  9/20 GOALS GROSSLY MET --pt able to carlos all ADLs, home chores, shopping. Gardening limited to 15 min due to back pain. improved carlos of ex today - appears bronchitis is improving --  less SOB with exercise and activity today. L hip pain persists after pt has been in static position for prolonged time period.  She would likely benefit form consult with ortho MD about hip pain    Date range of Visits: 7/19/22 -9/20/22  Total Visits: 12/12    Recommendation:    [x]D/C            []Hold PT, pending MD visit     Physical Therapy Daily Treatment Note  Date:  2022    Patient Name:  Alicia Hardy    :  1950  MRN: [id number]  Medical/Treatment Diagnosis Information:  Diagnosis: M17.12 (ICD-10-CM) - Localized osteoarthritis of left knee  Treatment Diagnosis: hypomobility L-P spine, strength/flexibility/balance deficits L >R LE. Myofascial pain at  trunk and hips, especially at R QL. Flexion bias reduces symptoms. Insurance/Certification information:  PT Insurance Information: medicare and human[employer]. $233 deductible. [facility]  Physician Information: Josh Chakraborty, BAYRON - NP; referral date: 22  Plan of care signed (Y/N): []  Yes [x]  No     Date of Patient follow up with Physician: kidney doctor on      Progress Report: []  Yes  [x]  No     Date Range for reporting period:  Beginnin/19  PT POC 22   Ending:     Progress report due (10 Rx/or 30 days whichever is less): visit #10 or  (date)     Recertification due (POC duration/ or 90 days whichever is less): visit # 12 or 10/19     Visit # Insurance Allowable Auth required? Date Range    Med nec []  Yes  [x]  No NA     Latex Allergy:  [x]NO      []YES  Preferred Language for Healthcare:   [x]English       []other:    Functional Scale:        Date assessed:  FOTO: lumbar raw score = 42, risk adjusted score:  42 ,    taken at initial eval  FOTO knee raw score 36, risk adjusted 44,     taken at initial eval    FOTO:50        22  30 sec sit to stand: 11x          Pain level:  9/10 L hip /buttocks when she first stands up - it eases up as she moves around. SUBJECTIVE:   reports she feels pretty good except for her L hip - thinks it is likely arthritis.  went to MD - has bronchitis -  states they prescribed her medicine (antibiotic ) and an inhaler     still has her cold. Sometimes thinks it's getting better, sometimes thinks it's the same.  reports less pain with ADLs. Feels stronger - easier to do normal daily tasks. reports she couldn't sleep last night - only slept 1 hr. Went out to eat with her son and family last wk on 8/23 - went in his tall truck - flared up her knee getting out of it. States L knee only hurts if she moves the wrong way. Had john horses last night while sleeping     8/23 reports she woke up with back pain this am.  Reports her buttocks and post thighs were sore yesterday. States she saw Dr. Zohreh Sprague the kidney doc yesterday - he is overall happy with her numbers but watching the amount of protein in urine. States he said her ankle swelling is ok. 8/16 reports her post  thighs ache today. Reports she notices she can walk further without hurting - walked from handicapped spot at [facility] in to the  registration area without increased pain. Feels really tired - wonders if it is from her depression meds    8/11 reports her legs fel;t like jelly after last session but didn't hurt. Exercises are going well. Reports she cont with ankle swelling. Reports she called kidney doc but they didn't call her back. Reports her dtr says her walking looks better.    States she has 4 granddtrs:   Ivelisse (21), Kait (15 y/o),  [facility] [address] [facility] y/o- has autism, Nena Kaplan (10)      OBJECTIVE: 9/8 pt coughing t/o session  and states she wonders if she has bronchitis; PT encouraged her to see PCP  8/23 see  PT POC    RESTRICTIONS/PRECAUTIONS: kidney failure,  BACK PAIN, Kidney failure - sees Dr. Zohreh Sprague, smokes 1 pack/d  LUMBAR FUSION N/A 2/16/2021 LUMBAR4-LUMBAR5 TRANSVERSE LUMBAR INTERBODY FUSION ([id number], 09414, 88005, 67014, 49864, 72664, 93922, 62809) performed by Marcel Acosta MD at [facility]. [address]  8/20/15 L45 B/L decompressive hobson and excision of synovial cyst    ROTATOR CUFF REPAIR Right 2/98          Exercises/Interventions: flexion bias    Therapeutic Exercises (02710) Resistance / level Sets/sec Reps Notes   Nu step or bike  5.5min  Sci fit s=17, arms = 7   Nu step s=8, arms = 11 workload 4    9/8 O2 sats after seated stepper: 97%   IB  3 x 30 sec     HR/TR  2 10    Step stretches  B  HS  Hip flex    2 x 30 sec  2 x 30 sec                   Mat ex  Supine HS stretch  SKTC  Fig 4 piriformis stretch  -pull in  -push down     Low back stretch at bar- latissimus stretch  2x30\"sec            Therapeutic Activities (56327)       TG squats B squats   1x20  1x15 SBA/CGA on/off   Posture /body mechs REVIEW:  Instructed in neutral spine for bed mob      Diaphragmatic breathing in hooklying       Steps  -fwd  -lateral 6\"   1  1   12 B  12 B 8/23 mild L knee pain    Mini squat     Cone taps  1 15 B SBA   Step over and back - 1 nestor/yoga block  1 12 B CGA 0-1 UE light support          Neuromuscular Re-ed (75563)       Gliders   Hip AB  Hip ext  9/8 O2 sats after gliders: 95%   Stabilization progression, flexion bias  TrA iso     Supine SB         Seated SB       Balance  -rhomberg +horz head turn + EC  -semi tandem  -semi  tandem + horz head turn    Airex  Rhomberg  Rhomberg + horz head turns  -Rhomberg + EC + horiz head turns (light fingertips)  -semi tandem  semi tandem + EC  -semi tandem + EC + head turns  -marching      30 sec  30sec    30x     30 sec B  30 sec B  30 sec B  SBA/CGA   Wobble board  A/P wt shifts  A/P: DLS    2X54C  6F95\"  CGA/ Light B UE support   Multifidi walkouts                     Manual Intervention (16269)       STM/IASTM  Prn to R QL and R>L  lower quarter /trunk                                             Modalities:     Pt. Education:  9/20 encouraged pt to f/u with PCP re  chronic L hip pain - discuss re would she benefit form consult with ortho MD? And encouraged to cont with HEP   9/1  instructed in how to stretch calves and feet when gets john horses at night  8/30 review HEP.   Encourage pt to consider purchase of airex foam pad for HEP progression  8/16 d/w pt that side effects of depression meds can cause fatigue - encouraged pt to d/w her PCP re depression, coordination, kinesthetic sense, posture, motor skill, proprioception to assist with   [x] LE / lumbar: LE, proximal hip, and core control in self care, mobility, lifting, ambulation and eccentric single leg control. [] UE / cervical: cervical, scapular, scapulothoracic and UE control with self care, reaching, carrying, lifting, house/yardwork, driving, computer work.   [] (13430) Therapist is in constant attendance of 2 or more patients providing skilled therapy interventions, but not providing any significant amount of measurable one-on-one time to either patient, for improvements in  [] LE / lumbar: LE, proximal hip, and core control in self care, mobility, lifting, ambulation and eccentric single leg control. [] UE / cervical: cervical, scapular, scapulothoracic and UE control with self care, reaching, carrying, lifting, house/yardwork, driving, computer work.      NMR and Therapeutic Activities:    [x] (59422 or 45834) Provided verbal/tactile cueing for activities related to improving balance, coordination, kinesthetic sense, posture, motor skill, proprioception and motor activation to allow for proper function of   [x] LE: / Lumbar core, proximal hip and LE with self care and ADLs  [] UE / Cervical: cervical, postural, scapular, scapulothoracic and UE control with self care, carrying, lifting, driving, computer work.   [] (46850) Gait Re-education- Provided training and instruction to the patient for proper LE, core and proximal hip recruitment and positioning and eccentric body weight control with ambulation re-education including up and down stairs     Home Management Training / Self Care:  [] (97590) Provided self-care/home management training related to activities of daily living and compensatory training, and/or use of adaptive equipment for improvement with: ADLs and compensatory training, meal preparation, safety procedures and instruction in use of adaptive equipment, including bathing, grooming, dressing, personal hygiene, basic household cleaning and chores. Home Exercise Program:    [] (62189) Reviewed/Progressed HEP activities related to strengthening, flexibility, endurance, ROM of   [] LE / Lumbar: core, proximal hip and LE for functional self-care, mobility, lifting and ambulation/stair navigation   [] UE / Cervical: cervical, postural, scapular, scapulothoracic and UE control with self care, reaching, carrying, lifting, house/yardwork, driving, computer work  [] (68698)Reviewed/Progressed HEP activities related to improving balance, coordination, kinesthetic sense, posture, motor skill, proprioception of   [] LE: core, proximal hip and LE for self care, mobility, lifting, and ambulation/stair navigation    [] UE / Cervical: cervical, postural,  scapular, scapulothoracic and UE control with self care, reaching, carrying, lifting, house/yardwork, driving, computer work    Manual Treatments:  PROM / STM / Oscillations-Mobs:  G-I, II, III, IV (PA's, Inf., Post.)  [] (84224) Provided manual therapy to mobilize LE, proximal hip and/or LS spine soft tissue/joints for the purpose of modulating pain, promoting relaxation,  increasing ROM, reducing/eliminating soft tissue swelling/inflammation/restriction, improving soft tissue extensibility and allowing for proper ROM for normal function with   [] LE / lumbar: self care, mobility, lifting and ambulation. [] UE / Cervical: self care, reaching, carrying, lifting, house/yardwork, driving, computer work. Modalities:  [] (29109) Vasopneumatic compression: Utilized vasopneumatic compression to decrease edema / swelling for the purpose of improving mobility and quad tone / recruitment which will allow for increased overall function including but not limited to self-care, transfers, ambulation, and ascending / descending stairs.        Charges:  Timed Code Treatment Minutes: 41   Total Treatment Minutes: 41     [] EVAL - LOW (49410)   [] EVAL - MOD struggling with fatigue and agreeable to d/w her PCP. Report she has had multiple large losses this last year that contribute to depression    8/11 increasing balance and carlos of ex  8/9 good carlos of exercise, increasing carlos of balance ex. Pt reports she feels like she is steadier on her feet, but still has losses of balance    7/27 Pt tolerated initiation of TG squats and cone taps today. States that at time she has trouble lifting her feet at times with walking. Fatigue noted after about 6 reps of nestor step overs. Plan to continue to strengthen and stretch BLE as Pt tolerates. Treatment/Activity Tolerance:  [x] Patient able to complete tx [] Patient limited by fatigue  [] Patient limited by pain  [] Patient limited by other medical complications  [] Other:     Prognosis: [x] Good [] Fair  [] Poor    Patient Requires Follow-up: [x] Yes  [] No    Plan for next treatment session: gentle stabs and stretches with flexion bias. Manual therapy prn for myofascial pain. Normalize symmetry of gait. Posture and body mechs. PLAN:  DC with HEP    Frequency/Duration:  2 days per week for 4 Weeks:  Interventions:  [x]  Therapeutic exercise including:strength, ROM, flexibility  [x]  NMR activation and proprioception including postural re-education  [x]  Manual therapy as indicated to include: IASTM, STM, PROM, Gr I-IV mobilizations, manipulation. [x]  Modalities as needed that may include: thermal agents, E-stim, Biofeedback, US, iontophoresis as indicated  [x]  Patient education on joint protection, postural re-education, activity modification, progression of HEP. Aquatic therapy    PLAN: See eval. PT 2x / week for 6 weeks.    [x] Continue per plan of care [] Alter current plan (see comments)  [] Plan of care initiated [] Hold pending MD visit [] Discharge    Electronically signed by: Radha Win, PT , DPT     Note: If patient does not return for scheduled/ recommended follow up visits, this note will serve as a discharge from care along with most recent update on progress.

## 2022-10-06 ENCOUNTER — HOSPITAL ENCOUNTER (OUTPATIENT)
Age: 72
Discharge: HOME | End: 2022-10-06
Payer: MEDICARE

## 2022-10-06 DIAGNOSIS — R73.9 HYPERGLYCEMIA: ICD-10-CM

## 2022-10-06 DIAGNOSIS — E78.00 PURE HYPERCHOLESTEROLEMIA: ICD-10-CM

## 2022-10-06 DIAGNOSIS — E78.00 PURE HYPERCHOLESTEROLEMIA: Primary | ICD-10-CM

## 2022-10-06 LAB
A/G RATIO: 1.6 (ref 1.1–2.2)
ALBUMIN SERPL-MCNC: 4 G/DL (ref 3.4–5)
ALP BLD-CCNC: 47 U/L (ref 40–129)
ALT SERPL-CCNC: 12 U/L (ref 10–40)
ANION GAP SERPL CALCULATED.3IONS-SCNC: 13 MMOL/L (ref 3–16)
AST SERPL-CCNC: 19 U/L (ref 15–37)
BILIRUB SERPL-MCNC: 0.3 MG/DL (ref 0–1)
BUN BLDV-MCNC: 26 MG/DL (ref 7–20)
CALCIUM SERPL-MCNC: 10 MG/DL (ref 8.3–10.6)
CHLORIDE BLD-SCNC: 106 MMOL/L (ref 99–110)
CHOLESTEROL, FASTING: 159 MG/DL (ref 0–199)
CO2: 24 MMOL/L (ref 21–32)
CREAT SERPL-MCNC: 1.8 MG/DL (ref 0.6–1.2)
GFR AFRICAN AMERICAN: 33
GFR NON-AFRICAN AMERICAN: 28
GLUCOSE FASTING: 116 MG/DL (ref 70–99)
HDLC SERPL-MCNC: 58 MG/DL (ref 40–60)
LDL CHOLESTEROL CALCULATED: 72 MG/DL
POTASSIUM SERPL-SCNC: 4.6 MMOL/L (ref 3.5–5.1)
SODIUM BLD-SCNC: 143 MMOL/L (ref 136–145)
TOTAL PROTEIN: 6.5 G/DL (ref 6.4–8.2)
TRIGLYCERIDE, FASTING: 147 MG/DL (ref 0–150)
VLDLC SERPL CALC-MCNC: 29 MG/DL

## 2022-10-06 PROCEDURE — 83036 HEMOGLOBIN GLYCOSYLATED A1C: CPT

## 2022-10-06 PROCEDURE — 80053 COMPREHEN METABOLIC PANEL: CPT

## 2022-10-06 PROCEDURE — 36415 COLL VENOUS BLD VENIPUNCTURE: CPT

## 2022-10-06 PROCEDURE — 80061 LIPID PANEL: CPT

## 2022-10-07 LAB
ESTIMATED AVERAGE GLUCOSE: 108.3 MG/DL
HBA1C MFR BLD: 5.4 %

## 2022-10-11 ENCOUNTER — OFFICE VISIT (OUTPATIENT)
Dept: FAMILY MEDICINE CLINIC | Age: 72
End: 2022-10-11
Payer: MEDICARE

## 2022-10-11 VITALS
WEIGHT: 183 LBS | DIASTOLIC BLOOD PRESSURE: 86 MMHG | OXYGEN SATURATION: 97 % | SYSTOLIC BLOOD PRESSURE: 138 MMHG | HEART RATE: 78 BPM | HEIGHT: 61 IN | BODY MASS INDEX: 34.55 KG/M2 | TEMPERATURE: 97.3 F

## 2022-10-11 DIAGNOSIS — Z00.00 ENCOUNTER FOR ANNUAL WELLNESS VISIT (AWV) IN MEDICARE PATIENT: Primary | ICD-10-CM

## 2022-10-11 DIAGNOSIS — Z23 NEED FOR VACCINATION: ICD-10-CM

## 2022-10-11 DIAGNOSIS — Z12.31 ENCOUNTER FOR SCREENING MAMMOGRAM FOR MALIGNANT NEOPLASM OF BREAST: ICD-10-CM

## 2022-10-11 DIAGNOSIS — J42 CHRONIC BRONCHITIS, UNSPECIFIED CHRONIC BRONCHITIS TYPE (HCC): ICD-10-CM

## 2022-10-11 PROCEDURE — G0439 PPPS, SUBSEQ VISIT: HCPCS | Performed by: NURSE PRACTITIONER

## 2022-10-11 PROCEDURE — 90471 IMMUNIZATION ADMIN: CPT | Performed by: NURSE PRACTITIONER

## 2022-10-11 PROCEDURE — 3017F COLORECTAL CA SCREEN DOC REV: CPT | Performed by: NURSE PRACTITIONER

## 2022-10-11 PROCEDURE — G8484 FLU IMMUNIZE NO ADMIN: HCPCS | Performed by: NURSE PRACTITIONER

## 2022-10-11 PROCEDURE — 1123F ACP DISCUSS/DSCN MKR DOCD: CPT | Performed by: NURSE PRACTITIONER

## 2022-10-11 PROCEDURE — 90715 TDAP VACCINE 7 YRS/> IM: CPT | Performed by: NURSE PRACTITIONER

## 2022-10-11 RX ORDER — METHYLPREDNISOLONE 4 MG/1
TABLET ORAL
Qty: 1 KIT | Refills: 0 | Status: SHIPPED | OUTPATIENT
Start: 2022-10-11 | End: 2022-10-17

## 2022-10-11 ASSESSMENT — PATIENT HEALTH QUESTIONNAIRE - PHQ9
SUM OF ALL RESPONSES TO PHQ QUESTIONS 1-9: 0
1. LITTLE INTEREST OR PLEASURE IN DOING THINGS: 0
SUM OF ALL RESPONSES TO PHQ QUESTIONS 1-9: 0
2. FEELING DOWN, DEPRESSED OR HOPELESS: 0
SUM OF ALL RESPONSES TO PHQ QUESTIONS 1-9: 0
SUM OF ALL RESPONSES TO PHQ9 QUESTIONS 1 & 2: 0
SUM OF ALL RESPONSES TO PHQ QUESTIONS 1-9: 0

## 2022-10-11 NOTE — PROGRESS NOTES
[facility] VISIT    Patient is here for their Medicare Annual Wellness Visit   Pt being followed by Dr. Esha Warner, Nephrology for stage 4 CKD, reviewed recent labs. Recently completed course of Augmentin for bronchitis, continues wheezing, tobacco dependence. No new concerns, due for basic HM. Last eye exam: 2019  Last dental exam: 10/2022  Exercise: physical therapy 3x a week  Diet: well balanced    How would you rate your overall health? : Fair        Fall Risk 10/11/2022 6/1/2022 8/19/2020 8/5/2019 7/30/2018   2 or more falls in past year? no no no no no   Fall with injury in past year? no no no no no       PHQ Scores 10/11/2022 6/1/2022 2/2/2021 8/19/2020 8/5/2019 7/30/2018   PHQ2 Score 0 0 0 2 0 0   PHQ9 Score 0 0 0 2 0 0       Do you always wear a seat belt in the car?: Yes      Have you noted any problems with hearing?: No  Have you noted any vision problems?: No  Do you have concerns about your sexual health?: no  In the past month how much has pain been an issue for you?:  Somewhat  In the past month have you had issues with anxiety, loneliness, irritability or fatigue:  A little bit    Do you take opioid medications even sometimes? No (if using assess risk and whether other treatments would be beneficial)    Living Will: No,   Patient declined      Healthcare Decision Maker: Today we documented Decision Maker(s) consistent with Legal Next of Kin hierarchy. Who lives at home with you: no one  Do you have any services coming to your home (meals on wheels, home health, etc) ? : no      Do you need help with:  Using the phone:  No  Bathing: No  Dressing:  No  Toileting: No  Transportation:  No  Shopping: No  Preparing meals: No  Housework/Laundry: No  Medications: No  Money management: No    Does your home have:  Unsecured throw rugs: No  Grab bars in bathroom: Yes  Walk in shower: No  Seat in shower: No  Lit pathways for night (nightlights): Yes    Memory:  Have you or anyone close to you expressed concerns about your memory: No    Knows:  Month: Yes  Day: Yes  Year: Yes  Day of Week: Yes  Able to Recall (tree, fork, ball) : Yes    Patient history:   Patient's medications, allergies, past medical, surgical, social and family histories were reviewed and updated in the EHR under History. Social History     Substance and Sexual Activity   Alcohol Use No    Alcohol/week: 0.0 standard drinks       Social History     Substance and Sexual Activity   Drug Use No       Social History     Tobacco Use   Smoking Status Every Day    Packs/day: 1.00    Years: 40.00    Pack years: 40.00    Types: Cigarettes   Smokeless Tobacco Never           Care Team:  Patient's list of care team members was updated in EHR under the Snap Shot. Nephrolology- Dr. Paulette Mar  Opthamology- Dr. Ivan Mchugh  PT    Immunizations: Reviewed with patient. Covid booster  TDAP  Shingrix vaccination    Health Maintenance Due   Topic Date Due    DTaP/Tdap/Td vaccine (1 - Tdap) Never done    Shingles vaccine (1 of 2) Never done    Colorectal Cancer Screen  02/15/2022    COVID-19 Vaccine (4 - Booster for Pfizer series) 06/08/2022    Flu vaccine (1) Never done    Breast cancer screen  08/21/2022       CHRONIC CONDITIONS / ACUTE COMPLAINTS     Essential hypertension  Impaired fasting glucose  Acquired hypothyroidism  JAGDEEP (acute kidney injury) ([facility] [address]  Generalized osteoarthrosis, involving multiple site  Dupuytren's contracture,mild  Synovial cyst of lumbar spine  Spondylolisthesis at L4-L5 level  Pure hypercholesterolemia  Insomnia  Tobacco dependence  Back pain  Spinal stenosis of lumbar region      Physical Exam:    Body mass index is 34.58 kg/m².   Vitals:    10/11/22 0900   BP: 138/86   Site: Left Upper Arm   Position: Sitting   Cuff Size: Medium Adult   Pulse: 78   Temp: 97.3 °F (36.3 °C)   TempSrc: Infrared   SpO2: 97%   Weight: 183 lb (83 kg)   Height: 5' 1\" (1.549 m)     Wt Readings from Last 3 Encounters:   10/11/22 183 lb (83 kg) 09/13/22 187 lb (84.8 kg)   08/22/22 186 lb (84.4 kg)       GENERAL:Alert and oriented x 4 NAD, no acute distress, well hydrated, well developed. LUNG:bilateral wheezes  CV: Normal heart sounds, regular rate and rhythm without murmurs  EXTREMETY: no loss of hair, no edema, normal pedal pulses bilaterally    Was the timed get up and go unsteady or longer than 20 seconds: Yes    Vision Screen for Initial Exam: no    EKG for Initial Exam at 71 (): no    AAA U/S screen for men 65-75 who smoked (): not applicable    Assessment/Plan:    Donna Linda was seen today for medicare awv. Diagnoses and all orders for this visit:    Encounter for annual wellness visit (AWV) in Medicare patient  Advised routine dental and vision  Increased exercise, healthy diet and weight loss  Advised copy living will  Follow up Dr. Miryam Villa  FIT test provided  Advised shingrix vaccination  Covid booster    Encounter for screening mammogram for malignant neoplasm of breast  -     GWENDOLYN DIGITAL SCREEN W OR WO CAD BILATERAL; Future    Need for vaccination  -     Tdap, BOOSTRIX, (age 8 yrs+), IM  Administered    Chronic bronchitis, unspecified chronic bronchitis type ([facility] [address]  -     methylPREDNISolone (MEDROL DOSEPACK) 4 MG tablet; Take by mouth. Advised smoking cessation        Return in about 6 months (around 4/11/2023) for hypertension re-check.

## 2022-10-18 DIAGNOSIS — E03.9 ACQUIRED HYPOTHYROIDISM: ICD-10-CM

## 2022-10-18 RX ORDER — LEVOTHYROXINE SODIUM 0.03 MG/1
25 TABLET ORAL DAILY
Qty: 90 TABLET | Refills: 0 | Status: SHIPPED | OUTPATIENT
Start: 2022-10-18

## 2022-10-18 NOTE — TELEPHONE ENCOUNTER
Medication:   Requested Prescriptions     Pending Prescriptions Disp Refills    levothyroxine (SYNTHROID) 25 MCG tablet 90 tablet 0     Sig: Take 1 tablet by mouth Daily      Last Filled:     Patient Phone Number: [phone] (home)     Last appt: 10/11/2022   Next appt: 4/12/2023    Last OARRS:   RX Monitoring 2/17/2021   Periodic Controlled Substance Monitoring Possible medication side effects, risk of tolerance/dependence & alternative treatments discussed. ;No signs of potential drug abuse or diversion identified.      PDMP Monitoring:    Last PDMP Bee Schmid as Reviewed [facility]):  Review User Review Instant Review Result   Gardenia Mccord 2/17/2021 11:53 AM Reviewed PDMP [1]     Preferred Pharmacy:   [facility] [id number] - [address] [place]ya Tovar  [facility] [id number]  Phone: [phone] Fax: [phone]

## 2022-10-28 ENCOUNTER — TELEPHONE (OUTPATIENT)
Dept: FAMILY MEDICINE CLINIC | Age: 72
End: 2022-10-28

## 2022-10-28 DIAGNOSIS — E78.00 PURE HYPERCHOLESTEROLEMIA: ICD-10-CM

## 2022-10-28 NOTE — TELEPHONE ENCOUNTER
Patient calling in to request a medication refill. fenofibrate (TRIGLIDE) 160 MG tablet [[id number]]     Order Details  Dose, Route, Frequency: As Directed   Dispense Quantity: 90 tablet Refills: 1            ezetimibe (ZETIA) 10 MG tablet [[id number]]     Order Details  Dose, Route, Frequency: As Directed   Dispense Quantity: 90 tablet Refills: 3          Sig: TAKE 1 TABLET BY MOUTH EVERY DAY           [facility] [id number] - [address] [place], [facility] [address]   Phone:  [phone]  Fax:  [phone]      Thank you.

## 2022-10-31 DIAGNOSIS — E78.00 PURE HYPERCHOLESTEROLEMIA: ICD-10-CM

## 2022-10-31 RX ORDER — FENOFIBRATE 160 MG/1
TABLET ORAL
Qty: 90 TABLET | Refills: 1 | Status: SHIPPED | OUTPATIENT
Start: 2022-10-31

## 2022-10-31 RX ORDER — EZETIMIBE 10 MG/1
TABLET ORAL
Qty: 90 TABLET | Refills: 3 | Status: SHIPPED | OUTPATIENT
Start: 2022-10-31

## 2022-11-01 RX ORDER — EZETIMIBE 10 MG/1
TABLET ORAL
Qty: 90 TABLET | Refills: 3 | OUTPATIENT
Start: 2022-11-01

## 2022-11-09 ENCOUNTER — TELEPHONE (OUTPATIENT)
Dept: CASE MANAGEMENT | Age: 72
End: 2022-11-09

## 2022-12-21 DIAGNOSIS — E55.9 VITAMIN D DEFICIENCY: ICD-10-CM

## 2022-12-21 DIAGNOSIS — I10 ESSENTIAL HYPERTENSION: ICD-10-CM

## 2022-12-21 DIAGNOSIS — F17.200 SMOKER: ICD-10-CM

## 2022-12-21 DIAGNOSIS — N18.32 STAGE 3B CHRONIC KIDNEY DISEASE (HCC): ICD-10-CM

## 2022-12-21 DIAGNOSIS — E87.5 HYPERKALEMIA: ICD-10-CM

## 2022-12-21 LAB
ANION GAP SERPL CALCULATED.3IONS-SCNC: 10 MMOL/L (ref 3–16)
BUN BLDV-MCNC: 27 MG/DL (ref 7–20)
CALCIUM SERPL-MCNC: 9.4 MG/DL (ref 8.3–10.6)
CHLORIDE BLD-SCNC: 104 MMOL/L (ref 99–110)
CO2: 26 MMOL/L (ref 21–32)
CREAT SERPL-MCNC: 1.8 MG/DL (ref 0.6–1.2)
GFR SERPL CREATININE-BSD FRML MDRD: 29 ML/MIN/{1.73_M2}
GLUCOSE BLD-MCNC: 166 MG/DL (ref 70–99)
POTASSIUM SERPL-SCNC: 4.4 MMOL/L (ref 3.5–5.1)
SODIUM BLD-SCNC: 140 MMOL/L (ref 136–145)

## 2022-12-22 LAB
CREATININE URINE: 174.1 MG/DL (ref 28–259)
MICROALBUMIN UR-MCNC: 190.7 MG/DL
MICROALBUMIN/CREAT UR-RTO: 1095.3 MG/G (ref 0–30)

## 2023-01-09 ENCOUNTER — TELEPHONE (OUTPATIENT)
Dept: CASE MANAGEMENT | Age: 73
End: 2023-01-09

## 2023-01-13 DIAGNOSIS — E03.9 ACQUIRED HYPOTHYROIDISM: ICD-10-CM

## 2023-01-13 RX ORDER — LEVOTHYROXINE SODIUM 0.03 MG/1
TABLET ORAL
Qty: 90 TABLET | Refills: 0 | Status: SHIPPED | OUTPATIENT
Start: 2023-01-13

## 2023-01-13 NOTE — TELEPHONE ENCOUNTER
Medication:   Requested Prescriptions     Pending Prescriptions Disp Refills    levothyroxine (SYNTHROID) 25 MCG tablet [Pharmacy Med Name: LEVOTHYROXINE 25 MCG TABLET] 90 tablet 0     Sig: TAKE ONE TABLET BY MOUTH DAILY      Last Filled:     Patient Phone Number: [phone] (home)     Last appt: 10/11/2022   Next appt: 4/12/2023    Last OARRS:   RX Monitoring 2/17/2021   Periodic Controlled Substance Monitoring Possible medication side effects, risk of tolerance/dependence & alternative treatments discussed. ;No signs of potential drug abuse or diversion identified.      PDMP Monitoring:    Last PDMP Jazmin Gonzalez as Reviewed [facility]):  Review User Review Instant Review Result   Melody Gonzalez 2/17/2021 11:53 AM Reviewed PDMP [1]     Preferred Pharmacy:   [facility] [id number] - 10 [address]  [id number] [place] [address],[address] [address]  Phone: [phone] Fax: [phone]

## 2023-01-18 ENCOUNTER — TELEPHONE (OUTPATIENT)
Dept: FAMILY MEDICINE CLINIC | Age: 73
End: 2023-01-18

## 2023-01-18 NOTE — TELEPHONE ENCOUNTER
Pt called and states that her daughter believes she has Parkinson's, and she would like a referral to [address] [place] [address] so she can make an appointment  Fax # [phone]    Please advise

## 2023-01-19 NOTE — TELEPHONE ENCOUNTER
Please call Pt and schedule appointment for 15 mins to review for referral to neurology. Unable to provide without being seen.

## 2023-01-22 DIAGNOSIS — G47.00 INSOMNIA, UNSPECIFIED TYPE: ICD-10-CM

## 2023-01-22 DIAGNOSIS — I10 ESSENTIAL HYPERTENSION: ICD-10-CM

## 2023-01-23 RX ORDER — TRAZODONE HYDROCHLORIDE 50 MG/1
TABLET ORAL
Qty: 90 TABLET | Refills: 1 | Status: SHIPPED | OUTPATIENT
Start: 2023-01-23

## 2023-02-01 ENCOUNTER — OFFICE VISIT (OUTPATIENT)
Dept: FAMILY MEDICINE CLINIC | Age: 73
End: 2023-02-01
Payer: MEDICARE

## 2023-02-01 VITALS
HEART RATE: 72 BPM | BODY MASS INDEX: 35.33 KG/M2 | SYSTOLIC BLOOD PRESSURE: 128 MMHG | OXYGEN SATURATION: 99 % | WEIGHT: 187 LBS | DIASTOLIC BLOOD PRESSURE: 72 MMHG | TEMPERATURE: 97.2 F

## 2023-02-01 DIAGNOSIS — R26.89 IMBALANCE: Primary | ICD-10-CM

## 2023-02-01 DIAGNOSIS — R41.3 MEMORY CHANGE: ICD-10-CM

## 2023-02-01 DIAGNOSIS — R26.9 GAIT ABNORMALITY: ICD-10-CM

## 2023-02-01 PROCEDURE — 3017F COLORECTAL CA SCREEN DOC REV: CPT | Performed by: NURSE PRACTITIONER

## 2023-02-01 PROCEDURE — G8417 CALC BMI ABV UP PARAM F/U: HCPCS | Performed by: NURSE PRACTITIONER

## 2023-02-01 PROCEDURE — 4004F PT TOBACCO SCREEN RCVD TLK: CPT | Performed by: NURSE PRACTITIONER

## 2023-02-01 PROCEDURE — G8427 DOCREV CUR MEDS BY ELIG CLIN: HCPCS | Performed by: NURSE PRACTITIONER

## 2023-02-01 PROCEDURE — 3078F DIAST BP <80 MM HG: CPT | Performed by: NURSE PRACTITIONER

## 2023-02-01 PROCEDURE — G8399 PT W/DXA RESULTS DOCUMENT: HCPCS | Performed by: NURSE PRACTITIONER

## 2023-02-01 PROCEDURE — 1090F PRES/ABSN URINE INCON ASSESS: CPT | Performed by: NURSE PRACTITIONER

## 2023-02-01 PROCEDURE — 3074F SYST BP LT 130 MM HG: CPT | Performed by: NURSE PRACTITIONER

## 2023-02-01 PROCEDURE — 99213 OFFICE O/P EST LOW 20 MIN: CPT | Performed by: NURSE PRACTITIONER

## 2023-02-01 PROCEDURE — G8484 FLU IMMUNIZE NO ADMIN: HCPCS | Performed by: NURSE PRACTITIONER

## 2023-02-01 PROCEDURE — 1123F ACP DISCUSS/DSCN MKR DOCD: CPT | Performed by: NURSE PRACTITIONER

## 2023-02-01 SDOH — ECONOMIC STABILITY: HOUSING INSECURITY
IN THE LAST 12 MONTHS, WAS THERE A TIME WHEN YOU DID NOT HAVE A STEADY PLACE TO SLEEP OR SLEPT IN A SHELTER (INCLUDING NOW)?: NO

## 2023-02-01 SDOH — ECONOMIC STABILITY: FOOD INSECURITY: WITHIN THE PAST 12 MONTHS, YOU WORRIED THAT YOUR FOOD WOULD RUN OUT BEFORE YOU GOT MONEY TO BUY MORE.: NEVER TRUE

## 2023-02-01 SDOH — ECONOMIC STABILITY: INCOME INSECURITY: HOW HARD IS IT FOR YOU TO PAY FOR THE VERY BASICS LIKE FOOD, HOUSING, MEDICAL CARE, AND HEATING?: NOT VERY HARD

## 2023-02-01 SDOH — ECONOMIC STABILITY: FOOD INSECURITY: WITHIN THE PAST 12 MONTHS, THE FOOD YOU BOUGHT JUST DIDN'T LAST AND YOU DIDN'T HAVE MONEY TO GET MORE.: NEVER TRUE

## 2023-02-01 ASSESSMENT — PATIENT HEALTH QUESTIONNAIRE - PHQ9
7. TROUBLE CONCENTRATING ON THINGS, SUCH AS READING THE NEWSPAPER OR WATCHING TELEVISION: 0
6. FEELING BAD ABOUT YOURSELF - OR THAT YOU ARE A FAILURE OR HAVE LET YOURSELF OR YOUR FAMILY DOWN: 2
SUM OF ALL RESPONSES TO PHQ9 QUESTIONS 1 & 2: 1
3. TROUBLE FALLING OR STAYING ASLEEP: 3
5. POOR APPETITE OR OVEREATING: 1
SUM OF ALL RESPONSES TO PHQ QUESTIONS 1-9: 9
SUM OF ALL RESPONSES TO PHQ QUESTIONS 1-9: 9
4. FEELING TIRED OR HAVING LITTLE ENERGY: 1
8. MOVING OR SPEAKING SO SLOWLY THAT OTHER PEOPLE COULD HAVE NOTICED. OR THE OPPOSITE, BEING SO FIGETY OR RESTLESS THAT YOU HAVE BEEN MOVING AROUND A LOT MORE THAN USUAL: 1
SUM OF ALL RESPONSES TO PHQ QUESTIONS 1-9: 9
1. LITTLE INTEREST OR PLEASURE IN DOING THINGS: 1
2. FEELING DOWN, DEPRESSED OR HOPELESS: 0
SUM OF ALL RESPONSES TO PHQ QUESTIONS 1-9: 9
9. THOUGHTS THAT YOU WOULD BE BETTER OFF DEAD, OR OF HURTING YOURSELF: 0
10. IF YOU CHECKED OFF ANY PROBLEMS, HOW DIFFICULT HAVE THESE PROBLEMS MADE IT FOR YOU TO DO YOUR WORK, TAKE CARE OF THINGS AT HOME, OR GET ALONG WITH OTHER PEOPLE: 0

## 2023-02-01 NOTE — PROGRESS NOTES
Melvin Kelly  : 1950  Encounter date: 2023    This jaun 75 y.o. female who presents with  Chief Complaint   Patient presents with    Referral - General     Possible Parkinsons, wants referral to [facility] at        History of present illness:    HPI Pt is 75year old female with  mother for concerns with possible Parkinson's like behaviors and movements. Possible FH. Concerns include shuffling gait, chronic pain and imbalance. Memory disturbances with both long and short term memory. Denies tremors or loss of strength. Pt with irregular sleeping habits, awake at night, sleep during day. Recent labs reviewed. Discussed vitamin B12 and vitamin D. Family member has neurologist would like referred to rule out diagnostic causes for worsened symptoms.     Current Outpatient Medications on File Prior to Visit   Medication Sig Dispense Refill    traZODone (DESYREL) 50 MG tablet TAKE ONE TABLET BY MOUTH ONCE NIGHTLY AS NEEDED FOR SLEEP 90 tablet 1    verapamil (CALAN SR) 120 MG extended release tablet TAKE ONE TABLET BY MOUTH DAILY 90 tablet 1    levothyroxine (SYNTHROID) 25 MCG tablet TAKE ONE TABLET BY MOUTH DAILY 90 tablet 0    empagliflozin (JARDIANCE) 10 MG tablet Take 1 tablet by mouth daily 30 tablet 3    dapagliflozin (FARXIGA) 5 MG tablet Take 1 tablet by mouth every morning 90 tablet 1    fenofibrate (TRIGLIDE) 160 MG tablet TAKE ONE TABLET BY MOUTH DAILY 90 tablet 1    ezetimibe (ZETIA) 10 MG tablet TAKE 1 TABLET BY MOUTH EVERY DAY 90 tablet 3    diclofenac sodium (VOLTAREN) 1 % GEL Apply 2 g topically 4 times daily 100 g 1    lisinopril (PRINIVIL;ZESTRIL) 20 MG tablet TAKE 1 TABLET BY MOUTH DAILY 90 tablet 3    vitamin D3 (CHOLECALCIFEROL) 25 MCG (1000 UT) TABS tablet Take 1 tablet by mouth daily 90 tablet 1    glucose monitoring kit (FREESTYLE) monitoring kit 1 kit by Does not apply route daily 1 kit 0    Lancets (BD LANCET ULTRAFINE 30G) MISC 1 Device by In Vitro route every morning CHECK BS EVERY MORNING FASTING 100 each 3    blood glucose monitor strips Test blood sugar every morning fasting. 100 strip 3    Ascorbic Acid (VITAMIN C CR) 1000 MG TBCR Take by mouth      vitamin E 400 UNIT capsule Take 400 Units by mouth daily       No current facility-administered medications on file prior to visit. Allergies   Allergen Reactions    Acetaminophen      Other reaction(s): Stomach Pain    Hydrocodone      Other reaction(s): Stomach Pain    Influenza Virus Vacc Split Pf      bronchitis    Pravastatin Sodium [Pravastatin Sodium] Other (See Comments)     Elevated liver enzymes.      Vicodin [Hydrocodone-Acetaminophen]      Nausea     Past Medical History:   Diagnosis Date    Hyperlipidemia     Hypertension     Impaired fasting glucose     Insomnia, unspecified     Osteoarthritis     Thyroid disease       Past Surgical History:   Procedure Laterality Date    CARPAL TUNNEL RELEASE Left [id number]    lemus    CHOLECYSTECTOMY      LUMBAR FUSION N/A 2/16/2021    LUMBAR4-LUMBAR5 TRANSVERSE LUMBAR INTERBODY FUSION (97 [id number], 34539, 69852, 90701, 13952, 78739, 28567, 59034) performed by Nichelle Christensen MD at [facility]  8/20/15    L45 B/L decompressive hobson and excision of synovial cyst    ROTATOR CUFF REPAIR Right 2/98    Brian Cortez       Family History   Problem Relation Age of Onset    Cancer Father     Cancer Brother       Social History     Tobacco Use    Smoking status: Every Day     Packs/day: 1.00     Years: 50.00     Pack years: 50.00     Types: Cigarettes    Smokeless tobacco: Never   Substance Use Topics    Alcohol use: No     Alcohol/week: 0.0 standard drinks        Review of Systems    Objective:    /72 (Site: Right Upper Arm, Position: Sitting, Cuff Size: Large Adult)   Pulse 72   Temp 97.2 °F (36.2 °C) (Infrared)   Wt 187 lb (84.8 kg)   SpO2 99%   BMI 35.33 kg/m²   Weight: 187 lb (84.8 kg)     BP Readings from Last 3 Encounters:   02/01/23 128/72   12/27/22 138/82 10/11/22 138/86     Wt Readings from Last 3 Encounters:   02/01/23 187 lb (84.8 kg)   12/27/22 186 lb (84.4 kg)   10/11/22 183 lb (83 kg)     BMI Readings from Last 3 Encounters:   02/01/23 35.33 kg/m²   12/27/22 35.14 kg/m²   10/11/22 34.58 kg/m²       Physical Exam  Vitals reviewed. Constitutional:       Appearance: Normal appearance. She is well-developed. HENT:      Right Ear: Tympanic membrane normal.      Left Ear: Tympanic membrane normal.   Cardiovascular:      Rate and Rhythm: Normal rate and regular rhythm. Pulses: Normal pulses. Heart sounds: Normal heart sounds. No murmur heard. Pulmonary:      Effort: Pulmonary effort is normal.      Breath sounds: Normal breath sounds. Skin:     General: Skin is warm and dry. Neurological:      Mental Status: She is alert and oriented to person, place, and time. Motor: Weakness present. Gait: Gait abnormal.   Psychiatric:         Mood and Affect: Mood normal.         Behavior: Behavior normal.         Thought Content: Thought content normal.         Judgment: Judgment normal.       Assessment/Plan    1. Imbalance  Advised safety concerns  - External Referral To Neurology    2. Gait abnormality  Discussed PT, home exercises  Strength training  - External Referral To Neurology    3. Memory change  Discussed mental exercises  Limit screen time  - External Referral To Neurology    Return in about 2 months (around 4/1/2023) for medication re-check. This dictation was generated by voice recognition computer software. Although all attempts are made to edit the dictation for accuracy, there may be errors in the transcription that are not intended.

## 2023-02-07 ENCOUNTER — TELEPHONE (OUTPATIENT)
Dept: CASE MANAGEMENT | Age: 73
End: 2023-02-07

## 2023-02-07 DIAGNOSIS — Z87.891 HISTORY OF TOBACCO ABUSE: Primary | ICD-10-CM

## 2023-02-07 NOTE — TELEPHONE ENCOUNTER
Pt due for annual Lung Screening CT. Pt has an active order for the test. Called pt to assist with scheduling. No answer- left message that PCP office placed an order for her annual screen and to call [phone] to schedule. Reminder letter sent.

## 2023-02-07 NOTE — TELEPHONE ENCOUNTER
Nora José APRN, CNP-    Patient due for annual CT Lung Screening. If you would like patient to have screening, please place order for CT Lung Screening ([facility] [address]). I will contact patient to help schedule after order entered.     Thanks,  Zac Astorga, RN  Lung Navigator  [facility]  [address] [place] [address]  [phone]

## 2023-03-23 ENCOUNTER — TELEPHONE (OUTPATIENT)
Dept: CASE MANAGEMENT | Age: 73
End: 2023-03-23

## 2023-04-12 PROBLEM — N18.32 STAGE 3B CHRONIC KIDNEY DISEASE (HCC): Status: ACTIVE | Noted: 2023-04-12

## 2023-04-14 DIAGNOSIS — E03.9 ACQUIRED HYPOTHYROIDISM: ICD-10-CM

## 2023-04-17 RX ORDER — LEVOTHYROXINE SODIUM 0.03 MG/1
25 TABLET ORAL DAILY
Qty: 90 TABLET | Refills: 1 | Status: SHIPPED | OUTPATIENT
Start: 2023-04-17

## 2023-05-04 DIAGNOSIS — N18.32 STAGE 3B CHRONIC KIDNEY DISEASE (HCC): ICD-10-CM

## 2023-05-04 DIAGNOSIS — I10 ESSENTIAL HYPERTENSION: ICD-10-CM

## 2023-05-04 LAB
ANION GAP SERPL CALCULATED.3IONS-SCNC: 10 MMOL/L (ref 3–16)
BUN SERPL-MCNC: 30 MG/DL (ref 7–20)
CALCIUM SERPL-MCNC: 9.5 MG/DL (ref 8.3–10.6)
CHLORIDE SERPL-SCNC: 105 MMOL/L (ref 99–110)
CO2 SERPL-SCNC: 25 MMOL/L (ref 21–32)
CREAT SERPL-MCNC: 2.3 MG/DL (ref 0.6–1.2)
GFR SERPLBLD CREATININE-BSD FMLA CKD-EPI: 22 ML/MIN/{1.73_M2}
GLUCOSE SERPL-MCNC: 129 MG/DL (ref 70–99)
POTASSIUM SERPL-SCNC: 4.9 MMOL/L (ref 3.5–5.1)
SODIUM SERPL-SCNC: 140 MMOL/L (ref 136–145)

## 2023-07-18 DIAGNOSIS — I10 ESSENTIAL HYPERTENSION: ICD-10-CM

## 2023-08-01 DIAGNOSIS — E78.00 PURE HYPERCHOLESTEROLEMIA: ICD-10-CM

## 2023-08-01 RX ORDER — FENOFIBRATE 160 MG/1
TABLET ORAL
Qty: 90 TABLET | Refills: 1 | Status: SHIPPED | OUTPATIENT
Start: 2023-08-01

## 2023-08-24 ENCOUNTER — OFFICE VISIT (OUTPATIENT)
Dept: FAMILY MEDICINE CLINIC | Age: 73
End: 2023-08-24

## 2023-08-24 VITALS — DIASTOLIC BLOOD PRESSURE: 86 MMHG | HEART RATE: 66 BPM | SYSTOLIC BLOOD PRESSURE: 142 MMHG

## 2023-08-24 DIAGNOSIS — E03.9 ACQUIRED HYPOTHYROIDISM: ICD-10-CM

## 2023-08-24 DIAGNOSIS — G47.00 INSOMNIA, UNSPECIFIED TYPE: Primary | ICD-10-CM

## 2023-08-24 DIAGNOSIS — E78.00 PURE HYPERCHOLESTEROLEMIA: Primary | ICD-10-CM

## 2023-08-24 DIAGNOSIS — I10 ESSENTIAL HYPERTENSION: ICD-10-CM

## 2023-08-24 DIAGNOSIS — E55.9 VITAMIN D INSUFFICIENCY: ICD-10-CM

## 2023-08-24 DIAGNOSIS — R73.01 IMPAIRED FASTING GLUCOSE: ICD-10-CM

## 2023-08-24 RX ORDER — AMITRIPTYLINE HYDROCHLORIDE 10 MG/1
10 TABLET, FILM COATED ORAL NIGHTLY
Qty: 90 TABLET | Refills: 0 | Status: SHIPPED | OUTPATIENT
Start: 2023-08-24

## 2023-08-24 RX ORDER — ERGOCALCIFEROL 1.25 MG/1
50000 CAPSULE ORAL WEEKLY
COMMUNITY

## 2023-08-24 RX ORDER — PNV NO.95/FERROUS FUM/FOLIC AC 28MG-0.8MG
TABLET ORAL
COMMUNITY

## 2023-08-24 NOTE — PROGRESS NOTES
[address]  : 1950  Encounter date: 2023    This jaun 75 y.o. female who presents with  Chief Complaint   Patient presents with    Leg Swelling     Large lump on LLE, no pain, no pitting edema. No other injuries x3 wks. Medication Problem     Pt was wanting to know if taking any of her medication that she take in the PM could be keeping her awake at night and if she should start taking them in the AM.       History of present illness:    HPI Pt is 75year old female with R sided LE swelling, NL anatomy, bilaterally same. Pt also reports not taking medications as ordered, has been taking all medications at night, including BP and levothyroxine. Pt reports not sleeping well, falls asleep near 12 and waking at 3 am with difficulty falling back asleep. Pt reports little exercise. Pt is due for labs and AWV.     Current Outpatient Medications on File Prior to Visit   Medication Sig Dispense Refill    ergocalciferol (ERGOCALCIFEROL) 1.25 MG (37720 UT) capsule Take 1 capsule by mouth once a week      Omega-3 Fatty Acids (FISH OIL OMEGA-3) 1000 MG CAPS Take by mouth      fenofibrate (TRIGLIDE) 160 MG tablet TAKE ONE TABLET BY MOUTH DAILY 90 tablet 1    verapamil (CALAN SR) 120 MG extended release tablet TAKE ONE TABLET BY MOUTH DAILY 90 tablet 0    empagliflozin (JARDIANCE) 10 MG tablet Take 1 tablet by mouth daily 30 tablet 2    metoprolol succinate (TOPROL XL) 25 MG extended release tablet Take 1 tablet by mouth daily 90 tablet 1    lisinopril (PRINIVIL;ZESTRIL) 20 MG tablet TAKE ONE TABLET BY MOUTH DAILY 90 tablet 3    levothyroxine (SYNTHROID) 25 MCG tablet Take 1 tablet by mouth daily 90 tablet 1    ezetimibe (ZETIA) 10 MG tablet TAKE 1 TABLET BY MOUTH EVERY DAY 90 tablet 3    vitamin D3 (CHOLECALCIFEROL) 25 MCG (1000 UT) TABS tablet Take 1 tablet by mouth daily 90 tablet 1    glucose monitoring kit (FREESTYLE) monitoring kit 1 kit by Does not apply route daily 1 kit 0    Lancets (BD LANCET

## 2023-09-08 DIAGNOSIS — N18.32 STAGE 3B CHRONIC KIDNEY DISEASE (HCC): ICD-10-CM

## 2023-09-08 DIAGNOSIS — I10 ESSENTIAL HYPERTENSION: ICD-10-CM

## 2023-09-08 LAB
ANION GAP SERPL CALCULATED.3IONS-SCNC: 7 MMOL/L (ref 3–16)
BUN SERPL-MCNC: 25 MG/DL (ref 7–20)
CALCIUM SERPL-MCNC: 9.9 MG/DL (ref 8.3–10.6)
CHLORIDE SERPL-SCNC: 108 MMOL/L (ref 99–110)
CO2 SERPL-SCNC: 27 MMOL/L (ref 21–32)
CREAT SERPL-MCNC: 2.1 MG/DL (ref 0.6–1.2)
CREAT UR-MCNC: 173.5 MG/DL (ref 28–259)
GFR SERPLBLD CREATININE-BSD FMLA CKD-EPI: 24 ML/MIN/{1.73_M2}
GLUCOSE SERPL-MCNC: 119 MG/DL (ref 70–99)
POTASSIUM SERPL-SCNC: 4.9 MMOL/L (ref 3.5–5.1)
PROT UR-MCNC: 119 MG/DL
PROT/CREAT UR-RTO: 0.7 MG/DL
SODIUM SERPL-SCNC: 142 MMOL/L (ref 136–145)

## 2023-10-15 DIAGNOSIS — E03.9 ACQUIRED HYPOTHYROIDISM: ICD-10-CM

## 2023-10-15 DIAGNOSIS — I10 ESSENTIAL HYPERTENSION: ICD-10-CM

## 2023-10-16 RX ORDER — LEVOTHYROXINE SODIUM 0.03 MG/1
25 TABLET ORAL DAILY
Qty: 90 TABLET | Refills: 1 | Status: SHIPPED | OUTPATIENT
Start: 2023-10-16

## 2023-11-04 DIAGNOSIS — E78.00 PURE HYPERCHOLESTEROLEMIA: ICD-10-CM

## 2023-11-06 RX ORDER — EZETIMIBE 10 MG/1
TABLET ORAL
Qty: 90 TABLET | Refills: 3 | Status: SHIPPED | OUTPATIENT
Start: 2023-11-06

## 2023-11-18 DIAGNOSIS — G47.00 INSOMNIA, UNSPECIFIED TYPE: ICD-10-CM

## 2023-11-20 RX ORDER — AMITRIPTYLINE HYDROCHLORIDE 10 MG/1
10 TABLET, FILM COATED ORAL
Qty: 90 TABLET | Refills: 0 | Status: SHIPPED | OUTPATIENT
Start: 2023-11-20

## 2023-11-20 NOTE — TELEPHONE ENCOUNTER
Medication:   Requested Prescriptions     Pending Prescriptions Disp Refills    amitriptyline (ELAVIL) 10 MG tablet [Pharmacy Med Name: AMITRIPTYLINE HCL 10 MG TAB] 90 tablet 0     Sig: TAKE ONE TABLET BY MOUTH ONCE NIGHTLY      Last Filled:  8/24/2023    Patient Phone Number: [phone] (home)     Last appt: 8/24/2023   Next appt: 11/27/2023    Last OARRS:       2/17/2021    11:53 AM   RX Monitoring   Periodic Controlled Substance Monitoring Possible medication side effects, risk of tolerance/dependence & alternative treatments discussed. ;No signs of potential drug abuse or diversion identified.      PDMP Monitoring:    Last PDMP Bandar Pavon as Reviewed [facility]):  Review User Review Instant Review Result   Enola Malling, Elvina Nageotte 2/17/2021 11:53 AM Reviewed PDMP [1]     Preferred Pharmacy:   Nur[facility] Dolan [id number] - [address], [address] [address]  Phone: [phone] Fax: [facility] [place] [address],[address] [facility] [phone] - F [phone]  [address] [place] [address]  Phone: [phone] Fax: [address] [place] [address],[address], [address] [facility],[address] [address] [place] [address]  Phone: [phone] Fax: [phone]

## 2023-11-21 DIAGNOSIS — N18.4 CKD (CHRONIC KIDNEY DISEASE) STAGE 4, GFR 15-29 ML/MIN (HCC): ICD-10-CM

## 2023-11-21 DIAGNOSIS — I10 ESSENTIAL HYPERTENSION: ICD-10-CM

## 2023-11-22 LAB
25(OH)D3 SERPL-MCNC: 33 NG/ML
ANA SER QL IA: NEGATIVE
ANION GAP SERPL CALCULATED.3IONS-SCNC: 9 MMOL/L (ref 3–16)
BUN SERPL-MCNC: 24 MG/DL (ref 7–20)
C3 SERPL-MCNC: 141.4 MG/DL (ref 90–180)
C4 SERPL-MCNC: 30.5 MG/DL (ref 10–40)
CALCIUM SERPL-MCNC: 9.5 MG/DL (ref 8.3–10.6)
CHLORIDE SERPL-SCNC: 108 MMOL/L (ref 99–110)
CO2 SERPL-SCNC: 27 MMOL/L (ref 21–32)
CREAT SERPL-MCNC: 2.1 MG/DL (ref 0.6–1.2)
GFR SERPLBLD CREATININE-BSD FMLA CKD-EPI: 24 ML/MIN/{1.73_M2}
GLUCOSE SERPL-MCNC: 101 MG/DL (ref 70–99)
PHOSPHATE SERPL-MCNC: 4.4 MG/DL (ref 2.5–4.9)
POTASSIUM SERPL-SCNC: 5 MMOL/L (ref 3.5–5.1)
PTH-INTACT SERPL-MCNC: 105.3 PG/ML (ref 14–72)
SODIUM SERPL-SCNC: 144 MMOL/L (ref 136–145)

## 2023-11-27 ENCOUNTER — TELEPHONE (OUTPATIENT)
Dept: FAMILY MEDICINE CLINIC | Age: 73
End: 2023-11-27

## 2023-11-27 NOTE — TELEPHONE ENCOUNTER
----- Message from Maurizio Phan sent at 11/24/2023  9:30 AM EST -----  Subject: Appointment Request    Reason for Call: Established Patient Appointment needed: Routine Medicare   AWV    QUESTIONS    Reason for appointment request? No appointments available during search     Additional Information for Provider?  Patient is looking to reschedule her   AWV that was scheduled for 11-27-23  ---------------------------------------------------------------------------  --------------  Moose Kebede INFO  [phone]; OK to leave message on voicemail  ---------------------------------------------------------------------------  --------------  SCRIPT ANSWERS

## 2023-12-05 ENCOUNTER — OFFICE VISIT (OUTPATIENT)
Dept: FAMILY MEDICINE CLINIC | Age: 73
End: 2023-12-05

## 2023-12-05 VITALS
DIASTOLIC BLOOD PRESSURE: 82 MMHG | HEART RATE: 68 BPM | BODY MASS INDEX: 36.44 KG/M2 | OXYGEN SATURATION: 97 % | TEMPERATURE: 97.1 F | SYSTOLIC BLOOD PRESSURE: 138 MMHG | HEIGHT: 61 IN | WEIGHT: 193 LBS

## 2023-12-05 DIAGNOSIS — I10 ESSENTIAL HYPERTENSION: ICD-10-CM

## 2023-12-05 DIAGNOSIS — J42 CHRONIC BRONCHITIS, UNSPECIFIED CHRONIC BRONCHITIS TYPE (HCC): ICD-10-CM

## 2023-12-05 DIAGNOSIS — E66.01 SEVERE OBESITY (BMI 35.0-39.9) WITH COMORBIDITY (HCC): ICD-10-CM

## 2023-12-05 DIAGNOSIS — Z00.00 ENCOUNTER FOR SUBSEQUENT ANNUAL WELLNESS VISIT (AWV) IN MEDICARE PATIENT: Primary | ICD-10-CM

## 2023-12-05 RX ORDER — CLONIDINE HYDROCHLORIDE 0.1 MG/1
0.1 TABLET ORAL DAILY
Qty: 30 TABLET | Refills: 0 | Status: SHIPPED | OUTPATIENT
Start: 2023-12-05

## 2023-12-05 ASSESSMENT — PATIENT HEALTH QUESTIONNAIRE - PHQ9
SUM OF ALL RESPONSES TO PHQ9 QUESTIONS 1 & 2: 0
SUM OF ALL RESPONSES TO PHQ QUESTIONS 1-9: 0
1. LITTLE INTEREST OR PLEASURE IN DOING THINGS: 0
SUM OF ALL RESPONSES TO PHQ QUESTIONS 1-9: 0
2. FEELING DOWN, DEPRESSED OR HOPELESS: 0
SUM OF ALL RESPONSES TO PHQ QUESTIONS 1-9: 0
SUM OF ALL RESPONSES TO PHQ QUESTIONS 1-9: 0

## 2023-12-05 NOTE — PROGRESS NOTES
[facility] VISIT    Patient is here for their Medicare Annual Wellness Visit. Pt recently seen by Nephrology for CKD, reports added diuretic for better BP control and advised low salt diet. Pt reports BP running 170's at home, asymptomatic. Labs ordered to be repeated. Pt is not up to date on HM, has not completed cologuard, due for vaccinations. Last eye exam: 7/2023  Last dental exam: has appt 1/27/24  Exercise: none  Diet: well balanced    How would you rate your overall health? : Good            12/5/2023     1:39 PM 10/11/2022     8:59 AM 6/1/2022    11:40 AM 8/19/2020    11:02 AM 8/5/2019     1:00 PM 7/30/2018     1:26 PM   Fall Risk   2 or more falls in past year? no no no no no no   Fall with injury in past year? no no no no no no           12/5/2023     1:39 PM 2/1/2023    10:26 AM 10/11/2022     8:59 AM 6/1/2022    11:40 AM 2/2/2021     1:08 PM 8/19/2020    11:02 AM 8/5/2019     1:00 PM   PHQ Scores   PHQ2 Score 0 1 0 0 0 2 0   PHQ9 Score 0 9 0 0 0 2 0         Do you always wear a seat belt in the car?: Yes      Have you noted any problems with hearing?: No  Have you noted any vision problems?: No  Do you have concerns about your sexual health?: no  In the past month how much has pain been an issue for you?:  Very Much, back pain when walking  In the past month have you had issues with anxiety, loneliness, irritability or fatigue:  Not at all    Do you take opioid medications even sometimes? No (if using assess risk and whether other treatments would be beneficial)    Living Will and/or Healthcare POA: No,   Additional information provided      Healthcare Decision Maker:    Primary Decision MakeBirdieel Lundborg Child - [phone]    Secondary Decision Maker: Peter Bean - Child - [phone]  Click here to complete Healthcare Decision Makers including selection of the Healthcare Decision Maker Relationship (ie \"Primary\").          Who lives at home with you: self  Do you have

## 2023-12-28 NOTE — TELEPHONE ENCOUNTER
Spoke to Daniel and advised that we will not be able to send in medication and Rylee will need to be seen due to her cough lasting several weeks. Daniel stated that his wife will need to call in and schedule the appointment because he sis not know her schedule.    From: Todd Rylee Mae  Sent: 12/28/2023   2:00 PM EST  To: [id number] [address] Clinical Support Staff  Subject: Cough                                            Hi I messaged you on Monday and was called back Tuesday and set up with an appointment for tomorrow to see Dr Mclean for Rylee. I am not able to make this appt as I have to work and so does my . I didn’t think she needed to be seen but she still has a nasty cough and you had said to reach out if she did. She is drinking and eating but can be picky about it. This cough is not constant but is the same as when she was on the antibiotic, not better. She has not had fevers or a runny nose since the antibiotic. My  called yesterday but you guys were out of office. I really would just like an alternative antibiotic or medication to help this cough. It wakes her up multiple times throughout the night. If you could call my  at [phone] that would be great. I’m at work but you can reach me at [phone] tomorrow.      notified pt that she was advised back in 7/26 to wait until after her appt with AJ to do her labs because it hasn't been a year. Pt states she needs refill on Zetia.

## 2024-01-01 DIAGNOSIS — I10 ESSENTIAL HYPERTENSION: ICD-10-CM

## 2024-01-02 RX ORDER — CLONIDINE HYDROCHLORIDE 0.1 MG/1
0.1 TABLET ORAL DAILY
Qty: 30 TABLET | Refills: 0 | Status: SHIPPED | OUTPATIENT
Start: 2024-01-02

## 2024-01-15 DIAGNOSIS — I10 ESSENTIAL HYPERTENSION: ICD-10-CM

## 2024-01-26 DIAGNOSIS — I10 ESSENTIAL HYPERTENSION: ICD-10-CM

## 2024-01-26 LAB
25(OH)D3 SERPL-MCNC: 38.5 NG/ML
ANION GAP SERPL CALCULATED.3IONS-SCNC: 8 MMOL/L (ref 3–16)
BASOPHILS # BLD: 0 K/UL (ref 0–0.2)
BASOPHILS NFR BLD: 0.6 %
BUN SERPL-MCNC: 38 MG/DL (ref 7–20)
CALCIUM SERPL-MCNC: 9.4 MG/DL (ref 8.3–10.6)
CHLORIDE SERPL-SCNC: 103 MMOL/L (ref 99–110)
CO2 SERPL-SCNC: 29 MMOL/L (ref 21–32)
CREAT SERPL-MCNC: 2.5 MG/DL (ref 0.6–1.2)
CREAT UR-MCNC: 130 MG/DL (ref 28–259)
DEPRECATED RDW RBC AUTO: 13.2 % (ref 12.4–15.4)
EOSINOPHIL # BLD: 0.2 K/UL (ref 0–0.6)
EOSINOPHIL NFR BLD: 3.6 %
GFR SERPLBLD CREATININE-BSD FMLA CKD-EPI: 20 ML/MIN/{1.73_M2}
GLUCOSE SERPL-MCNC: 113 MG/DL (ref 70–99)
HCT VFR BLD AUTO: 40.6 % (ref 36–48)
HGB BLD-MCNC: 13.7 G/DL (ref 12–16)
LYMPHOCYTES # BLD: 2.1 K/UL (ref 1–5.1)
LYMPHOCYTES NFR BLD: 38.8 %
MCH RBC QN AUTO: 31.8 PG (ref 26–34)
MCHC RBC AUTO-ENTMCNC: 33.7 G/DL (ref 31–36)
MCV RBC AUTO: 94.3 FL (ref 80–100)
MICROALBUMIN UR DL<=1MG/L-MCNC: 35.2 MG/DL
MICROALBUMIN/CREAT UR: 270.8 MG/G (ref 0–30)
MONOCYTES # BLD: 0.4 K/UL (ref 0–1.3)
MONOCYTES NFR BLD: 6.8 %
NEUTROPHILS # BLD: 2.7 K/UL (ref 1.7–7.7)
NEUTROPHILS NFR BLD: 50.2 %
PHOSPHATE SERPL-MCNC: 5.1 MG/DL (ref 2.5–4.9)
PLATELET # BLD AUTO: 174 K/UL (ref 135–450)
PLATELET BLD QL SMEAR: ADEQUATE
PMV BLD AUTO: 11.3 FL (ref 5–10.5)
POTASSIUM SERPL-SCNC: 5 MMOL/L (ref 3.5–5.1)
PROT UR-MCNC: 61 MG/DL
PROT/CREAT UR-RTO: 0.5 MG/DL
PTH-INTACT SERPL-MCNC: 99.1 PG/ML (ref 14–72)
RBC # BLD AUTO: 4.31 M/UL (ref 4–5.2)
SLIDE REVIEW: ABNORMAL
SODIUM SERPL-SCNC: 140 MMOL/L (ref 136–145)
WBC # BLD AUTO: 5.4 K/UL (ref 4–11)

## 2024-01-31 DIAGNOSIS — I10 ESSENTIAL HYPERTENSION: ICD-10-CM

## 2024-01-31 RX ORDER — CLONIDINE HYDROCHLORIDE 0.1 MG/1
0.1 TABLET ORAL DAILY
Qty: 30 TABLET | Refills: 0 | Status: SHIPPED | OUTPATIENT
Start: 2024-01-31

## 2024-02-13 DIAGNOSIS — I10 ESSENTIAL HYPERTENSION: ICD-10-CM

## 2024-02-13 DIAGNOSIS — E78.00 PURE HYPERCHOLESTEROLEMIA: ICD-10-CM

## 2024-02-13 RX ORDER — EZETIMIBE 10 MG/1
TABLET ORAL
Qty: 90 TABLET | Refills: 1 | Status: SHIPPED | OUTPATIENT
Start: 2024-02-13

## 2024-02-13 RX ORDER — CLONIDINE HYDROCHLORIDE 0.1 MG/1
0.1 TABLET ORAL 2 TIMES DAILY
Qty: 180 TABLET | Refills: 1 | Status: SHIPPED | OUTPATIENT
Start: 2024-02-13

## 2024-02-13 RX ORDER — FENOFIBRATE 160 MG/1
160 TABLET ORAL DAILY
Qty: 90 TABLET | Refills: 1 | Status: SHIPPED | OUTPATIENT
Start: 2024-02-13

## 2024-02-19 ENCOUNTER — OFFICE VISIT (OUTPATIENT)
Dept: FAMILY MEDICINE CLINIC | Age: 74
End: 2024-02-19
Payer: MEDICARE

## 2024-02-19 VITALS — TEMPERATURE: 96.7 F | OXYGEN SATURATION: 99 % | HEART RATE: 117 BPM

## 2024-02-19 DIAGNOSIS — J44.1 ACUTE EXACERBATION OF CHRONIC OBSTRUCTIVE PULMONARY DISEASE (COPD) (HCC): Primary | ICD-10-CM

## 2024-02-19 PROCEDURE — 3023F SPIROM DOC REV: CPT | Performed by: STUDENT IN AN ORGANIZED HEALTH CARE EDUCATION/TRAINING PROGRAM

## 2024-02-19 PROCEDURE — 3017F COLORECTAL CA SCREEN DOC REV: CPT | Performed by: STUDENT IN AN ORGANIZED HEALTH CARE EDUCATION/TRAINING PROGRAM

## 2024-02-19 PROCEDURE — G8484 FLU IMMUNIZE NO ADMIN: HCPCS | Performed by: STUDENT IN AN ORGANIZED HEALTH CARE EDUCATION/TRAINING PROGRAM

## 2024-02-19 PROCEDURE — G8428 CUR MEDS NOT DOCUMENT: HCPCS | Performed by: STUDENT IN AN ORGANIZED HEALTH CARE EDUCATION/TRAINING PROGRAM

## 2024-02-19 PROCEDURE — G8417 CALC BMI ABV UP PARAM F/U: HCPCS | Performed by: STUDENT IN AN ORGANIZED HEALTH CARE EDUCATION/TRAINING PROGRAM

## 2024-02-19 PROCEDURE — 4004F PT TOBACCO SCREEN RCVD TLK: CPT | Performed by: STUDENT IN AN ORGANIZED HEALTH CARE EDUCATION/TRAINING PROGRAM

## 2024-02-19 PROCEDURE — 99213 OFFICE O/P EST LOW 20 MIN: CPT | Performed by: STUDENT IN AN ORGANIZED HEALTH CARE EDUCATION/TRAINING PROGRAM

## 2024-02-19 PROCEDURE — 1090F PRES/ABSN URINE INCON ASSESS: CPT | Performed by: STUDENT IN AN ORGANIZED HEALTH CARE EDUCATION/TRAINING PROGRAM

## 2024-02-19 PROCEDURE — G8399 PT W/DXA RESULTS DOCUMENT: HCPCS | Performed by: STUDENT IN AN ORGANIZED HEALTH CARE EDUCATION/TRAINING PROGRAM

## 2024-02-19 PROCEDURE — 1123F ACP DISCUSS/DSCN MKR DOCD: CPT | Performed by: STUDENT IN AN ORGANIZED HEALTH CARE EDUCATION/TRAINING PROGRAM

## 2024-02-19 RX ORDER — PREDNISONE 20 MG/1
20 TABLET ORAL 2 TIMES DAILY
Qty: 10 TABLET | Refills: 0 | Status: SHIPPED | OUTPATIENT
Start: 2024-02-19 | End: 2024-02-24

## 2024-02-19 RX ORDER — AZITHROMYCIN 500 MG/1
500 TABLET, FILM COATED ORAL DAILY
Qty: 3 TABLET | Refills: 0 | Status: SHIPPED | OUTPATIENT
Start: 2024-02-19 | End: 2024-02-22

## 2024-02-19 NOTE — PROGRESS NOTES
glucose monitoring kit (FREESTYLE) monitoring kit 1 kit by Does not apply route daily 1 kit 0    Lancets (BD LANCET ULTRAFINE 30G) MISC 1 Device by In Vitro route every morning CHECK BS EVERY MORNING FASTING 100 each 3    blood glucose monitor strips Test blood sugar every morning fasting. 100 strip 3     No current facility-administered medications for this visit.     Allergies:  Allergies   Allergen Reactions    Haemophilus Influenzae Vaccines Shortness Of Breath    Acetaminophen      Other reaction(s): Stomach Pain    Hydrocodone      Other reaction(s): Stomach Pain    Influenza Vac Split Quad      bronchitis    Pravastatin Sodium [Pravastatin Sodium] Other (See Comments)     Elevated liver enzymes.     Vicodin [Hydrocodone-Acetaminophen]      Nausea       ROS and PHYSICAL:   ROS:  10 point ROS otherwise negative except as mentioned in the HPI    VITALS:  Vitals:    02/19/24 1335   Pulse: (!) 117   Temp: (!) 96.7 °F (35.9 °C)   TempSrc: Tympanic   SpO2: 99%      There is no height or weight on file to calculate BMI.    PHYSICAL EXAM:  GENERAL: NAD, alert and oriented  HEENT: Head: NC/AT. Eyes: clear conjunctiva. Ears: TMs clear fluid bilaterally. Nose: normal, mild nasal congestion. Throat: Oropharynx normal.    NECK: Supple, no LAD  RESPIRATORY: Normal respiratory effort, moderate diffuse expiratory wheezing bilaterally  HEART: Sinus tachycardia, no murmurs  SKIN: Skin color, texture, and turgor normal. No rash.    ASSESSMENT & PLAN:     74 y.o. female presents to the office for a sick visit.    1. Acute exacerbation of chronic obstructive pulmonary disease (COPD) (HCC)  - Symptoms consistent with an acute COPD exacerbation. Treat with azithromycin and prednisone. Continue supportive care.  - Recommend alternating tylenol/ibuprofen as needed. Gargle warm salt water to help with sore throat. Cough drops and/or honey as helpful for cough. Push fluids. Recommend Claritin or Zyrtec and Flonase to help with

## 2024-02-24 DIAGNOSIS — G47.00 INSOMNIA, UNSPECIFIED TYPE: ICD-10-CM

## 2024-02-26 RX ORDER — AMITRIPTYLINE HYDROCHLORIDE 10 MG/1
10 TABLET, FILM COATED ORAL
Qty: 90 TABLET | Refills: 0 | OUTPATIENT
Start: 2024-02-26

## 2024-03-12 DIAGNOSIS — I10 ESSENTIAL HYPERTENSION: ICD-10-CM

## 2024-03-12 RX ORDER — CLONIDINE HYDROCHLORIDE 0.1 MG/1
0.1 TABLET ORAL DAILY
Qty: 90 TABLET | Refills: 0 | Status: SHIPPED | OUTPATIENT
Start: 2024-03-12

## 2024-03-22 DIAGNOSIS — I10 ESSENTIAL HYPERTENSION: ICD-10-CM

## 2024-03-23 LAB
ANION GAP SERPL CALCULATED.3IONS-SCNC: 10 MMOL/L (ref 3–16)
BUN SERPL-MCNC: 24 MG/DL (ref 7–20)
CALCIUM SERPL-MCNC: 9.6 MG/DL (ref 8.3–10.6)
CHLORIDE SERPL-SCNC: 106 MMOL/L (ref 99–110)
CO2 SERPL-SCNC: 25 MMOL/L (ref 21–32)
CREAT SERPL-MCNC: 2.3 MG/DL (ref 0.6–1.2)
GFR SERPLBLD CREATININE-BSD FMLA CKD-EPI: 22 ML/MIN/{1.73_M2}
GLUCOSE SERPL-MCNC: 117 MG/DL (ref 70–99)
POTASSIUM SERPL-SCNC: 4.4 MMOL/L (ref 3.5–5.1)
SODIUM SERPL-SCNC: 141 MMOL/L (ref 136–145)

## 2024-04-12 DIAGNOSIS — I10 ESSENTIAL HYPERTENSION: ICD-10-CM

## 2024-04-14 DIAGNOSIS — E03.9 ACQUIRED HYPOTHYROIDISM: ICD-10-CM

## 2024-04-16 RX ORDER — LEVOTHYROXINE SODIUM 0.03 MG/1
25 TABLET ORAL DAILY
Qty: 90 TABLET | Refills: 1 | Status: SHIPPED | OUTPATIENT
Start: 2024-04-16

## 2024-05-31 DIAGNOSIS — N18.32 STAGE 3B CHRONIC KIDNEY DISEASE (HCC): ICD-10-CM

## 2024-06-01 LAB
25(OH)D3 SERPL-MCNC: 34.3 NG/ML
ANION GAP SERPL CALCULATED.3IONS-SCNC: 11 MMOL/L (ref 3–16)
BASOPHILS # BLD: 0.1 K/UL (ref 0–0.2)
BASOPHILS NFR BLD: 1 %
BUN SERPL-MCNC: 45 MG/DL (ref 7–20)
CALCIUM SERPL-MCNC: 9.4 MG/DL (ref 8.3–10.6)
CHLORIDE SERPL-SCNC: 106 MMOL/L (ref 99–110)
CO2 SERPL-SCNC: 24 MMOL/L (ref 21–32)
CREAT SERPL-MCNC: 2.8 MG/DL (ref 0.6–1.2)
CREAT UR-MCNC: 74.3 MG/DL (ref 28–259)
CREAT UR-MCNC: 75.1 MG/DL (ref 28–259)
DEPRECATED RDW RBC AUTO: 14.8 % (ref 12.4–15.4)
EOSINOPHIL # BLD: 0.2 K/UL (ref 0–0.6)
EOSINOPHIL NFR BLD: 3.2 %
GFR SERPLBLD CREATININE-BSD FMLA CKD-EPI: 17 ML/MIN/{1.73_M2}
GLUCOSE SERPL-MCNC: 144 MG/DL (ref 70–99)
HCT VFR BLD AUTO: 37.2 % (ref 36–48)
HGB BLD-MCNC: 12.3 G/DL (ref 12–16)
LYMPHOCYTES # BLD: 2.1 K/UL (ref 1–5.1)
LYMPHOCYTES NFR BLD: 39.8 %
MCH RBC QN AUTO: 31.8 PG (ref 26–34)
MCHC RBC AUTO-ENTMCNC: 33.1 G/DL (ref 31–36)
MCV RBC AUTO: 96.1 FL (ref 80–100)
MICROALBUMIN UR DL<=1MG/L-MCNC: 12.2 MG/DL
MICROALBUMIN/CREAT UR: 162.5 MG/G (ref 0–30)
MONOCYTES # BLD: 0.3 K/UL (ref 0–1.3)
MONOCYTES NFR BLD: 5.5 %
NEUTROPHILS # BLD: 2.7 K/UL (ref 1.7–7.7)
NEUTROPHILS NFR BLD: 50.5 %
PHOSPHATE SERPL-MCNC: 4.4 MG/DL (ref 2.5–4.9)
PLATELET # BLD AUTO: 175 K/UL (ref 135–450)
PMV BLD AUTO: 10.9 FL (ref 5–10.5)
POTASSIUM SERPL-SCNC: 5.1 MMOL/L (ref 3.5–5.1)
PROT UR-MCNC: 24 MG/DL
PROT/CREAT UR-RTO: 0.3 MG/DL
PTH-INTACT SERPL-MCNC: 100.8 PG/ML (ref 14–72)
RBC # BLD AUTO: 3.87 M/UL (ref 4–5.2)
SODIUM SERPL-SCNC: 141 MMOL/L (ref 136–145)
WBC # BLD AUTO: 5.3 K/UL (ref 4–11)

## 2024-06-06 DIAGNOSIS — I10 ESSENTIAL HYPERTENSION: ICD-10-CM

## 2024-06-06 RX ORDER — CLONIDINE HYDROCHLORIDE 0.1 MG/1
0.1 TABLET ORAL DAILY
Qty: 90 TABLET | Refills: 0 | OUTPATIENT
Start: 2024-06-06

## 2024-07-08 DIAGNOSIS — I10 ESSENTIAL HYPERTENSION: ICD-10-CM

## 2024-07-08 DIAGNOSIS — N18.32 STAGE 3B CHRONIC KIDNEY DISEASE (HCC): ICD-10-CM

## 2024-07-09 LAB
ANION GAP SERPL CALCULATED.3IONS-SCNC: 10 MMOL/L (ref 3–16)
BUN SERPL-MCNC: 42 MG/DL (ref 7–20)
CALCIUM SERPL-MCNC: 9.6 MG/DL (ref 8.3–10.6)
CHLORIDE SERPL-SCNC: 102 MMOL/L (ref 99–110)
CO2 SERPL-SCNC: 25 MMOL/L (ref 21–32)
CREAT SERPL-MCNC: 3 MG/DL (ref 0.6–1.2)
GFR SERPLBLD CREATININE-BSD FMLA CKD-EPI: 16 ML/MIN/{1.73_M2}
GLUCOSE SERPL-MCNC: 155 MG/DL (ref 70–99)
POTASSIUM SERPL-SCNC: 5.4 MMOL/L (ref 3.5–5.1)
SODIUM SERPL-SCNC: 137 MMOL/L (ref 136–145)

## 2024-07-10 DIAGNOSIS — I10 ESSENTIAL HYPERTENSION: ICD-10-CM

## 2024-09-04 ENCOUNTER — HOSPITAL ENCOUNTER (OUTPATIENT)
Dept: VASCULAR LAB | Age: 74
Discharge: HOME | End: 2024-09-06
Attending: INTERNAL MEDICINE
Payer: MEDICARE

## 2024-09-04 DIAGNOSIS — N18.4 CKD (CHRONIC KIDNEY DISEASE) STAGE 4, GFR 15-29 ML/MIN (HCC): ICD-10-CM

## 2024-09-04 LAB
VAS AORTA DIST AP: 1.28 CM
VAS AORTA MID AP: 1.28 CM
VAS AORTA MID PSV: 116 CM/S
VAS AORTA PROX AP: 1.89 CM
VAS AORTA PROX PSV: 78.7 CM/S
VAS LEFT KIDNEY LENGTH: 9.81 CM
VAS LEFT KIDNEY WIDTH: 3.82 CM
VAS LEFT RENAL DIST EDV: 23.2 CM/S
VAS LEFT RENAL DIST PSV: 105 CM/S
VAS LEFT RENAL DIST RAR: 0.91
VAS LEFT RENAL DIST RI: 0.78 NO UNITS
VAS LEFT RENAL LOWER PARENCHYMA EDV: 0.5 CM/S
VAS LEFT RENAL LOWER PARENCHYMA PSV: 21.9 CM/S
VAS LEFT RENAL LOWER PARENCHYMA RI: 0.98 NO UNITS
VAS LEFT RENAL MID EDV: 8 CM/S
VAS LEFT RENAL MID PSV: 114 CM/S
VAS LEFT RENAL MID RAR: 0.98
VAS LEFT RENAL MID RI: 0.93 NO UNITS
VAS LEFT RENAL PROX EDV: 13.5 CM/S
VAS LEFT RENAL PROX PSV: 165 CM/S
VAS LEFT RENAL PROX RAR: 1.42
VAS LEFT RENAL PROX RI: 0.92 NO UNITS
VAS LEFT RENAL RAR: 1.42
VAS LEFT RENAL UPPER PARENCHYMA EDV: 11.7 CM/S
VAS LEFT RENAL UPPER PARENCHYMA PSV: 24.3 CM/S
VAS LEFT RENAL UPPER PARENCHYMA RI: 0.52 NO UNITS
VAS RIGHT KIDNEY LENGTH: 10.21 CM
VAS RIGHT KIDNEY WIDTH: 3.55 CM
VAS RIGHT RENAL DIST EDV: 19.8 CM/S
VAS RIGHT RENAL DIST PSV: 109 CM/S
VAS RIGHT RENAL DIST RAR: 0.94
VAS RIGHT RENAL DIST RI: 0.82 NO UNITS
VAS RIGHT RENAL LOWER PARENCHYMA EDV: 8.5 CM/S
VAS RIGHT RENAL LOWER PARENCHYMA PSV: 39.8 CM/S
VAS RIGHT RENAL LOWER PARENCHYMA RI: 0.79 NO UNITS
VAS RIGHT RENAL MID EDV: 8.8 CM/S
VAS RIGHT RENAL MID PSV: 40.1 CM/S
VAS RIGHT RENAL MID RAR: 0.35
VAS RIGHT RENAL MID RI: 0.78 NO UNITS
VAS RIGHT RENAL PROX EDV: 17.2 CM/S
VAS RIGHT RENAL PROX PSV: 112 CM/S
VAS RIGHT RENAL PROX RAR: 0.97
VAS RIGHT RENAL PROX RI: 0.85 NO UNITS
VAS RIGHT RENAL RAR: 0.97
VAS RIGHT RENAL UPPER PARENCHYMA EDV: 9.5 CM/S
VAS RIGHT RENAL UPPER PARENCHYMA PSV: 21.9 CM/S
VAS RIGHT RENAL UPPER PARENCHYMA RI: 0.57 NO UNITS

## 2024-09-04 PROCEDURE — 93975 VASCULAR STUDY: CPT

## 2024-09-04 PROCEDURE — 93975 VASCULAR STUDY: CPT | Performed by: INTERNAL MEDICINE

## 2024-10-06 DIAGNOSIS — I10 ESSENTIAL HYPERTENSION: ICD-10-CM

## 2024-10-07 RX ORDER — VERAPAMIL HYDROCHLORIDE 120 MG/1
TABLET, FILM COATED, EXTENDED RELEASE ORAL
Qty: 90 TABLET | Refills: 0 | OUTPATIENT
Start: 2024-10-07

## 2024-10-16 DIAGNOSIS — E03.9 ACQUIRED HYPOTHYROIDISM: ICD-10-CM

## 2024-10-16 RX ORDER — LEVOTHYROXINE SODIUM 25 UG/1
25 TABLET ORAL DAILY
Qty: 90 TABLET | Refills: 1 | OUTPATIENT
Start: 2024-10-16

## 2024-10-31 DIAGNOSIS — I10 ESSENTIAL HYPERTENSION: ICD-10-CM

## 2024-10-31 RX ORDER — VERAPAMIL HYDROCHLORIDE 120 MG/1
TABLET, FILM COATED, EXTENDED RELEASE ORAL
Qty: 90 TABLET | Refills: 0 | OUTPATIENT
Start: 2024-10-31

## 2024-12-05 DIAGNOSIS — E03.9 ACQUIRED HYPOTHYROIDISM: ICD-10-CM

## 2024-12-05 DIAGNOSIS — I10 ESSENTIAL HYPERTENSION: ICD-10-CM

## 2024-12-05 DIAGNOSIS — E78.00 PURE HYPERCHOLESTEROLEMIA: ICD-10-CM

## 2024-12-05 RX ORDER — EZETIMIBE 10 MG/1
TABLET ORAL
Qty: 90 TABLET | Refills: 1 | Status: SHIPPED | OUTPATIENT
Start: 2024-12-05

## 2024-12-05 RX ORDER — LEVOTHYROXINE SODIUM 25 UG/1
25 TABLET ORAL DAILY
Qty: 90 TABLET | Refills: 1 | Status: SHIPPED | OUTPATIENT
Start: 2024-12-05

## 2024-12-05 RX ORDER — VERAPAMIL HYDROCHLORIDE 120 MG/1
TABLET, FILM COATED, EXTENDED RELEASE ORAL
Qty: 90 TABLET | Refills: 0 | Status: SHIPPED | OUTPATIENT
Start: 2024-12-05

## 2025-01-16 ENCOUNTER — APPOINTMENT (OUTPATIENT)
Dept: GENERAL RADIOLOGY | Age: 75
DRG: 391 | End: 2025-01-16
Payer: MEDICARE

## 2025-01-16 ENCOUNTER — APPOINTMENT (OUTPATIENT)
Dept: CT IMAGING | Age: 75
DRG: 391 | End: 2025-01-16
Payer: MEDICARE

## 2025-01-16 ENCOUNTER — HOSPITAL ENCOUNTER (INPATIENT)
Age: 75
LOS: 5 days | Discharge: NURSING, REHAB OR LONG-TERM CARE | DRG: 391 | End: 2025-01-21
Attending: EMERGENCY MEDICINE | Admitting: INTERNAL MEDICINE
Payer: MEDICARE

## 2025-01-16 DIAGNOSIS — R41.82 ALTERED MENTAL STATUS, UNSPECIFIED ALTERED MENTAL STATUS TYPE: ICD-10-CM

## 2025-01-16 DIAGNOSIS — N18.9 CHRONIC KIDNEY DISEASE, UNSPECIFIED CKD STAGE: ICD-10-CM

## 2025-01-16 DIAGNOSIS — K85.90 ACUTE PANCREATITIS, UNSPECIFIED COMPLICATION STATUS, UNSPECIFIED PANCREATITIS TYPE: Primary | ICD-10-CM

## 2025-01-16 LAB
ALBUMIN SERPL-MCNC: 3.9 G/DL (ref 3.4–5)
ALBUMIN/GLOB SERPL: 1.3 {RATIO} (ref 1.1–2.2)
ALP SERPL-CCNC: 109 U/L (ref 40–129)
ALT SERPL-CCNC: 33 U/L (ref 10–40)
ANION GAP SERPL CALCULATED.3IONS-SCNC: 21 MMOL/L (ref 3–16)
AST SERPL-CCNC: 89 U/L (ref 15–37)
BASE EXCESS BLDV CALC-SCNC: 0.9 MMOL/L (ref -3–3)
BASOPHILS # BLD: 0 K/UL (ref 0–0.2)
BASOPHILS NFR BLD: 0.1 %
BILIRUB SERPL-MCNC: 0.9 MG/DL (ref 0–1)
BUN SERPL-MCNC: 52 MG/DL (ref 7–20)
CALCIUM SERPL-MCNC: 11.1 MG/DL (ref 8.3–10.6)
CHLORIDE SERPL-SCNC: 101 MMOL/L (ref 99–110)
CO2 BLDV-SCNC: 60 MMOL/L
CO2 SERPL-SCNC: 23 MMOL/L (ref 21–32)
COHGB MFR BLDV: 2.5 % (ref 0–1.5)
CREAT SERPL-MCNC: 2.5 MG/DL (ref 0.6–1.2)
DEPRECATED RDW RBC AUTO: 13.5 % (ref 12.4–15.4)
DO-HGB MFR BLDV: 19 %
EOSINOPHIL # BLD: 0 K/UL (ref 0–0.6)
EOSINOPHIL NFR BLD: 0 %
ETHANOLAMINE SERPL-MCNC: NORMAL MG/DL (ref 0–0.08)
GFR SERPLBLD CREATININE-BSD FMLA CKD-EPI: 19 ML/MIN/{1.73_M2}
GLUCOSE SERPL-MCNC: 193 MG/DL (ref 70–99)
HCO3 BLDV-SCNC: 25.6 MMOL/L (ref 23–29)
HCT VFR BLD AUTO: 40 % (ref 36–48)
HGB BLD-MCNC: 13.3 G/DL (ref 12–16)
LACTATE BLDV-SCNC: 1.6 MMOL/L (ref 0.4–1.9)
LIPASE SERPL-CCNC: 1124 U/L (ref 13–60)
LYMPHOCYTES # BLD: 4.2 K/UL (ref 1–5.1)
LYMPHOCYTES NFR BLD: 29.2 %
MAGNESIUM SERPL-MCNC: 2.54 MG/DL (ref 1.8–2.4)
MCH RBC QN AUTO: 30.3 PG (ref 26–34)
MCHC RBC AUTO-ENTMCNC: 33.3 G/DL (ref 31–36)
MCV RBC AUTO: 91.1 FL (ref 80–100)
METHGB MFR BLDV: 0.3 %
MONOCYTES # BLD: 0.8 K/UL (ref 0–1.3)
MONOCYTES NFR BLD: 5.3 %
NEUTROPHILS # BLD: 9.4 K/UL (ref 1.7–7.7)
NEUTROPHILS NFR BLD: 65.4 %
NT-PROBNP SERPL-MCNC: ABNORMAL PG/ML (ref 0–449)
O2 CT VFR BLDV CALC: 15 VOL %
O2 THERAPY: ABNORMAL
PCO2 BLDV: 40.2 MMHG (ref 40–50)
PH BLDV: 7.41 [PH] (ref 7.35–7.45)
PLATELET # BLD AUTO: 231 K/UL (ref 135–450)
PMV BLD AUTO: 10.2 FL (ref 5–10.5)
PO2 BLDV: 43.8 MMHG (ref 25–40)
POTASSIUM SERPL-SCNC: 3.3 MMOL/L (ref 3.5–5.1)
PROCALCITONIN SERPL IA-MCNC: 18.1 NG/ML (ref 0–0.15)
PROT SERPL-MCNC: 7 G/DL (ref 6.4–8.2)
RBC # BLD AUTO: 4.39 M/UL (ref 4–5.2)
SAO2 % BLDV: 81 %
SODIUM SERPL-SCNC: 145 MMOL/L (ref 136–145)
TROPONIN, HIGH SENSITIVITY: 373 NG/L (ref 0–14)
TROPONIN, HIGH SENSITIVITY: 377 NG/L (ref 0–14)
TSH SERPL DL<=0.005 MIU/L-ACNC: 0.78 UIU/ML (ref 0.27–4.2)
WBC # BLD AUTO: 14.5 K/UL (ref 4–11)

## 2025-01-16 PROCEDURE — 83605 ASSAY OF LACTIC ACID: CPT

## 2025-01-16 PROCEDURE — 82077 ASSAY SPEC XCP UR&BREATH IA: CPT

## 2025-01-16 PROCEDURE — 80053 COMPREHEN METABOLIC PANEL: CPT

## 2025-01-16 PROCEDURE — 99285 EMERGENCY DEPT VISIT HI MDM: CPT

## 2025-01-16 PROCEDURE — 1200000000 HC SEMI PRIVATE

## 2025-01-16 PROCEDURE — 84443 ASSAY THYROID STIM HORMONE: CPT

## 2025-01-16 PROCEDURE — 87449 NOS EACH ORGANISM AG IA: CPT

## 2025-01-16 PROCEDURE — 2580000003 HC RX 258: Performed by: PHYSICIAN ASSISTANT

## 2025-01-16 PROCEDURE — 83690 ASSAY OF LIPASE: CPT

## 2025-01-16 PROCEDURE — 93005 ELECTROCARDIOGRAM TRACING: CPT | Performed by: PHYSICIAN ASSISTANT

## 2025-01-16 PROCEDURE — 83880 ASSAY OF NATRIURETIC PEPTIDE: CPT

## 2025-01-16 PROCEDURE — 85025 COMPLETE CBC W/AUTO DIFF WBC: CPT

## 2025-01-16 PROCEDURE — 82803 BLOOD GASES ANY COMBINATION: CPT

## 2025-01-16 PROCEDURE — 96375 TX/PRO/DX INJ NEW DRUG ADDON: CPT

## 2025-01-16 PROCEDURE — 6360000002 HC RX W HCPCS: Performed by: PHYSICIAN ASSISTANT

## 2025-01-16 PROCEDURE — 83735 ASSAY OF MAGNESIUM: CPT

## 2025-01-16 PROCEDURE — 84145 PROCALCITONIN (PCT): CPT

## 2025-01-16 PROCEDURE — 96374 THER/PROPH/DIAG INJ IV PUSH: CPT

## 2025-01-16 PROCEDURE — 71250 CT THORAX DX C-: CPT

## 2025-01-16 PROCEDURE — 6370000000 HC RX 637 (ALT 250 FOR IP): Performed by: PHYSICIAN ASSISTANT

## 2025-01-16 PROCEDURE — 84484 ASSAY OF TROPONIN QUANT: CPT

## 2025-01-16 PROCEDURE — 70450 CT HEAD/BRAIN W/O DYE: CPT

## 2025-01-16 PROCEDURE — 71045 X-RAY EXAM CHEST 1 VIEW: CPT

## 2025-01-16 RX ORDER — VERAPAMIL HYDROCHLORIDE 120 MG/1
120 TABLET, FILM COATED, EXTENDED RELEASE ORAL DAILY
Status: DISCONTINUED | OUTPATIENT
Start: 2025-01-17 | End: 2025-01-20

## 2025-01-16 RX ORDER — MAGNESIUM SULFATE IN WATER 40 MG/ML
2000 INJECTION, SOLUTION INTRAVENOUS PRN
Status: DISCONTINUED | OUTPATIENT
Start: 2025-01-16 | End: 2025-01-16

## 2025-01-16 RX ORDER — ENOXAPARIN SODIUM 100 MG/ML
30 INJECTION SUBCUTANEOUS DAILY
Status: DISCONTINUED | OUTPATIENT
Start: 2025-01-17 | End: 2025-01-21 | Stop reason: HOSPADM

## 2025-01-16 RX ORDER — SODIUM CHLORIDE, SODIUM LACTATE, POTASSIUM CHLORIDE, CALCIUM CHLORIDE 600; 310; 30; 20 MG/100ML; MG/100ML; MG/100ML; MG/100ML
INJECTION, SOLUTION INTRAVENOUS CONTINUOUS
Status: DISCONTINUED | OUTPATIENT
Start: 2025-01-16 | End: 2025-01-17 | Stop reason: ALTCHOICE

## 2025-01-16 RX ORDER — CHLORTHALIDONE 25 MG/1
25 TABLET ORAL DAILY
Status: DISCONTINUED | OUTPATIENT
Start: 2025-01-17 | End: 2025-01-18

## 2025-01-16 RX ORDER — CLONIDINE HYDROCHLORIDE 0.1 MG/1
0.4 TABLET ORAL 2 TIMES DAILY
Status: DISCONTINUED | OUTPATIENT
Start: 2025-01-17 | End: 2025-01-19

## 2025-01-16 RX ORDER — LABETALOL HYDROCHLORIDE 5 MG/ML
10 INJECTION, SOLUTION INTRAVENOUS ONCE
Status: COMPLETED | OUTPATIENT
Start: 2025-01-16 | End: 2025-01-16

## 2025-01-16 RX ORDER — SODIUM CHLORIDE 0.9 % (FLUSH) 0.9 %
10 SYRINGE (ML) INJECTION PRN
Status: DISCONTINUED | OUTPATIENT
Start: 2025-01-16 | End: 2025-01-21 | Stop reason: HOSPADM

## 2025-01-16 RX ORDER — MORPHINE SULFATE 2 MG/ML
2 INJECTION, SOLUTION INTRAMUSCULAR; INTRAVENOUS EVERY 4 HOURS PRN
Status: DISCONTINUED | OUTPATIENT
Start: 2025-01-16 | End: 2025-01-21 | Stop reason: HOSPADM

## 2025-01-16 RX ORDER — POLYETHYLENE GLYCOL 3350 17 G/17G
17 POWDER, FOR SOLUTION ORAL DAILY PRN
Status: DISCONTINUED | OUTPATIENT
Start: 2025-01-16 | End: 2025-01-21 | Stop reason: HOSPADM

## 2025-01-16 RX ORDER — POTASSIUM CHLORIDE 7.45 MG/ML
10 INJECTION INTRAVENOUS PRN
Status: DISCONTINUED | OUTPATIENT
Start: 2025-01-16 | End: 2025-01-16

## 2025-01-16 RX ORDER — METOPROLOL SUCCINATE 25 MG/1
25 TABLET, EXTENDED RELEASE ORAL ONCE
Status: DISCONTINUED | OUTPATIENT
Start: 2025-01-16 | End: 2025-01-16

## 2025-01-16 RX ORDER — 0.9 % SODIUM CHLORIDE 0.9 %
1000 INTRAVENOUS SOLUTION INTRAVENOUS ONCE
Status: COMPLETED | OUTPATIENT
Start: 2025-01-16 | End: 2025-01-17

## 2025-01-16 RX ORDER — POTASSIUM CHLORIDE 750 MG/1
40 TABLET, EXTENDED RELEASE ORAL ONCE
Status: COMPLETED | OUTPATIENT
Start: 2025-01-16 | End: 2025-01-16

## 2025-01-16 RX ORDER — PROMETHAZINE HYDROCHLORIDE 25 MG/1
12.5 TABLET ORAL EVERY 6 HOURS PRN
Status: DISCONTINUED | OUTPATIENT
Start: 2025-01-16 | End: 2025-01-21 | Stop reason: HOSPADM

## 2025-01-16 RX ORDER — ONDANSETRON 2 MG/ML
4 INJECTION INTRAMUSCULAR; INTRAVENOUS EVERY 6 HOURS PRN
Status: DISCONTINUED | OUTPATIENT
Start: 2025-01-16 | End: 2025-01-21 | Stop reason: HOSPADM

## 2025-01-16 RX ORDER — LISINOPRIL 10 MG/1
20 TABLET ORAL DAILY
Status: DISCONTINUED | OUTPATIENT
Start: 2025-01-17 | End: 2025-01-18

## 2025-01-16 RX ORDER — POTASSIUM CHLORIDE 1500 MG/1
40 TABLET, EXTENDED RELEASE ORAL PRN
Status: DISCONTINUED | OUTPATIENT
Start: 2025-01-16 | End: 2025-01-16

## 2025-01-16 RX ORDER — EZETIMIBE 10 MG/1
10 TABLET ORAL NIGHTLY
Status: DISCONTINUED | OUTPATIENT
Start: 2025-01-17 | End: 2025-01-16 | Stop reason: RX

## 2025-01-16 RX ORDER — SODIUM CHLORIDE 9 MG/ML
INJECTION, SOLUTION INTRAVENOUS PRN
Status: DISCONTINUED | OUTPATIENT
Start: 2025-01-16 | End: 2025-01-21 | Stop reason: HOSPADM

## 2025-01-16 RX ORDER — SODIUM CHLORIDE 0.9 % (FLUSH) 0.9 %
10 SYRINGE (ML) INJECTION EVERY 12 HOURS SCHEDULED
Status: DISCONTINUED | OUTPATIENT
Start: 2025-01-16 | End: 2025-01-21 | Stop reason: HOSPADM

## 2025-01-16 RX ORDER — FENOFIBRATE 160 MG/1
160 TABLET ORAL DAILY
Status: DISCONTINUED | OUTPATIENT
Start: 2025-01-17 | End: 2025-01-17

## 2025-01-16 RX ORDER — LEVOTHYROXINE SODIUM 25 UG/1
25 TABLET ORAL DAILY
Status: DISCONTINUED | OUTPATIENT
Start: 2025-01-17 | End: 2025-01-21 | Stop reason: HOSPADM

## 2025-01-16 RX ORDER — METOPROLOL SUCCINATE 25 MG/1
25 TABLET, EXTENDED RELEASE ORAL DAILY
Status: DISCONTINUED | OUTPATIENT
Start: 2025-01-17 | End: 2025-01-20

## 2025-01-16 RX ADMIN — PIPERACILLIN AND TAZOBACTAM 3375 MG: 3; .375 INJECTION, POWDER, LYOPHILIZED, FOR SOLUTION INTRAVENOUS at 23:08

## 2025-01-16 RX ADMIN — SODIUM CHLORIDE 1000 ML: 9 INJECTION, SOLUTION INTRAVENOUS at 22:24

## 2025-01-16 RX ADMIN — LABETALOL HYDROCHLORIDE 10 MG: 5 INJECTION, SOLUTION INTRAVENOUS at 23:02

## 2025-01-16 RX ADMIN — POTASSIUM CHLORIDE 40 MEQ: 750 TABLET, FILM COATED, EXTENDED RELEASE ORAL at 22:36

## 2025-01-16 ASSESSMENT — PAIN - FUNCTIONAL ASSESSMENT: PAIN_FUNCTIONAL_ASSESSMENT: NONE - DENIES PAIN

## 2025-01-17 ENCOUNTER — APPOINTMENT (OUTPATIENT)
Dept: MRI IMAGING | Age: 75
DRG: 391 | End: 2025-01-17
Payer: MEDICARE

## 2025-01-17 LAB
ALBUMIN SERPL-MCNC: 3.3 G/DL (ref 3.4–5)
ALBUMIN/GLOB SERPL: 1.2 {RATIO} (ref 1.1–2.2)
ALP SERPL-CCNC: 92 U/L (ref 40–129)
ALT SERPL-CCNC: 29 U/L (ref 10–40)
ANION GAP SERPL CALCULATED.3IONS-SCNC: 12 MMOL/L (ref 3–16)
AST SERPL-CCNC: 86 U/L (ref 15–37)
BACTERIA URNS QL MICRO: NORMAL /HPF
BASOPHILS # BLD: 0 K/UL (ref 0–0.2)
BASOPHILS NFR BLD: 0.5 %
BILIRUB SERPL-MCNC: 0.7 MG/DL (ref 0–1)
BILIRUB UR QL STRIP.AUTO: NEGATIVE
BUN SERPL-MCNC: 51 MG/DL (ref 7–20)
CALCIUM SERPL-MCNC: 10.1 MG/DL (ref 8.3–10.6)
CHLORIDE SERPL-SCNC: 108 MMOL/L (ref 99–110)
CLARITY UR: CLEAR
CO2 SERPL-SCNC: 25 MMOL/L (ref 21–32)
COLOR UR: ABNORMAL
CREAT SERPL-MCNC: 2.5 MG/DL (ref 0.6–1.2)
DEPRECATED RDW RBC AUTO: 13.6 % (ref 12.4–15.4)
EKG ATRIAL RATE: 111 BPM
EKG DIAGNOSIS: NORMAL
EKG P AXIS: 40 DEGREES
EKG P-R INTERVAL: 158 MS
EKG Q-T INTERVAL: 350 MS
EKG QRS DURATION: 78 MS
EKG QTC CALCULATION (BAZETT): 476 MS
EKG R AXIS: -31 DEGREES
EKG T AXIS: 54 DEGREES
EKG VENTRICULAR RATE: 111 BPM
EOSINOPHIL # BLD: 0 K/UL (ref 0–0.6)
EOSINOPHIL NFR BLD: 0 %
EPI CELLS #/AREA URNS AUTO: 3 /HPF (ref 0–5)
GFR SERPLBLD CREATININE-BSD FMLA CKD-EPI: 19 ML/MIN/{1.73_M2}
GLUCOSE BLD-MCNC: 154 MG/DL (ref 70–99)
GLUCOSE SERPL-MCNC: 149 MG/DL (ref 70–99)
GLUCOSE UR STRIP.AUTO-MCNC: >=1000 MG/DL
HCT VFR BLD AUTO: 35 % (ref 36–48)
HGB BLD-MCNC: 11.7 G/DL (ref 12–16)
HGB UR QL STRIP.AUTO: NEGATIVE
HYALINE CASTS #/AREA URNS AUTO: 6 /LPF (ref 0–8)
KETONES UR STRIP.AUTO-MCNC: ABNORMAL MG/DL
LEUKOCYTE ESTERASE UR QL STRIP.AUTO: NEGATIVE
LIPASE SERPL-CCNC: 960 U/L (ref 13–60)
LYMPHOCYTES # BLD: 1.8 K/UL (ref 1–5.1)
LYMPHOCYTES NFR BLD: 21.5 %
MCH RBC QN AUTO: 30.6 PG (ref 26–34)
MCHC RBC AUTO-ENTMCNC: 33.3 G/DL (ref 31–36)
MCV RBC AUTO: 91.9 FL (ref 80–100)
MONOCYTES # BLD: 0.5 K/UL (ref 0–1.3)
MONOCYTES NFR BLD: 6.1 %
NEUTROPHILS # BLD: 6 K/UL (ref 1.7–7.7)
NEUTROPHILS NFR BLD: 71.9 %
NITRITE UR QL STRIP.AUTO: NEGATIVE
PERFORMED ON: ABNORMAL
PH UR STRIP.AUTO: 5 [PH] (ref 5–8)
PLATELET # BLD AUTO: 176 K/UL (ref 135–450)
PMV BLD AUTO: 10.4 FL (ref 5–10.5)
POTASSIUM SERPL-SCNC: 4.3 MMOL/L (ref 3.5–5.1)
PROT SERPL-MCNC: 6.1 G/DL (ref 6.4–8.2)
PROT UR STRIP.AUTO-MCNC: 100 MG/DL
RBC # BLD AUTO: 3.81 M/UL (ref 4–5.2)
RBC CLUMPS #/AREA URNS AUTO: 0 /HPF (ref 0–4)
REPORT: NORMAL
RESP PATH DNA+RNA PNL NPH NAA+NON-PROBE: NORMAL
SODIUM SERPL-SCNC: 145 MMOL/L (ref 136–145)
SP GR UR STRIP.AUTO: 1.02 (ref 1–1.03)
TRIGL SERPL-MCNC: 176 MG/DL (ref 0–150)
UA COMPLETE W REFLEX CULTURE PNL UR: ABNORMAL
UA DIPSTICK W REFLEX MICRO PNL UR: YES
URN SPEC COLLECT METH UR: ABNORMAL
UROBILINOGEN UR STRIP-ACNC: 1 E.U./DL
WBC # BLD AUTO: 8.3 K/UL (ref 4–11)
WBC #/AREA URNS AUTO: 1 /HPF (ref 0–5)

## 2025-01-17 PROCEDURE — 0202U NFCT DS 22 TRGT SARS-COV-2: CPT

## 2025-01-17 PROCEDURE — 83690 ASSAY OF LIPASE: CPT

## 2025-01-17 PROCEDURE — 6370000000 HC RX 637 (ALT 250 FOR IP): Performed by: INTERNAL MEDICINE

## 2025-01-17 PROCEDURE — 99223 1ST HOSP IP/OBS HIGH 75: CPT | Performed by: INTERNAL MEDICINE

## 2025-01-17 PROCEDURE — 93010 ELECTROCARDIOGRAM REPORT: CPT | Performed by: INTERNAL MEDICINE

## 2025-01-17 PROCEDURE — 92523 SPEECH SOUND LANG COMPREHEN: CPT

## 2025-01-17 PROCEDURE — 2580000003 HC RX 258: Performed by: INTERNAL MEDICINE

## 2025-01-17 PROCEDURE — 36415 COLL VENOUS BLD VENIPUNCTURE: CPT

## 2025-01-17 PROCEDURE — 1200000000 HC SEMI PRIVATE

## 2025-01-17 PROCEDURE — 6360000002 HC RX W HCPCS: Performed by: INTERNAL MEDICINE

## 2025-01-17 PROCEDURE — 81001 URINALYSIS AUTO W/SCOPE: CPT

## 2025-01-17 PROCEDURE — 6360000002 HC RX W HCPCS: Performed by: STUDENT IN AN ORGANIZED HEALTH CARE EDUCATION/TRAINING PROGRAM

## 2025-01-17 PROCEDURE — 70551 MRI BRAIN STEM W/O DYE: CPT

## 2025-01-17 PROCEDURE — 80053 COMPREHEN METABOLIC PANEL: CPT

## 2025-01-17 PROCEDURE — 99223 1ST HOSP IP/OBS HIGH 75: CPT | Performed by: STUDENT IN AN ORGANIZED HEALTH CARE EDUCATION/TRAINING PROGRAM

## 2025-01-17 PROCEDURE — 2580000003 HC RX 258: Performed by: STUDENT IN AN ORGANIZED HEALTH CARE EDUCATION/TRAINING PROGRAM

## 2025-01-17 PROCEDURE — 84478 ASSAY OF TRIGLYCERIDES: CPT

## 2025-01-17 PROCEDURE — 51798 US URINE CAPACITY MEASURE: CPT

## 2025-01-17 PROCEDURE — 2500000003 HC RX 250 WO HCPCS: Performed by: INTERNAL MEDICINE

## 2025-01-17 PROCEDURE — 92610 EVALUATE SWALLOWING FUNCTION: CPT

## 2025-01-17 PROCEDURE — 85025 COMPLETE CBC W/AUTO DIFF WBC: CPT

## 2025-01-17 RX ORDER — SODIUM CHLORIDE 9 MG/ML
INJECTION, SOLUTION INTRAVENOUS CONTINUOUS
Status: DISCONTINUED | OUTPATIENT
Start: 2025-01-17 | End: 2025-01-18

## 2025-01-17 RX ORDER — PAROXETINE 10 MG/1
10 TABLET, FILM COATED ORAL EVERY MORNING
COMMUNITY

## 2025-01-17 RX ADMIN — CHLORTHALIDONE 25 MG: 25 TABLET ORAL at 08:18

## 2025-01-17 RX ADMIN — LEVOTHYROXINE SODIUM 25 MCG: 0.03 TABLET ORAL at 08:18

## 2025-01-17 RX ADMIN — LISINOPRIL 20 MG: 10 TABLET ORAL at 08:17

## 2025-01-17 RX ADMIN — SODIUM CHLORIDE: 9 INJECTION, SOLUTION INTRAVENOUS at 11:13

## 2025-01-17 RX ADMIN — SODIUM CHLORIDE, PRESERVATIVE FREE 40 MG: 5 INJECTION INTRAVENOUS at 17:01

## 2025-01-17 RX ADMIN — SODIUM CHLORIDE: 9 INJECTION, SOLUTION INTRAVENOUS at 01:08

## 2025-01-17 RX ADMIN — CLONIDINE HYDROCHLORIDE 0.4 MG: 0.1 TABLET ORAL at 08:18

## 2025-01-17 RX ADMIN — METOPROLOL SUCCINATE 25 MG: 25 TABLET, FILM COATED, EXTENDED RELEASE ORAL at 08:18

## 2025-01-17 RX ADMIN — PIPERACILLIN AND TAZOBACTAM 3375 MG: 3; .375 INJECTION, POWDER, LYOPHILIZED, FOR SOLUTION INTRAVENOUS at 22:52

## 2025-01-17 RX ADMIN — MELATONIN TAB 3 MG 6 MG: 3 TAB at 01:21

## 2025-01-17 RX ADMIN — ENOXAPARIN SODIUM 30 MG: 100 INJECTION SUBCUTANEOUS at 08:16

## 2025-01-17 RX ADMIN — SODIUM CHLORIDE, PRESERVATIVE FREE 10 ML: 5 INJECTION INTRAVENOUS at 20:26

## 2025-01-17 RX ADMIN — CLONIDINE HYDROCHLORIDE 0.4 MG: 0.1 TABLET ORAL at 01:21

## 2025-01-17 RX ADMIN — PIPERACILLIN AND TAZOBACTAM 3375 MG: 3; .375 INJECTION, POWDER, LYOPHILIZED, FOR SOLUTION INTRAVENOUS at 11:18

## 2025-01-17 RX ADMIN — CLONIDINE HYDROCHLORIDE 0.4 MG: 0.1 TABLET ORAL at 20:26

## 2025-01-17 RX ADMIN — VERAPAMIL HYDROCHLORIDE 120 MG: 120 TABLET, FILM COATED, EXTENDED RELEASE ORAL at 08:18

## 2025-01-17 NOTE — ED PROVIDER NOTES
[facility] EMERGENCY DEPARTMENT  EMERGENCY DEPARTMENT ENCOUNTER        Pt Name: Iwona Alva  MRN: [id number]  Birthdate 1950  Date of evaluation: 1/16/2025  Provider: Lia Leonard PA-C  PCP: Ce Middleton MD  Note Started: 10:24 PM EST 1/16/25       I have seen and evaluated this patient with my supervising physician Isidro Godinez DO.      CHIEF COMPLAINT       Chief Complaint   Patient presents with    Fatigue     Patient arrives through triage with complaints of generalized weakness and fatigue. Son reports that patient lives alone and that she has not been eating or sleeping for about a week. Patient has not been taking her pills. Confused and lethargic.        HISTORY OF PRESENT ILLNESS: 1 or more Elements     History From: Patient, family members at bedside  Limitations to history : Altered Mental Status    Iwona Alva is a 74 y.o. female who presents to the emergency department today for evaluation for concerns of altered mental status.  Family reports that over the past year, the patient has had intermittent episodes of confusion, which they were attributing to age.  They report that since 12/25/2024, she has had more episodes of confusion and is \"going downhill\".  They report that the patient lives at home by herself.  They report that over the past week they have been checking on her daily and feels that the confusion is worsening.  They report that she \"sleeps all day\".  They report that she does smoke 1 to 2 packs of cigarettes per day.  Patient has a history of hypertension, as well as diabetes and is not taking any of her medications.  The family is concerned that her fatigue is gradually worsening, which prompted her visit to the ED.  They report that she does have several episodes of diarrhea, patient denies any diarrhea today.  She has no abdominal pain or vomiting.  No fever or chills.  No cough.  No urinary symptoms.  Patient is alert and oriented x 2.    Nursing  Pro-Beka is elevated.  Troponin is elevated at 03 73 this is repeated and is essentially unchanged    CT of the abdomen does show acute pancreatitis, ethanol level is ordered and is pending    Does show findings of possible aspiration pneumonia will cover with Zosyn    .  Patient does have altered mental status, due to this with her acute pancreatitis she would benefit from admission, hospitalist has been paged for admission, patient's updated, agreeable plan, stable for admission    I am the Primary Clinician of Record.  FINAL IMPRESSION      1. Acute pancreatitis, unspecified complication status, unspecified pancreatitis type    2. Altered mental status, unspecified altered mental status type    3. Chronic kidney disease, unspecified CKD stage          DISPOSITION/PLAN     DISPOSITION Admitted 01/16/2025 11:09:12 PM               PATIENT REFERRED TO:  No follow-up provider specified.    DISCHARGE MEDICATIONS:  New Prescriptions    No medications on file       DISCONTINUED MEDICATIONS:  Discontinued Medications    No medications on file              (Please note that portions of this note were completed with a voice recognition program.  Efforts were made to edit the dictations but occasionally words are mis-transcribed.)    Lia Leonard PA-C (electronically signed)        Lia Leonard PA-C  01/16/25 [id number]

## 2025-01-17 NOTE — ACP (ADVANCE CARE PLANNING)
Advance Care Planning     Advance Care Planning Inpatient Note  Spiritual Care Department    Today's Date: 1/17/2025  Unit: FZ 5 TWR ONCOLOGY    Received request from IDT Member, Vonda Jones RN.  Upon review of chart and communication with care team, request Health Care Provider's clarification of patient's decision making capacity. and Spiritual Care will defer advance care planning with patient at this time.. Patient and Child/Children was/were present in the room during visit.    Goals of ACP Conversation:  Discuss advance care planning documents  Facilitate a discussion related to patient's goals of care as they align with the patient's values and beliefs.    Health Care Decision Makers:       Primary Decision Maker: Mike Alva - Child - [phone]    Secondary Decision Maker: Susan Reyez - Child - [phone]  Summary:  Documented Next of Kin, per patient report    Advance Care Planning Documents (Patient Wishes):  None     Assessment:  Spiritual Care Consult to facilitate completion of Health Care Power of  and Living Will. Pt was asleep, son at bedside. A review of pt's chat and a brief conversation with Pt's son revealed pt's altered mental status and pt's in ability to hold acp conversation. Spiritual Care Services  will defer advance care planning with pt at this time. Pt will notify the nurse when appropriate.  Provided support though active listening, affirmed feelings, thought, concerns, conversation and blessing.Son expressed concerns and gratitude.    Interventions:  Provided education on documents for clarity and greater understanding  Discussed and provided education on state decision maker hierarchy  Encouraged ongoing ACP conversation with future decision makers and loved ones  Reviewed but did not complete ACP document    Care Preferences Communicated:   No    Outcomes/Plan:  ACP Discussion: Postponed. Pt will notify the nurse when appropriate.    Electronically signed

## 2025-01-17 NOTE — ED NOTES
Iwona Alva is a 74 y.o. female admitted for  Principal Problem:    Pancreatitis without necrosis or infection  Resolved Problems:    * No resolved hospital problems. *  .   Patient Home via family with   Chief Complaint   Patient presents with    Fatigue     Patient arrives through triage with complaints of generalized weakness and fatigue. Son reports that patient lives alone and that she has not been eating or sleeping for about a week. Patient has not been taking her pills. Confused and lethargic.    .  Patient is alert and Person & PLace  Patient's baseline mobility: Baseline Mobility: Independent   Code Status: Full Code   Cardiac Rhythm:  Sinus Tach      Is patient on baseline Oxygen: no how many Liters:   Abnormal Assessment Findings:     Isolation: None      NIH Score:    C-SSRS:    Bedside swallow:        Active LDA's:   Peripheral IV 01/16/25 Right Antecubital (Active)   Site Assessment Clean, dry & intact 01/16/25 2004     Patient admitted with a pineda: no If the pineda is chronic was it exchanged:NA  Reason for pineda:  Purewick in place  Patient admitted with Central Line:  . PICC line placement confirmed: YES OR NO:[id number]}   Reason for Central line:   Was central line Inserted from an outside facility:        Family/Caregiver Present yes Any Concerns: no   Restraints no  Sitter no         Vitals: MEWS Score: 4    Vitals:    01/16/25 2114 01/16/25 2119 01/16/25 2145 01/16/25 2230   BP:   (!) 164/114 (!) 203/114   Pulse: (!) 113 (!) 113 (!) 123 (!) 123   Resp:  15 17 19   Temp:       TempSrc:       SpO2:  98% 90% 96%   Weight:       Height:           Last documented pain score (0-10 scale)    Pain medication administered No.    Pertinent or High Risk Medications/Drips: No.    Pending Blood Product Administration: no    Abnormal labs:   Abnormal Labs Reviewed   CBC WITH AUTO DIFFERENTIAL - Abnormal; Notable for the following components:       Result Value    WBC 14.5 (*)     Neutrophils Absolute 9.4 (*)

## 2025-01-17 NOTE — CARE COORDINATION
Patient with stage 1 and dti on buttocks at admission.  Wound care orders initiated. KYUNG GRANADOSN, RN, CWOCN  Inpatient  Wound/Ostomy Care  [phone]

## 2025-01-17 NOTE — CONSULTS
Pulmonary and Critical Care Medicine    CC: weakness   Date: 2025  Admit Date:  2025  Reason for Consultation: right hilar mass  Consult Requesting Physician: Nuris Lance MD     HPI     This is a 75 y/o F w/ a hx of HTN, HLD, who presented with generalized weakness, AMS and fatigue. She is admitted to the medical floor with pancreatitis, PNA, Delirium. Work up included CT C/A/P, chest CT reported enlarging right hilar lymph node/mass. Pulmonary medicine was consulted to help manage the CT chest finding, on my exam patient was in room 5566 she was on RA, she denies any cough, hemoptysis, she did endorse weight and diarrhea x 1 month, she also has been having back pain. She has extensive hx of tobacco use 45 yrs 2PPD. She had a LDCT in , which was negative.     ROS: negative except stated above    PMH:  has a past medical history of Hyperlipidemia, Hypertension, Impaired fasting glucose, Insomnia, unspecified, Osteoarthritis, and Thyroid disease.   PSH:  has a past surgical history that includes Cholecystectomy; Rotator cuff repair (Right, ); Carpal tunnel release (Left, [id number]); lumbar laminectomy (8/20/15); and lumbar fusion (N/A, 2021).    SH:  reports that she has been smoking cigarettes. She has a 50 pack-year smoking history. She has never used smokeless tobacco. She reports that she does not drink alcohol and does not use drugs.   FH: family history includes Cancer in her brother and father.    Allergies: Haemophilus influenzae vaccines, Acetaminophen, Hydrocodone, Influenza virus vacc split pf, Pravastatin sodium [pravastatin sodium], and Vicodin [hydrocodone-acetaminophen]     OBJECTIVE DATA       PHYSICAL EXAM:   Temp  Av.6 °F (36.4 °C)  Min: 97.3 °F (36.3 °C)  Max: 98.5 °F (36.9 °C)  Pulse  Av.4  Min: 70  Max: 135  BP  Min: 121/78  Max: 203/114  SpO2  Av.1 %  Min: 90 %  Max: 98 %    General: NAD  Neuro: A0x3  Lung: Clear, no wheezing, equal non labored  Heart:  continue IVF  - I will start PPI BID  - agree with sending TG levels    Discussed with son at the bedside    Pulmonary medicine will sign off.    High risk 75 minutes    Emeka Gustafson MD  Pulmonary and Critical Care Medicine   [facility]

## 2025-01-17 NOTE — ED PROVIDER NOTES
EMERGENCY MEDICINE ATTENDING NOTE  Isidro Godinez Jr., SHIRLEY NIELSON FAAEM        I personally saw the patient and made/approved the management plan and take responsibility for the patient management on Iwona Alva.  All diagnostic, treatment, and disposition decisions were made by myself in conjunction with the advanced practice provider.  I have participated in the medical decision making and directed the treatment plan and disposition of the patient.    For further details of Iwona Alva's emergency department encounter, please see the advanced practice provider's documentation.    I, Isidro Godinez Jr., SHIRLEY NIELSON FAAEM, am the primary physician provider of record.      CHIEF COMPLAINT  Chief Complaint   Patient presents with    Fatigue     Patient arrives through triage with complaints of generalized weakness and fatigue. Son reports that patient lives alone and that she has not been eating or sleeping for about a week. Patient has not been taking her pills. Confused and lethargic.        BRIEF HISTORY  Iwona Alva is a 74 y.o. female  who presents to the ED for evaluation of general weakness and fatigue.  There is also concern she seems markedly is unusual.  She does live alone and has not been taking care of herself for the last week.    BRIEF/FOCUSED PHYSICAL EXAMINATION  VITAL SIGNS:    ED Triage Vitals   Encounter Vitals Group      BP 01/16/25 1939 (!) 198/102      Systolic BP Percentile --       Diastolic BP Percentile --       Pulse 01/16/25 1939 (!) 135      Respirations 01/16/25 1939 18      Temp 01/16/25 1939 98.5 °F (36.9 °C)      Temp Source 01/16/25 1939 Oral      SpO2 01/16/25 1939 92 %      Weight - Scale 01/16/25 1939 70.4 kg (155 lb 3.2 oz)      Height 01/16/25 2005 1.549 m (5' 1\")      Head Circumference --       Peak Flow --       Pain Score --       Pain Loc --       Pain Education --       Exclude from Growth Chart --      Physical Exam  Vitals and nursing note

## 2025-01-17 NOTE — PROGRESS NOTES
4 Eyes Skin Assessment     NAME:  Iwona Alva  YOB: 1950  MEDICAL RECORD NUMBER:  [id number]    The patient is being assessed for  Admission    I agree that at least one RN has performed a thorough Head to Toe Skin Assessment on the patient. ALL assessment sites listed below have been assessed.      Areas assessed by both nurses:    Head, Face, Ears, Shoulders, Back, Chest, Arms, Elbows, Hands, Sacrum. Buttock, Coccyx, Ischium, and Legs. Feet and Heels        Does the Patient have a Wound? Yes wound(s) were present on assessment. LDA wound assessment was Initiated and completed by RN       Ramirez Prevention initiated by RN: Yes  Wound Care Orders initiated by RN: No    Pressure Injury (Stage 3,4, Unstageable, DTI, NWPT, and Complex wounds) if present, place Wound referral order by RN under : Yes    New Ostomies, if present place, Ostomy referral order under : No     Nurse 1 eSignature: Electronically signed by Susan Pittman RN on 1/17/25 at 7:58 AM EST    **SHARE this note so that the co-signing nurse can place an eSignature**    Nurse 2 eSignature: Electronically signed by Nelly Pinon LPN on 1/17/25 at 8:16 AM EST

## 2025-01-17 NOTE — PROGRESS NOTES
[facility]    1/17/2025 7:59 AM        Name: Iwona Alva .              Admitted: 1/16/2025  Primary Care Provider: Ce Middleton MD (Tel: [phone])      Subjective:  .  Seen this am with daughter and grand daughter at the bedside.  Pt is very lethargic but awakens to my cold hands.  She is a poor historian.  Recognized her daughter but unable to tell me her name.  Moves all ext without deficits.    Denies any belly pain or dyspnea     Summary:   Son brought her to ED because she was sleepy , had previously vomited at home and then had diarrhea after eating a fish sandwich from her son.    Daughter also reports increased confusion since vincent dorothy.  Daughter reports up to 30 lb weight loss in the past year.       PCP office notes reviewed from 2023 which suggest concerns for dementia , parkinson's , saw neurology 8/23 ,  did not  think parkinson's was present ,  PT recommended for her chronic balance disorder        Reviewed interval ancillary notes    Current Medications  0.9 % sodium chloride infusion, Continuous  morphine (PF) injection 2 mg, Q4H PRN  chlorthalidone (HYGROTON) tablet 25 mg, Daily  cloNIDine (CATAPRES) tablet 0.4 mg, BID  levothyroxine (SYNTHROID) tablet 25 mcg, Daily  lisinopril (PRINIVIL;ZESTRIL) tablet 20 mg, Daily  metoprolol succinate (TOPROL XL) extended release tablet 25 mg, Daily  verapamil (CALAN SR) extended release tablet 120 mg, Daily  sodium chloride flush 0.9 % injection 10 mL, 2 times per day  sodium chloride flush 0.9 % injection 10 mL, PRN  0.9 % sodium chloride infusion, PRN  enoxaparin Sodium (LOVENOX) injection 30 mg, Daily  promethazine (PHENERGAN) tablet 12.5 mg, Q6H PRN   Or  ondansetron (ZOFRAN) injection 4 mg, Q6H PRN  polyethylene glycol (GLYCOLAX) packet 17 g, Daily PRN  melatonin tablet 6 mg, Nightly PRN        Objective:  BP (!) 151/77   Pulse 70   Temp 97.3 °F (36.3 °C) (Axillary)   Resp 18   Ht 1.549 m (5' 1\")

## 2025-01-17 NOTE — CARE COORDINATION
Case Management Assessment  Initial Evaluation    Date/Time of Evaluation: 1/17/2025 1:43 PM  Assessment Completed by: José Robles    If patient is discharged prior to next notation, then this note serves as note for discharge by case management.    Patient Name: Iwona Alva                   YOB: 1950  Diagnosis: Altered mental status, unspecified altered mental status type [R41.82]  Acute pancreatitis, unspecified complication status, unspecified pancreatitis type [K85.90]  Chronic kidney disease, unspecified CKD stage [N18.9]  Pancreatitis without necrosis or infection [K85.90]                   Date / Time: 1/16/2025  7:31 PM    Patient Admission Status: Inpatient   Readmission Risk (Low < 19, Mod (19-27), High > 27): Readmission Risk Score: 16.5    Current PCP: Ce Middleton MD  PCP verified by CM? Yes    Chart Reviewed: Yes      History Provided by: Medical Record, Child/Family  Patient Orientation: Alert and Oriented    Patient Cognition: Alert    Hospitalization in the last 30 days (Readmission):  No    If yes, Readmission Assessment in  Navigator will be completed.    Advance Directives:      Code Status: Full Code   Patient's Primary Decision Maker is: Legal Next of Kin    Primary Decision Maker: iMke Alva - Child - [phone]    Secondary Decision Maker: Susan Reyez - Child - [phone]    Discharge Planning:    Patient lives with: Alone Type of Home: House  Primary Care Giver: Self  Patient Support Systems include: Children   Current Financial resources: Medicare  Current community resources: None  Current services prior to admission: None            Current DME:              Type of Home Care services:  None    ADLS  Prior functional level: Independent in ADLs/IADLs, Bathing, Dressing, Toileting, Feeding, Cooking, Housework, Shopping, Mobility  Current functional level: Mobility, Toileting, Dressing, Bathing, Assistance with the following:    PT AM-PAC:   /24  OT  AM-PAC:   /24    Family can provide assistance at DC: Yes  Would you like Case Management to discuss the discharge plan with any other family members/significant others, and if so, who? Yes  Plans to Return to Present Housing: Unknown at present  Other Identified Issues/Barriers to RETURNING to current housing: Lives alone  Potential Assistance needed at discharge: N/A            Potential DME:    Patient expects to discharge to: House  Plan for transportation at discharge:      Financial    Payor: MEDICARE / Plan: MEDICARE PART A AND B / Product Type: *No Product type* /     Does insurance require precert for SNF: No    Potential assistance Purchasing Medications: Yes  Meds-to-Beds request:        [facility] - [id number] [facility] [address]  [place]  Phone: [phone] Fax: [phone]    [facility] [id number] [address] [phone] -  [phone]  [address] [facility] [address]  Phone: [phone] Fax: [phone]      Notes:    Factors facilitating achievement of predicted outcomes: Family support    Barriers to discharge: Medical complications    Additional Case Management Notes: Patient was admitted from home where she lives alone. Patients son-Mike was in the room and provided assessment answers. Per son the patient is independent at home but he acknowledges the patient needs ilda help. CM spoke about placement options. CM will refer to [place]. Son-feels the patient would not got to a Snf even if recommended.     The Plan for Transition of Care is related to the following treatment goals of Altered mental status, unspecified altered mental status type [R41.82]  Acute pancreatitis, unspecified complication status, unspecified pancreatitis type [K85.90]  Chronic kidney disease, unspecified CKD stage [N18.9]  Pancreatitis without necrosis or infection [K85.90]    IF APPLICABLE: The Patient and/or patient representative Iwona and her family were provided

## 2025-01-17 NOTE — H&P
HOSPITALISTS HISTORY AND PHYSICAL    1/16/2025 11:09 PM    Patient Information:  IWONA ALVA is a 74 y.o. female [id number]  PCP:  Ce Middleton MD (Tel: [phone] )    Chief complaint:    Chief Complaint   Patient presents with    Fatigue     Patient arrives through triage with complaints of generalized weakness and fatigue. Son reports that patient lives alone and that she has not been eating or sleeping for about a week. Patient has not been taking her pills. Confused and lethargic.         History of Present Illness:  Iwona Alva is a 74 y.o. female who presented with complaints of generalized weakness, fatigue, altered mental status, lethargy, not wanting to take her medications at home.  Family reported that episodes like this have come and gone at times throughout the past year, but that she is seeming to get more confused each time.  She currently lives at home by herself.  Family has been checking on her every day, and she seems to be getting worse with her confusion.  She mostly just stays in bed.  They report that she has not been compliant with her medications due to her altered mental status.  They brought her to the ED for evaluation.  Further history is limited, as patient has altered mental status.  She denies acute complaints but does agree that she feels chronically weak and tired most of the time.  In the ED, she was noted to be hypertensive and tachycardic, with otherwise stable vital signs.  Workup is significant for negative lactic acid, troponin of 373, lipase of 1100, CT of chest abdomen and pelvis showing evidence of acute pancreatitis, negative CT head, negative chest x-ray.  She has received a dose of labetalol for hypertension in the ED.  Medicine was asked to admit for pancreatitis, altered mental status, possible delirium, uncontrolled hypertension.    REVIEW OF

## 2025-01-17 NOTE — PROGRESS NOTES
Speech Language Pathology  [facility]  [phone]  SLP Clinical Swallow Evaluation and Speech Language Cognitive Assessment       Patient: Iwona Alva   : 1950   MRN: [id number]      Evaluation Date: 2025      Admitting Dx: Altered mental status, unspecified altered mental status type [R41.82]  Acute pancreatitis, unspecified complication status, unspecified pancreatitis type [K85.90]  Chronic kidney disease, unspecified CKD stage [N18.9]  Pancreatitis without necrosis or infection [K85.90]  Treatment Diagnosis: Speech Language Deficits , Oropharyngeal Dysphagia   Pain: Denies                                  Recommendations      Recommended Diet and Intervention 2025:  Diet Solids Recommendation:  Regular texture diet (When MD OK's PO diet)  Liquid Consistency Recommendation:  Thin liquids  Recommended form of Meds: Meds whole with water or Meds in puree     The [employer] Mental Status (UMS) Examination administered to pt with pt scoring 5/30 falling within the Normal Range (27/30); Mild Neurocognitive Disorder (21-26/30); Dementia rage (1-20).* Of note, Pt does not have a formal dementia diagnosis.     Compensatory strategies: Upright as possible with all PO intake , Small bites/sips , Eat/feed slowly, Remain upright 30-45 min , Aspiration Precautions     Discharge Recommendations:  Recommend ongoing SLP for speech and dysphagia therapy upon discharge from hospital     History/Course of Treatment     H&P: Iwona Alva is a 74 y.o. female who presented with complaints of generalized weakness, fatigue, altered mental status, lethargy, not wanting to take her medications at home.  Family reported that episodes like this have come and gone at times throughout the past year, but that she is seeming to get more confused each time.  She currently lives at home by herself.  Family has been checking on her every day, and she seems to be  min cues         Above goals reviewed on 1/17/2025.  All goals are ongoing at this time unless indicated above.       POC/Education     Dysphagia Therapeutic Intervention:  Diet Tolerance Monitoring , Patient/Family Education , Therapeutic Trials with SLP     Plan of care: 3-5 times per week during acute care stay.      Education:  Provided education regarding role of SLP, results of assessment, recommendations and general speech pathology plan of care:  Pt verbalized understanding and agreement     If patient discharges prior to next visit, this note will serve as discharge.     Treatment time:  Timed Code Treatment Minutes: 0   Total Treatment Time Minutes: 30     Electronically signed by:  Olga Ortez MA CCC-SLP #[id number]  Speech Language Pathologist

## 2025-01-17 NOTE — PROGRESS NOTES
Shift assessment completed. Routine vitals obtained. Scheduled medications given. Patient is awake, alert and oriented x1. Respirations are easy and unlabored. Patient does not appear to be in distress, resting comfortably at this time. Call light within reach. Fall precaution are in place.

## 2025-01-17 NOTE — PLAN OF CARE
Problem: Skin/Tissue Integrity  Goal: Absence of new skin breakdown  Description: 1.  Monitor for areas of redness and/or skin breakdown  2.  Assess vascular access sites hourly  3.  Every 4-6 hours minimum:  Change oxygen saturation probe site  4.  Every 4-6 hours:  If on nasal continuous positive airway pressure, respiratory therapy assess nares and determine need for appliance change or resting period.  Outcome: Progressing     Problem: Safety - Adult  Goal: Free from fall injury  Outcome: Progressing     Problem: ABCDS Injury Assessment  Goal: Absence of physical injury  Outcome: Progressing     Problem: Confusion  Goal: Confusion, delirium, dementia, or psychosis is improved or at baseline  Description: INTERVENTIONS:  1. Assess for possible contributors to thought disturbance, including medications, impaired vision or hearing, underlying metabolic abnormalities, dehydration, psychiatric diagnoses, and notify attending LIP  2. [place] high risk fall precautions, as indicated  3. Provide frequent short contacts to provide reality reorientation, refocusing and direction  4. Decrease environmental stimuli, including noise as appropriate  5. Monitor and intervene to maintain adequate nutrition, hydration, elimination, sleep and activity  6. If unable to ensure safety without constant attention obtain sitter and review sitter guidelines with assigned personnel  7. Initiate Psychosocial CNS and Spiritual Care consult, as indicated  Outcome: Progressing

## 2025-01-18 PROBLEM — N18.9 CHRONIC KIDNEY DISEASE: Status: ACTIVE | Noted: 2025-01-18

## 2025-01-18 PROBLEM — R41.82 ALTERED MENTAL STATUS: Status: ACTIVE | Noted: 2025-01-18

## 2025-01-18 PROBLEM — E87.8 ELECTROLYTE IMBALANCE: Status: ACTIVE | Noted: 2025-01-18

## 2025-01-18 LAB
ALBUMIN SERPL-MCNC: 3.1 G/DL (ref 3.4–5)
ANION GAP SERPL CALCULATED.3IONS-SCNC: 11 MMOL/L (ref 3–16)
BASOPHILS # BLD: 0 K/UL (ref 0–0.2)
BASOPHILS NFR BLD: 0.3 %
BUN SERPL-MCNC: 48 MG/DL (ref 7–20)
CALCIUM SERPL-MCNC: 9.6 MG/DL (ref 8.3–10.6)
CHLORIDE SERPL-SCNC: 109 MMOL/L (ref 99–110)
CO2 SERPL-SCNC: 24 MMOL/L (ref 21–32)
CREAT SERPL-MCNC: 2.3 MG/DL (ref 0.6–1.2)
DEPRECATED RDW RBC AUTO: 14 % (ref 12.4–15.4)
EOSINOPHIL # BLD: 0 K/UL (ref 0–0.6)
EOSINOPHIL NFR BLD: 0.2 %
GFR SERPLBLD CREATININE-BSD FMLA CKD-EPI: 21 ML/MIN/{1.73_M2}
GLUCOSE SERPL-MCNC: 90 MG/DL (ref 70–99)
HCT VFR BLD AUTO: 32.7 % (ref 36–48)
HGB BLD-MCNC: 11 G/DL (ref 12–16)
LIPASE SERPL-CCNC: 348 U/L (ref 13–60)
LYMPHOCYTES # BLD: 1.7 K/UL (ref 1–5.1)
LYMPHOCYTES NFR BLD: 24.3 %
MCH RBC QN AUTO: 30.7 PG (ref 26–34)
MCHC RBC AUTO-ENTMCNC: 33.8 G/DL (ref 31–36)
MCV RBC AUTO: 90.8 FL (ref 80–100)
MONOCYTES # BLD: 0.4 K/UL (ref 0–1.3)
MONOCYTES NFR BLD: 6.3 %
NEUTROPHILS # BLD: 4.7 K/UL (ref 1.7–7.7)
NEUTROPHILS NFR BLD: 68.9 %
NT-PROBNP SERPL-MCNC: ABNORMAL PG/ML (ref 0–449)
PHOSPHATE SERPL-MCNC: 2.4 MG/DL (ref 2.5–4.9)
PLATELET # BLD AUTO: 128 K/UL (ref 135–450)
PMV BLD AUTO: 10 FL (ref 5–10.5)
POTASSIUM SERPL-SCNC: 3.7 MMOL/L (ref 3.5–5.1)
RBC # BLD AUTO: 3.6 M/UL (ref 4–5.2)
SODIUM SERPL-SCNC: 144 MMOL/L (ref 136–145)
WBC # BLD AUTO: 6.8 K/UL (ref 4–11)

## 2025-01-18 PROCEDURE — 6370000000 HC RX 637 (ALT 250 FOR IP): Performed by: INTERNAL MEDICINE

## 2025-01-18 PROCEDURE — 80069 RENAL FUNCTION PANEL: CPT

## 2025-01-18 PROCEDURE — 83690 ASSAY OF LIPASE: CPT

## 2025-01-18 PROCEDURE — 6360000002 HC RX W HCPCS: Performed by: STUDENT IN AN ORGANIZED HEALTH CARE EDUCATION/TRAINING PROGRAM

## 2025-01-18 PROCEDURE — 99233 SBSQ HOSP IP/OBS HIGH 50: CPT | Performed by: INTERNAL MEDICINE

## 2025-01-18 PROCEDURE — 2500000003 HC RX 250 WO HCPCS: Performed by: INTERNAL MEDICINE

## 2025-01-18 PROCEDURE — 36415 COLL VENOUS BLD VENIPUNCTURE: CPT

## 2025-01-18 PROCEDURE — 6360000002 HC RX W HCPCS: Performed by: INTERNAL MEDICINE

## 2025-01-18 PROCEDURE — 83880 ASSAY OF NATRIURETIC PEPTIDE: CPT

## 2025-01-18 PROCEDURE — 2580000003 HC RX 258: Performed by: STUDENT IN AN ORGANIZED HEALTH CARE EDUCATION/TRAINING PROGRAM

## 2025-01-18 PROCEDURE — 2580000003 HC RX 258: Performed by: INTERNAL MEDICINE

## 2025-01-18 PROCEDURE — 85025 COMPLETE CBC W/AUTO DIFF WBC: CPT

## 2025-01-18 PROCEDURE — 1200000000 HC SEMI PRIVATE

## 2025-01-18 PROCEDURE — 6370000000 HC RX 637 (ALT 250 FOR IP): Performed by: NURSE PRACTITIONER

## 2025-01-18 RX ORDER — LIDOCAINE 4 G/G
1 PATCH TOPICAL DAILY
Status: DISCONTINUED | OUTPATIENT
Start: 2025-01-18 | End: 2025-01-21 | Stop reason: HOSPADM

## 2025-01-18 RX ORDER — SODIUM CHLORIDE 9 MG/ML
INJECTION, SOLUTION INTRAVENOUS CONTINUOUS
Status: DISCONTINUED | OUTPATIENT
Start: 2025-01-18 | End: 2025-01-18

## 2025-01-18 RX ORDER — SODIUM CHLORIDE, SODIUM LACTATE, POTASSIUM CHLORIDE, CALCIUM CHLORIDE 600; 310; 30; 20 MG/100ML; MG/100ML; MG/100ML; MG/100ML
INJECTION, SOLUTION INTRAVENOUS CONTINUOUS
Status: DISCONTINUED | OUTPATIENT
Start: 2025-01-18 | End: 2025-01-18

## 2025-01-18 RX ADMIN — CLONIDINE HYDROCHLORIDE 0.4 MG: 0.1 TABLET ORAL at 09:47

## 2025-01-18 RX ADMIN — SODIUM CHLORIDE, PRESERVATIVE FREE 40 MG: 5 INJECTION INTRAVENOUS at 05:10

## 2025-01-18 RX ADMIN — PIPERACILLIN AND TAZOBACTAM 3375 MG: 3; .375 INJECTION, POWDER, LYOPHILIZED, FOR SOLUTION INTRAVENOUS at 22:58

## 2025-01-18 RX ADMIN — VERAPAMIL HYDROCHLORIDE 120 MG: 120 TABLET, FILM COATED, EXTENDED RELEASE ORAL at 12:09

## 2025-01-18 RX ADMIN — PIPERACILLIN AND TAZOBACTAM 3375 MG: 3; .375 INJECTION, POWDER, LYOPHILIZED, FOR SOLUTION INTRAVENOUS at 09:51

## 2025-01-18 RX ADMIN — METOPROLOL SUCCINATE 25 MG: 25 TABLET, FILM COATED, EXTENDED RELEASE ORAL at 09:47

## 2025-01-18 RX ADMIN — CLONIDINE HYDROCHLORIDE 0.4 MG: 0.1 TABLET ORAL at 20:02

## 2025-01-18 RX ADMIN — SODIUM CHLORIDE, PRESERVATIVE FREE 10 ML: 5 INJECTION INTRAVENOUS at 20:04

## 2025-01-18 RX ADMIN — SODIUM CHLORIDE, PRESERVATIVE FREE 40 MG: 5 INJECTION INTRAVENOUS at 16:53

## 2025-01-18 RX ADMIN — ENOXAPARIN SODIUM 30 MG: 100 INJECTION SUBCUTANEOUS at 09:47

## 2025-01-18 RX ADMIN — SODIUM CHLORIDE: 9 INJECTION, SOLUTION INTRAVENOUS at 05:37

## 2025-01-18 RX ADMIN — LEVOTHYROXINE SODIUM 25 MCG: 0.03 TABLET ORAL at 05:35

## 2025-01-18 NOTE — PROGRESS NOTES
Nephrology Consult Note                                                                                                                                                                                                                                                                                                                                                               Office : [phone]     Fax :[phone]    Patient's Name: Iwona Alva      Reason for Consult: CKD   Requesting Physician:  Ce Middleton MD  Chief Complaint:    Chief Complaint   Patient presents with    Fatigue     Patient arrives through triage with complaints of generalized weakness and fatigue. Son reports that patient lives alone and that she has not been eating or sleeping for about a week. Patient has not been taking her pills. Confused and lethargic.        Assessment/Plan     # CKD 4 sec to HTN/Ischemic Nephropathy   - Cr better   - Ur studies pending   - hold IVF as pt seems euvolemic   - labs in am     # HTN   - in decent control   - Hold thiazide for now     # AMS   - better   - MRI brain reviewed     # Anemia  - monitor     # Ac Pancreatitis   - need controlled Hydration   - High risk to get in to vol overload   - MRCP and EUS per GI         History of Present Ilness:    Iwona Alva is a 74 y.o. female who presented with complaints of generalized weakness, fatigue, altered mental status, lethargy, not wanting to take her medications at home.  Family reported that episodes like this have come and gone at times throughout the past year, but that she is seeming to get more confused each time.  She currently lives at home by herself.  Family has been checking on her every day, and she seems to be getting worse with her confusion.  She mostly just stays in bed.  They report that she has not been compliant with her medications due to her altered mental status.  They brought her to the ED for evaluation.  Further history is  Microscopic:   Recent Labs     01/17/25  1553   BACTERIA None Seen   HYALCAST 6   WBCUA 1   RBCUA 0     Urine Culture: No results for input(s): \"LABURIN\" in the last 72 hours.  Urine Chemistry: No results for input(s): \"CLUR\", \"LABCREA\", \"PROTEINUR\", \"NAUR\" in the last 72 hours.      IMAGING:  MRI BRAIN WO CONTRAST   Final Result   No acute intracranial abnormality         CT CHEST ABDOMEN PELVIS WO CONTRAST Additional Contrast? None   Final Result   1. Fat stranding in the region of the pancreatic head/uncinate process and   adjacent proximal duodenum, which demonstrates mild wall thickening. Findings   may be related to acute pancreatitis or duodenitis.  Traumatic pancreatic or   duodenal contusion is not excluded but considered less likely.   2. No other acute traumatic injury identified within the chest, abdomen, or   pelvis.   3. Asymmetric enlargement of the right hilum which is new compared with   12/09/2021 with suggestion of a 2.2 cm right hilar mass or lymph node,   suspicious for possible malignancy.  Postcontrast chest CT could be performed   for further delineation.   4. Mild tree-in-bud opacities within the right middle lobe, which may be   related to bronchiolitis or aspiration.   5. Punctate nonobstructive right lower pole renal calculus. No hydronephrosis.         CT HEAD WO CONTRAST   Final Result   Technically limited exam without acute abnormality.         XR CHEST PORTABLE   Final Result   No acute process.               Medical Decision Making:  The following items were considered in medical decision making:  Discussion of patient care with other providers  Reviewed clinical lab tests  Reviewed radiology tests  Reviewed other diagnostic tests/interventions    Will be discussed w/  Primary team     Thank you for allowing us to participate in care of Iwona MCKOY Artie   Feel free to contact me,     Evin Boggs MD   [facility]  Office : [phone] or through

## 2025-01-18 NOTE — CONSULTS
Nephrology Consult Note                                                                                                                                                                                                                                                                                                                                                               Office : [phone]     Fax :[phone]    Patient's Name: Iwona Alva      Reason for Consult: CKD   Requesting Physician:  Ce Middleton MD  Chief Complaint:    Chief Complaint   Patient presents with    Fatigue     Patient arrives through triage with complaints of generalized weakness and fatigue. Son reports that patient lives alone and that she has not been eating or sleeping for about a week. Patient has not been taking her pills. Confused and lethargic.        Assessment/Plan     # CKD 4 sec to HTN/Ischemic Nephropathy   - Cr stable   - Ur studies   - hold IVF as pt seems euvolemic   - labs in am     # HTN   - in decent control   - Hold thiazide for now     # AMS   - better   - MRI brain reviewed     # Anemia  - monitor     # Ac Pancreatitis   - need controlled Hydration   - High risk to get in to vol overload         History of Present Ilness:    Iwona Alva is a 74 y.o. female who presented with complaints of generalized weakness, fatigue, altered mental status, lethargy, not wanting to take her medications at home.  Family reported that episodes like this have come and gone at times throughout the past year, but that she is seeming to get more confused each time.  She currently lives at home by herself.  Family has been checking on her every day, and she seems to be getting worse with her confusion.  She mostly just stays in bed.  They report that she has not been compliant with her medications due to her altered mental status.  They brought her to the ED for evaluation.  Further history is limited, as patient has altered mental  [phone] or through [facility]  Fax :[phone]

## 2025-01-18 NOTE — PROGRESS NOTES
[facility]    1/18/2025 7:40 AM        Name: Iwona Alva .              Admitted: 1/16/2025  Primary Care Provider: Ce Middleton MD (Tel: [phone])      Subjective:  .  Seen this am with daughter in law at bedside. We had detailed conversation and reviewed labs and imaging in detail.  She more alert today but still occasionally is slow to respond and responses are not appropriate.     I assisted her to BSC to void qs.  Pure wick removed    Denies any belly pain or dyspnea   Pt has chronic low back pain from previous surgery. Has required epidural injections in the past     Summary:   Son brought her to ED because she was sleepy , had previously vomited at home and then had diarrhea after eating a fish sandwich from her son.    Daughter also reports increased confusion since vincent dorothy.  Daughter reports up to 30 lb weight loss in the past year.       PCP office notes reviewed from 2023 which suggest concerns for dementia , parkinson's , saw neurology 8/23 ,  did not  think parkinson's was present ,  PT recommended for her chronic balance disorder        Reviewed interval ancillary notes    Current Medications  lactated ringers infusion, Continuous  piperacillin-tazobactam (ZOSYN) 3,375 mg in sodium chloride 0.9 % 50 mL IVPB (mini-bag), Q12H  pantoprazole (PROTONIX) 40 mg in sodium chloride (PF) 0.9 % 10 mL injection, Q12H  morphine (PF) injection 2 mg, Q4H PRN  cloNIDine (CATAPRES) tablet 0.4 mg, BID  levothyroxine (SYNTHROID) tablet 25 mcg, Daily  metoprolol succinate (TOPROL XL) extended release tablet 25 mg, Daily  verapamil (CALAN SR) extended release tablet 120 mg, Daily  sodium chloride flush 0.9 % injection 10 mL, 2 times per day  sodium chloride flush 0.9 % injection 10 mL, PRN  0.9 % sodium chloride infusion, PRN  enoxaparin Sodium (LOVENOX) injection 30 mg, Daily  promethazine (PHENERGAN) tablet 12.5 mg, Q6H PRN   Or  ondansetron (ZOFRAN) injection 4 mg,

## 2025-01-18 NOTE — PLAN OF CARE
Problem: Metabolic/Fluid and Electrolytes - Adult  Goal: Electrolytes maintained within normal limits  Outcome: Progressing  Goal: Hemodynamic stability and optimal renal function maintained  Outcome: Progressing     Problem: Musculoskeletal - Adult  Goal: Return mobility to safest level of function  Outcome: Progressing  Goal: Maintain proper alignment of affected body part  Outcome: Progressing     Problem: Skin/Tissue Integrity - Adult  Goal: Skin integrity remains intact  Outcome: Progressing  Goal: Incisions, wounds, or drain sites healing without S/S of infection  Outcome: Progressing     Problem: Cardiovascular - Adult  Goal: Maintains optimal cardiac output and hemodynamic stability  Outcome: Progressing  Goal: Absence of cardiac dysrhythmias or at baseline  Outcome: Progressing     Problem: Confusion  Goal: Confusion, delirium, dementia, or psychosis is improved or at baseline  Description: INTERVENTIONS:  1. Assess for possible contributors to thought disturbance, including medications, impaired vision or hearing, underlying metabolic abnormalities, dehydration, psychiatric diagnoses, and notify attending LIP  2. [place] high risk fall precautions, as indicated  3. Provide frequent short contacts to provide reality reorientation, refocusing and direction  4. Decrease environmental stimuli, including noise as appropriate  5. Monitor and intervene to maintain adequate nutrition, hydration, elimination, sleep and activity  6. If unable to ensure safety without constant attention obtain sitter and review sitter guidelines with assigned personnel  7. Initiate Psychosocial CNS and Spiritual Care consult, as indicated  1/17/2025 2356 by Sunshine Matt LPN  Outcome: Progressing     Problem: ABCDS Injury Assessment  Goal: Absence of physical injury  1/17/2025 2356 by Sunshine Matt LPN  Outcome: Progressing

## 2025-01-18 NOTE — PROGRESS NOTES
Shift assessment complete. VSS. Patient resting in bed in semi fowlers position. Respirations even and unlabored on room air. Call light within reach. Fall precautions in place. Bed in low, locked position. No additional needs noted at this time.

## 2025-01-18 NOTE — PROGRESS NOTES
Advance Directive  Patient has received [facility]'s materials regarding advance directives and Patient has NOT completed an advance directive   Discussed with: Patient and Family member    Pt did not understand documents and  determined today was not the day for her to complete documents.  Daughter has blank docs from today's conversation.  Son apparently has blank documents from yesterday's conversation with another .  Family will notify Spiritual Care if / when pt is able to complete documents.     Elinor Mauro

## 2025-01-18 NOTE — PROGRESS NOTES
Shift assessment completed. Neuro WNL.  Denies any pain at this time. AM meds administered per MAR. The care plan and education has been reviewed and mutually agreed upon with the patient. Standard safety measures in place.

## 2025-01-18 NOTE — CONSULTS
Gastroenterology Consult Note                          Patient: Iwona Alva  : 1950  CSN#:      Date:  2025    Subjective:       History of Present Illness  Patient is a 74 y.o. female admitted with Altered mental status, unspecified altered mental status type [R41.82]  Acute pancreatitis, unspecified complication status, unspecified pancreatitis type [K85.90]  Chronic kidney disease, unspecified CKD stage [N18.9]  Pancreatitis without necrosis or infection [K85.90] who is seen in consult for the same.  Pt was admitted with mental status changes/confusion.  Labs and imaging  revealed  lipase >1000 and ct c/w inflammation of pancreatic head c/w pancreatitis. She denies having any ab pain.  Appetite has been decreased but now feels hungry. Denies new meds or etoh use.  No previous pancreatitis.  S/p remote jose a.       Past Medical History:   Diagnosis Date    Hyperlipidemia     Hypertension     Impaired fasting glucose     Insomnia, unspecified     Osteoarthritis     Thyroid disease       Past Surgical History:   Procedure Laterality Date    CARPAL TUNNEL RELEASE Left [id number]    lemus    CHOLECYSTECTOMY      LUMBAR FUSION N/A 2021    LUMBAR4-LUMBAR5 TRANSVERSE LUMBAR INTERBODY FUSION (00368, 97899, 05972, 52509, 27028, 98509, 64281, 11570) performed by Lavell Ching MD at [facility] OR    LUMBAR LAMINECTOMY  8/20/15    L45 B/L decompressive hobson and excision of synovial cyst    ROTATOR CUFF REPAIR Right              Admission Meds  No current facility-administered medications on file prior to encounter.     Current Outpatient Medications on File Prior to Encounter   Medication Sig Dispense Refill    PARoxetine (PAXIL) 10 MG tablet Take 1 tablet by mouth every morning      empagliflozin (JARDIANCE) 10 MG tablet Take 1 tablet by mouth daily      verapamil (CALAN SR) 120 MG extended release tablet TAKE 1 TABLET BY MOUTH DAILY 90 tablet 0    levothyroxine (SYNTHROID) 25 MCG tablet

## 2025-01-18 NOTE — PLAN OF CARE
Problem: Skin/Tissue Integrity  Goal: Absence of new skin breakdown  Description: 1.  Monitor for areas of redness and/or skin breakdown  2.  Assess vascular access sites hourly  3.  Every 4-6 hours minimum:  Change oxygen saturation probe site  4.  Every 4-6 hours:  If on nasal continuous positive airway pressure, respiratory therapy assess nares and determine need for appliance change or resting period.  1/17/2025 2356 by Sunshine Matt LPN  Outcome: Progressing  1/17/2025 1609 by Susan Pittman RN  Outcome: Progressing     Problem: Safety - Adult  Goal: Free from fall injury  1/17/2025 2356 by Sunshine Matt LPN  Outcome: Progressing  1/17/2025 1609 by Susan Pittman RN  Outcome: Progressing     Problem: ABCDS Injury Assessment  Goal: Absence of physical injury  1/17/2025 2356 by Sunshine Matt LPN  Outcome: Progressing  1/17/2025 1609 by Susan Pittman RN  Outcome: Progressing     Problem: Confusion  Goal: Confusion, delirium, dementia, or psychosis is improved or at baseline  Description: INTERVENTIONS:  1. Assess for possible contributors to thought disturbance, including medications, impaired vision or hearing, underlying metabolic abnormalities, dehydration, psychiatric diagnoses, and notify attending LIP  2. [place] high risk fall precautions, as indicated  3. Provide frequent short contacts to provide reality reorientation, refocusing and direction  4. Decrease environmental stimuli, including noise as appropriate  5. Monitor and intervene to maintain adequate nutrition, hydration, elimination, sleep and activity  6. If unable to ensure safety without constant attention obtain sitter and review sitter guidelines with assigned personnel  7. Initiate Psychosocial CNS and Spiritual Care consult, as indicated  1/17/2025 2356 by Sunshine Matt LPN  Outcome: Progressing  1/17/2025 1609 by Susan Pittman RN  Outcome: Progressing

## 2025-01-19 LAB
ALBUMIN SERPL-MCNC: 2.8 G/DL (ref 3.4–5)
ANION GAP SERPL CALCULATED.3IONS-SCNC: 15 MMOL/L (ref 3–16)
BASOPHILS # BLD: 0 K/UL (ref 0–0.2)
BASOPHILS NFR BLD: 0.1 %
BUN SERPL-MCNC: 51 MG/DL (ref 7–20)
CALCIUM SERPL-MCNC: 9.3 MG/DL (ref 8.3–10.6)
CHLORIDE SERPL-SCNC: 103 MMOL/L (ref 99–110)
CO2 SERPL-SCNC: 20 MMOL/L (ref 21–32)
CREAT SERPL-MCNC: 2.4 MG/DL (ref 0.6–1.2)
DEPRECATED RDW RBC AUTO: 13.6 % (ref 12.4–15.4)
EOSINOPHIL # BLD: 0 K/UL (ref 0–0.6)
EOSINOPHIL NFR BLD: 0.8 %
GFR SERPLBLD CREATININE-BSD FMLA CKD-EPI: 20 ML/MIN/{1.73_M2}
GLUCOSE SERPL-MCNC: 92 MG/DL (ref 70–99)
HCT VFR BLD AUTO: 27.3 % (ref 36–48)
HGB BLD-MCNC: 9.4 G/DL (ref 12–16)
LEGIONELLA AG UR QL: NORMAL
LIPASE SERPL-CCNC: 220 U/L (ref 13–60)
LYMPHOCYTES # BLD: 1.5 K/UL (ref 1–5.1)
LYMPHOCYTES NFR BLD: 27.1 %
MCH RBC QN AUTO: 31.1 PG (ref 26–34)
MCHC RBC AUTO-ENTMCNC: 34.5 G/DL (ref 31–36)
MCV RBC AUTO: 90.2 FL (ref 80–100)
MONOCYTES # BLD: 0.3 K/UL (ref 0–1.3)
MONOCYTES NFR BLD: 5.9 %
NEUTROPHILS # BLD: 3.7 K/UL (ref 1.7–7.7)
NEUTROPHILS NFR BLD: 66.1 %
PHOSPHATE SERPL-MCNC: 2.5 MG/DL (ref 2.5–4.9)
PLATELET # BLD AUTO: 95 K/UL (ref 135–450)
PLATELET BLD QL SMEAR: ABNORMAL
PMV BLD AUTO: 10.4 FL (ref 5–10.5)
POTASSIUM SERPL-SCNC: 3.5 MMOL/L (ref 3.5–5.1)
RBC # BLD AUTO: 3.03 M/UL (ref 4–5.2)
RBC MORPH BLD: NORMAL
S PNEUM AG UR QL: NORMAL
SLIDE REVIEW: ABNORMAL
SODIUM SERPL-SCNC: 138 MMOL/L (ref 136–145)
WBC # BLD AUTO: 5.6 K/UL (ref 4–11)

## 2025-01-19 PROCEDURE — 2580000003 HC RX 258: Performed by: INTERNAL MEDICINE

## 2025-01-19 PROCEDURE — 83690 ASSAY OF LIPASE: CPT

## 2025-01-19 PROCEDURE — 97535 SELF CARE MNGMENT TRAINING: CPT

## 2025-01-19 PROCEDURE — 6370000000 HC RX 637 (ALT 250 FOR IP): Performed by: NURSE PRACTITIONER

## 2025-01-19 PROCEDURE — 36415 COLL VENOUS BLD VENIPUNCTURE: CPT

## 2025-01-19 PROCEDURE — 85025 COMPLETE CBC W/AUTO DIFF WBC: CPT

## 2025-01-19 PROCEDURE — 2580000003 HC RX 258: Performed by: STUDENT IN AN ORGANIZED HEALTH CARE EDUCATION/TRAINING PROGRAM

## 2025-01-19 PROCEDURE — 97166 OT EVAL MOD COMPLEX 45 MIN: CPT

## 2025-01-19 PROCEDURE — 97162 PT EVAL MOD COMPLEX 30 MIN: CPT

## 2025-01-19 PROCEDURE — 6360000002 HC RX W HCPCS: Performed by: STUDENT IN AN ORGANIZED HEALTH CARE EDUCATION/TRAINING PROGRAM

## 2025-01-19 PROCEDURE — 80069 RENAL FUNCTION PANEL: CPT

## 2025-01-19 PROCEDURE — 6360000002 HC RX W HCPCS: Performed by: INTERNAL MEDICINE

## 2025-01-19 PROCEDURE — 6370000000 HC RX 637 (ALT 250 FOR IP): Performed by: INTERNAL MEDICINE

## 2025-01-19 PROCEDURE — 97530 THERAPEUTIC ACTIVITIES: CPT

## 2025-01-19 PROCEDURE — 2500000003 HC RX 250 WO HCPCS: Performed by: INTERNAL MEDICINE

## 2025-01-19 PROCEDURE — 99233 SBSQ HOSP IP/OBS HIGH 50: CPT | Performed by: INTERNAL MEDICINE

## 2025-01-19 PROCEDURE — 1200000000 HC SEMI PRIVATE

## 2025-01-19 RX ORDER — DEXTROSE, SODIUM CHLORIDE, SODIUM LACTATE, POTASSIUM CHLORIDE, AND CALCIUM CHLORIDE 5; .6; .31; .03; .02 G/100ML; G/100ML; G/100ML; G/100ML; G/100ML
500 INJECTION, SOLUTION INTRAVENOUS ONCE
Status: COMPLETED | OUTPATIENT
Start: 2025-01-19 | End: 2025-01-19

## 2025-01-19 RX ORDER — CLONIDINE HYDROCHLORIDE 0.1 MG/1
0.2 TABLET ORAL 2 TIMES DAILY
Status: DISCONTINUED | OUTPATIENT
Start: 2025-01-19 | End: 2025-01-19

## 2025-01-19 RX ORDER — CLONIDINE HYDROCHLORIDE 0.1 MG/1
0.1 TABLET ORAL 2 TIMES DAILY
Status: DISCONTINUED | OUTPATIENT
Start: 2025-01-20 | End: 2025-01-20

## 2025-01-19 RX ADMIN — METOPROLOL SUCCINATE 25 MG: 25 TABLET, FILM COATED, EXTENDED RELEASE ORAL at 08:20

## 2025-01-19 RX ADMIN — SODIUM CHLORIDE, PRESERVATIVE FREE 10 ML: 5 INJECTION INTRAVENOUS at 08:20

## 2025-01-19 RX ADMIN — SODIUM CHLORIDE, PRESERVATIVE FREE 10 ML: 5 INJECTION INTRAVENOUS at 20:47

## 2025-01-19 RX ADMIN — SODIUM CHLORIDE, PRESERVATIVE FREE 40 MG: 5 INJECTION INTRAVENOUS at 17:15

## 2025-01-19 RX ADMIN — CLONIDINE HYDROCHLORIDE 0.4 MG: 0.1 TABLET ORAL at 08:20

## 2025-01-19 RX ADMIN — SODIUM CHLORIDE, SODIUM LACTATE, POTASSIUM CHLORIDE, CALCIUM CHLORIDE AND DEXTROSE MONOHYDRATE 500 ML: 5; 600; 310; 30; 20 INJECTION, SOLUTION INTRAVENOUS at 11:26

## 2025-01-19 RX ADMIN — MORPHINE SULFATE 2 MG: 2 INJECTION, SOLUTION INTRAMUSCULAR; INTRAVENOUS at 23:09

## 2025-01-19 RX ADMIN — LEVOTHYROXINE SODIUM 25 MCG: 0.03 TABLET ORAL at 06:21

## 2025-01-19 RX ADMIN — VERAPAMIL HYDROCHLORIDE 120 MG: 120 TABLET, FILM COATED, EXTENDED RELEASE ORAL at 08:20

## 2025-01-19 RX ADMIN — ENOXAPARIN SODIUM 30 MG: 100 INJECTION SUBCUTANEOUS at 08:20

## 2025-01-19 RX ADMIN — AMOXICILLIN AND CLAVULANATE POTASSIUM 1 TABLET: 875; 125 TABLET, FILM COATED ORAL at 11:10

## 2025-01-19 RX ADMIN — AMOXICILLIN AND CLAVULANATE POTASSIUM 1 TABLET: 875; 125 TABLET, FILM COATED ORAL at 20:47

## 2025-01-19 RX ADMIN — SODIUM CHLORIDE, PRESERVATIVE FREE 40 MG: 5 INJECTION INTRAVENOUS at 05:58

## 2025-01-19 ASSESSMENT — PAIN SCALES - GENERAL
PAINLEVEL_OUTOF10: 3
PAINLEVEL_OUTOF10: 7

## 2025-01-19 ASSESSMENT — PAIN DESCRIPTION - LOCATION: LOCATION: BACK

## 2025-01-19 ASSESSMENT — PAIN DESCRIPTION - ORIENTATION: ORIENTATION: RIGHT;LEFT;LOWER

## 2025-01-19 ASSESSMENT — PAIN DESCRIPTION - DESCRIPTORS: DESCRIPTORS: ACHING;GNAWING;CRAMPING

## 2025-01-19 NOTE — PROGRESS NOTES
[facility] - Inpatient Rehabilitation Department   Phone: [phone]    Occupational Therapy    [] Initial Evaluation            [] Daily Treatment Note         [] Discharge Summary      Patient: Iwona Alva   : 1950   MRN: [id number]   Date of Service:  2025    Admitting Diagnosis:  Acute pancreatitis  Current Admission Summary: Iwona Alva is a 74 y.o. female who presented with complaints of generalized weakness, fatigue, altered mental status, lethargy, not wanting to take her medications at home. Family reported that episodes like this have come and gone at times throughout the past year, but that she is seeming to get more confused each time. She currently lives at home by herself. Family has been checking on her every day, and she seems to be getting worse with her confusion. She mostly just stays in bed. They report that she has not been compliant with her medications due to her altered mental status.   Past Medical History:  has a past medical history of Hyperlipidemia, Hypertension, Impaired fasting glucose, Insomnia, unspecified, Osteoarthritis, and Thyroid disease.  Past Surgical History:  has a past surgical history that includes Cholecystectomy; Rotator cuff repair (Right, ); Carpal tunnel release (Left, [id number]); lumbar laminectomy (8/20/15); and lumbar fusion (N/A, 2021).    Discharge Recommendations: Iwona Avla scored a  on the AM-PAC ADL Inpatient form. Current research shows that an AM-PAC score of 17 or less is typically not associated with a discharge to the patient's home setting. Based on the patient's AM-PAC score and their current ADL deficits, it is recommended that the patient have 3-5 sessions per week of Occupational Therapy at d/c to increase the patient's independence.  Please see assessment section for further patient specific details.    If patient discharges prior to next session this note will serve as a discharge summary.  Please  however limited by orthostatic hypotension, BP seated was 97/44 and following transfers BP was 77/45, BP after a 1-2 in recliner was 75/47, and final BP 78/52   Impairments Requiring Therapeutic Intervention: decreased functional mobility, decreased ADL status, decreased strength, decreased safety awareness, decreased cognition, decreased endurance, decreased balance, decreased IADL, decreased coordination, increased pain  Prognosis: fair  Clinical Assessment: Patient presents with the above deficits impacting occupational performance and functioning below baseline, skilled OT services needed to address deficits to facilitate safe return to PLOF   Safety Interventions: patient left in chair, chair alarm in place, call light within reach, gait belt, patient at risk for falls, telesitter in use, nurse notified, and family/caregiver present    Plan  Frequency: 3-5 x/per week  Current Treatment Recommendations: balance training, functional mobility training, transfer training, endurance training, patient/caregiver education, ADL/self-care training, IADL training, and safety education    Goals  Patient Goals: Patient did not state    Short Term Goals:  Time Frame: discharge   Patient will complete upper body ADL at contact guard assistance   Patient will complete lower body ADL at minimal assistance   Patient will complete grooming at contact guard assistance   Patient will complete functional transfers at contact guard assistance   Patient will complete functional mobility at contact guard assistance     Above goals reviewed on 1/19/2025.  All goals are ongoing at this time unless indicated above.       Therapy Session Time     Individual Group Co-treatment   Time In    0947   Time Out    1047   Minutes    60        Timed Code Treatment Minutes:   45 minutes   Total Treatment Minutes:  60 minutes        Electronically Signed By: SUSU Cole/JOCY [id number]

## 2025-01-19 NOTE — PROGRESS NOTES
[facility]    1/19/2025 8:31 AM        Name: Iwona Alva .              Admitted: 1/16/2025  Primary Care Provider: Ce Middleton MD (Tel: [phone])      Subjective:  .  Seen this am with no family at bedside  Seems to be more alert today.           Denies any belly pain or dyspnea   Pt has chronic low back pain from previous surgery. Has required epidural injections in the past     Summary:   Son brought her to ED because she was sleepy , had previously vomited at home and then had diarrhea after eating a fish sandwich from her son.    Daughter also reports increased confusion since christmas eve.  Daughter reports up to 30 lb weight loss in the past year.       PCP office notes reviewed from 2023 which suggest concerns for dementia , parkinson's , saw neurology 8/23 ,  did not  think parkinson's was present ,  PT recommended for her chronic balance disorder        Reviewed interval ancillary notes    Current Medications  lidocaine 4 % external patch 1 patch, Daily  piperacillin-tazobactam (ZOSYN) 3,375 mg in sodium chloride 0.9 % 50 mL IVPB (mini-bag), Q12H  pantoprazole (PROTONIX) 40 mg in sodium chloride (PF) 0.9 % 10 mL injection, Q12H  morphine (PF) injection 2 mg, Q4H PRN  cloNIDine (CATAPRES) tablet 0.4 mg, BID  levothyroxine (SYNTHROID) tablet 25 mcg, Daily  metoprolol succinate (TOPROL XL) extended release tablet 25 mg, Daily  verapamil (CALAN SR) extended release tablet 120 mg, Daily  sodium chloride flush 0.9 % injection 10 mL, 2 times per day  sodium chloride flush 0.9 % injection 10 mL, PRN  0.9 % sodium chloride infusion, PRN  enoxaparin Sodium (LOVENOX) injection 30 mg, Daily  promethazine (PHENERGAN) tablet 12.5 mg, Q6H PRN   Or  ondansetron (ZOFRAN) injection 4 mg, Q6H PRN  polyethylene glycol (GLYCOLAX) packet 17 g, Daily PRN  melatonin tablet 6 mg, Nightly PRN        Objective:  BP (!) 119/59   Pulse 66   Temp 97.9 °F (36.6 °C) (Oral)   Resp 17

## 2025-01-19 NOTE — PROGRESS NOTES
PIV leaking, removed without complication. Attempted to place new PIV, unsuccessful. Call placed to DIT.     1458: PIV placed by DIT, IVF resumed. Pt remains resting in chair, eating birthday cake with family at bedside. No needs expressed. Call light in reach, fall precautions in place.

## 2025-01-19 NOTE — PROGRESS NOTES
Shift assessment complete. VSS. Patient resting in bed. Respirations even and unlabored on room air. Call light within reach. Fall precautions in place. Bed in low, locked position. No additional needs noted at this time.

## 2025-01-19 NOTE — PROGRESS NOTES
Gastroenterology Progress Note            Iwona Alva is a 75 y.o. female patient.  1. Acute pancreatitis, unspecified complication status, unspecified pancreatitis type    2. Altered mental status, unspecified altered mental status type    3. Chronic kidney disease, unspecified CKD stage        SUBJECTIVE:  no ab pain.  Tolerating diet ok.     Physical    VITALS:  BP (!) 119/59   Pulse 66   Temp 97.9 °F (36.6 °C) (Oral)   Resp 17   Ht 1.549 m (5' 1\")   Wt 70.6 kg (155 lb 11.2 oz)   SpO2 95%   BMI 29.42 kg/m²   TEMPERATURE:  Current - Temp: 97.9 °F (36.6 °C); Max - Temp  Av.9 °F (36.6 °C)  Min: 97.6 °F (36.4 °C)  Max: 98.2 °F (36.8 °C)    Abdomen soft, ND, NT, no HSM, Bowel sounds normal     Data      Recent Labs     25  0733 25  0543   WBC 8.3 6.8 5.6   HGB 11.7* 11.0* 9.4*   HCT 35.0* 32.7* 27.3*   MCV 91.9 90.8 90.2    128* 95*     Recent Labs     25  0733 25  0543    144 138   K 4.3 3.7 3.5    109 103   CO2 25 24 20*   PHOS  --  2.4* 2.5   BUN 51* 48* 51*   CREATININE 2.5* 2.3* 2.4*     Recent Labs     25  0530   AST 89* 86*   ALT 33 29   BILITOT 0.9 0.7   ALKPHOS 109 92     Recent Labs     25  0733 25  0543   LIPASE 960.0* 348.0* 220.0*             ASSESSMENT   Acute pancreatitis- based on ct and lipase levels.  She was confused on admission so could have had pain prior to admission but she denies it.  TG normal, no new meds, no etoh, s/p remote jose a but AST is mildly elevated.    She is on ACE-I but not new.  Denies nsaid use so PUD less likely.   JAGDEEP     Recommendations:   Cont supportive care.   Diet as tolerated.   Would rec EUS in 8 weeks  Will sign off.          Aj Beaulieu MD  [facility]

## 2025-01-19 NOTE — PROGRESS NOTES
[facility] - Inpatient Rehabilitation Department   Phone: [phone]    Physical Therapy    [x] Initial Evaluation            [] Daily Treatment Note         [] Discharge Summary      Patient: Iwona Alva   : 1950   MRN: [id number]   Date of Service:  2025  Admitting Diagnosis: Acute pancreatitis  Current Admission Summary: Iwona Alva is a 74 y.o. female who presented with complaints of generalized weakness, fatigue, altered mental status, lethargy, not wanting to take her medications at home.  Family reported that episodes like this have come and gone at times throughout the past year, but that she is seeming to get more confused each time.  She currently lives at home by herself.  Family has been checking on her every day, and she seems to be getting worse with her confusion.  She mostly just stays in bed.  They report that she has not been compliant with her medications due to her altered mental status.  They brought her to the ED for evaluation.  Further history is limited, as patient has altered mental status.  She denies acute complaints but does agree that she feels chronically weak and tired most of the time.  In the ED, she was noted to be hypertensive and tachycardic, with otherwise stable vital signs.  Workup is significant for negative lactic acid, troponin of 373, lipase of 1100, CT of chest abdomen and pelvis showing evidence of acute pancreatitis, negative CT head, negative chest x-ray.  She has received a dose of labetalol for hypertension in the ED.  Medicine was asked to admit for pancreatitis, altered mental status, possible delirium, uncontrolled hypertension   Past Medical History:  has a past medical history of Hyperlipidemia, Hypertension, Impaired fasting glucose, Insomnia, unspecified, Osteoarthritis, and Thyroid disease.  Past Surgical History:  has a past surgical history that includes Cholecystectomy; Rotator cuff repair (Right, ); Carpal tunnel

## 2025-01-19 NOTE — PLAN OF CARE
Problem: Skin/Tissue Integrity  Goal: Absence of new skin breakdown  Description: 1.  Monitor for areas of redness and/or skin breakdown  2.  Assess vascular access sites hourly  3.  Every 4-6 hours minimum:  Change oxygen saturation probe site  4.  Every 4-6 hours:  If on nasal continuous positive airway pressure, respiratory therapy assess nares and determine need for appliance change or resting period.  Outcome: Progressing     Problem: Safety - Adult  Goal: Free from fall injury  Outcome: Progressing     Problem: ABCDS Injury Assessment  Goal: Absence of physical injury  Outcome: Progressing     Problem: Confusion  Goal: Confusion, delirium, dementia, or psychosis is improved or at baseline  Description: INTERVENTIONS:  1. Assess for possible contributors to thought disturbance, including medications, impaired vision or hearing, underlying metabolic abnormalities, dehydration, psychiatric diagnoses, and notify attending LIP  2. [place] high risk fall precautions, as indicated  3. Provide frequent short contacts to provide reality reorientation, refocusing and direction  4. Decrease environmental stimuli, including noise as appropriate  5. Monitor and intervene to maintain adequate nutrition, hydration, elimination, sleep and activity  6. If unable to ensure safety without constant attention obtain sitter and review sitter guidelines with assigned personnel  7. Initiate Psychosocial CNS and Spiritual Care consult, as indicated  Outcome: Progressing     Problem: Cardiovascular - Adult  Goal: Maintains optimal cardiac output and hemodynamic stability  Outcome: Progressing  Goal: Absence of cardiac dysrhythmias or at baseline  Outcome: Progressing     Problem: Skin/Tissue Integrity - Adult  Goal: Skin integrity remains intact  Outcome: Progressing  Goal: Incisions, wounds, or drain sites healing without S/S of infection  Outcome: Progressing     Problem: Musculoskeletal - Adult  Goal: Return mobility to safest  level of function  Outcome: Progressing  Goal: Maintain proper alignment of affected body part  Outcome: Progressing     Problem: Metabolic/Fluid and Electrolytes - Adult  Goal: Electrolytes maintained within normal limits  Outcome: Progressing  Goal: Hemodynamic stability and optimal renal function maintained  Outcome: Progressing

## 2025-01-20 ENCOUNTER — APPOINTMENT (OUTPATIENT)
Age: 75
DRG: 391 | End: 2025-01-20
Payer: MEDICARE

## 2025-01-20 LAB
ALBUMIN SERPL-MCNC: 2.7 G/DL (ref 3.4–5)
ANION GAP SERPL CALCULATED.3IONS-SCNC: 13 MMOL/L (ref 3–16)
BASOPHILS # BLD: 0 K/UL (ref 0–0.2)
BASOPHILS NFR BLD: 0.6 %
BUN SERPL-MCNC: 49 MG/DL (ref 7–20)
CALCIUM SERPL-MCNC: 9.2 MG/DL (ref 8.3–10.6)
CHLORIDE SERPL-SCNC: 102 MMOL/L (ref 99–110)
CO2 SERPL-SCNC: 22 MMOL/L (ref 21–32)
CREAT SERPL-MCNC: 2.5 MG/DL (ref 0.6–1.2)
DEPRECATED RDW RBC AUTO: 13.7 % (ref 12.4–15.4)
ECHO AO ASC DIAM: 3.6 CM
ECHO AO ASCENDING AORTA INDEX: 2.12 CM/M2
ECHO AO ROOT DIAM: 3 CM
ECHO AO ROOT INDEX: 1.76 CM/M2
ECHO AV AREA PEAK VELOCITY: 2 CM2
ECHO AV AREA VTI: 1.9 CM2
ECHO AV AREA/BSA PEAK VELOCITY: 1.2 CM2/M2
ECHO AV AREA/BSA VTI: 1.1 CM2/M2
ECHO AV MEAN GRADIENT: 6 MMHG
ECHO AV MEAN VELOCITY: 1.2 M/S
ECHO AV PEAK GRADIENT: 13 MMHG
ECHO AV PEAK VELOCITY: 1.8 M/S
ECHO AV VELOCITY RATIO: 0.83
ECHO AV VTI: 35.2 CM
ECHO BSA: 1.74 M2
ECHO LA AREA 2C: 18 CM2
ECHO LA AREA 4C: 19.4 CM2
ECHO LA DIAMETER INDEX: 2.41 CM/M2
ECHO LA DIAMETER: 4.1 CM
ECHO LA MAJOR AXIS: 5.5 CM
ECHO LA MINOR AXIS: 5.3 CM
ECHO LA TO AORTIC ROOT RATIO: 1.37
ECHO LA VOL BP: 53 ML (ref 22–52)
ECHO LA VOL MOD A2C: 50 ML (ref 22–52)
ECHO LA VOL MOD A4C: 56 ML (ref 22–52)
ECHO LA VOL/BSA BIPLANE: 31 ML/M2 (ref 16–34)
ECHO LA VOLUME INDEX MOD A2C: 29 ML/M2 (ref 16–34)
ECHO LA VOLUME INDEX MOD A4C: 33 ML/M2 (ref 16–34)
ECHO LV E' LATERAL VELOCITY: 8.81 CM/S
ECHO LV E' SEPTAL VELOCITY: 8.49 CM/S
ECHO LV EDV A2C: 80 ML
ECHO LV EDV A4C: 74 ML
ECHO LV EDV INDEX A4C: 44 ML/M2
ECHO LV EDV NDEX A2C: 47 ML/M2
ECHO LV EJECTION FRACTION A2C: 63 %
ECHO LV EJECTION FRACTION A4C: 51 %
ECHO LV EJECTION FRACTION BIPLANE: 58 % (ref 55–100)
ECHO LV ESV A2C: 30 ML
ECHO LV ESV A4C: 37 ML
ECHO LV ESV INDEX A2C: 18 ML/M2
ECHO LV ESV INDEX A4C: 22 ML/M2
ECHO LV FRACTIONAL SHORTENING: 31 % (ref 28–44)
ECHO LV INTERNAL DIMENSION DIASTOLE INDEX: 2.65 CM/M2
ECHO LV INTERNAL DIMENSION DIASTOLIC: 4.5 CM (ref 3.9–5.3)
ECHO LV INTERNAL DIMENSION SYSTOLIC INDEX: 1.82 CM/M2
ECHO LV INTERNAL DIMENSION SYSTOLIC: 3.1 CM
ECHO LV IVSD: 1 CM (ref 0.6–0.9)
ECHO LV MASS 2D: 153.3 G (ref 67–162)
ECHO LV MASS INDEX 2D: 90.2 G/M2 (ref 43–95)
ECHO LV POSTERIOR WALL DIASTOLIC: 1 CM (ref 0.6–0.9)
ECHO LV RELATIVE WALL THICKNESS RATIO: 0.44
ECHO LVOT AREA: 2.5 CM2
ECHO LVOT AV VTI INDEX: 0.76
ECHO LVOT DIAM: 1.8 CM
ECHO LVOT MEAN GRADIENT: 4 MMHG
ECHO LVOT PEAK GRADIENT: 9 MMHG
ECHO LVOT PEAK VELOCITY: 1.5 M/S
ECHO LVOT STROKE VOLUME INDEX: 39.8 ML/M2
ECHO LVOT SV: 67.7 ML
ECHO LVOT VTI: 26.6 CM
ECHO MV A VELOCITY: 1.31 M/S
ECHO MV AREA VTI: 1.6 CM2
ECHO MV E VELOCITY: 1.07 M/S
ECHO MV E/A RATIO: 0.82
ECHO MV E/E' LATERAL: 12.15
ECHO MV E/E' RATIO (AVERAGED): 12.37
ECHO MV E/E' SEPTAL: 12.6
ECHO MV LVOT VTI INDEX: 1.56
ECHO MV MAX VELOCITY: 1.2 M/S
ECHO MV MEAN GRADIENT: 2 MMHG
ECHO MV MEAN VELOCITY: 0.7 M/S
ECHO MV PEAK GRADIENT: 6 MMHG
ECHO MV VTI: 41.4 CM
ECHO PULMONARY ARTERY END DIASTOLIC PRESSURE: 5 MMHG
ECHO PV MAX VELOCITY: 1.2 M/S
ECHO PV PEAK GRADIENT: 6 MMHG
ECHO PV REGURGITANT MAX VELOCITY: 1.2 M/S
ECHO RA AREA 4C: 15.1 CM2
ECHO RA END SYSTOLIC VOLUME APICAL 4 CHAMBER INDEX BSA: 20 ML/M2
ECHO RA VOLUME: 34 ML
ECHO RV BASAL DIMENSION: 3 CM
ECHO RV FREE WALL PEAK S': 15.3 CM/S
ECHO RV LONGITUDINAL DIMENSION: 6.7 CM
ECHO RV MID DIMENSION: 2.9 CM
ECHO RV TAPSE: 2 CM (ref 1.7–?)
EOSINOPHIL # BLD: 0.1 K/UL (ref 0–0.6)
EOSINOPHIL NFR BLD: 1.5 %
GFR SERPLBLD CREATININE-BSD FMLA CKD-EPI: 19 ML/MIN/{1.73_M2}
GLUCOSE SERPL-MCNC: 116 MG/DL (ref 70–99)
HCT VFR BLD AUTO: 28.5 % (ref 36–48)
HGB BLD-MCNC: 9.9 G/DL (ref 12–16)
LIPASE SERPL-CCNC: 257 U/L (ref 13–60)
LYMPHOCYTES # BLD: 1.7 K/UL (ref 1–5.1)
LYMPHOCYTES NFR BLD: 27.3 %
MCH RBC QN AUTO: 31.2 PG (ref 26–34)
MCHC RBC AUTO-ENTMCNC: 34.8 G/DL (ref 31–36)
MCV RBC AUTO: 89.7 FL (ref 80–100)
MONOCYTES # BLD: 0.3 K/UL (ref 0–1.3)
MONOCYTES NFR BLD: 5.3 %
NEUTROPHILS # BLD: 4.1 K/UL (ref 1.7–7.7)
NEUTROPHILS NFR BLD: 65.3 %
PHOSPHATE SERPL-MCNC: 2.9 MG/DL (ref 2.5–4.9)
PLATELET # BLD AUTO: 115 K/UL (ref 135–450)
PMV BLD AUTO: 10.3 FL (ref 5–10.5)
POTASSIUM SERPL-SCNC: 3.2 MMOL/L (ref 3.5–5.1)
RBC # BLD AUTO: 3.18 M/UL (ref 4–5.2)
SODIUM SERPL-SCNC: 137 MMOL/L (ref 136–145)
WBC # BLD AUTO: 6.3 K/UL (ref 4–11)

## 2025-01-20 PROCEDURE — 6360000002 HC RX W HCPCS: Performed by: STUDENT IN AN ORGANIZED HEALTH CARE EDUCATION/TRAINING PROGRAM

## 2025-01-20 PROCEDURE — 6360000002 HC RX W HCPCS: Performed by: INTERNAL MEDICINE

## 2025-01-20 PROCEDURE — 36415 COLL VENOUS BLD VENIPUNCTURE: CPT

## 2025-01-20 PROCEDURE — 6370000000 HC RX 637 (ALT 250 FOR IP): Performed by: INTERNAL MEDICINE

## 2025-01-20 PROCEDURE — 85025 COMPLETE CBC W/AUTO DIFF WBC: CPT

## 2025-01-20 PROCEDURE — 83690 ASSAY OF LIPASE: CPT

## 2025-01-20 PROCEDURE — 1200000000 HC SEMI PRIVATE

## 2025-01-20 PROCEDURE — 93306 TTE W/DOPPLER COMPLETE: CPT

## 2025-01-20 PROCEDURE — 6370000000 HC RX 637 (ALT 250 FOR IP): Performed by: NURSE PRACTITIONER

## 2025-01-20 PROCEDURE — 2500000003 HC RX 250 WO HCPCS: Performed by: INTERNAL MEDICINE

## 2025-01-20 PROCEDURE — 2580000003 HC RX 258: Performed by: STUDENT IN AN ORGANIZED HEALTH CARE EDUCATION/TRAINING PROGRAM

## 2025-01-20 PROCEDURE — 80069 RENAL FUNCTION PANEL: CPT

## 2025-01-20 PROCEDURE — 93306 TTE W/DOPPLER COMPLETE: CPT | Performed by: INTERNAL MEDICINE

## 2025-01-20 PROCEDURE — 99232 SBSQ HOSP IP/OBS MODERATE 35: CPT | Performed by: HOSPITALIST

## 2025-01-20 RX ORDER — AMOXICILLIN AND CLAVULANATE POTASSIUM 500; 125 MG/1; MG/1
1 TABLET, FILM COATED ORAL EVERY 12 HOURS SCHEDULED
Status: DISCONTINUED | OUTPATIENT
Start: 2025-01-20 | End: 2025-01-21 | Stop reason: HOSPADM

## 2025-01-20 RX ORDER — POTASSIUM CHLORIDE 1500 MG/1
40 TABLET, EXTENDED RELEASE ORAL ONCE
Status: COMPLETED | OUTPATIENT
Start: 2025-01-20 | End: 2025-01-20

## 2025-01-20 RX ORDER — VERAPAMIL HYDROCHLORIDE 120 MG/1
120 TABLET, FILM COATED, EXTENDED RELEASE ORAL DAILY
Status: DISCONTINUED | OUTPATIENT
Start: 2025-01-20 | End: 2025-01-21 | Stop reason: HOSPADM

## 2025-01-20 RX ORDER — METOPROLOL SUCCINATE 25 MG/1
25 TABLET, EXTENDED RELEASE ORAL DAILY
Status: DISCONTINUED | OUTPATIENT
Start: 2025-01-20 | End: 2025-01-21 | Stop reason: HOSPADM

## 2025-01-20 RX ORDER — AMOXICILLIN AND CLAVULANATE POTASSIUM 500; 125 MG/1; MG/1
1 TABLET, FILM COATED ORAL EVERY 12 HOURS SCHEDULED
Qty: 12 TABLET | Refills: 0 | Status: SHIPPED | OUTPATIENT
Start: 2025-01-20 | End: 2025-01-21

## 2025-01-20 RX ADMIN — SODIUM CHLORIDE, PRESERVATIVE FREE 10 ML: 5 INJECTION INTRAVENOUS at 21:11

## 2025-01-20 RX ADMIN — AMOXICILLIN AND CLAVULANATE POTASSIUM 1 TABLET: 500; 125 TABLET, FILM COATED ORAL at 21:11

## 2025-01-20 RX ADMIN — METOPROLOL SUCCINATE 25 MG: 25 TABLET, FILM COATED, EXTENDED RELEASE ORAL at 09:19

## 2025-01-20 RX ADMIN — SODIUM CHLORIDE, PRESERVATIVE FREE 10 ML: 5 INJECTION INTRAVENOUS at 09:20

## 2025-01-20 RX ADMIN — LEVOTHYROXINE SODIUM 25 MCG: 0.03 TABLET ORAL at 05:09

## 2025-01-20 RX ADMIN — VERAPAMIL HYDROCHLORIDE 120 MG: 120 TABLET, FILM COATED, EXTENDED RELEASE ORAL at 09:27

## 2025-01-20 RX ADMIN — SODIUM CHLORIDE, PRESERVATIVE FREE 40 MG: 5 INJECTION INTRAVENOUS at 05:08

## 2025-01-20 RX ADMIN — SODIUM CHLORIDE, PRESERVATIVE FREE 40 MG: 5 INJECTION INTRAVENOUS at 16:49

## 2025-01-20 RX ADMIN — ENOXAPARIN SODIUM 30 MG: 100 INJECTION SUBCUTANEOUS at 09:20

## 2025-01-20 RX ADMIN — AMOXICILLIN AND CLAVULANATE POTASSIUM 1 TABLET: 500; 125 TABLET, FILM COATED ORAL at 09:19

## 2025-01-20 RX ADMIN — POTASSIUM CHLORIDE 40 MEQ: 1500 TABLET, EXTENDED RELEASE ORAL at 09:19

## 2025-01-20 NOTE — PROGRESS NOTES
Shift assessment completed. Routine vitals obtained. Scheduled medications given. Patient is awake, alert and oriented. Respirations are easy and unlabored. Patient assisted to the bathroom then returned to chair. Patient does not appear to be in distress, resting comfortably at this time. Call light within reach. Fall precautions are in place.

## 2025-01-20 NOTE — PROGRESS NOTES
Nutrition Note    RECOMMENDATIONS  PO Diet: Continue current diet  ONS: Ordered Ensure HP TID    ASSESSMENT   Pt triggered for positive nutrition screen. Documented as oriented x2 to person/situation. Upon visiting, pt was unsure how she was eating pta or any unintentional wt loss but did state willing to receive ONS to offer additional nutrition. Son reported upon admission pt had not been eating x 1 week. Found to have acute pancreatitis. Wt hx in EMR indicates 38 lb (19.6%) wt loss since 3/26/24, which is considered significant. Also with increased protein needs r/t documentation of multiple pressure injuries. RD will monitor for adequate po intake.     Malnutrition Status: Insufficient data  Chronic Illness    NUTRITION DIAGNOSIS   Inadequate oral intake related to inadequate protein-energy intake as evidenced by weight loss  Increased nutrient needs related to increase demand for energy/nutrients as evidenced by wounds    Goals: PO intake 50% or greater, prior to discharge     NUTRITION RELATED FINDINGS  Objective: K+ 3.2. LBM 1/19. +1 non-pitting BLE edema.  Wounds: Multiple, Pressure Injury, Stage I, Unstageable    CURRENT NUTRITION THERAPIES  ADULT DIET; Regular       PO Intake: Unable to assess (% x1)   PO Supplement Intake:None Ordered    ANTHROPOMETRICS  Current Height: 154.9 cm (5' 1\")  Current Weight - Scale: 70.6 kg (155 lb 11.2 oz)    Admission weight: 70.6 kg (155 lb 10.3 oz)  Ideal Body Weight (IBW): 105 lbs  (48 kg)        BMI: 29.4    COMPARATIVE STANDARDS  Total Energy Requirements (kcals/day): 1588     Protein (g):  58-96       Fluid (mL/day):  1588    EDUCATION  Education/Counseling not indicated     The patient will be monitored per nutrition standards of care. Consult dietitian if additional nutrition interventions are needed prior to RD reassessment.     Bharati Bustillo, MS, RD, LD    Contact: [phone]

## 2025-01-20 NOTE — PROGRESS NOTES
Nephrology Note                                                                                                                                                                                                                                                                                                                                                               Office : [phone]     Fax :[phone]    Patient's Name: Iwona Alva      Reason for Consult: CKD   Requesting Physician:  Ce Middleton MD  Chief Complaint:    Chief Complaint   Patient presents with    Fatigue     Patient arrives through triage with complaints of generalized weakness and fatigue. Son reports that patient lives alone and that she has not been eating or sleeping for about a week. Patient has not been taking her pills. Confused and lethargic.        Assessment/Plan     # HTN   - d/c Clonidine as BP low   - Cont metoprolol, verapamil  - Hold thiazide for now     # CKD 4 sec to HTN/Ischemic Nephropathy   - Cr overall stable   - Ur studies pending   - s/p RL bolus x 1 as BP low   - BP meds dec   - labs in am     # AMS   - better   - MRI brain reviewed     # Anemia  - monitor     # Ac Pancreatitis   - need controlled Hydration   - High risk to get in to vol overload   - MRCP and EUS per GI     #Hypokalemia  - replaced per primary       History of Present Ilness:    Iwona Alva is a 74 y.o. female who presented with complaints of generalized weakness, fatigue, altered mental status, lethargy, not wanting to take her medications at home.  Family reported that episodes like this have come and gone at times throughout the past year, but that she is seeming to get more confused each time.  She currently lives at home by herself.  Family has been checking on her every day, and she seems to be getting worse with her confusion.  She mostly just stays in bed.  They report that she has not been compliant with her medications due to her  day  sodium chloride flush 0.9 % injection 10 mL, PRN  0.9 % sodium chloride infusion, PRN  enoxaparin Sodium (LOVENOX) injection 30 mg, Daily  promethazine (PHENERGAN) tablet 12.5 mg, Q6H PRN   Or  ondansetron (ZOFRAN) injection 4 mg, Q6H PRN  polyethylene glycol (GLYCOLAX) packet 17 g, Daily PRN  melatonin tablet 6 mg, Nightly PRN          Physical exam:     Vitals:  /70   Pulse 72   Temp 98.2 °F (36.8 °C) (Oral)   Resp 16   Ht 1.549 m (5' 1\")   Wt 70.3 kg (155 lb)   SpO2 99%   BMI 29.29 kg/m²   Constitutional:  OAA X3 NAD Yes  Skin: no rash, turgor wnl  Cardiovascular:  S1, S2 without m/r/g  Respiratory: CTA B without w/r/r  Abdomen:  , soft, nt, nd  Ext:  lower extremity edema Yes- trace  Psychiatric: mood and affect flat   Musculoskeletal:  Rom, muscular strength intact    Data:   Labs:  CBC:   Recent Labs     01/18/25  0733 01/19/25  0543 01/20/25  0554   WBC 6.8 5.6 6.3   HGB 11.0* 9.4* 9.9*   * 95* 115*     BMP:    Recent Labs     01/18/25  0733 01/19/25  0543 01/20/25  0554    138 137   K 3.7 3.5 3.2*    103 102   CO2 24 20* 22   BUN 48* 51* 49*   CREATININE 2.3* 2.4* 2.5*   GLUCOSE 90 92 116*     Ca/Mg/Phos:   Recent Labs     01/18/25  0733 01/19/25  0543 01/20/25  0554   CALCIUM 9.6 9.3 9.2   PHOS 2.4* 2.5 2.9     Hepatic:   No results for input(s): \"AST\", \"ALT\", \"BILITOT\", \"ALKPHOS\" in the last 72 hours.    Invalid input(s): \"ALB\"    Troponin: No results for input(s): \"TROPONINI\" in the last 72 hours.  BNP: No results for input(s): \"BNP\" in the last 72 hours.  Lipids:   No results for input(s): \"CHOL\", \"TRIG\", \"HDL\" in the last 72 hours.    Invalid input(s): \"LDLCALC\", \"LABVLDL\"    ABGs: No results for input(s): \"PHART\", \"PO2ART\", \"UUE1ZVY\" in the last 72 hours.  INR: No results for input(s): \"INR\" in the last 72 hours.  UA:  Recent Labs     01/17/25  1553   COLORU DARK YELLOW*   CLARITYU Clear   GLUCOSEU >=1000*   BILIRUBINUR Negative   KETUA TRACE*   BLOODU Negative

## 2025-01-20 NOTE — PROGRESS NOTES
Initial assessment completed- see doc flowsheets. VSS. Sleeping upon entering. Stated that she just feels very tired. No complaints of pain/discomfort. No S/S of respiratory distress/SOB noted. Able to make needs known. SBA to commode. Care ongoing.

## 2025-01-20 NOTE — PLAN OF CARE
Problem: Skin/Tissue Integrity  Goal: Absence of new skin breakdown  Description: 1.  Monitor for areas of redness and/or skin breakdown  2.  Assess vascular access sites hourly  3.  Every 4-6 hours minimum:  Change oxygen saturation probe site  4.  Every 4-6 hours:  If on nasal continuous positive airway pressure, respiratory therapy assess nares and determine need for appliance change or resting period.  Outcome: Progressing     Problem: Safety - Adult  Goal: Free from fall injury  Outcome: Progressing     Problem: ABCDS Injury Assessment  Goal: Absence of physical injury  Outcome: Progressing     Problem: Confusion  Goal: Confusion, delirium, dementia, or psychosis is improved or at baseline  Description: INTERVENTIONS:  1. Assess for possible contributors to thought disturbance, including medications, impaired vision or hearing, underlying metabolic abnormalities, dehydration, psychiatric diagnoses, and notify attending LIP  2. [place] high risk fall precautions, as indicated  3. Provide frequent short contacts to provide reality reorientation, refocusing and direction  4. Decrease environmental stimuli, including noise as appropriate  5. Monitor and intervene to maintain adequate nutrition, hydration, elimination, sleep and activity  6. If unable to ensure safety without constant attention obtain sitter and review sitter guidelines with assigned personnel  7. Initiate Psychosocial CNS and Spiritual Care consult, as indicated  Outcome: Progressing     Problem: Cardiovascular - Adult  Goal: Maintains optimal cardiac output and hemodynamic stability  Outcome: Progressing  Goal: Absence of cardiac dysrhythmias or at baseline  Outcome: Progressing     Problem: Skin/Tissue Integrity - Adult  Goal: Skin integrity remains intact  Outcome: Progressing  Goal: Incisions, wounds, or drain sites healing without S/S of infection  Outcome: Progressing     Problem: Musculoskeletal - Adult  Goal: Return mobility to safest

## 2025-01-20 NOTE — PROGRESS NOTES
Nephrology Consult Note                                                                                                                                                                                                                                                                                                                                                               Office : [phone]     Fax :[phone]    Patient's Name: Iwona Alva      Reason for Consult: CKD   Requesting Physician:  Ce Middleton MD  Chief Complaint:    Chief Complaint   Patient presents with    Fatigue     Patient arrives through triage with complaints of generalized weakness and fatigue. Son reports that patient lives alone and that she has not been eating or sleeping for about a week. Patient has not been taking her pills. Confused and lethargic.        Assessment/Plan     # CKD 4 sec to HTN/Ischemic Nephropathy   - Cr stable   - Ur studies pending   - RL bolus x 1 as BP low   - BP meds dec   - labs in am     # HTN   - dec Clonidine as BP low   - Cont BB, CCB   - Hold thiazide for now     # AMS   - better   - MRI brain reviewed     # Anemia  - monitor     # Ac Pancreatitis   - need controlled Hydration   - High risk to get in to vol overload   - MRCP and EUS per GI         History of Present Ilness:    Iwona Alva is a 74 y.o. female who presented with complaints of generalized weakness, fatigue, altered mental status, lethargy, not wanting to take her medications at home.  Family reported that episodes like this have come and gone at times throughout the past year, but that she is seeming to get more confused each time.  She currently lives at home by herself.  Family has been checking on her every day, and she seems to be getting worse with her confusion.  She mostly just stays in bed.  They report that she has not been compliant with her medications due to her altered mental status.  They brought her to the ED for

## 2025-01-20 NOTE — DISCHARGE SUMMARY
[facility] DISCHARGE SUMMARY    Patient Demographics    Patient. Iwona Alva  Date of Birth. 1950  MRN. [id number]     Primary care provider. Ce Middleton MD  (Tel: [phone])    Admit date: 1/16/2025    Discharge date 1/20/2025  Note Date: 1/20/2025     Reason for Hospitalization.   Chief Complaint   Patient presents with    Fatigue     Patient arrives through triage with complaints of generalized weakness and fatigue. Son reports that patient lives alone and that she has not been eating or sleeping for about a week. Patient has not been taking her pills. Confused and lethargic.          Significant Findings.   Principal Problem:    Acute pancreatitis  Active Problems:    Hypertension    Altered mental status    Chronic kidney disease    Electrolyte imbalance  Resolved Problems:    * No resolved hospital problems. *       Problems and results from this hospitalization that need follow up.  Right hilar lung mass:  follow up with pulmonary in 2 weeks. Will need outpatient PET scan or further imaging  Possible pancreatitis:  now resolved.  Needs outpatient EUS per GI in 6 - 8 weeks   CKD:  will check labs weekly at [facility],  results to nephrology Dr Jane     Significant test results and incidental findings.  CT head non contrast:  IMPRESSION:  Technically limited exam without acute abnormality.     CT chest abd non contrast:   IMPRESSION:  1. Fat stranding in the region of the pancreatic head/uncinate process and  adjacent proximal duodenum, which demonstrates mild wall thickening. Findings  may be related to acute pancreatitis or duodenitis.  Traumatic pancreatic or  duodenal contusion is not excluded but considered less likely.  2. No other acute traumatic injury identified within the chest, abdomen, or  pelvis.  3. Asymmetric enlargement of the right hilum which is new compared with  12/09/2021 with

## 2025-01-20 NOTE — CARE COORDINATION
Discharge Planning:     (CM) spoke with the NP who informed the CM that the patient family would like a referral sent to [facility].  CM spoke with the patient son who stated he would like to look at the Medicare SNF list. CM provided the patient son two copies. Patient son stated he is okay with a referral sent to [facility] while he continues to review the list.     Referral sent to the following- [facility].    Electronically signed by BETTY Denton on 1/20/2025 at 1:25 PM     Update:      CM received a call from Luz Marina from [facility] who informed the CM that they do not have any beds available at [facility]. CM spoke with the patient family who requested additional referrals sent to the following:      [facility] (1st choice)-Waiting on a response.     [facility]-Accepted     [facility]-Accepted.        Electronically signed by BETTY Denton on 1/20/2025 at 2:21 PM      Update:       CM received a call from [place] form [facility] who informed the CM that they can accept the patient. Brooklyn stated she spoke with the patient son Mike [phone] and they stated they wanted to tour the facility before they make making a decision. Brooklyn stated they will reach out to the family to set up a tour tomorrow.    ADRI spoke with Mike who informed the CM that [facility] is there first choice but [facility] and [facility].     Electronically signed by BETTY Denton on 1/20/2025 at 3:56 PM

## 2025-01-20 NOTE — DISCHARGE INSTR - COC
Continuity of Care Form    Patient Name: Iwona Alva   :  1950  MRN:  [id number]    Admit date:  2025  Discharge date:  [id number]    Code Status Order: Full Code   Advance Directives:   Advance Care Flowsheet Documentation             Admitting Physician:  Alcides Gloria DO  PCP: Ce Middleton MD    Discharging Nurse: [facility]/Room#: 5TN-5566/5566-01  Discharging Unit Phone Number: [phone]    Emergency Contact:   Extended Emergency Contact Information  [facility]  Home Phone: [phone]  Relation: Child  Secondary Emergency Contact: Mike Alva  Home Phone: [phone]  Mobile Phone: [phone]  Relation: Child    Past Surgical History:  Past Surgical History:   Procedure Laterality Date    CARPAL TUNNEL RELEASE Left [id number]    lemus    CHOLECYSTECTOMY      LUMBAR FUSION N/A 2021    LUMBAR4-LUMBAR5 TRANSVERSE LUMBAR INTERBODY FUSION (95058, 17670, 48767, 06433, 98166, 46928, 60100, 09548) performed by Lavell Ching MD at [facility] OR    LUMBAR LAMINECTOMY  8/20/15    L45 B/L decompressive hobson and excision of synovial cyst    ROTATOR CUFF REPAIR Right             Immunization History:   Immunization History   Administered Date(s) Administered    COVID-19, PFIZER PURPLE top, DILUTE for use, (age 12 y+), 30mcg/0.3mL 2021, 2021, 2022    Pneumococcal, PCV-13, PREVNAR 13, (age 6w+), IM, 0.5mL 2016, 10/08/2021    Pneumococcal, PPSV23, PNEUMOVAX 23, (age 2y+), SC/IM, 0.5mL 2015, 2020    TDaP, ADACEL (age 10y-64y), BOOSTRIX (age 10y+), IM, 0.5mL 10/11/2022       Active Problems:  Patient Active Problem List   Diagnosis Code    Essential hypertension I10    Pure hypercholesterolemia E78.00    Generalized osteoarthrosis, involving multiple sites M15.9    Impaired fasting glucose R73.01    Insomnia G47.00    Osteoarthritis M19.90    Hypertension I10

## 2025-01-20 NOTE — CARE COORDINATION
01/20/25 1510   IMM Letter   IMM Letter given to Patient/Family/Significant other/Guardian/POA/by: IMM given by CM   IMM Letter date given: 01/20/25   IMM Letter time given: 1500  (Pt agreeable to IMM w/o 4 hr. notice)

## 2025-01-20 NOTE — PROGRESS NOTES
Speech Language Pathology  Attempt Note     Name: Iwona Alva  : 1950  Medical Diagnosis: Altered mental status, unspecified altered mental status type [R41.82]  Acute pancreatitis, unspecified complication status, unspecified pancreatitis type [K85.90]  Chronic kidney disease, unspecified CKD stage [N18.9]  Pancreatitis without necrosis or infection [K85.90]      SLP attempted to see pt for dysphagia tx and cognitive-linguistic tx. Treatment unable to be completed due to pt AYLEEN. SLP to re-attempt as pt's condition and schedule allows. No charges.    Thank you,    Brooklyn Munoz MA CCC-SLP #[id number]  Speech Language Pathologist

## 2025-01-21 VITALS
BODY MASS INDEX: 29.27 KG/M2 | SYSTOLIC BLOOD PRESSURE: 154 MMHG | OXYGEN SATURATION: 96 % | HEART RATE: 80 BPM | WEIGHT: 155 LBS | DIASTOLIC BLOOD PRESSURE: 80 MMHG | HEIGHT: 61 IN | TEMPERATURE: 97.9 F | RESPIRATION RATE: 16 BRPM

## 2025-01-21 LAB
ALBUMIN SERPL-MCNC: 2.9 G/DL (ref 3.4–5)
ANION GAP SERPL CALCULATED.3IONS-SCNC: 14 MMOL/L (ref 3–16)
BUN SERPL-MCNC: 43 MG/DL (ref 7–20)
CALCIUM SERPL-MCNC: 9.7 MG/DL (ref 8.3–10.6)
CHLORIDE SERPL-SCNC: 102 MMOL/L (ref 99–110)
CO2 SERPL-SCNC: 22 MMOL/L (ref 21–32)
CREAT SERPL-MCNC: 2.2 MG/DL (ref 0.6–1.2)
GFR SERPLBLD CREATININE-BSD FMLA CKD-EPI: 23 ML/MIN/{1.73_M2}
GLUCOSE SERPL-MCNC: 125 MG/DL (ref 70–99)
PHOSPHATE SERPL-MCNC: 2.8 MG/DL (ref 2.5–4.9)
POTASSIUM SERPL-SCNC: 3.4 MMOL/L (ref 3.5–5.1)
SODIUM SERPL-SCNC: 138 MMOL/L (ref 136–145)

## 2025-01-21 PROCEDURE — 6370000000 HC RX 637 (ALT 250 FOR IP): Performed by: NURSE PRACTITIONER

## 2025-01-21 PROCEDURE — 6360000002 HC RX W HCPCS: Performed by: STUDENT IN AN ORGANIZED HEALTH CARE EDUCATION/TRAINING PROGRAM

## 2025-01-21 PROCEDURE — 2580000003 HC RX 258: Performed by: STUDENT IN AN ORGANIZED HEALTH CARE EDUCATION/TRAINING PROGRAM

## 2025-01-21 PROCEDURE — 6370000000 HC RX 637 (ALT 250 FOR IP): Performed by: STUDENT IN AN ORGANIZED HEALTH CARE EDUCATION/TRAINING PROGRAM

## 2025-01-21 PROCEDURE — 6370000000 HC RX 637 (ALT 250 FOR IP): Performed by: INTERNAL MEDICINE

## 2025-01-21 PROCEDURE — 80069 RENAL FUNCTION PANEL: CPT

## 2025-01-21 PROCEDURE — 2500000003 HC RX 250 WO HCPCS: Performed by: INTERNAL MEDICINE

## 2025-01-21 PROCEDURE — 97129 THER IVNTJ 1ST 15 MIN: CPT

## 2025-01-21 PROCEDURE — 92526 ORAL FUNCTION THERAPY: CPT

## 2025-01-21 PROCEDURE — 6360000002 HC RX W HCPCS: Performed by: INTERNAL MEDICINE

## 2025-01-21 PROCEDURE — 36415 COLL VENOUS BLD VENIPUNCTURE: CPT

## 2025-01-21 PROCEDURE — 99232 SBSQ HOSP IP/OBS MODERATE 35: CPT | Performed by: HOSPITALIST

## 2025-01-21 RX ORDER — AMOXICILLIN AND CLAVULANATE POTASSIUM 500; 125 MG/1; MG/1
1 TABLET, FILM COATED ORAL EVERY 12 HOURS SCHEDULED
Qty: 10 TABLET | Refills: 0 | Status: SHIPPED | OUTPATIENT
Start: 2025-01-21 | End: 2025-01-26

## 2025-01-21 RX ORDER — POTASSIUM CHLORIDE 1500 MG/1
40 TABLET, EXTENDED RELEASE ORAL ONCE
Status: COMPLETED | OUTPATIENT
Start: 2025-01-21 | End: 2025-01-21

## 2025-01-21 RX ADMIN — LEVOTHYROXINE SODIUM 25 MCG: 0.03 TABLET ORAL at 05:52

## 2025-01-21 RX ADMIN — SODIUM CHLORIDE, PRESERVATIVE FREE 40 MG: 5 INJECTION INTRAVENOUS at 05:52

## 2025-01-21 RX ADMIN — SODIUM CHLORIDE, PRESERVATIVE FREE 10 ML: 5 INJECTION INTRAVENOUS at 08:48

## 2025-01-21 RX ADMIN — ENOXAPARIN SODIUM 30 MG: 100 INJECTION SUBCUTANEOUS at 08:47

## 2025-01-21 RX ADMIN — METOPROLOL SUCCINATE 25 MG: 25 TABLET, FILM COATED, EXTENDED RELEASE ORAL at 08:47

## 2025-01-21 RX ADMIN — MORPHINE SULFATE 2 MG: 2 INJECTION, SOLUTION INTRAMUSCULAR; INTRAVENOUS at 05:53

## 2025-01-21 RX ADMIN — POTASSIUM CHLORIDE 40 MEQ: 1500 TABLET, EXTENDED RELEASE ORAL at 08:47

## 2025-01-21 RX ADMIN — AMOXICILLIN AND CLAVULANATE POTASSIUM 1 TABLET: 500; 125 TABLET, FILM COATED ORAL at 08:51

## 2025-01-21 RX ADMIN — VERAPAMIL HYDROCHLORIDE 120 MG: 120 TABLET, FILM COATED, EXTENDED RELEASE ORAL at 08:47

## 2025-01-21 ASSESSMENT — PAIN SCALES - GENERAL
PAINLEVEL_OUTOF10: 10
PAINLEVEL_OUTOF10: 3

## 2025-01-21 ASSESSMENT — PAIN DESCRIPTION - DESCRIPTORS: DESCRIPTORS: DISCOMFORT;CRAMPING;ACHING

## 2025-01-21 ASSESSMENT — PAIN DESCRIPTION - LOCATION: LOCATION: BACK

## 2025-01-21 ASSESSMENT — PAIN DESCRIPTION - ORIENTATION: ORIENTATION: MID

## 2025-01-21 ASSESSMENT — PAIN SCALES - WONG BAKER: WONGBAKER_NUMERICALRESPONSE: HURTS A LITTLE BIT

## 2025-01-21 NOTE — CARE COORDINATION
Case Management -  Discharge Note      Patient Name: Iwona Alva                   YOB: 1950  Room: [facility]5566/5566-            Readmission Risk (Low < 19, Mod (19-27), High > 27): Readmission Risk Score: 14.8    Current PCP: Ce Middleton MD      (IMM) Important Message from Medicare:    Has pt received appropriate compliance notices before being discharged if required: yes  Compliance doc:  [x] 2nd IMM; [] Code 44 [] [place]  Date Given: 1/20/2025 Given By: Sharon    PT AM-PAC: 16 /24  OT AM-PAC: 11 /24    Patient/patient representative has been educated on the benefits of skilled nursing facility  as well as the possible risks of declining recommended services. Patient/patient representative has acknowledged the information provided and decided on the following discharge plan. Patient/ patient representative has been provided freedom of choice regarding service provider, supported by basic dialogue that supports the patient's individualized plan of care/goals.    Patient noted to have a discharge order.  Pt has been medically cleared for transition to Skilled Nursing Rehab Facility    [place] [address]  Phone: [phone]  Fax: [phone]        HENS Completed:  Yes  Pre-cert required/obtained:  No pre-cert needed    Transportation scheduled for 12:30pm  Transportation provided by [facility] "[employer]"  ([phone])   AVS faxed and agency notified:  Yes  The following prescriptions sent with pt:   Family Notified:  So    Nurse to call report to facility         Financial    Payor: MEDICARE / Plan: MEDICARE PART A AND B / Product Type: *No Product type* /     Electronically signed by BETTY Denton on 1/21/2025 at 9:14 AM

## 2025-01-21 NOTE — PLAN OF CARE
Problem: Skin/Tissue Integrity  Goal: Absence of new skin breakdown  Description: 1.  Monitor for areas of redness and/or skin breakdown  2.  Assess vascular access sites hourly  3.  Every 4-6 hours minimum:  Change oxygen saturation probe site  4.  Every 4-6 hours:  If on nasal continuous positive airway pressure, respiratory therapy assess nares and determine need for appliance change or resting period.  Outcome: Progressing     Problem: Safety - Adult  Goal: Free from fall injury  Outcome: Progressing     Problem: ABCDS Injury Assessment  Goal: Absence of physical injury  Outcome: Progressing     Problem: Confusion  Goal: Confusion, delirium, dementia, or psychosis is improved or at baseline  Description: INTERVENTIONS:  1. Assess for possible contributors to thought disturbance, including medications, impaired vision or hearing, underlying metabolic abnormalities, dehydration, psychiatric diagnoses, and notify attending LIP  2. [place] high risk fall precautions, as indicated  3. Provide frequent short contacts to provide reality reorientation, refocusing and direction  4. Decrease environmental stimuli, including noise as appropriate  5. Monitor and intervene to maintain adequate nutrition, hydration, elimination, sleep and activity  6. If unable to ensure safety without constant attention obtain sitter and review sitter guidelines with assigned personnel  7. Initiate Psychosocial CNS and Spiritual Care consult, as indicated  Outcome: Progressing     Problem: Cardiovascular - Adult  Goal: Maintains optimal cardiac output and hemodynamic stability  Outcome: Progressing  Goal: Absence of cardiac dysrhythmias or at baseline  Outcome: Progressing     Problem: Skin/Tissue Integrity - Adult  Goal: Skin integrity remains intact  Outcome: Progressing  Goal: Incisions, wounds, or drain sites healing without S/S of infection  Outcome: Progressing     Problem: Musculoskeletal - Adult  Goal: Return mobility to safest

## 2025-01-21 NOTE — PROGRESS NOTES
Speech Language Pathology  [facility]  [phone]  SLP Dysphagia Treatment and Speech Language Cognitive Treatment      Patient: Iwona Alva   : 1950   MRN: [id number]      Evaluation Date: 2025      Admitting Dx: Altered mental status, unspecified altered mental status type [R41.82]  Acute pancreatitis, unspecified complication status, unspecified pancreatitis type [K85.90]  Chronic kidney disease, unspecified CKD stage [N18.9]  Pancreatitis without necrosis or infection [K85.90]  Treatment Diagnosis: Oropharyngeal Dysphagia, Cognitive-Linguistic Deficits  Pain: Denies                                  Recommendations      Recommended Diet and Intervention 2025:  Diet Solids Recommendation:  Regular texture diet  Liquid Consistency Recommendation:  Thin liquids  Recommended form of Meds: Meds PO as tolerated   *The [employer] Mental Status (UMS) Examination administered to pt with pt scoring 5/30 falling within the Normal Range (27/30); Mild Neurocognitive Disorder (21-26/30); Dementia rage (1-20).* Of note, Pt does not have a formal dementia diagnosis.    Compensatory strategies: Upright as possible with all PO intake, Small bites/sips, Eat/feed slowly, Remain upright 30-45 min, Aspiration Precautions     Discharge Recommendations:  Recommend ongoing SLP for speech and dysphagia therapy upon discharge from hospital     History/Course of Treatment     H&P: \"Iwona Alva is a 74 y.o. female who presented with complaints of generalized weakness, fatigue, altered mental status, lethargy, not wanting to take her medications at home.  Family reported that episodes like this have come and gone at times throughout the past year, but that she is seeming to get more confused each time.  She currently lives at home by herself.  Family has been checking on her every day, and she seems to be getting worse with her confusion.  She mostly just  stays in bed.  They report that she has not been compliant with her medications due to her altered mental status.  They brought her to the ED for evaluation.  Further history is limited, as patient has altered mental status.  She denies acute complaints but does agree that she feels chronically weak and tired most of the time.  In the ED, she was noted to be hypertensive and tachycardic, with otherwise stable vital signs.  Workup is significant for negative lactic acid, troponin of 373, lipase of 1100, CT of chest abdomen and pelvis showing evidence of acute pancreatitis, negative CT head, negative chest x-ray.  She has received a dose of labetalol for hypertension in the ED.  Medicine was asked to admit for pancreatitis, altered mental status, possible delirium, uncontrolled hypertension.\"     Imaging:  Chest X-ray: 1/16/2025  \"IMPRESSION:  No acute process.\"     MRI: 1/17/2025  \"IMPRESSION:  No acute intracranial abnormality\"     History/Prior Level of Function:   Living Status: Home  Prior Dysphagia/Speech History: No prior hx    Reason for referral: SLP evaluation orders received due to altered mental status and concern for aspiration. Family reports mental status changes over the past few weeks. Son reports pt appears to wax and wane. Pt with new PNA, concerns for aspiration     Evaluation/Treatment   Subjective:  Pt seen upright in bed, alert and agreeable to participate in session which targeted dysphagia and cognition.   Pt reports improved PO intake with breakfast, reduced PO intake as day progresses. Son at bedside reports pt with reduced PO intake and weight loss for past 3-4 months secondary to poor appetite. Pt is scheduled for discharge to [facility] today.     Dysphagia Treatment:    Assessment of Texture Tolerance:  Diet level prior to treatment: Regular texture diet, Thin liquids   Tolerance of Current Diet Level: Per chart, no noted difficulty with current diet level      Impressions: Pt was

## 2025-01-21 NOTE — PLAN OF CARE
Problem: Skin/Tissue Integrity  Goal: Absence of new skin breakdown  Description: 1.  Monitor for areas of redness and/or skin breakdown  2.  Assess vascular access sites hourly  3.  Every 4-6 hours minimum:  Change oxygen saturation probe site  4.  Every 4-6 hours:  If on nasal continuous positive airway pressure, respiratory therapy assess nares and determine need for appliance change or resting period.  1/21/2025 1106 by Susan Pittman RN  Outcome: Progressing  1/21/2025 0435 by Darin Galvan RN  Outcome: Progressing     Problem: Safety - Adult  Goal: Free from fall injury  1/21/2025 1106 by Susan Pittman RN  Outcome: Progressing  1/21/2025 0435 by Darin Galvan RN  Outcome: Progressing     Problem: ABCDS Injury Assessment  Goal: Absence of physical injury  1/21/2025 1106 by Susan Pittman RN  Outcome: Progressing  1/21/2025 0435 by Darin Galvan RN  Outcome: Progressing     Problem: Confusion  Goal: Confusion, delirium, dementia, or psychosis is improved or at baseline  Description: INTERVENTIONS:  1. Assess for possible contributors to thought disturbance, including medications, impaired vision or hearing, underlying metabolic abnormalities, dehydration, psychiatric diagnoses, and notify attending LIP  2. Isom high risk fall precautions, as indicated  3. Provide frequent short contacts to provide reality reorientation, refocusing and direction  4. Decrease environmental stimuli, including noise as appropriate  5. Monitor and intervene to maintain adequate nutrition, hydration, elimination, sleep and activity  6. If unable to ensure safety without constant attention obtain sitter and review sitter guidelines with assigned personnel  7. Initiate Psychosocial CNS and Spiritual Care consult, as indicated  1/21/2025 1106 by Susan Pittman RN  Outcome: Progressing  1/21/2025 0435 by Darin Galvan RN  Outcome: Progressing     Problem: Cardiovascular - Adult  Goal: Maintains optimal cardiac output and

## 2025-01-21 NOTE — DISCHARGE SUMMARY
This patient has had a discharge order placed. They are being discharged to [facility] and being picked up by [facility]. Discharge paperwork has been printed and faxed by JUS RUSH completed by this RN. no further needs at this time. IV has been removed with no complications. Telemetry has been removed. Pt has all belongings present.    Report has been called to miller and all questions have been answered.

## 2025-01-21 NOTE — PROGRESS NOTES
Nephrology Note                                                                                                                                                                                                                                                                                                                                                               Office : [phone]     Fax :[phone]    Patient's Name: Iwona Alva      Reason for Consult: CKD   Requesting Physician:  Ce Middleton MD  Chief Complaint:    Chief Complaint   Patient presents with    Fatigue     Patient arrives through triage with complaints of generalized weakness and fatigue. Son reports that patient lives alone and that she has not been eating or sleeping for about a week. Patient has not been taking her pills. Confused and lethargic.        Assessment/Plan     # HTN   - d/c Clonidine as BP low   - Cont metoprolol, verapamil  - Hold thiazide     # CKD 4 sec to HTN/Ischemic Nephropathy   - Cr overall stable   - Ur studies pending   - BP meds dec   - labs in am     # AMS   - better   - MRI brain reviewed     # Anemia  - monitor     # Ac Pancreatitis   - need controlled Hydration   - High risk to get in to vol overload   - MRCP and EUS per GI     #Hypokalemia  - replaced per primary       History of Present Ilness:    Iwona Alva is a 74 y.o. female who presented with complaints of generalized weakness, fatigue, altered mental status, lethargy, not wanting to take her medications at home.  Family reported that episodes like this have come and gone at times throughout the past year, but that she is seeming to get more confused each time.  She currently lives at home by herself.  Family has been checking on her every day, and she seems to be getting worse with her confusion.  She mostly just stays in bed.  They report that she has not been compliant with her medications due to her altered mental status.  They brought her to

## 2025-01-21 NOTE — DISCHARGE SUMMARY
[facility] DISCHARGE SUMMARY    Patient Demographics    Patient. Iwona Alva  Date of Birth. 1950  MRN. [id number]     Primary care provider. Ce Middleton MD  (Tel: [phone])    Admit date: 1/16/2025    Discharge date 1/21/2025  Note Date: 1/21/2025     Reason for Hospitalization.   Chief Complaint   Patient presents with    Fatigue     Patient arrives through triage with complaints of generalized weakness and fatigue. Son reports that patient lives alone and that she has not been eating or sleeping for about a week. Patient has not been taking her pills. Confused and lethargic.          Significant Findings.   Principal Problem:    Acute pancreatitis  Active Problems:    Hypertension    Altered mental status    Chronic kidney disease    Electrolyte imbalance  Resolved Problems:    * No resolved hospital problems. *       Problems and results from this hospitalization that need follow up.  Right hilar lung mass:  follow up with pulmonary in 2 weeks. Will need outpatient PET scan or further imaging  Possible pancreatitis:  now resolved.  Needs outpatient EUS per GI in 6 - 8 weeks   CKD:  will check labs weekly at [facility],  results to nephrology Dr Jane     Significant test results and incidental findings.  CT head non contrast:  IMPRESSION:  Technically limited exam without acute abnormality.     CT chest abd non contrast:   IMPRESSION:  1. Fat stranding in the region of the pancreatic head/uncinate process and  adjacent proximal duodenum, which demonstrates mild wall thickening. Findings  may be related to acute pancreatitis or duodenitis.  Traumatic pancreatic or  duodenal contusion is not excluded but considered less likely.  2. No other acute traumatic injury identified within the chest, abdomen, or  pelvis.  3. Asymmetric enlargement of the right hilum which is new compared with  12/09/2021 with      Medication instructions provided to patient at discharge.     Medication List        START taking these medications      amoxicillin-clavulanate 500-125 MG per tablet  Commonly known as: AUGMENTIN  Take 1 tablet by mouth every 12 hours for 5 days            CONTINUE taking these medications      BD Lancet Ultrafine 30G Misc  1 Device by In Vitro route every morning CHECK BS EVERY MORNING FASTING     blood glucose test strips  Test blood sugar every morning fasting.     ezetimibe 10 MG tablet  Commonly known as: ZETIA  TAKE 1 TABLET BY MOUTH DAILY     fenofibrate 160 MG tablet  Take 1 tablet by mouth daily     glucose monitoring kit  1 kit by Does not apply route daily     levothyroxine 25 MCG tablet  Commonly known as: SYNTHROID  TAKE 1 TABLET BY MOUTH DAILY     metoprolol succinate 50 MG extended release tablet  Commonly known as: TOPROL XL  TAKE 1/2 TABLET BY MOUTH DAILY     PARoxetine 10 MG tablet  Commonly known as: PAXIL     verapamil 120 MG extended release tablet  Commonly known as: CALAN SR  TAKE 1 TABLET BY MOUTH DAILY     vitamin D3 25 MCG (1000 UT) Tabs tablet  Commonly known as: CHOLECALCIFEROL  Take 2 tablets by mouth daily            STOP taking these medications      chlorthalidone 25 MG tablet  Commonly known as: HYGROTON     cloNIDine 0.2 MG tablet  Commonly known as: CATAPRES     empagliflozin 10 MG tablet  Commonly known as: Jardiance     ergocalciferol 1.25 MG (74618 UT) capsule  Commonly known as: ERGOCALCIFEROL     Fish Oil [place]-3 1000 MG Caps     Jardiance 10 MG tablet  Generic drug: empagliflozin     lisinopril 20 MG tablet  Commonly known as: PRINIVIL;ZESTRIL     patiromer sorbitex calcium 8.4 g Pack packet  Commonly known as: VELTASSA     sodium zirconium cyclosilicate 5 g Pack oral suspension  Commonly known as: Zoey               Where to Get Your Medications        You can get these medications from any pharmacy    Bring a paper prescription for each of these

## 2025-01-21 NOTE — DISCHARGE INSTR - DIET

## 2025-01-21 NOTE — PROGRESS NOTES
Shift assessment completed. Routine vitals obtained. Scheduled medications given. Patient is awake, alert and oriented. Respirations are easy and unlabored. Patient does not appear to be in distress, resting comfortably at this time. Call light within reach. Fall precautions are in place.

## 2025-01-22 ENCOUNTER — CARE COORDINATION (OUTPATIENT)
Dept: CARE COORDINATION | Age: 75
End: 2025-01-22

## 2025-01-23 ENCOUNTER — TELEPHONE (OUTPATIENT)
Dept: CASE MANAGEMENT | Age: 75
End: 2025-01-23

## 2025-01-23 NOTE — CARE COORDINATION
Care Transitions Post-Acute Facility Discharge Call    2025    Patient: Iwona Alva Patient : 1950   MRN: <[id number]>  Reason for Admission: acute pancreatitis (lipase- 1,124 (normal 13/60), hypertensive emergency 203/114, , BNP 32,576, AMS, CKD  Discharge Date: 25 RARS: Readmission Risk Score: 15.8       Post Acute Facility Update    Care Transitions Post Acute Facility Update    Care Transitions Interventions      Post Acute Facility Update   Post Acute Facility: [facility]    ELOS: 21 days      Nursing   Prescribed diet: diabetic w/ Boost glucose control qd    Wounds: Pos   Wound Location: stage I left glute 1 x 1 x 0.5 cm; unstagable right glute- 0.5 x 0.5 x 0.1 cm   Disease specific clinical considerations: HTN, possible CHF (BNP /- 32,276), Dupuytreris contractured, snoker 1-2 ppd, back issues from prior fusion- debilitating   Reported Nursing Updates: Full code. PT/ OT/ ST eval and treat.   Remedy Protect Zinc Oxide Paste with Menthol every shift Apply topically to paxton area, buttocks, & coccyx AND as needed Apply topically to paxton area, buttocks, & coccyx           Rehab/Functional   Prior Level of Functioning: home alone independent ADL and ambulation, assist iADL   Cognitive function assessment: BIMS 6/15- severe cog impairment       SW/Discharge Planning   Discharge Planning / Barriers: Pt from one-level home alone w/ 3 steps in w/ RHR. Walks w/ QC, independent ADL and ambulating with QC, assist iADL. Per ER note, family has noted decline (\"going downhill\") in past year- weakness, confusion worsening, sleeps a lot.    Care Progression on Track?: Pos  Next IDT Planned Review: 25           Next IDT Planned Review: 2025    No future appointments.    SN Notes:   Diet: - Oral Diet:  Carb Control 5 carbs/meal (2000kcals/day)  - Oral Nutrition Supplement:  Diabetic  daily  Wounds: left, right, and buttocks Stage 1 nonblanchable erythema or discoloration and

## 2025-01-23 NOTE — PROGRESS NOTES
Physician Progress Note      PATIENT:               KEIRA DIAS  CSN #:                  [id number]  :                       1950  ADMIT DATE:       2025 7:31 PM  DISCH DATE:        2025 12:43 PM  RESPONDING  PROVIDER #:        Evin Boggs MD          QUERY TEXT:    Patient admitted with fatigue  Noted documentation of Acute Kidney Injury in   PN  and  .  In order to support the diagnosis of JAGDEEP, please include   additional clinical indicators in your documentation.? Or please document if   the diagnosis of JAGDEEP has been ruled out after further study.  The medical record reflects the following:  Risk Factors: Electrolyte imbalance, CKD 4  Clinical Indicators:  PN  and PN  state JAGDEEP. Cr 2.5-2.5-2.4-2.5-2.2  Treatment:  Imaging for Pna and Pancreatitis, hold fluids, Nephrology consult,   Ur studies. labs    Defined by Kidney Disease Improving Global Outcomes (KDIGO) clinical practice   guideline for acute kidney injury:  -Increase in SCr by greater than or equal to 0.3 mg/dl within 48 hours; or  -Increase or decrease in SCr to greater than or equal to 1.5 times baseline,   which is known or presumed to have occurred within the prior 7 days; or  -Urine volume < 0.5ml/kg/h for 6 hours.  Options provided:  -- Acute kidney injury evidenced by, Please document evidence as well as a   numerical baseline creatinine, if known.  -- Currently resolved acute kidney injury was evidenced by, Please document   evidence as well as a numerical baseline creatinine, if known.  -- Acute kidney injury ruled out after study  -- Other - I will add my own diagnosis  -- Disagree - Not applicable / Not valid  -- Disagree - Clinically unable to determine / Unknown  -- Refer to Clinical Documentation Reviewer    PROVIDER RESPONSE TEXT:    This patient has acute kidney injury as evidenced by    Query created by: Luly Rodrigues on 2025 2:25 PM      Electronically signed by:  Evin Boggs MD  1/23/2025 2:52 PM

## 2025-01-23 NOTE — TELEPHONE ENCOUNTER
Patient due for pulm appt with Dr. Gustafson in two weeks for right hilar mass on 1/16/25 CT.   Please call patient to schedule.

## 2025-01-24 ENCOUNTER — TELEPHONE (OUTPATIENT)
Dept: PULMONOLOGY | Age: 75
End: 2025-01-24

## 2025-01-24 NOTE — PROGRESS NOTES
Physician Progress Note      PATIENT:               KEIRA DIAS  [facility] #:                  [id number]  :                       1950  ADMIT DATE:       2025 7:31 PM  DISCH DATE:        2025 12:43 PM  RESPONDING  PROVIDER #:        ISRA BETANCOURT PA-C          QUERY TEXT:    Patient admitted with Fatigue. Per wound consult on 24,Pt  noted to also   have pressure ulcer. If possible, please document in progress notes and   discharge summary the location, present on admission status and stage of the   pressure ulcer:    The medical record reflects the following:  Risk Factors: Dementia, CKD, Gait abnormalities  Clinical Indicators: Wound note : Patient with stage 1 and dti on buttocks   at admission.  Treatment: wound consult    Stage 1:  Non-blanchable erythema of intact skin  Stage 2:  Abrasion, Blister, Partial-thickness skin loss, with exposed dermis  Stage 3:  Full-thickness skin loss with damage or necrosis of subcutaneous   tissue  Stage 4:  Full-thickness skin & soft tissue loss through to underlying muscle,   tendon or bone  Unstageable: Obscured full-thickness skin & tissue loss  Options provided:  -- Stage 1 pressure ulcer and deep tissue injury on buttocks, present on   admission  -- Other - I will add my own diagnosis  -- Disagree - Not applicable / Not valid  -- Disagree - Clinically unable to determine / Unknown  -- Refer to Clinical Documentation Reviewer    PROVIDER RESPONSE TEXT:    This patient has a Stage 1 pressure ulcer and deep tissue injury on buttocks   which was present on admission.    Query created by: Luly Rodrigues on 2025 1:34 PM      QUERY TEXT:    Pt admitted with fatigue.  Pt noted to have HTN. If possible, please document   in progress notes and discharge summary if you are evaluating and/or treating   any of the following:    The medical record reflects the following:  Risk Factors: CKD 4, HTN, AMS  Clinical Indicators: B/Ps  198/102-181/115-203/114-195/101-189/92 day of admit.    ER: AMS, Fatigue  Treatment: ECHO, Neuro consult, GI consult,  Labetalol IV 10 mg x 1 dose    Hypertensive Urgency: Defined as SBP of >180 OR DBP >120 w/o associated organ   damage. S/s may or may not be present, but can include severe headache, SOB,   epistaxis, severe anxiety. Tx: adjustment of oral BP medication; IV meds not   usually required.  Hypertensive Emergency: SBP of >180 OR DBP >120 w/ associated organ damage   (CVA, MI, acute CHF, JAGDEEP, encephalopathy, pulmonary edema, and unstable   angina). Documentation should include specific organ affected.  Requires   immediate treatment, usually with IV meds.  Hypertensive Crisis, unspecified: SBP of > 180 OR DBP > 120 and includes   damage to blood vessels, including inflammation, leakage of fluid or blood and   can cause stroke, headache, heart failure and eclampsia.  Source: [employer] MS-DRG Training Guide and Quick Reference Guide  Options provided:  -- Hypertensive Crisis  -- Hypertensive Emergency  -- Hypertensive Urgency  -- Other - I will add my own diagnosis  -- Disagree - Not applicable / Not valid  -- Disagree - Clinically unable to determine / Unknown  -- Refer to Clinical Documentation Reviewer    PROVIDER RESPONSE TEXT:    This patient has hypertensive urgency.    Query created by: Luly Rodrigues on 1/23/2025 2:31 PM      QUERY TEXT:    Patient admitted with fatigue, noted to have possible PNA vs aspiration. If   possible, please document in progress notes and discharge summary if you are   evaluating and/or treating any of the following:    The medical record reflects the following:  Risk Factors: AMS, CKD4, Dementia, HTN  Clinical Indicators: ED: Does show findings of possible aspiration pneumonia   will cover with Zosyn, Pro [place] 18.10, WBC 14.5, , pro BNP 32,576. CT   -Mild tree-in-bud opacities within the right middle lobe, which may be related   to bronchiolitis or aspiration.

## 2025-01-28 ENCOUNTER — CARE COORDINATION (OUTPATIENT)
Dept: CARE COORDINATION | Age: 75
End: 2025-01-28

## 2025-01-28 NOTE — CARE COORDINATION
Care Transitions Post-Acute Facility Discharge Call    2025    Patient: Iwona Alva Patient : 1950   MRN: <[id number]>  Reason for Admission: acute pancreatitis (lipase- 1,124 (normal 13/60), hypertensive emergency 203/114, , BNP 32,576, AMS, CKD    Discharge Date: 25 RARS: Readmission Risk Score: 15.8       Post Acute Facility Update    Care Transitions Post Acute Facility Update    Care Transitions Interventions      Post Acute Facility Update   Post Acute Facility: [facility]    ELOS: 21 days      Nursing   Prescribed diet: regular w/ Ensure Plus bid for wound healing and David bid for wound healing    Nutrition intake assessment: 75%   Wounds: Pos   Wound Location: stage III left buttock   Wound Care Therapies: Left buttock - cleanse ulcer, pat dry, apply Betadine moistened gauze with an ABD pad Qshift and PRN every shift for Pressure ulcer AND as needed   Labs: Normal   Disease specific clinical considerations: HTN, possible CHF (BNP - ,276), Dupuytreris contractured, snoker 1-2 ppd, back issues from prior fusion- debilitating   Reported Nursing Updates: 154.8#, 124/83, 97.2, 81 bpm, 16, 94% on room air, pain 2/10.     LOS charting  Current LOS:  7      ELOS:  18-21  Anticipated DOD:  pending DC date   3DW admission?  no  PLOF: home alone   Goals of care:  return home/ get stronger   Wounds/ treatment/ stage: stage III left buttock- 1.5 x 2.0 x 0.1 cm, 100% slough. Right buttock wound resolved  Labs? BUN/ creatinine slightly elevated- repeat labs ordered    Disease specific clinical considerations: HTN, possible CHF (BNP - ,276), Dupuytreris contractured, snoker 1-2 ppd, back issues from prior fusion- debilitating  Palliative/ hospice referral? no  Does caregiver need any support/ have needs?   Anticipated DC dispo/ services/ date: home with 24 hour care (family cannot provide), [facility] vs LTC  DME/ equipment needed at DC?    Barriers to transition? Weakness,

## 2025-01-29 NOTE — PROGRESS NOTES
Physician Progress Note      PATIENT:               KEIRA DIAS  CSN #:                  [id number]  :                       1950  ADMIT DATE:       2025 7:31 PM  DISCH DATE:        2025 12:43 PM  RESPONDING  PROVIDER #:        BEATRICE Vega CNP          QUERY TEXT:    Pt admitted with fatigue . Pt noted to have aspiration pneumonia. If possible,   please document in the progress notes and discharge summary if you are   evaluating and /or treating any of the following:  The medical record reflects the following:  Risk Factors: aspiration pneumonia  Clinical Indicators: Patient with lethargy and vomiting after eating a fish   sandwich was noted to have WBC 14.5 and procalcitonin 18.10. Chest/ABD CT   showed opacities. Patient was diagnosed with aspiration pneumonia.  Treatment:  IV Zosyn and Augmentin for 5 days and discharged on Augmentin x 5   additional days. CXR, CT chest  Options provided:  -- Sepsis due to aspiration pneumonia , present on admission  -- Aspiration pneumonia without Sepsis  -- Sepsis was ruled out  -- Other - I will add my own diagnosis  -- Disagree - Not applicable / Not valid  -- Disagree - Clinically unable to determine / Unknown  -- Refer to Clinical Documentation Reviewer    PROVIDER RESPONSE TEXT:    This patient has aspiration pneumonia without Sepsis.    Query created by: Luly Rodrigues on 2025 6:11 PM      Electronically signed by:  BEATRICE Vega CNP 2025 12:57   PM

## 2025-02-03 ENCOUNTER — CLINICAL DOCUMENTATION (OUTPATIENT)
Dept: PULMONOLOGY | Age: 75
End: 2025-02-03

## 2025-02-03 NOTE — PROGRESS NOTES
Called Susan - she confirmed patient will be coming to see us on 2/10 to work up lung mass/lnd    Emeka Gustafson MD   Pulmonary and Critical Care Medicine  [facility]

## 2025-02-04 ENCOUNTER — CARE COORDINATION (OUTPATIENT)
Dept: CARE COORDINATION | Age: 75
End: 2025-02-04

## 2025-02-04 NOTE — CARE COORDINATION
Care Transitions Post-Acute Facility Discharge Call    2025    Patient: Iwona Alva Patient : 1950   MRN: <[id number]>  Reason for Admission: acute pancreatitis (lipase- 1,124 (normal 13/60), hypertensive emergency 203/114, , BNP 32,576, AMS, CKD     Discharge Date: 25 RARS: Readmission Risk Score: 15.8       Post Acute Facility Update    Care Transitions Post Acute Facility Update    Care Transitions Interventions      Post Acute Facility Update   Post Acute Facility: [facility]    ELOS: 21 days      Nursing   Prescribed diet: regular w/ Ensure Plus bid for wound healing and David bid for wound healing    Nutrition intake assessment: 25-50%   Wounds: Pos   Wound Location: left buttock- almost healed   Wound Care Therapies: Left buttock - cleanse ulcer, pat dry, apply Betadine moistened gauze with an ABD pad Qshift and PRN  every shift for Pressure ulcer AND as needed   Labs: Normal   Disease specific clinical considerations: HTN, possible CHF (BNP - ,276), Dupuytreris contractured, snoker 1-2 ppd, back issues from prior fusion- debilitating   Reported Nursing Updates: 161#, 145/82, 97.7, 67 bpm, 14, 93% on room air, pain 4/10 (pain ranging 0-4/10)- new order 2/3 Give 1 tablet by mouth three times a day for moderate to severe pain AND Give 1 tablet by mouth as needed for pain bid)  LOS charting  Current LOS:  14      ELOS:  18-21  Anticipated DOD:    3DW admission?  no  PLOF: home alone   Goals of care:  return home   Wounds/ treatment/ stage: almost healed stage III On left buttock   Labs? Unremarkable    Disease specific clinical considerations: HTN, possible CHF (BNP - ,276), Dupuytreris contractured, snoker 1-2 ppd, back issues from prior fusion- debilitating  Palliative/ hospice referral? no  Does caregiver need any support/ have needs?   Anticipated DC dispo/ services/ date: looking at LTC or 24 hour care at home- SW gave family info for [facility]

## 2025-02-05 RX ORDER — OXYCODONE HYDROCHLORIDE 5 MG/1
5 TABLET ORAL 3 TIMES DAILY
COMMUNITY
Start: 2025-02-03

## 2025-02-10 ENCOUNTER — OFFICE VISIT (OUTPATIENT)
Dept: PULMONOLOGY | Age: 75
End: 2025-02-10
Payer: MEDICARE

## 2025-02-10 VITALS
DIASTOLIC BLOOD PRESSURE: 78 MMHG | SYSTOLIC BLOOD PRESSURE: 140 MMHG | HEIGHT: 61 IN | WEIGHT: 163.4 LBS | OXYGEN SATURATION: 99 % | HEART RATE: 81 BPM | BODY MASS INDEX: 30.85 KG/M2

## 2025-02-10 DIAGNOSIS — R59.0 HILAR LYMPHADENOPATHY: ICD-10-CM

## 2025-02-10 DIAGNOSIS — R91.8 LUNG MASS: Primary | ICD-10-CM

## 2025-02-10 DIAGNOSIS — F17.201 TOBACCO USE DISORDER, SEVERE, IN EARLY REMISSION: ICD-10-CM

## 2025-02-10 PROCEDURE — 1123F ACP DISCUSS/DSCN MKR DOCD: CPT | Performed by: STUDENT IN AN ORGANIZED HEALTH CARE EDUCATION/TRAINING PROGRAM

## 2025-02-10 PROCEDURE — 1036F TOBACCO NON-USER: CPT | Performed by: STUDENT IN AN ORGANIZED HEALTH CARE EDUCATION/TRAINING PROGRAM

## 2025-02-10 PROCEDURE — 3078F DIAST BP <80 MM HG: CPT | Performed by: STUDENT IN AN ORGANIZED HEALTH CARE EDUCATION/TRAINING PROGRAM

## 2025-02-10 PROCEDURE — 1159F MED LIST DOCD IN RCRD: CPT | Performed by: STUDENT IN AN ORGANIZED HEALTH CARE EDUCATION/TRAINING PROGRAM

## 2025-02-10 PROCEDURE — G8417 CALC BMI ABV UP PARAM F/U: HCPCS | Performed by: STUDENT IN AN ORGANIZED HEALTH CARE EDUCATION/TRAINING PROGRAM

## 2025-02-10 PROCEDURE — 1090F PRES/ABSN URINE INCON ASSESS: CPT | Performed by: STUDENT IN AN ORGANIZED HEALTH CARE EDUCATION/TRAINING PROGRAM

## 2025-02-10 PROCEDURE — 3077F SYST BP >= 140 MM HG: CPT | Performed by: STUDENT IN AN ORGANIZED HEALTH CARE EDUCATION/TRAINING PROGRAM

## 2025-02-10 PROCEDURE — G8427 DOCREV CUR MEDS BY ELIG CLIN: HCPCS | Performed by: STUDENT IN AN ORGANIZED HEALTH CARE EDUCATION/TRAINING PROGRAM

## 2025-02-10 PROCEDURE — 1111F DSCHRG MED/CURRENT MED MERGE: CPT | Performed by: STUDENT IN AN ORGANIZED HEALTH CARE EDUCATION/TRAINING PROGRAM

## 2025-02-10 PROCEDURE — G8399 PT W/DXA RESULTS DOCUMENT: HCPCS | Performed by: STUDENT IN AN ORGANIZED HEALTH CARE EDUCATION/TRAINING PROGRAM

## 2025-02-10 RX ORDER — SENNOSIDES A AND B 8.6 MG/1
2 TABLET, FILM COATED ORAL DAILY
COMMUNITY

## 2025-02-10 RX ORDER — HYDRALAZINE HYDROCHLORIDE 50 MG/1
50 TABLET, FILM COATED ORAL 3 TIMES DAILY
COMMUNITY

## 2025-02-10 NOTE — PROGRESS NOTES
Pulmonary and Critical Care Medicine    Date: 2/10/2025  CC: lung mass/lymphadenopathy     HPI     HPI: Ms. Alva is a 75 y.o. female with a PMH of tobacco use (46 pack yr x 2PPD), CKD, whom I had the pleasure of seeing during her hospitalization around 1/17/2025 for AMS, fatigue, diarrhea, weight loss. She was found to have pancreatitis and thus was treated for pancreatitis, during the work up she also underwent CT chest which showed right hilar fullness concerning for nodule/lymphadenopathy which was treated as aspiration PNA with abx. Since her discharge she has been admitted to rehab, she has gained weight since discharge. she has quit smoking, at baseline she has very infrequent cough, might have intermittent wheezing, she has a hx of \"bronchitis\" once a year. She is not on oxygen or inhalers.    ROS: negative except stated above     PMH:  has a past medical history of Hyperlipidemia, Hypertension, Impaired fasting glucose, Insomnia, unspecified, Osteoarthritis, and Thyroid disease.   PSH:  has a past surgical history that includes Cholecystectomy; Rotator cuff repair (Right, 2/98); Carpal tunnel release (Left, 51979108); lumbar laminectomy (8/20/15); and lumbar fusion (N/A, 2/16/2021).    SH:  reports that she quit smoking about 3 weeks ago. Her smoking use included cigarettes. She started smoking about 46 years ago. She has a 46 pack-year smoking history. She has never used smokeless tobacco. She reports that she does not drink alcohol and does not use drugs.   FH: family history includes Cancer in her brother and father.    Allergies: Haemophilus influenzae vaccines, Acetaminophen, Hydrocodone, Influenza virus vacc split pf, Pravastatin sodium [pravastatin sodium], and Vicodin [hydrocodone-acetaminophen]   Work: [employer]   Home: no mold  Pets: cat    OBJECTIVE DATA       PHYSICAL EXAM:   BP (!) 140/78 (Site: Left Upper Arm, Position: Sitting, Cuff Size: Large Adult)   Pulse 81   Ht 1.549 m (5' 1\")

## 2025-02-11 ENCOUNTER — CARE COORDINATION (OUTPATIENT)
Dept: CARE COORDINATION | Age: 75
End: 2025-02-11

## 2025-02-12 NOTE — CARE COORDINATION
Care Transitions Post-Acute Facility Discharge Call    2025    Patient: Iwona Alva Patient : 1950   MRN: <[id number]>  Reason for Admission: acute pancreatitis (lipase- 1,124 (normal 13/60), hypertensive emergency 203/114, , BNP 32,576, AMS, CKD      Discharge Date: 25 RARS: Readmission Risk Score: 15.8       Post Acute Facility Update    Care Transitions Post Acute Facility Update    Care Transitions Interventions      Post Acute Facility Update   Post Acute Facility: [facility]    ELOS: 21 days      Nursing   Prescribed diet: regular w/ Ensure Plus bid for wound healing and David bid for wound healing    Nutrition intake assessment: poor   Wounds: Pos   Wound Location: stage III left buttock- improving   Wound Care Therapies: Left buttock - cleanse ulcer, pat dry, apply Betadine moistened gauze with an ABD pad Qshift and PRN   Disease specific clinical considerations: HTN, possible CHF (BNP - 32,276), Dupuytreris contractured, snoker 1-2 ppd, back issues from prior fusion- debilitating   Reported Nursing Updates: 156.8#, 150/896, 97.0, 108 bpm (has been WNL previous days), 16, 94% on room air, pain 0/10  LOS charting  Current LOS:  21      ELOS:  18-21  Anticipated DOD:   3DW admission?  no  PLOF: home alone   Goals of care:  get stronger  Wounds/ treatment/ stage: see above   Labs? no   Disease specific clinical considerations: HTN, possible CHF (BNP - 32,276), Dupuytreris contractured, snoker 1-2 ppd, back issues from prior fusion- debilitating  Palliative/ hospice referral? Has not been discussed with pt/ family   Does caregiver need any support/ have needs?   Anticipated DC dispo/ services/ date: DC LTC   DME/ equipment needed at DC? no   Barriers to transition? Weakness/ cognition   Care progression on track w/ ELOS? Yes but will be over on days at DC date   Interventions if progression not on track? Therapies have continued   Care conference-

## 2025-02-24 ENCOUNTER — CARE COORDINATION (OUTPATIENT)
Dept: CARE COORDINATION | Age: 75
End: 2025-02-24

## 2025-02-24 NOTE — CARE COORDINATION
Care Transitions Post-Acute Facility Discharge Call    2025    Patient: Iwona Alva Patient : 1950   MRN: <[id number]>  Reason for Admission: acute pancreatitis (lipase- 1,124 (normal 13/60), hypertensive emergency 203/114, , BNP 32,576, AMS, CKD      Discharge Date: 25 RARS: Readmission Risk Score: 15.8    Email to MATT Ramachandran/ LUCERO with [facility] to confirm pt admission status. She replied back pt has been private pay since  and will be discharging to [facility] today. Enrollment ended     Discharge Facility:    Care Transitions Post Acute Facility Transition    Post Acute Facility: [facility]  Post Acute Admit Date: 25  Post Acute Discharge Date: 25  ELOS: 21 days  Do you have all of your prescriptions and are they filled?: Yes            Care Transitions Interventions         Future Appointments   Date Time Provider Department Center   3/3/2025 11:00 AM Emeka Gustafson MD PULM & CC [facility]   2025  1:00 PM Michael Jane MD AFL NEPH FF [facility]       ALLISON PETTY RN

## 2025-02-25 ENCOUNTER — TELEPHONE (OUTPATIENT)
Dept: CASE MANAGEMENT | Age: 75
End: 2025-02-25

## 2025-02-25 NOTE — TELEPHONE ENCOUNTER
I see that the patient has NOT completed her PET scan on 2/17/25 before her follow up appt with Dr. Gustafson on 3/3.    Please call patient to see if she needs to reschedule PET and 3/3 Janay Appt.    Thanks.    Susan Galeana RN  Lung Navigator  [place] [id number] [phone]  [email or web address]

## 2025-03-02 DIAGNOSIS — I10 ESSENTIAL HYPERTENSION: ICD-10-CM

## 2025-03-03 ENCOUNTER — TELEPHONE (OUTPATIENT)
Dept: PULMONOLOGY | Age: 75
End: 2025-03-03

## 2025-03-03 RX ORDER — VERAPAMIL HYDROCHLORIDE 120 MG/1
TABLET, FILM COATED, EXTENDED RELEASE ORAL
Qty: 90 TABLET | Refills: 0 | OUTPATIENT
Start: 2025-03-03

## 2025-03-03 NOTE — TELEPHONE ENCOUNTER
Facility called and was able to reschedule PET for the pt for 3/24/2025 at 8 am. I r/s her for 3/31/25 for Dr Gustafson f/u. I let her know he also wants a PFT done before that as well. Gave her central scheduling number to call and schedule PFT.   normal...

## 2025-03-03 NOTE — TELEPHONE ENCOUNTER
Medication:   Requested Prescriptions     Pending Prescriptions Disp Refills    verapamil (CALAN SR) 120 MG extended release tablet [Pharmacy Med Name: VERAPAMIL  MG TABLET] 90 tablet 0     Sig: TAKE 1 TABLET BY MOUTH DAILY      Provider out of office.     Patient Phone Number: [phone] (home)     Last appt: 2/19/2024   Next appt: Visit date not found    Last OARRS:       2/17/2021    11:53 AM   RX Monitoring   Periodic Controlled Substance Monitoring Possible medication side effects, risk of tolerance/dependence & alternative treatments discussed.;No signs of potential drug abuse or diversion identified.     PDMP Monitoring:    Last PDMP [place] as Reviewed ([place]):  Review User Review Instant Review Result   ROSEMARY HUDSON 12/5/2023  1:46 PM Reviewed PDMP [1]     Preferred Pharmacy:   [facility]  [id number]  [place]  Phone: [phone] Fax: [phone]    [facility] [id number] - [place], [place] - [address] [facility] [phone] -  [phone]  [address] [place] [address]  Phone: [phone] Fax: [phone]

## 2025-03-03 NOTE — TELEPHONE ENCOUNTER
Facility that pt is currently at called to see if the patient was scheduled for any visits that they need to set up transportation to. I let her know we don't yhave any current appts but she was scheduled for a PET in Feb and a f/u with Dr Gustafson after that but those were cancelled when she was hospitalized. They wanted to r/s those. I told her she can call scheduling for PET scan to see if they can just r/s it since it was already scheduled before and just cancelled. Gave her the number to call for PET r/s then she will call us back to r/s for Dr Gustafson. Told her if she cannot r/s the PET to call me back and I will have his MA call to r/s it.

## 2025-03-06 ENCOUNTER — CLINICAL DOCUMENTATION (OUTPATIENT)
Dept: PULMONOLOGY | Age: 75
End: 2025-03-06

## 2025-03-06 NOTE — PROGRESS NOTES
Called Susan to follow up on PET scan  Patient has significantly decline, now in a nursing home  She is going to be taken to [facility] due to proximity for AMS.  I asked Susan to keep me updated.     Emeka Gustafson MD   Pulmonary and Critical Care Medicine  [facility]

## 2025-03-24 ENCOUNTER — HOSPITAL ENCOUNTER (OUTPATIENT)
Dept: PET IMAGING | Age: 75
Discharge: HOME | End: 2025-03-24
Attending: STUDENT IN AN ORGANIZED HEALTH CARE EDUCATION/TRAINING PROGRAM

## 2025-03-26 ENCOUNTER — HOSPITAL ENCOUNTER (OUTPATIENT)
Dept: PULMONOLOGY | Age: 75
Discharge: HOME | End: 2025-03-26
Attending: STUDENT IN AN ORGANIZED HEALTH CARE EDUCATION/TRAINING PROGRAM

## 2025-03-26 RX ORDER — ALBUTEROL SULFATE 90 UG/1
4 INHALANT RESPIRATORY (INHALATION) ONCE
Status: CANCELLED | OUTPATIENT
Start: 2025-03-26

## 2025-04-02 ENCOUNTER — TELEPHONE (OUTPATIENT)
Dept: PULMONOLOGY | Age: 75
End: 2025-04-02

## 2025-04-02 NOTE — TELEPHONE ENCOUNTER
Attempted to call patient's daughter to f/u on patient. Patient was ordered to have PET and PFT. Both tests are complete but patient no showed her appt on Monday 3/31. LVM advising daughter to return call to office to get appt r/s to f/u on test results and next steps. Provided office number via voicemail.

## (undated) DEVICE — DRAPE MICSCP W132XL406CM LENS DIA68MM W VARI LENS2 FOR LEICA

## (undated) DEVICE — STERILE LATEX POWDER-FREE SURGICAL GLOVESWITH NITRILE AND EMOLLIENT COATINGS: Brand: PROTEXIS

## (undated) DEVICE — SYMMETRY SHARP KERRISON® RONGEUR TIPS, THIN FOOTPLATE, 3 MM TIP, SINGLE USE, (3/BX): Brand: SYMMETRY SHARP KERRISON

## (undated) DEVICE — SPONGES GAUZE X-RAY 4X4 16PLY

## (undated) DEVICE — SHEET,DRAPE,53X77,STERILE: Brand: MEDLINE

## (undated) DEVICE — MASTISOL ADHESIVE LIQ 2/3ML

## (undated) DEVICE — COVER,TABLE,77X90,STERILE: Brand: MEDLINE

## (undated) DEVICE — NEURO HBO 85949

## (undated) DEVICE — SOLUTION IV IRRIG POUR BRL 0.9% SODIUM CHL 2F7124

## (undated) DEVICE — COVER,MAYO STAND,XL,STERILE: Brand: MEDLINE

## (undated) DEVICE — 3.0MM NEURO (MATCH HEAD)

## (undated) DEVICE — POSITIONER HD SFT TCH BERRY FOAM DEVON

## (undated) DEVICE — PROTECTOR EYE PT SELF ADH NS OPT GRD LF

## (undated) DEVICE — SUTURE MCRYL SZ 4-0 L27IN ABSRB UD L19MM PS-2 1/2 CIR PRIM Y426H

## (undated) DEVICE — SUTURE VCRL SZ 0 L18IN ABSRB UD L36MM CT-1 1/2 CIR J840D

## (undated) DEVICE — BLANKET WRM W29.9XL79.1IN UP BODY FORC AIR MISTRAL-AIR

## (undated) DEVICE — SUTURE VCRL SZ 2-0 L18IN ABSRB UD CT-1 L36MM 1/2 CIR J839D

## (undated) DEVICE — 3M™ STERI-STRIP™ REINFORCED ADHESIVE SKIN CLOSURES, R1547, 1/2 IN X 4 IN (12 MM X 100 MM), 6 STRIPS/ENVELOPE: Brand: 3M™ STERI-STRIP™

## (undated) DEVICE — PIN, 9733235, 100MM, STERILE, PERC REF

## (undated) DEVICE — TRAY PREP DRY W/ PREM GLV 2 APPL 6 SPNG 2 UNDPD 1 OVERWRAP

## (undated) DEVICE — GAUZE,SPONGE,4"X4",8PLY,STRL,LF,10/TRAY: Brand: MEDLINE

## (undated) DEVICE — ARM CRADLE: Brand: DEVON

## (undated) DEVICE — GOWNS CAPE 3 PLY WHT DISPOSABLE 30 X 42INCH

## (undated) DEVICE — MERCY FAIRFIELD TURNOVER KIT: Brand: MEDLINE INDUSTRIES, INC.

## (undated) DEVICE — 3M™ TEGADERM™ TRANSPARENT FILM DRESSING FRAME STYLE, 1628, 6 IN X 8 IN (15 CM X 20 CM), 10/CT 8CT/CASE: Brand: 3M™ TEGADERM™

## (undated) DEVICE — RONGEUR SURG KERRISON 2 MM SHRP THN FTPLT DISP

## (undated) DEVICE — TOWEL,OR,DSP,ST,BLUE,DLX,10/PK,8PK/CS: Brand: MEDLINE

## (undated) DEVICE — ELECTRODE PT RET AD L9FT HI MOIST COND ADH HYDRGEL CORDED

## (undated) DEVICE — SURE SET SINGLE BASIN-LF: Brand: MEDLINE INDUSTRIES, INC.

## (undated) DEVICE — DRAPE,LAP,CHOLE,W/TROUGHS,STERILE: Brand: MEDLINE

## (undated) DEVICE — Device: Brand: SNAP ON SPHERZ